# Patient Record
Sex: FEMALE | Race: WHITE | NOT HISPANIC OR LATINO | Employment: OTHER | URBAN - METROPOLITAN AREA
[De-identification: names, ages, dates, MRNs, and addresses within clinical notes are randomized per-mention and may not be internally consistent; named-entity substitution may affect disease eponyms.]

---

## 2017-01-16 DIAGNOSIS — E78.5 HYPERLIPIDEMIA LDL GOAL <160: ICD-10-CM

## 2017-01-16 DIAGNOSIS — R73.01 IMPAIRED FASTING GLUCOSE: ICD-10-CM

## 2017-01-16 DIAGNOSIS — L68.0 HIRSUTISM: ICD-10-CM

## 2017-01-16 DIAGNOSIS — E66.09 NON MORBID OBESITY DUE TO EXCESS CALORIES: ICD-10-CM

## 2017-01-16 DIAGNOSIS — F33.1 MAJOR DEPRESSIVE DISORDER, RECURRENT EPISODE, MODERATE (H): Primary | ICD-10-CM

## 2017-01-16 DIAGNOSIS — Z11.59 NEED FOR HEPATITIS C SCREENING TEST: ICD-10-CM

## 2017-01-16 NOTE — TELEPHONE ENCOUNTER
buPROPion (WELLBUTRIN XL) 150 MG 24 hr tablet  Last Written Prescription Date: 10/19/16  Last Fill Quantity: 90; # refills: 0  Last Office Visit with [facility], [facility] or [facility] prescribing provider:  5/13/16        Last PHQ-9 score on record=   PHQ-9 SCORE 10/19/2016   Total Score -   Total Score MyChart -   Total Score 6       AST       23   1/17/2012  ALT       21   1/17/2012

## 2017-01-16 NOTE — TELEPHONE ENCOUNTER
spironolactone (ALDACTONE) 100 MG tablet  Last Written Prescription Date: 12/24/15  Last Fill Quantity: 90, # refills: 3  Last Office Visit with [facility], [facility] or [facility] prescribing provider: 5/13/16       POTASSIUM   Date Value Ref Range Status   11/06/2015 4.8 3.4 - 5.3 mmol/L Final     CREATININE   Date Value Ref Range Status   11/06/2015 0.84 0.52 - 1.04 mg/dL Final     BP Readings from Last 3 Encounters:   05/13/16 128/85   04/15/16 118/80   02/22/16 110/63

## 2017-01-19 RX ORDER — SPIRONOLACTONE 100 MG/1
100 TABLET, FILM COATED ORAL EVERY MORNING
Qty: 30 TABLET | Refills: 0 | Status: SHIPPED | OUTPATIENT
Start: 2017-01-19 | End: 2017-05-23

## 2017-01-19 RX ORDER — BUPROPION HYDROCHLORIDE 150 MG/1
150 TABLET ORAL EVERY MORNING
Qty: 90 TABLET | Refills: 0 | Status: SHIPPED | OUTPATIENT
Start: 2017-01-19 | End: 2017-05-03

## 2017-01-19 NOTE — TELEPHONE ENCOUNTER
[facility] pharmacy called, please fill or deny ASAP.    Thank you,    Marnie Mcarthur

## 2017-01-19 NOTE — TELEPHONE ENCOUNTER
Routing refill request to provider for review/approval because:  Labs not current:  hugo James. PHQ 9 >4. Please sign if ok. Lab teed up.  Mamta Maurice, RN  Triage Nurse

## 2017-02-06 ENCOUNTER — TELEPHONE (OUTPATIENT)
Dept: PEDIATRICS | Facility: CLINIC | Age: 67
End: 2017-02-06

## 2017-02-06 NOTE — TELEPHONE ENCOUNTER
2/6/2017    Call Regarding Preventive Health Screening Mammogram    Attempt 1    Message on voicemail     Comments:         Outreach   Lorraine Solis

## 2017-03-06 DIAGNOSIS — F33.1 MAJOR DEPRESSIVE DISORDER, RECURRENT EPISODE, MODERATE (H): ICD-10-CM

## 2017-03-06 RX ORDER — CITALOPRAM HYDROBROMIDE 20 MG/1
20 TABLET ORAL DAILY
Qty: 90 TABLET | Refills: 0 | Status: SHIPPED | OUTPATIENT
Start: 2017-03-06 | End: 2017-05-23

## 2017-03-06 NOTE — TELEPHONE ENCOUNTER
Routing refill request to provider for review/approval because:  PHQ 9 >4, please sign if ok.   Mamta Maurice, RN  Triage Nurse

## 2017-03-06 NOTE — TELEPHONE ENCOUNTER
CITALOPRAM 20MG     Last Written Prescription Date: 8/12/2016  Last Fill Quantity: 90, # refills: 1  Last Office Visit with [facility] primary care provider:  5/13/2016        Last PHQ-9 score on record=   PHQ-9 SCORE 10/19/2016   Total Score MyChart -   Total Score 6

## 2017-03-10 ENCOUNTER — TELEPHONE (OUTPATIENT)
Dept: PEDIATRICS | Facility: CLINIC | Age: 67
End: 2017-03-10

## 2017-03-10 NOTE — TELEPHONE ENCOUNTER
Type of outreach:  Sent [employer] message.  Health Maintenance Due   Topic Date Due     ADVANCE DIRECTIVE PLANNING Q5 YRS (NO INBASKET)  08/29/1970     HEPATITIS C SCREENING  08/29/1970     MAMMO Q2 YR NO INBASKET MESSAGE  05/20/2015     DEXA Q5 YR  05/19/2016     DEPRESSION ACTION PLAN Q1 YR (NO INBASKET)  07/29/2016     BMP Q1 YR (NO INBASKET)  11/06/2016     LIPID MONITORING Q1 YEAR( NO INBASKET)  11/06/2016     Lida Barajas CMA ([facility])

## 2017-03-24 ENCOUNTER — TELEPHONE (OUTPATIENT)
Dept: PEDIATRICS | Facility: CLINIC | Age: 67
End: 2017-03-24

## 2017-03-24 ENCOUNTER — RADIANT APPOINTMENT (OUTPATIENT)
Dept: BONE DENSITY | Facility: CLINIC | Age: 67
End: 2017-03-24
Attending: INTERNAL MEDICINE
Payer: COMMERCIAL

## 2017-03-24 DIAGNOSIS — E78.5 HYPERLIPIDEMIA LDL GOAL <160: ICD-10-CM

## 2017-03-24 DIAGNOSIS — M85.80 OSTEOPENIA: ICD-10-CM

## 2017-03-24 DIAGNOSIS — R73.01 IMPAIRED FASTING GLUCOSE: ICD-10-CM

## 2017-03-24 DIAGNOSIS — Z11.59 NEED FOR HEPATITIS C SCREENING TEST: ICD-10-CM

## 2017-03-24 DIAGNOSIS — L68.0 HIRSUTISM: ICD-10-CM

## 2017-03-24 PROCEDURE — 77080 DXA BONE DENSITY AXIAL: CPT | Mod: 59 | Performed by: FAMILY MEDICINE

## 2017-03-24 PROCEDURE — 77081 DXA BONE DENSITY APPENDICULR: CPT | Performed by: FAMILY MEDICINE

## 2017-03-24 PROCEDURE — 80048 BASIC METABOLIC PNL TOTAL CA: CPT | Performed by: INTERNAL MEDICINE

## 2017-03-24 PROCEDURE — 86803 HEPATITIS C AB TEST: CPT | Performed by: INTERNAL MEDICINE

## 2017-03-24 PROCEDURE — 36415 COLL VENOUS BLD VENIPUNCTURE: CPT | Performed by: INTERNAL MEDICINE

## 2017-03-24 PROCEDURE — 80061 LIPID PANEL: CPT | Performed by: INTERNAL MEDICINE

## 2017-03-24 NOTE — TELEPHONE ENCOUNTER
Type of outreach:  Phone, left message for patient to call back.   Health Maintenance Due   Topic Date Due     ADVANCE DIRECTIVE PLANNING Q5 YRS (NO INBASKET)  08/29/1970     HEPATITIS C SCREENING  08/29/1970     MAMMO Q2 YR NO INBASKET MESSAGE  05/20/2015     DEXA Q5 YR  05/19/2016     DEPRESSION ACTION PLAN Q1 YR (NO INBASKET)  07/29/2016     BMP Q1 YR (NO INBASKET)  11/06/2016     LIPID MONITORING Q1 YEAR( NO INBASKET)  11/06/2016     PHQ-9 Q6 MONTHS (NO INBASKET)  04/19/2017     Lida Barajas CMA ([facility])

## 2017-03-24 NOTE — TELEPHONE ENCOUNTER
Reason for call: Form   Our goal is to have forms completed within 72 hours, however some forms may require a visit or additional information.     Who is the form from? Insurance comp  Where did the form come from? Patient or family brought in     What clinic location was the form placed at? [place]  Where was the form placed? 's Box  What number is listed as a contact on the form? [phone]    Phone call message - patient request for a letter, form or note:     Date needed: at your convenience  Please fax to [phone]  Has the patient signed a consent form for release of information? Not Applicable    Additional comments: Pt needs biometric screening form filled out with data from her previous physical for insurance company.    Type of letter, form or note: work--biometric    Phone Number Pt can be reached at: Cell number on file:    Telephone Information:   Mobile [phone]     Best Time: N/A  Can we leave a detailed message on this number? Not Applicable     Thank you,    Micki Aleman

## 2017-03-25 LAB
ANION GAP SERPL CALCULATED.3IONS-SCNC: 11 MMOL/L (ref 3–14)
BUN SERPL-MCNC: 18 MG/DL (ref 7–30)
CALCIUM SERPL-MCNC: 9.2 MG/DL (ref 8.5–10.1)
CHLORIDE SERPL-SCNC: 104 MMOL/L (ref 94–109)
CHOLEST SERPL-MCNC: 229 MG/DL
CO2 SERPL-SCNC: 25 MMOL/L (ref 20–32)
CREAT SERPL-MCNC: 0.88 MG/DL (ref 0.52–1.04)
GFR SERPL CREATININE-BSD FRML MDRD: 64 ML/MIN/1.7M2
GLUCOSE SERPL-MCNC: 77 MG/DL (ref 70–99)
HDLC SERPL-MCNC: 44 MG/DL
LDLC SERPL CALC-MCNC: 143 MG/DL
NONHDLC SERPL-MCNC: 185 MG/DL
POTASSIUM SERPL-SCNC: 3.8 MMOL/L (ref 3.4–5.3)
SODIUM SERPL-SCNC: 140 MMOL/L (ref 133–144)
TRIGL SERPL-MCNC: 208 MG/DL

## 2017-03-27 LAB — HCV AB SERPL QL IA: NORMAL

## 2017-03-27 NOTE — TELEPHONE ENCOUNTER
Form completed and faxed. Called and informed patient.     Lida Barajas CMA ([facility])

## 2017-03-29 ENCOUNTER — RADIANT APPOINTMENT (OUTPATIENT)
Dept: MAMMOGRAPHY | Facility: CLINIC | Age: 67
End: 2017-03-29
Attending: INTERNAL MEDICINE
Payer: COMMERCIAL

## 2017-03-29 DIAGNOSIS — Z00.00 ENCOUNTER FOR ROUTINE ADULT HEALTH EXAMINATION WITHOUT ABNORMAL FINDINGS: ICD-10-CM

## 2017-03-29 PROCEDURE — G0202 SCR MAMMO BI INCL CAD: HCPCS | Mod: TC

## 2017-05-03 DIAGNOSIS — F33.1 MAJOR DEPRESSIVE DISORDER, RECURRENT EPISODE, MODERATE (H): ICD-10-CM

## 2017-05-03 RX ORDER — BUPROPION HYDROCHLORIDE 150 MG/1
150 TABLET ORAL EVERY MORNING
Qty: 30 TABLET | Refills: 0 | Status: SHIPPED | OUTPATIENT
Start: 2017-05-03 | End: 2017-05-05

## 2017-05-03 NOTE — TELEPHONE ENCOUNTER
Medication is being filled for 1 time refill only due to:  Patient needs to be seen because due for annual physical and depression medication f/u.     PHQ-9 score:    PHQ-9 SCORE 5/3/2017   Total Score MyChart -   Total Score 3       Prescription approved per [facility] Refill Protocol.    Tayler Mchugh, RN, BSN, PHN

## 2017-05-03 NOTE — TELEPHONE ENCOUNTER
buPROPion (WELLBUTRIN XL) 150 MG 24 hr tablet  Last Written Prescription Date: 1/19/17  Last Fill Quantity: 90; # refills: 0  Last Office Visit with [facility], [facility] or [facility] prescribing provider:  5/13/16        Last PHQ-9 score on record=   PHQ-9 SCORE 10/19/2016   Total Score MyChart -   Total Score 6       Lab Results   Component Value Date    AST 23 01/17/2012     Lab Results   Component Value Date    ALT 21 01/17/2012

## 2017-05-04 ASSESSMENT — PATIENT HEALTH QUESTIONNAIRE - PHQ9: SUM OF ALL RESPONSES TO PHQ QUESTIONS 1-9: 3

## 2017-05-05 RX ORDER — BUPROPION HYDROCHLORIDE 150 MG/1
150 TABLET ORAL EVERY MORNING
Qty: 90 TABLET | Refills: 0 | Status: SHIPPED | OUTPATIENT
Start: 2017-05-05 | End: 2017-05-23

## 2017-05-05 NOTE — TELEPHONE ENCOUNTER
PA being requested due to quantity limit.   [facility] must fill [facility] day [facility]. Please advise.     Lida Barajas CMA ([facility])

## 2017-05-08 DIAGNOSIS — R73.01 IMPAIRED FASTING GLUCOSE: ICD-10-CM

## 2017-05-08 NOTE — TELEPHONE ENCOUNTER
METFORMIN 500MG         Last Written Prescription Date: 5/13/2016  Last Fill Quantity: 360, # refills: 3  Last Office Visit with [facility], [facility] or  Health prescribing provider:  5/13/2016   Next 5 appointments (look out 90 days)     May 23, 2017  4:30 PM CDT   PHYSICAL with Nallely Moore MD   [facility] ([facility])    [address] [place] [address]  [address] [place] [id number] [phone]                   BP Readings from Last 3 Encounters:   05/13/16 128/85   04/15/16 118/80   02/22/16 110/63     No results found for: MICROL  No results found for: UMALCR  Creatinine   Date Value Ref Range Status   03/24/2017 0.88 0.52 - 1.04 mg/dL Final   ]  GFR Estimate   Date Value Ref Range Status   03/24/2017 64 >60 mL/min/1.7m2 Final     Comment:     Non  GFR Calc   11/06/2015 69 >60 mL/min/1.7m2 Final     Comment:     Non  GFR Calc   09/12/2014 70 >60 mL/min/1.7m2 Final     Comment:     Non  GFR Calc     GFR Estimate If Black   Date Value Ref Range Status   03/24/2017 78 >60 mL/min/1.7m2 Final     Comment:      GFR Calc   11/06/2015 83 >60 mL/min/1.7m2 Final     Comment:      GFR Calc   09/12/2014 85 >60 mL/min/1.7m2 Final     Comment:      GFR Calc     Lab Results   Component Value Date    CHOL 229 03/24/2017     Lab Results   Component Value Date    HDL 44 03/24/2017     Lab Results   Component Value Date     03/24/2017     Lab Results   Component Value Date    TRIG 208 03/24/2017     Lab Results   Component Value Date    CHOLHDLRATIO 5.6 11/06/2015     Lab Results   Component Value Date    AST 23 01/17/2012     Lab Results   Component Value Date    ALT 21 01/17/2012     No results found for: A1C  Potassium   Date Value Ref Range Status   03/24/2017 3.8 3.4 - 5.3 mmol/L Final

## 2017-05-10 RX ORDER — METFORMIN HCL 500 MG
2000 TABLET, EXTENDED RELEASE 24 HR ORAL
Qty: 360 TABLET | Refills: 1 | Status: SHIPPED | OUTPATIENT
Start: 2017-05-10 | End: 2017-05-23

## 2017-05-10 NOTE — TELEPHONE ENCOUNTER
Prescription approved per [facility] Refill Protocol.  Upcoming appointment this month.   Mamta Maurice, RN  Triage Nurse

## 2017-05-23 ENCOUNTER — OFFICE VISIT (OUTPATIENT)
Dept: PEDIATRICS | Facility: CLINIC | Age: 67
End: 2017-05-23
Payer: COMMERCIAL

## 2017-05-23 VITALS
OXYGEN SATURATION: 95 % | HEIGHT: 66 IN | HEART RATE: 75 BPM | TEMPERATURE: 98.2 F | WEIGHT: 201.2 LBS | BODY MASS INDEX: 32.33 KG/M2 | DIASTOLIC BLOOD PRESSURE: 78 MMHG | SYSTOLIC BLOOD PRESSURE: 120 MMHG

## 2017-05-23 DIAGNOSIS — G47.33 OSA (OBSTRUCTIVE SLEEP APNEA): ICD-10-CM

## 2017-05-23 DIAGNOSIS — R73.01 IMPAIRED FASTING GLUCOSE: ICD-10-CM

## 2017-05-23 DIAGNOSIS — F33.1 MAJOR DEPRESSIVE DISORDER, RECURRENT EPISODE, MODERATE (H): ICD-10-CM

## 2017-05-23 DIAGNOSIS — Z00.00 ROUTINE GENERAL MEDICAL EXAMINATION AT A HEALTH CARE FACILITY: Primary | ICD-10-CM

## 2017-05-23 DIAGNOSIS — D12.6 TUBULAR ADENOMA OF COLON: ICD-10-CM

## 2017-05-23 DIAGNOSIS — L68.0 HIRSUTISM: ICD-10-CM

## 2017-05-23 PROCEDURE — 99396 PREV VISIT EST AGE 40-64: CPT | Performed by: INTERNAL MEDICINE

## 2017-05-23 RX ORDER — SPIRONOLACTONE 100 MG/1
100 TABLET, FILM COATED ORAL EVERY MORNING
Qty: 90 TABLET | Refills: 3 | Status: SHIPPED | OUTPATIENT
Start: 2017-05-23 | End: 2017-05-23

## 2017-05-23 RX ORDER — BUPROPION HYDROCHLORIDE 150 MG/1
150 TABLET ORAL EVERY MORNING
Qty: 90 TABLET | Refills: 3 | Status: SHIPPED | OUTPATIENT
Start: 2017-05-23 | End: 2018-03-23

## 2017-05-23 RX ORDER — CITALOPRAM HYDROBROMIDE 20 MG/1
20 TABLET ORAL DAILY
Qty: 90 TABLET | Refills: 3 | Status: SHIPPED | OUTPATIENT
Start: 2017-05-23 | End: 2018-03-20

## 2017-05-23 NOTE — PROGRESS NOTES
SUBJECTIVE:     CC: Lucie Whitehead is an 66 year old woman who presents for preventive health visit.     Physical   Annual:     Getting at least 3 servings of Calcium per day::  Yes    Bi-annual eye exam::  Yes    Dental care twice a year::  Yes    Sleep apnea or symptoms of sleep apnea::  Sleep apnea    Diet::  Regular (no restrictions)    Taking medications regularly::  Yes    Medication side effects::  None    Additional concerns today::  YES  active at work.  Walks a lot.          Today's PHQ-2 Score:   PHQ-2 ( 1999 [employer]) 5/23/2017   Q1: Little interest or pleasure in doing things Not at all   Q2: Feeling down, depressed or hopeless Not at all   PHQ-2 Score 0       Abuse: Current or Past(Physical, Sexual or Emotional)- No  Do you feel safe in your environment - Yes    Social History   Substance Use Topics     Smoking status: Former Smoker     Quit date: 1/1/1975     Smokeless tobacco: Never Used     Alcohol use 0.0 oz/week     0 Standard drinks or equivalent per week      Comment: 2-3 glass wine /week     The patient does not drink >3 drinks per day nor >7 drinks per week.    Recent Labs   Lab Test  03/24/17   1334  11/06/15   0951  07/30/14   0744   CHOL  229*  237*  146   HDL  44*  42*  52   LDL  143*  145*  65   TRIG  208*  250*  146   CHOLHDLRATIO   --   5.6*  2.8   NHDL  185*   --    --        Reviewed orders with patient.  Reviewed health maintenance and updated orders accordingly - Yes    Mammo Decision Support:  Patient over age 50, mutual decision to screen reflected in health maintenance.    Pertinent mammograms are reviewed under the imaging tab.  History of abnormal Pap smear: NO - age 30-65 PAP every 5 years with negative HPV co-testing recommended    Reviewed and updated as needed this visit by clinical staff  Tobacco  Allergies  Meds  Problems  Med Hx  Surg Hx  Fam Hx  Soc Hx          Reviewed and updated as needed this visit by Provider  Allergies  Meds  Problems       "      ROS:  C: NEGATIVE for fever, chills, change in weight  I: NEGATIVE for worrisome rashes, moles or lesions  E: NEGATIVE for vision changes or irritation  ENT: NEGATIVE for ear, mouth and throat problems  R: NEGATIVE for significant cough or SOB  B: NEGATIVE for masses, tenderness or discharge  CV: NEGATIVE for chest pain, palpitations or peripheral edema  GI: NEGATIVE for nausea, abdominal pain, heartburn, or change in bowel habits  : NEGATIVE for unusual urinary or vaginal symptoms. No vaginal bleeding.  M: NEGATIVE for significant arthralgias or myalgia  N: NEGATIVE for weakness, dizziness or paresthesias  P: NEGATIVE for changes in mood or affect     Problem list, Medication list, Allergies, and Medical/Social/Surgical histories reviewed in [facility] and updated as appropriate.  Labs reviewed in EPIC  OBJECTIVE:     /78  Pulse 75  Temp 98.2  F (36.8  C) (Oral)  Ht 5' 5.75\" (1.67 m)  Wt 201 lb 3.2 oz (91.3 kg)  LMP 11/12/2007  SpO2 95%  BMI 32.72 kg/m2  EXAM:  GENERAL: healthy, alert and no distress  EYES: Eyes grossly normal to inspection, PERRL and conjunctivae and sclerae normal  HENT: ear canals and TM's normal, nose and mouth without ulcers or lesions  NECK: no adenopathy, no asymmetry, masses, or scars and thyroid normal to palpation  RESP: lungs clear to auscultation - no rales, rhonchi or wheezes  CV: regular rate and rhythm, normal S1 S2, no S3 or S4, no murmur, click or rub, no peripheral edema and peripheral pulses strong  ABDOMEN: soft, nontender, no hepatosplenomegaly, no masses and bowel sounds normal  MS: no gross musculoskeletal defects noted, no edema  SKIN: no suspicious lesions or rashes  NEURO: Normal strength and tone, mentation intact and speech normal  PSYCH: mentation appears normal, affect normal/bright    ASSESSMENT/PLAN:     1. Routine general medical examination at a health care facility  Routine health education discussed: calcium, diet, exercise, weight, safety.     2. " "Major depressive disorder, recurrent episode, moderate (H)  Discussed, controlled  - buPROPion (WELLBUTRIN XL) 150 MG 24 hr tablet; Take 1 tablet (150 mg) by mouth every morning  Dispense: 90 tablet; Refill: 3  - citalopram (CELEXA) 20 MG tablet; Take 1 tablet (20 mg) by mouth daily  Dispense: 90 tablet; Refill: 3    3. Hirsutism  Controlled, post-menopausal.  Will try off medication and notify if desires to resume    4. Tubular adenoma of colon  recheck next yr    5. PEÑA (obstructive sleep apnea)  Continue cpap    6. Impaired fasting glucose  .inipm       COUNSELING:  Reviewed preventive health counseling, as reflected in patient instructions    BP Screening:   Last 3 BP Readings:    BP Readings from Last 3 Encounters:   05/23/17 120/78   05/13/16 128/85   04/15/16 118/80       The following was recommended to the patient:  Re-screen BP within a year and recommended lifestyle modifications     reports that she quit smoking about 42 years ago. She has never used smokeless tobacco.    Estimated body mass index is 32.72 kg/(m^2) as calculated from the following:    Height as of this encounter: 5' 5.75\" (1.67 m).    Weight as of this encounter: 201 lb 3.2 oz (91.3 kg).   Weight management plan: Discussed healthy diet and exercise guidelines and patient will follow up in 12 months in clinic to re-evaluate.    Counseling Resources:  ATP IV Guidelines  Pooled Cohorts Equation Calculator  Breast Cancer Risk Calculator  FRAX Risk Assessment  ICSI Preventive Guidelines  Dietary Guidelines for Americans, 2010  USDA's MyPlate  ASA Prophylaxis  Lung CA Screening    Nallely Moore MD  [facility]  Answers for HPI/ROS submitted by the patient on 5/23/2017   Q1: Little interest or pleasure in doing things: 0=Not at all  Q2: Feeling down, depressed or hopeless: 0=Not at all  PHQ-2 Score: 0    The 10-year ASCVD risk score (Kekerodrigo COWART Jr, et al., 2013) is: 5.3%    Values used to calculate the score:      Age: 66 years      " Sex: Female      Is Non- : No      Diabetic: No      Tobacco smoker: No      Systolic Blood Pressure: 120 mmHg      Is BP treated: No      HDL Cholesterol: 44 mg/dL      Total Cholesterol: 229 mg/dL

## 2017-05-23 NOTE — MR AVS SNAPSHOT
After Visit Summary   5/23/2017    Lucie Whitehead    MRN: [id number]           Patient Information     Date Of Birth          1950        Visit Information        Provider Department      5/23/2017 4:30 PM Nallely Moore MD [facility]        Today's Diagnoses     Routine general medical examination at a health care facility    -  1    Major depressive disorder, recurrent episode, moderate (H)        Hirsutism        Tubular adenoma of colon        PEÑA (obstructive sleep apnea)        Impaired fasting glucose          Care Instructions      Preventive Health Recommendations  Female Ages 50 - 64    Yearly exam: See your health care provider every year in order to  o Review health changes.   o Discuss preventive care.    o Review your medicines if your doctor has prescribed any.      If you get Pap tests with HPV test, you only need to test every 5 years, unless you have an abnormal result.  You are due in 2021 but paps are not needed after age 65.      Have a mammogram every 1 to 2 years.    Have a colonoscopy in 2018     Have a cholesterol test every year    Have a diabetes test (fasting glucose) every three years. If you are at risk for diabetes, you should have this test more often.     Have a bone density scan (DEXA) in 2022    Shots: Get a flu shot each year. Get a tetanus shot every 10 years.  You are due 2026.  You can consider the shingles vaccine, check with your insurance.      Nutrition:     Eat at least 5 servings of fruits and vegetables each day.    Eat whole-grain bread, whole-wheat pasta and brown rice instead of white grains and rice.    Talk to your provider about Calcium and Vitamin D.     Lifestyle    Exercise at least 150 minutes a week (30 minutes a day, 5 days a week). This will help you control your weight and prevent disease.    Limit alcohol to one drink per day.    No smoking.     Wear sunscreen to prevent skin cancer.     See your dentist every six  "months for an exam and cleaning.    See your eye doctor every 1 to 2 years.    Try stopping the spironolactone - let me know if your hair growth gets worse.    Try stopping the metformin - let me know if your weight goes up.        Follow-ups after your visit        Who to contact     If you have questions or need follow up information about today's clinic visit or your schedule please contact [facility] directly at [phone].  Normal or non-critical lab and imaging results will be communicated to you by [employer], letter or phone within 4 business days after the clinic has received the results. If you do not hear from us within 7 days, please contact the clinic through [employer] or phone. If you have a critical or abnormal lab result, we will notify you by phone as soon as possible.  Submit refill requests through [employer] or call your pharmacy and they will forward the refill request to us. Please allow 3 business days for your refill to be completed.          Additional Information About Your Visit        [employer] Information     [employer] gives you secure access to your electronic health record. If you see a primary care provider, you can also send messages to your care team and make appointments. If you have questions, please call your primary care clinic.  If you do not have a primary care provider, please call [phone] and they will assist you.        Care EveryWhere ID     This is your Care EveryWhere ID. This could be used by other organizations to access your [facility] medical records  [id number]        Your Vitals Were     Pulse Temperature Height Last Period Pulse Oximetry BMI (Body Mass Index)    75 98.2  F (36.8  C) (Oral) 5' 5.75\" (1.67 m) 11/12/2007 95% 32.72 kg/m2       Blood Pressure from Last 3 Encounters:   05/23/17 120/78   05/13/16 128/85   04/15/16 118/80    Weight from Last 3 Encounters:   05/23/17 201 lb 3.2 oz (91.3 kg)   05/13/16 203 lb 14.4 oz (92.5 kg)   04/15/16 204 lb (92.5 " kg)              Today, you had the following     No orders found for display         Today's Medication Changes          These changes are accurate as of: 5/23/17  5:26 PM.  If you have any questions, ask your nurse or doctor.               These medicines have changed or have updated prescriptions.        Dose/Directions    buPROPion 150 MG 24 hr tablet   Commonly known as:  WELLBUTRIN XL   This may have changed:  additional instructions   Used for:  Major depressive disorder, recurrent episode, moderate (H)   Changed by:  Nallely Moore MD        Dose:  150 mg   Take 1 tablet (150 mg) by mouth every morning   Quantity:  90 tablet   Refills:  3         Stop taking these medicines if you haven't already. Please contact your care team if you have questions.     spironolactone 100 MG tablet   Commonly known as:  ALDACTONE   Stopped by:  Nallely Moore MD                Where to get your medicines      These medications were [facility] to [id number] - [facility] [address]  [address] [facility] [address]     Phone:  [phone]     buPROPion 150 MG 24 hr tablet    citalopram 20 MG tablet                Primary Care Provider Office Phone # Fax #    Nallely Moore [phone]       [address] [place] [address]        Thank you!     Thank you for choosing [facility]  for your care. Our goal is always to provide you with excellent care. Hearing back from our patients is one way we can continue to improve our services. Please take a few minutes to complete the written survey that you may receive in the mail after your visit with us. Thank you!             Your Updated Medication List - Protect others around you: Learn how to safely use, store and throw away your medicines at www.disposemymeds.org.          This list is accurate as of: 5/23/17  5:26 PM.  Always use your most recent med list.                   Brand Name Dispense  Instructions for use    ALEVE PO      Take 220 mg by mouth daily Takes up to 4 tablets depending on day       buPROPion 150 MG 24 hr tablet    WELLBUTRIN XL    90 tablet    Take 1 tablet (150 mg) by mouth every morning       CALCIUM PO      Take 950 mg by mouth 2 times daily.       carboxymethylcellulose 0.5 % Soln ophthalmic solution    REFRESH PLUS     Place 1 drop into both eyes 4 times daily Reported on 5/23/2017       citalopram 20 MG tablet    celeXA    90 tablet    Take 1 tablet (20 mg) by mouth daily       OCUVITE ADULT 50+ PO      Take  by mouth daily.       ranitidine 75 MG tablet   Generic drug:  ranitidine      Take 75 mg by mouth 2 times daily as needed.       TYLENOL PO      Take 1,300 mg by mouth 2 times daily       VITAMIN D3 PO      Take 1,000 Units by mouth daily       ZYRTEC 10 MG tablet   Generic drug:  cetirizine      1 TABLET EACH MORNING

## 2017-05-23 NOTE — NURSING NOTE
"Chief Complaint   Patient presents with     Physical       Initial /78  Pulse 75  Temp 98.2  F (36.8  C) (Oral)  Ht 5' 5.75\" (1.67 m)  Wt 201 lb 3.2 oz (91.3 kg)  LMP 11/12/2007  SpO2 95%  BMI 32.72 kg/m2 Estimated body mass index is 32.72 kg/(m^2) as calculated from the following:    Height as of this encounter: 5' 5.75\" (1.67 m).    Weight as of this encounter: 201 lb 3.2 oz (91.3 kg).  Medication Reconciliation: complete   Rafal Gross CMA        "

## 2017-05-23 NOTE — PATIENT INSTRUCTIONS
Preventive Health Recommendations  Female Ages 50 - 64    Yearly exam: See your health care provider every year in order to  o Review health changes.   o Discuss preventive care.    o Review your medicines if your doctor has prescribed any.      If you get Pap tests with HPV test, you only need to test every 5 years, unless you have an abnormal result.  You are due in 2021 but paps are not needed after age 65.      Have a mammogram every 1 to 2 years.    Have a colonoscopy in 2018     Have a cholesterol test every year    Have a diabetes test (fasting glucose) every three years. If you are at risk for diabetes, you should have this test more often.     Have a bone density scan (DEXA) in 2022    Shots: Get a flu shot each year. Get a tetanus shot every 10 years.  You are due 2026.  You can consider the shingles vaccine, check with your insurance.      Nutrition:     Eat at least 5 servings of fruits and vegetables each day.    Eat whole-grain bread, whole-wheat pasta and brown rice instead of white grains and rice.    Talk to your provider about Calcium and Vitamin D.     Lifestyle    Exercise at least 150 minutes a week (30 minutes a day, 5 days a week). This will help you control your weight and prevent disease.    Limit alcohol to one drink per day.    No smoking.     Wear sunscreen to prevent skin cancer.     See your dentist every six months for an exam and cleaning.    See your eye doctor every 1 to 2 years.    Try stopping the spironolactone - let me know if your hair growth gets worse.    Try stopping the metformin - let me know if your weight goes up.

## 2018-01-10 ENCOUNTER — TELEPHONE (OUTPATIENT)
Dept: PEDIATRICS | Facility: CLINIC | Age: 68
End: 2018-01-10

## 2018-01-10 ENCOUNTER — OFFICE VISIT (OUTPATIENT)
Dept: PEDIATRICS | Facility: CLINIC | Age: 68
End: 2018-01-10
Payer: COMMERCIAL

## 2018-01-10 VITALS
WEIGHT: 222.6 LBS | BODY MASS INDEX: 35.77 KG/M2 | HEIGHT: 66 IN | DIASTOLIC BLOOD PRESSURE: 80 MMHG | TEMPERATURE: 99 F | SYSTOLIC BLOOD PRESSURE: 118 MMHG | OXYGEN SATURATION: 97 % | HEART RATE: 83 BPM

## 2018-01-10 DIAGNOSIS — Z98.890 HISTORY OF SPINAL SURGERY: ICD-10-CM

## 2018-01-10 DIAGNOSIS — R20.2 PARESTHESIA: Primary | ICD-10-CM

## 2018-01-10 DIAGNOSIS — M48.061 SPINAL STENOSIS OF LUMBAR REGION WITHOUT NEUROGENIC CLAUDICATION: ICD-10-CM

## 2018-01-10 DIAGNOSIS — M54.16 LUMBAR RADICULOPATHY: ICD-10-CM

## 2018-01-10 PROCEDURE — 99213 OFFICE O/P EST LOW 20 MIN: CPT | Mod: GE | Performed by: INTERNAL MEDICINE

## 2018-01-10 RX ORDER — PREDNISONE 20 MG/1
40 TABLET ORAL DAILY
Qty: 10 TABLET | Refills: 0 | Status: SHIPPED | OUTPATIENT
Start: 2018-01-10 | End: 2018-01-15

## 2018-01-10 NOTE — TELEPHONE ENCOUNTER
Patient calling that she has had several surgeries on her spine but can't get in with spine surgeon until tomorrow afternoon. She states she feels she will need to go to the ER if she can't be seen today. Leg numbness, pain, and difficulty walking. She is wanting prednisone. Appointment scheduled.   Luanne Rojas RN

## 2018-01-10 NOTE — PROGRESS NOTES
SUBJECTIVE:   Lucie Whitehead is a 67 year old female who presents to clinic today for the following health issues:    Musculoskeletal problem/pain      Duration: a few months     Description  Location: lower right side of back radiating into leg and knee     Intensity:  severe    Accompanying signs and symptoms: radiation of pain to knee, numbness, hot/cold sensations, knee pain     History  Previous similar problem: YES- pt had back surgery in the back   Previous evaluation:  none    Precipitating or alleviating factors:  Trauma or overuse: no   Aggravating factors include: standing    Therapies tried and outcome: only monetarily stretching/lifiting  out leg    Has a history of multiple back surgeries for lumbar stenosis, including a prior surgery that was emergent due to bladder incontinence and paralysis in the lower extremities.   States she always has some baseline numbness in the right upper lateral thigh but in the past few months she has been developing numbness that radiates from the buttock down the back and side of the right leg.   It develops after periods of standing for a few minutes and will get worse and worse with onset of achy pain and weakness.   She comes in today because that has been having the sensation more frequently and more severely.     If she flexes at the hip and props her flexed knee on the edge of a table or if she sits down, that relieves the numbness/ache. Also mentions that leaning over onto a countertop helps to relieve the pain.    Has an appointment tomorrow with a neuro surgeon.   When she has had this issue in the past it has been managed with steroids which helped.     No fevers/chills. No falls/trauma. No unintentional weight loss. No saddle anesthesia. No unintentional bowel/bladder incontinence.    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 Has not been driving more than usual for her job    Always has a bit of lumbar back pain    Takes aleve;  acetaminophen    Problem list and histories reviewed & adjusted, as indicated.  Additional history: as documented    Patient Active Problem List   Diagnosis     Hirsutism     Leiomyoma of uterus     Ovarian cyst     Hyperlipidemia LDL goal <160     Lumbar spinal stenosis     PEÑA (obstructive sleep apnea)     Impaired fasting glucose     Macular degeneration     Tubular adenoma of colon     Major depressive disorder, recurrent episode (H)     Non morbid obesity due to excess calories     Osteopenia     Past Surgical History:   Procedure Laterality Date     ARTHROSCOPY SHOULDER DISTAL CLAVICLE REPAIR Right 2014    Procedure: ARTHROSCOPY SHOULDER DISTAL CLAVICLE RESECTION;  Surgeon: John Paul Rodriguez MD;  Location: [facility] OR     C NONSPECIFIC PROCEDURE      Bicep tendon repair    Abstracted 02     C/SECTION, LOW TRANSVERSE  x 3    , Low Transverse x 4     DECOMPRESSION, FUSION CERVICAL ANTERIOR TWO LEVELS, COMBINED  2012    Procedure:COMBINED DECOMPRESSION, FUSION CERVICAL ANTERIOR TWO LEVELS; DECOMPRESSION, FUSION CERVICAL ANTERIOR C4-6; Surgeon:DAVIDE ALCALA; Location:[facility] OR     DECOMPRESSION, FUSION LUMBAR POSTERIOR ONE LEVEL, COMBINED  3/7/2013    Procedure: COMBINED DECOMPRESSION, FUSION LUMBAR POSTERIOR ONE LEVEL;  Posterior Fusion L2-3, Hardware Removal L3-5;  Surgeon: Davide Alcala MD;  Location: [facility] OR     FUSION LUMBAR ANTERIOR ONE LEVEL  2013    Procedure: FUSION LUMBAR ANTERIOR ONE LEVEL;  Anterior Fusion L2-3;  Surgeon: Davide Alcala MD;  Location: [facility] OR     LAMINECTOMY, FUSION LUMBAR TWO LEVELS, COMBINED  3/11    AP fusion L3-5       Social History   Substance Use Topics     Smoking status: Former Smoker     Quit date: 1975     Smokeless tobacco: Never Used     Alcohol use 0.0 oz/week     0 Standard drinks or equivalent per week      Comment: 2-3 glass wine /week     Family History   Problem Relation Age of Onset     Prostate Cancer Father      Cancer -  colorectal Mother      born 1978 HTN and DMII     DIABETES Mother      type II     Coronary Artery Disease Mother      CHF     DIABETES Maternal Grandmother      type II     Hypertension Maternal Grandmother      Cancer - colorectal Maternal Grandmother      DIABETES Maternal Uncle      type II     Hypertension Maternal Uncle      Respiratory Paternal Grandmother      asthma     DIABETES Son      dx age 21type I         Current Outpatient Prescriptions   Medication Sig Dispense Refill     predniSONE (DELTASONE) 20 MG tablet Take 2 tablets (40 mg) by mouth daily for 5 days 10 tablet 0     buPROPion (WELLBUTRIN XL) 150 MG 24 hr tablet Take 1 tablet (150 mg) by mouth every morning 90 tablet 3     citalopram (CELEXA) 20 MG tablet Take 1 tablet (20 mg) by mouth daily 90 tablet 3     Cholecalciferol (VITAMIN D3 PO) Take 1,000 Units by mouth daily       carboxymethylcellulose (REFRESH PLUS) 0.5 % SOLN Place 1 drop into both eyes 4 times daily Reported on 5/23/2017       ranitidine (ZANTAC 75) 75 MG tablet Take 75 mg by mouth 2 times daily as needed.       CALCIUM PO Take 950 mg by mouth 2 times daily.       Multiple Vitamins-Minerals (OCUVITE ADULT 50+ PO) Take  by mouth daily.       ZYRTEC 10 MG OR TABS 1 TABLET EACH MORNING       Naproxen Sodium (ALEVE PO) Take 220 mg by mouth daily Takes up to 4 tablets depending on day       Acetaminophen (TYLENOL PO) Take 1,300 mg by mouth 2 times daily        Allergies   Allergen Reactions     Animal Dander      Cats      Dogs      Nkda [No Known Drug Allergies]      Seasonal Allergies      Trees and mold     BP Readings from Last 3 Encounters:   01/10/18 118/80   05/23/17 120/78   05/13/16 128/85    Wt Readings from Last 3 Encounters:   01/10/18 222 lb 9.6 oz (101 kg)   05/23/17 201 lb 3.2 oz (91.3 kg)   05/13/16 203 lb 14.4 oz (92.5 kg)         Reviewed and updated as needed this visit by clinical staff  Tobacco  Allergies  Meds  Problems  Med Hx  Surg Hx  Fam Hx  Soc Hx  "       Reviewed and updated as needed this visit by Provider  Allergies  Meds  Problems         ROS:  Constitutional, HEENT, cardiovascular, pulmonary, gi and gu systems are negative, except as otherwise noted.    OBJECTIVE:   /80 (BP Location: Right arm, Patient Position: Right side, Cuff Size: Adult Large)  Pulse 83  Temp 99  F (37.2  C) (Tympanic)  Ht 5' 5.75\" (1.67 m)  Wt 222 lb 9.6 oz (101 kg)  LMP 11/12/2007  SpO2 97%  BMI 36.2 kg/m2  Body mass index is 36.2 kg/(m^2).  GENERAL: healthy, alert and no distress  EYES: Eyes grossly normal to inspection, PERRL and conjunctivae and sclerae normal  RESP: lungs clear to auscultation - no rales, rhonchi or wheezes  CV: regular rate and rhythm, normal S1 S2, no S3 or S4, no murmur, click or rub, no peripheral edema and peripheral pulses strong  MS: Pain to palpation over the sciatic notch on the right side.   Pain to palpation of the lumbar spine over scar (not new). Has pain in the right buttock with straight leg raises on the left and right.  SKIN: no suspicious lesions or rashes. Has midline lumbar scar.  NEURO: Normal strength and tone of bilateral lower extremities including hip flexors, knee flexion/extension, dorsiflexion/plantar flexion. Mentation intact and speech normal. Has diminished sensation on the lateral thigh on the right leg (not new). Diminished sensation on the lateral shin on right leg.  PSYCH: mentation appears normal, affect normal/bright     Diagnostic Test Results: none     ASSESSMENT/PLAN:   (R20.2) Paresthesia  (primary encounter diagnosis)  (M54.16) Lumbar radiculopathy  (Z98.890) History of spinal surgery  (M48.061) Spinal stenosis of lumbar region without neurogenic claudication  Comment: Her symptoms in the setting of prior spinal surgery and her h/o spinal stenosis is quite concerning for worsening of the spinal stenosis. No signs of cauda equina or signs that emergent surgery is warranted but the escalation of her " symptoms is definitely concerning. Considered other etiologies such as fracture, mass but the intermittent nature suggests a reversible neurologic process. I do not think emergent imaging is warranted but because she has an appointment with the neurosurgeons  Tomorrow, will defer to them for imaging concerns.    Plan:   -Will start steroid burst-  predniSONE (DELTASONE) 40 MG x 5 days  -Recommend follow-up with neurosurgery  -Reviewed symptoms/signs that should prompt emergency evaluation    Follow-up: if worsening/not improving    Brittney Moulton MD  [facility]    I have discussed the patient with the resident and agree with the jointly developed plan as documented above    May Michel MD  Internal Medicine - Pediatrics

## 2018-01-10 NOTE — PATIENT INSTRUCTIONS
1. This sounds like a pinched nerve that needs evaluation but not in an emergent fashion. Please follow-up with the spinal surgeons tomorrow as you planned and in the meantime, start the steroid burst.     Reasons to be seen emergently would be numbness/tingling when you wipe after going to the bathroom, sudden bowel/bladder incontinence, new weakness that is not resolving, numbness that doesn't resolve, fevers.     It was a pleasure to see you today!    Dr. Brittney Moulton

## 2018-01-10 NOTE — MR AVS SNAPSHOT
After Visit Summary   1/10/2018    Lucie Whitehead    MRN: [id number]           Patient Information     Date Of Birth          1950        Visit Information        Provider Department      1/10/2018 1:00 PM Brittney Moulton MD [facility]        Today's Diagnoses     Paresthesia    -  1    Lumbar radiculopathy        History of spinal surgery        Spinal stenosis of lumbar region without neurogenic claudication          Care Instructions    1. This sounds like a pinched nerve that needs evaluation but not in an emergent fashion. Please follow-up with the spinal surgeons tomorrow as you planned and in the meantime, start the steroid burst.     Reasons to be seen emergently would be numbness/tingling when you wipe after going to the bathroom, sudden bowel/bladder incontinence, new weakness that is not resolving, numbness that doesn't resolve, fevers.     It was a pleasure to see you today!    Dr. Brittney Moulton            Follow-ups after your visit        Who to contact     If you have questions or need follow up information about today's clinic visit or your schedule please contact [facility] directly at [phone].  Normal or non-critical lab and imaging results will be communicated to you by [employer], letter or phone within 4 business days after the clinic has received the results. If you do not hear from us within 7 days, please contact the clinic through [employer] or phone. If you have a critical or abnormal lab result, we will notify you by phone as soon as possible.  Submit refill requests through [employer] or call your pharmacy and they will forward the refill request to us. Please allow 3 business days for your refill to be completed.          Additional Information About Your Visit        [employer] Information     [employer] gives you secure access to your electronic health record. If you see a primary care provider, you can also send messages to your care team and  "make appointments. If you have questions, please call your primary care clinic.  If you do not have a primary care provider, please call [phone] and they will assist you.        Care EveryWhere ID     This is your Care EveryWhere ID. This could be used by other organizations to access your [place] medical records  [id number]        Your Vitals Were     Pulse Temperature Height Last Period Pulse Oximetry BMI (Body Mass Index)    83 99  F (37.2  C) (Tympanic) 5' 5.75\" (1.67 m) 11/12/2007 97% 36.2 kg/m2       Blood Pressure from Last 3 Encounters:   01/10/18 118/80   05/23/17 120/78   05/13/16 128/85    Weight from Last 3 Encounters:   01/10/18 222 lb 9.6 oz (101 kg)   05/23/17 201 lb 3.2 oz (91.3 kg)   05/13/16 203 lb 14.4 oz (92.5 kg)              Today, you had the following     No orders found for display         Today's Medication Changes          These changes are accurate as of: 1/10/18  1:42 PM.  If you have any questions, ask your nurse or doctor.               Start taking these medicines.        Dose/Directions    predniSONE 20 MG tablet   Commonly known as:  DELTASONE   Used for:  Paresthesia, Lumbar radiculopathy, History of spinal surgery   Started by:  Brittney Moulton MD        Dose:  40 mg   Take 2 tablets (40 mg) by mouth daily for 5 days   Quantity:  10 tablet   Refills:  0            Where to get your medicines      These medications were sent to [facility]/pharmacy #[id number] - [facility] [address]  [address] [address] [facility] [address]     Phone:  [phone]     predniSONE 20 MG tablet                Primary Care Provider Office Phone # Fax #    Nallely Moore [phone] [address] [place] [address]        Equal Access to Services     DESTINY QUEZADA : Bertha Luciano, wasteffanie luqlaine, qaybta kaalmaelsy gutiérrez, nathan bucio. So [facility] [phone].    ATENCIÓN: Si arabella freeman " disposición servicios gratuitos de asistencia lingüística. Luz wei [phone].    We comply with applicable federal civil rights laws and [place] laws. We do not discriminate on the basis of race, color, national origin, age, disability, sex, sexual orientation, or gender identity.            Thank you!     Thank you for choosing [facility]  for your care. Our goal is always to provide you with excellent care. Hearing back from our patients is one way we can continue to improve our services. Please take a few minutes to complete the written survey that you may receive in the mail after your visit with us. Thank you!             Your Updated Medication List - Protect others around you: Learn how to safely use, store and throw away your medicines at www.disposemymeds.org.          This list is accurate as of: 1/10/18  1:42 PM.  Always use your most recent med list.                   Brand Name Dispense Instructions for use Diagnosis    ALEVE PO      Take 220 mg by mouth daily Takes up to 4 tablets depending on day        buPROPion 150 MG 24 hr tablet    WELLBUTRIN XL    90 tablet    Take 1 tablet (150 mg) by mouth every morning    Major depressive disorder, recurrent episode, moderate (H)       CALCIUM PO      Take 950 mg by mouth 2 times daily.        carboxymethylcellulose 0.5 % Soln ophthalmic solution    REFRESH PLUS     Place 1 drop into both eyes 4 times daily Reported on 5/23/2017        citalopram 20 MG tablet    celeXA    90 tablet    Take 1 tablet (20 mg) by mouth daily    Major depressive disorder, recurrent episode, moderate (H)       OCUVITE ADULT 50+ PO      Take  by mouth daily.        predniSONE 20 MG tablet    DELTASONE    10 tablet    Take 2 tablets (40 mg) by mouth daily for 5 days    Paresthesia, Lumbar radiculopathy, History of spinal surgery       ranitidine 75 MG tablet   Generic drug:  ranitidine      Take 75 mg by mouth 2 times daily as needed.        TYLENOL PO      Take  1,300 mg by mouth 2 times daily        VITAMIN D3 PO      Take 1,000 Units by mouth daily        ZYRTEC 10 MG tablet   Generic drug:  cetirizine      1 TABLET EACH MORNING

## 2018-01-10 NOTE — NURSING NOTE
"Chief Complaint   Patient presents with     Musculoskeletal Problem       Initial /80 (BP Location: Right arm, Patient Position: Right side, Cuff Size: Adult Large)  Pulse 83  Temp 99  F (37.2  C) (Tympanic)  Ht 5' 5.75\" (1.67 m)  Wt 222 lb 9.6 oz (101 kg)  LMP 11/12/2007  SpO2 97%  BMI 36.2 kg/m2 Estimated body mass index is 36.2 kg/(m^2) as calculated from the following:    Height as of this encounter: 5' 5.75\" (1.67 m).    Weight as of this encounter: 222 lb 9.6 oz (101 kg).  Medication Reconciliation: complete  "

## 2018-02-15 ENCOUNTER — TRANSFERRED RECORDS (OUTPATIENT)
Dept: HEALTH INFORMATION MANAGEMENT | Facility: CLINIC | Age: 68
End: 2018-02-15

## 2018-03-20 DIAGNOSIS — F33.1 MAJOR DEPRESSIVE DISORDER, RECURRENT EPISODE, MODERATE (H): ICD-10-CM

## 2018-03-20 NOTE — TELEPHONE ENCOUNTER
"Requested Prescriptions   Pending Prescriptions Disp Refills     citalopram (CELEXA) 20 MG tablet [Pharmacy Med Name: CITALOPRAM HBR 20 MG TABLET]    Last Written Prescription Date:  5/23/2017  Last Fill Quantity: 90,  # refills: 3   Last office visit: 1/10/2018 with prescribing provider:  Brittney Moulton MD      Future Office Visit:   Next 5 appointments (look out 90 days)     Mar 23, 2018  9:30 AM CDT   [facility]    [address] [place] [address]  [address]  [facility] [id number] [phone]                  90 tablet 3     Sig: TAKE 1 TABLET (20 MG) BY MOUTH DAILY    SSRIs Protocol Failed    3/20/2018  8:14 AM       Failed - PHQ-9 score less than 5 in past 6 months    Please review last PHQ-9 score.     PHQ-9 SCORE 8/12/2016 10/19/2016 5/3/2017   Total Score - - -   Total Score MyChart 11 (Moderate depression) - -   Total Score 11 6 3     No flowsheet data found.             Passed - Patient is age 18 or older       Passed - No active pregnancy on record       Passed - No positive pregnancy test in last 12 months       Passed - Recent (6 mo) or future (30 days) visit within the authorizing provider's specialty    Patient had office visit in the last 6 months or has a visit in the next 30 days with authorizing provider or within the authorizing provider's specialty.  See \"Patient Info\" tab in inbasket, or \"Choose Columns\" in Meds & Orders section of the refill encounter.              "

## 2018-03-21 RX ORDER — CITALOPRAM HYDROBROMIDE 20 MG/1
TABLET ORAL
Qty: 90 TABLET | Refills: 0 | Status: SHIPPED | OUTPATIENT
Start: 2018-03-21 | End: 2018-03-23

## 2018-03-21 NOTE — TELEPHONE ENCOUNTER
Will refill for one months sean.  Needs labs and office visit complete before future refills.    Aaron Benedict RN

## 2018-03-23 ENCOUNTER — OFFICE VISIT (OUTPATIENT)
Dept: PEDIATRICS | Facility: CLINIC | Age: 68
End: 2018-03-23
Payer: COMMERCIAL

## 2018-03-23 ENCOUNTER — THERAPY VISIT (OUTPATIENT)
Dept: PHYSICAL THERAPY | Facility: CLINIC | Age: 68
End: 2018-03-23
Payer: COMMERCIAL

## 2018-03-23 VITALS
SYSTOLIC BLOOD PRESSURE: 134 MMHG | WEIGHT: 223.2 LBS | DIASTOLIC BLOOD PRESSURE: 88 MMHG | HEART RATE: 85 BPM | HEIGHT: 66 IN | OXYGEN SATURATION: 96 % | TEMPERATURE: 97.4 F | BODY MASS INDEX: 35.87 KG/M2

## 2018-03-23 DIAGNOSIS — R53.83 FATIGUE, UNSPECIFIED TYPE: Primary | ICD-10-CM

## 2018-03-23 DIAGNOSIS — R73.01 IMPAIRED FASTING GLUCOSE: ICD-10-CM

## 2018-03-23 DIAGNOSIS — E78.5 HYPERLIPIDEMIA LDL GOAL <160: ICD-10-CM

## 2018-03-23 DIAGNOSIS — D12.6 TUBULAR ADENOMA OF COLON: ICD-10-CM

## 2018-03-23 DIAGNOSIS — Z23 ENCOUNTER FOR IMMUNIZATION: ICD-10-CM

## 2018-03-23 DIAGNOSIS — Z12.11 SCREEN FOR COLON CANCER: ICD-10-CM

## 2018-03-23 DIAGNOSIS — M54.50 LUMBAGO: Primary | ICD-10-CM

## 2018-03-23 DIAGNOSIS — F33.1 MAJOR DEPRESSIVE DISORDER, RECURRENT EPISODE, MODERATE (H): ICD-10-CM

## 2018-03-23 DIAGNOSIS — M85.80 OSTEOPENIA, UNSPECIFIED LOCATION: ICD-10-CM

## 2018-03-23 PROBLEM — Z71.89 ADVANCE CARE PLANNING: Chronic | Status: ACTIVE | Noted: 2018-03-23

## 2018-03-23 LAB
BASOPHILS # BLD AUTO: 0.1 10E9/L (ref 0–0.2)
BASOPHILS NFR BLD AUTO: 1 %
DEPRECATED CALCIDIOL+CALCIFEROL SERPL-MC: 32 UG/L (ref 20–75)
DIFFERENTIAL METHOD BLD: NORMAL
EOSINOPHIL # BLD AUTO: 0.3 10E9/L (ref 0–0.7)
EOSINOPHIL NFR BLD AUTO: 6.8 %
ERYTHROCYTE [DISTWIDTH] IN BLOOD BY AUTOMATED COUNT: 12.9 % (ref 10–15)
HCT VFR BLD AUTO: 41.6 % (ref 35–47)
HGB BLD-MCNC: 13.3 G/DL (ref 11.7–15.7)
LYMPHOCYTES # BLD AUTO: 1.3 10E9/L (ref 0.8–5.3)
LYMPHOCYTES NFR BLD AUTO: 26.9 %
MCH RBC QN AUTO: 31.8 PG (ref 26.5–33)
MCHC RBC AUTO-ENTMCNC: 32 G/DL (ref 31.5–36.5)
MCV RBC AUTO: 100 FL (ref 78–100)
MONOCYTES # BLD AUTO: 0.3 10E9/L (ref 0–1.3)
MONOCYTES NFR BLD AUTO: 6.8 %
NEUTROPHILS # BLD AUTO: 2.9 10E9/L (ref 1.6–8.3)
NEUTROPHILS NFR BLD AUTO: 58.5 %
NT-PROBNP SERPL-MCNC: 44 PG/ML (ref 0–125)
PLATELET # BLD AUTO: 290 10E9/L (ref 150–450)
RBC # BLD AUTO: 4.18 10E12/L (ref 3.8–5.2)
WBC # BLD AUTO: 5 10E9/L (ref 4–11)

## 2018-03-23 PROCEDURE — 97110 THERAPEUTIC EXERCISES: CPT | Mod: GP | Performed by: PHYSICAL THERAPIST

## 2018-03-23 PROCEDURE — 85025 COMPLETE CBC W/AUTO DIFF WBC: CPT | Performed by: INTERNAL MEDICINE

## 2018-03-23 PROCEDURE — 80061 LIPID PANEL: CPT | Performed by: INTERNAL MEDICINE

## 2018-03-23 PROCEDURE — 36415 COLL VENOUS BLD VENIPUNCTURE: CPT | Performed by: INTERNAL MEDICINE

## 2018-03-23 PROCEDURE — 82306 VITAMIN D 25 HYDROXY: CPT | Performed by: INTERNAL MEDICINE

## 2018-03-23 PROCEDURE — 99214 OFFICE O/P EST MOD 30 MIN: CPT | Performed by: INTERNAL MEDICINE

## 2018-03-23 PROCEDURE — 83880 ASSAY OF NATRIURETIC PEPTIDE: CPT | Performed by: INTERNAL MEDICINE

## 2018-03-23 PROCEDURE — 97161 PT EVAL LOW COMPLEX 20 MIN: CPT | Mod: GP | Performed by: PHYSICAL THERAPIST

## 2018-03-23 PROCEDURE — 83721 ASSAY OF BLOOD LIPOPROTEIN: CPT | Performed by: INTERNAL MEDICINE

## 2018-03-23 PROCEDURE — 80053 COMPREHEN METABOLIC PANEL: CPT | Performed by: INTERNAL MEDICINE

## 2018-03-23 PROCEDURE — 84443 ASSAY THYROID STIM HORMONE: CPT | Performed by: INTERNAL MEDICINE

## 2018-03-23 RX ORDER — CYCLOSPORINE 0.5 MG/ML
EMULSION OPHTHALMIC
Refills: 0 | COMMUNITY
Start: 2018-01-25 | End: 2019-10-01

## 2018-03-23 RX ORDER — CITALOPRAM HYDROBROMIDE 20 MG/1
TABLET ORAL
Qty: 90 TABLET | Refills: 3 | Status: SHIPPED | OUTPATIENT
Start: 2018-03-23 | End: 2019-03-26

## 2018-03-23 RX ORDER — BUPROPION HYDROCHLORIDE 150 MG/1
150 TABLET ORAL EVERY MORNING
Qty: 90 TABLET | Refills: 3 | Status: SHIPPED | OUTPATIENT
Start: 2018-03-23 | End: 2019-03-26

## 2018-03-23 NOTE — MR AVS SNAPSHOT
After Visit Summary   3/23/2018    Lucie Whitehead    MRN: [id number]           Patient Information     Date Of Birth          1950        Visit Information        Provider Department      3/23/2018 9:30 AM Nallely Moore MD [facility]        Today's Diagnoses     Fatigue, unspecified type    -  1    Tubular adenoma of colon        Screen for colon cancer        Class 2 obesity due to excess calories with serious comorbidity and body mass index (BMI) of 36.0 to 36.9 in adult        Hyperlipidemia LDL goal <160        Impaired fasting glucose        Major depressive disorder, recurrent episode, moderate (H)        Osteopenia, unspecified location          Care Instructions    I will let you know about labs and next steps.      They will call you about the colonoscopy.          Follow-ups after your visit        Additional Services     GASTROENTEROLOGY ADULT REF PROCEDURE ONLY       Last Lab Result: Creatinine (mg/dL)       Date                     Value                 03/24/2017               0.88             ----------  Body mass index is 36.3 kg/(m^2).      Patient will be contacted to schedule procedure.     Please be aware that coverage of these services is subject to the terms and limitations of your health insurance plan.  Call member services at your health plan with any benefit or coverage questions.  Any procedures must be performed at a [facility] facility OR coordinated by your clinic's referral office.    Please bring the following with you to your appointment:    (1) Any X-Rays, CTs or MRIs which have been performed.  Contact the facility where they were done to arrange for  prior to your scheduled appointment.    (2) List of current medications   (3) This referral request   (4) Any documents/labs given to you for this referral                  Follow-up notes from your care team     Return in about 1 year (around 3/23/2019) for Physical Exam.      Your next 10  "appointments already scheduled     Mar 23, 2018  2:20 PM CDT   (Arrive by 2:05 PM)   [facility] Spine with Maninder Valentino, PT   [facility] ([facility] [place] [facility])    [address]  [facility] [address] [phone]              Who to contact     If you have questions or need follow up information about today's clinic visit or your schedule please contact [facility] directly at [phone].  Normal or non-critical lab and imaging results will be communicated to you by [employer], letter or phone within 4 business days after the clinic has received the results. If you do not hear from us within 7 days, please contact the clinic through [employer] or phone. If you have a critical or abnormal lab result, we will notify you by phone as soon as possible.  Submit refill requests through [employer] or call your pharmacy and they will forward the refill request to us. Please allow 3 business days for your refill to be completed.          Additional Information About Your Visit        [employer] Information     [employer] gives you secure access to your electronic health record. If you see a primary care provider, you can also send messages to your care team and make appointments. If you have questions, please call your primary care clinic.  If you do not have a primary care provider, please call [phone] and they will assist you.        Care EveryWhere ID     This is your Care EveryWhere ID. This could be used by other organizations to access your [place] medical records  [id number]        Your Vitals Were     Pulse Temperature Height Last Period Pulse Oximetry BMI (Body Mass Index)    85 97.4  F (36.3  C) (Tympanic) 5' 5.75\" (1.67 m) 11/12/2007 96% 36.3 kg/m2       Blood Pressure from Last 3 Encounters:   03/23/18 134/88   01/10/18 118/80   05/23/17 120/78    Weight from Last 3 Encounters:   03/23/18 223 lb 3.2 oz (101.2 kg)   01/10/18 222 lb 9.6 oz (101 kg) "   05/23/17 201 lb 3.2 oz (91.3 kg)              We Performed the Following     BNP-N terminal pro     CBC with platelets differential     Comprehensive metabolic panel     GASTROENTEROLOGY ADULT REF PROCEDURE ONLY     Lipid panel reflex to direct LDL Fasting     TSH with free T4 reflex     Vitamin D Deficiency          Today's Medication Changes          These changes are accurate as of 3/23/18 10:04 AM.  If you have any questions, ask your nurse or doctor.               These medicines have changed or have updated prescriptions.        Dose/Directions    citalopram 20 MG tablet   Commonly known as:  celeXA   This may have changed:  See the new instructions.   Used for:  Major depressive disorder, recurrent episode, moderate (H)   Changed by:  Nallely Moore MD        TAKE 1 TABLET (20 MG) BY MOUTH DAILY   Quantity:  90 tablet   Refills:  3            Where to get your medicines      These medications were sent to [facility]/pharmacy #[id number] - [place], [place] - [address] [facility]  [address] [address] [facility] [address]     Phone:  [phone]     buPROPion 150 MG 24 hr tablet    citalopram 20 MG tablet                Primary Care Provider Office Phone # Fax #    Nallely Moore [phone] [address] [place] [address]        Equal Access to Services     [facility] AH: Hadii comfort driver hadeduardoo Sokymberly, waaxda luqadaha, qaybta kaalmada ademichelleyada, nathan bucio. So [facility] [phone].    ATENCIÓN: Si habla español, tiene a durham disposición servicios gratuitos de asistencia lingüística. [facility] [phone].    We comply with applicable federal civil rights laws and [place] laws. We do not discriminate on the basis of race, color, national origin, age, disability, sex, sexual orientation, or gender identity.            Thank you!     [facility] your [facility]. Our goal is always to provide you with excellent care. Hearing back  from our patients is one way we can continue to improve our services. Please take a few minutes to complete the written survey that you may receive in the mail after your visit with us. Thank you!             Your Updated Medication List - Protect others around you: Learn how to safely use, store and throw away your medicines at www.disposemymeds.org.          This list is accurate as of 3/23/18 10:04 AM.  Always use your most recent med list.                   Brand Name Dispense Instructions for use Diagnosis    ALEVE PO      Take 220 mg by mouth daily Takes up to 4 tablets depending on day        buPROPion 150 MG 24 hr tablet    WELLBUTRIN XL    90 tablet    Take 1 tablet (150 mg) by mouth every morning    Major depressive disorder, recurrent episode, moderate (H)       CALCIUM PO      Take 950 mg by mouth 2 times daily.        citalopram 20 MG tablet    celeXA    90 tablet    TAKE 1 TABLET (20 MG) BY MOUTH DAILY    Major depressive disorder, recurrent episode, moderate (H)       OCUVITE ADULT 50+ PO      Take  by mouth daily.        ranitidine 75 MG tablet   Generic drug:  ranitidine      Take 75 mg by mouth 2 times daily as needed.        RESTASIS 0.05 % ophthalmic emulsion   Generic drug:  cycloSPORINE      INSTILL 1 DROP INTO AFFECTED EYE(S) BY OPHTHALMIC ROUTE EVERY 12 HOURS        TYLENOL PO      Take 1,300 mg by mouth 2 times daily        VITAMIN D3 PO      Take 1,000 Units by mouth daily        ZYRTEC 10 MG tablet   Generic drug:  cetirizine      1 TABLET EACH MORNING

## 2018-03-23 NOTE — LETTER
[facility]  [address]  [facility] [address]  [phone]    2018    Re: Lucie Whitehead   :   1950  MRN:  [id number]   REFERRING PHYSICIAN:   Stas Baker    [facility]  Date of Initial Evaluation:    3/23/18  Visits:  Rxs Used: 1  Reason for Referral:  Lumbago    EVALUATION SUMMARY  Subjective:  Patient is a 67 year old female presenting with rehab back hpi. The history is provided by the patient. No  was used.   Lucie Whitehead is a 67 year old female with a lumbar condition.  Condition occurred with:  Insidious onset.  Condition occurred: for unknown reasons.  This is a recurrent and chronic condition  Pt reports having bilateral LBP with bilateral LE pain into thighs that has been present for greater than 2 years.  Previous L3-L5 fusion in .  .    Patient reports pain:  Lumbar spine right, lumbar spine left and central lumbar spine.  Radiates to:  Gluteals right, gluteals left, thigh left and thigh right.  Pain is described as aching and is intermittent and reported as 3/10.  Associated symptoms:  Loss of motion/stiffness and loss of strength. Pain is worse during the day.  Symptoms are exacerbated by bending, sitting, walking and standing and relieved by nothing.  Since onset symptoms are unchanged.  Special tests:  MRI (lumbar instability).  Previous treatment includes surgery.  There was moderate improvement following previous treatment.    Pertinent medical history includes:  Overweight, osteoarthritis, depression and sleep disorder/apnea.  Medical allergies: no.  Other surgeries include:  Orthopedic surgery (fusion and shoulder).  Current medications:  Anti-depressants, anti-inflammatory and pain medication.  Current occupation is   .  Patient is working in normal job without restrictions.  Primary job tasks include:   Prolonged sitting and prolonged standing.  Barriers include:  None as reported by the patient.  Red flags:  None as reported by the patient.  Objective:  Standing Alignment:    Lumbar:  Lordosis decr  Flexibility/Screens:   Positive screens:  Lumbar  Lumbar/SI Evaluation  ROM:    AROM Lumbar:   Flexion:          75% with ERP  Ext:                    25% with ERP   Side Bend:        Left:  75% with ERP    Right:  75% with ERP  Rotation:           Left:     Right:   Side Glide:        Left:     Right:   Lumbar Myotomes:  normal  Lumbar DTR's:  normal  Lumbar Dermtomes:  normal  Neural Tension/Mobility:    Left side:  SLR positive.   Right side:   SLR positive.  Lumbar Palpation:  Normal  Re: Lucie Whitehead   :   1950    Assessment/Plan:    Patient is a 67 year old female with lumbar complaints.    Patient has the following significant findings with corresponding treatment plan.                Diagnosis 1:  LBP  Pain -  self management, education, directional preference exercise and home program  Decreased ROM/flexibility - manual therapy and therapeutic exercise  Decreased strength - therapeutic exercise and therapeutic activities  Impaired muscle performance - neuro re-education  Decreased function - therapeutic activities    Therapy Evaluation Codes:   1) History comprised of:   Personal factors that impact the plan of care:      None.    Comorbidity factors that impact the plan of care are:      Depression, Osteoarthritis, Overweight and Sleep disorder/apnea.     Medications impacting care: Anti-depressant and Pain.  2) Examination of Body Systems comprised of:   Body structures and functions that impact the plan of care:      Lumbar spine.   Activity limitations that impact the plan of care are:      Bending, Dressing, Lifting, Sitting, Standing, Walking and Working.  3) Clinical presentation characteristics are:   Stable/Uncomplicated.  4) Decision-Making    Low complexity using standardized patient  assessment instrument and/or measureable assessment of functional outcome.  Cumulative Therapy Evaluation is: Low complexity.    Previous and current functional limitations:  (See Goal Flow Sheet for this information)    Short term and Long term goals: (See Goal Flow Sheet for this information)     Communication ability:  Patient appears to be able to clearly communicate and understand verbal and written communication and follow directions correctly.  Treatment Explanation - The following has been discussed with the patient:   RX ordered/plan of care  Anticipated outcomes  Possible risks and side effects  This patient would benefit from PT intervention to resume normal activities.   Rehab potential is fair.  Frequency:  2 X week, once daily  Duration:  for 6 weeks per MD order  Discharge Plan:  Achieve all LTG.  Independent in home treatment program.  Reach maximal therapeutic benefit.    Thank you for your referral.    INQUIRIES  Therapist:    Maninder Valentino, PT   [facility]  [address] [place] [address]  Phone: [phone]  Fax: [phone]

## 2018-03-23 NOTE — PROGRESS NOTES
"  SUBJECTIVE:   Lucie Whitehead is a 67 year old female who presents to clinic today for the following health issues:      \"I just don't feel well\".  Pt states she has been feeling really weak and fatigue x  3 months. Eyes swollen hands go numb. Would like her sugar levels checked.  Gained a lot of weight.  Hard to breathe - finds self wheezing even if just sitting down sometimes.  Legs make stairs hard.  Body aches.  Puffy lara around eyes but also hands and ankles.  Has been flying more than normal.  Arthritis hurts more than normal - has had swelling and redness in big toe and MCP joints.  Has had more neck and back pain - just saw Dr. Tamez and states this feels lie different issues. Mild sore throat lats week, but now gone.  Was mild.  Has had more headaches than normal.  Dull ache but never excruciating pain.  Cataracts and macular degeneration and dry eyes just saw eye doctor.  Not sure if is different than typical.  Has chronic post-nasal gtt, not changed.  Is coughing, not productive.  Cough triggered by unknown - not sleep/lying down or exercise.  Is wearing CPAP nightly.  Is waking on own in the morning, not a lot of energy even first thing in the morning.  No hypersomnolence.  Has food cravings and has no off switch.  Has intermittent diarrhea/constipation depending on what eats - more diarrhea.  Often feels very hot.    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    Takes 2 aleve in morning and aspirin or tylenol in afternoon and more tylenol or ibuprofen at night before bed.      PHQ-9 SCORE 10/19/2016 5/3/2017 3/23/2018   Total Score - - -   Total Score MyChart - - -   Total Score 6 3 8      Problem list and histories reviewed & adjusted, as " "indicated.  Additional history: as documented    Labs reviewed in EPIC    Reviewed and updated as needed this visit by clinical staff  Tobacco  Allergies  Meds  Problems  Med Hx  Surg Hx  Fam Hx  Soc Hx        Reviewed and updated as needed this visit by Provider  Allergies  Meds  Problems         ROS:  Constitutional, HEENT, cardiovascular, pulmonary, GI, , musculoskeletal, neuro, skin, endocrine and psych systems are negative, except as otherwise noted.    OBJECTIVE:     /88 (BP Location: Right arm, Patient Position: Chair, Cuff Size: Adult Regular)  Pulse 85  Temp 97.4  F (36.3  C) (Tympanic)  Ht 5' 5.75\" (1.67 m)  Wt 223 lb 3.2 oz (101.2 kg)  LMP 11/12/2007  SpO2 96%  BMI 36.3 kg/m2  Body mass index is 36.3 kg/(m^2).  GENERAL: healthy, alert and no distress  EYES: Eyes grossly normal to inspection, PERRL and conjunctivae and sclerae normal  HENT: ear canals and TM's normal, nose and mouth without ulcers or lesions  NECK: no adenopathy, no asymmetry, masses, or scars and thyroid normal to palpation  RESP: lungs clear to auscultation - no rales, rhonchi or wheezes  CV: regular rate and rhythm, normal S1 S2, no S3 or S4, no murmur, click or rub, no peripheral edema and peripheral pulses strong  ABDOMEN: soft, nontender, no hepatosplenomegaly, no masses and bowel sounds normal  MS: no gross musculoskeletal defects noted, no edema  SKIN: no suspicious lesions or rashes  NEURO: Normal strength and tone, mentation intact and speech normal  PSYCH: mentation appears normal and affect flat        ASSESSMENT/PLAN:         1. Fatigue, unspecified type  Will screen for metabolic cause with labs per orders.  Discussed sleep evaluation as next step  - TSH with free T4 reflex  - Comprehensive metabolic panel  - CBC with platelets differential  - BNP-N terminal pro  - LDL cholesterol direct    2. Tubular adenoma of colon  Screening colonoscopy discussed    3. Screen for colon cancer    - GASTROENTEROLOGY " ADULT REF PROCEDURE ONLY    4. Class 2 obesity due to excess calories with serious comorbidity and body mass index (BMI) of 36.0 to 36.9 in adult  Discussed healthy diet and exercise    5. Hyperlipidemia LDL goal <160  recheck  - Lipid panel reflex to direct LDL Fasting    6. Impaired fasting glucose  Labs per orders  - TSH with free T4 reflex  - Comprehensive metabolic panel    7. Major depressive disorder, recurrent episode, moderate (H)  Could be contributing to symptoms or worsening might be due to underlying illness.  Continue medications for now, consider adjusting if no other cause found  - TSH with free T4 reflex  - citalopram (CELEXA) 20 MG tablet; TAKE 1 TABLET (20 MG) BY MOUTH DAILY  Dispense: 90 tablet; Refill: 3  - buPROPion (WELLBUTRIN XL) 150 MG 24 hr tablet; Take 1 tablet (150 mg) by mouth every morning  Dispense: 90 tablet; Refill: 3    8. Osteopenia, unspecified location    - Vitamin D Deficiency        See Patient Instructions    Nallely Moore MD  [facility]

## 2018-03-23 NOTE — MR AVS SNAPSHOT
After Visit Summary   3/23/2018    Lucie Whitehead    MRN: [id number]           Patient Information     Date Of Birth          1950        Visit Information        Provider Department      3/23/2018 2:20 PM Maninder Valentino PT [facility]        Today's Diagnoses     Lumbago    -  1       Follow-ups after your visit        Who to contact     If you have questions or need follow up information about today's clinic visit or your schedule please contact [facility] directly at [phone].  Normal or non-critical lab and imaging results will be communicated to you by [employer], letter or phone within 4 business days after the clinic has received the results. If you do not hear from us within 7 days, please contact the clinic through [employer] or phone. If you have a critical or abnormal lab result, we will notify you by phone as soon as possible.  Submit refill requests through [employer] or call your pharmacy and they will forward the refill request to us. Please allow 3 business days for your refill to be completed.          Additional Information About Your Visit        MyChart Information     [employer] gives you secure access to your electronic health record. If you see a primary care provider, you can also send messages to your care team and make appointments. If you have questions, please call your primary care clinic.  If you do not have a primary care provider, please call [phone] and they will assist you.        Care EveryWhere ID     This is your Care EveryWhere ID. This could be used by other organizations to access your [place] medical records  [id number]        Your Vitals Were     Last Period                   11/12/2007            Blood Pressure from Last 3 Encounters:   03/23/18 134/88   01/10/18 118/80   05/23/17 120/78    Weight from Last 3 Encounters:   03/23/18 101.2 kg  (223 lb 3.2 oz)   01/10/18 101 kg (222 lb 9.6 oz)   05/23/17 91.3 kg (201 lb 3.2 oz)              We Performed the Following     HC PT EVAL, LOW COMPLEXITY     NICOL INITIAL EVAL REPORT     THERAPEUTIC EXERCISES          Today's Medication Changes          These changes are accurate as of 3/23/18  3:08 PM.  If you have any questions, ask your nurse or doctor.               These medicines have changed or have updated prescriptions.        Dose/Directions    citalopram 20 MG tablet   Commonly known as:  celeXA   This may have changed:  See the new instructions.   Used for:  Major depressive disorder, recurrent episode, moderate (H)   Changed by:  Nallely Moore MD        TAKE 1 TABLET (20 MG) BY MOUTH DAILY   Quantity:  90 tablet   Refills:  3            Where to get your medicines      These medications were sent to [facility]/pharmacy #[id number] - [place], [place] - [address]  [address] [facility] [address]     Phone:  [phone]     buPROPion 150 MG 24 hr tablet    citalopram 20 MG tablet                Primary Care Provider Office Phone # Fax #    Nallely Moore [phone] [address] [place] [address]        Equal Access to Services     [facility] AH: Hadii comfort bowerso Sokymberly, waaxda luqadaha, qaybta kaalmada adekristida, nathan bucio. So [facility] [phone].    ATENCIÓN: Si habla español, tiene a durham disposición servicios gratuitos de asistencia lingüística. Llame al [phone].    We comply with applicable federal civil rights laws and [place] laws. We do not discriminate on the basis of race, color, national origin, age, disability, sex, sexual orientation, or gender identity.            Thank you!     Thank you for choosing [facility]  for your care. Our goal is always to provide you with excellent care. Hearing back from our patients is one way we can continue to improve our  services. Please take a few minutes to complete the written survey that you may receive in the mail after your visit with us. Thank you!             Your Updated Medication List - Protect others around you: Learn how to safely use, store and throw away your medicines at www.disposemymeds.org.          This list is accurate as of 3/23/18  3:08 PM.  Always use your most recent med list.                   Brand Name Dispense Instructions for use Diagnosis    ALEVE PO      Take 220 mg by mouth daily Takes up to 4 tablets depending on day        buPROPion 150 MG 24 hr tablet    WELLBUTRIN XL    90 tablet    Take 1 tablet (150 mg) by mouth every morning    Major depressive disorder, recurrent episode, moderate (H)       CALCIUM PO      Take 950 mg by mouth 2 times daily.        citalopram 20 MG tablet    celeXA    90 tablet    TAKE 1 TABLET (20 MG) BY MOUTH DAILY    Major depressive disorder, recurrent episode, moderate (H)       OCUVITE ADULT 50+ PO      Take  by mouth daily.        ranitidine 75 MG tablet   Generic drug:  ranitidine      Take 75 mg by mouth 2 times daily as needed.        RESTASIS 0.05 % ophthalmic emulsion   Generic drug:  cycloSPORINE      INSTILL 1 DROP INTO AFFECTED EYE(S) BY OPHTHALMIC ROUTE EVERY 12 HOURS        TYLENOL PO      Take 1,300 mg by mouth 2 times daily        VITAMIN D3 PO      Take 1,000 Units by mouth daily        ZYRTEC 10 MG tablet   Generic drug:  cetirizine      1 TABLET EACH MORNING

## 2018-03-23 NOTE — PROGRESS NOTES
Subjective:  Patient is a 67 year old female presenting with rehab back hpi. The history is provided by the patient. No  was used.   Lucie Whitehead is a 67 year old female with a lumbar condition.  Condition occurred with:  Insidious onset.  Condition occurred: for unknown reasons.  This is a recurrent and chronic condition  Pt reports having bilateral LBP with bilateral LE pain into thighs that has been present for greater than 2 years.  Previous L3-L5 fusion in 2013.  .    Patient reports pain:  Lumbar spine right, lumbar spine left and central lumbar spine.  Radiates to:  Gluteals right, gluteals left, thigh left and thigh right.  Pain is described as aching and is intermittent and reported as 3/10.  Associated symptoms:  Loss of motion/stiffness and loss of strength. Pain is worse during the day.  Symptoms are exacerbated by bending, sitting, walking and standing and relieved by nothing.  Since onset symptoms are unchanged.  Special tests:  MRI (lumbar instability).  Previous treatment includes surgery.  There was moderate improvement following previous treatment.    Pertinent medical history includes:  Overweight, osteoarthritis, depression and sleep disorder/apnea.  Medical allergies: no.  Other surgeries include:  Orthopedic surgery (fusion and shoulder).  Current medications:  Anti-depressants, anti-inflammatory and pain medication.  Current occupation is   .  Patient is working in normal job without restrictions.  Primary job tasks include:  Prolonged sitting and prolonged standing.    Barriers include:  None as reported by the patient.    Red flags:  None as reported by the patient.                        Objective:  Standing Alignment:        Lumbar:  Lordosis decr                Flexibility/Screens:   Positive screens:  Lumbar                   Lumbar/SI Evaluation  ROM:    AROM Lumbar:   Flexion:          75% with ERP  Ext:                    25% with ERP   Side  Bend:        Left:  75% with ERP    Right:  75% with ERP  Rotation:           Left:     Right:   Side Glide:        Left:     Right:           Lumbar Myotomes:  normal            Lumbar DTR's:  normal        Lumbar Dermtomes:  normal                Neural Tension/Mobility:    Left side:  SLR positive.   Right side:   SLR positive.  Lumbar Palpation:  normal                                                         General     ROS    Assessment/Plan:    Patient is a 67 year old female with lumbar complaints.    Patient has the following significant findings with corresponding treatment plan.                Diagnosis 1:  LBP  Pain -  self management, education, directional preference exercise and home program  Decreased ROM/flexibility - manual therapy and therapeutic exercise  Decreased strength - therapeutic exercise and therapeutic activities  Impaired muscle performance - neuro re-education  Decreased function - therapeutic activities    Therapy Evaluation Codes:   1) History comprised of:   Personal factors that impact the plan of care:      None.    Comorbidity factors that impact the plan of care are:      Depression, Osteoarthritis, Overweight and Sleep disorder/apnea.     Medications impacting care: Anti-depressant and Pain.  2) Examination of Body Systems comprised of:   Body structures and functions that impact the plan of care:      Lumbar spine.   Activity limitations that impact the plan of care are:      Bending, Dressing, Lifting, Sitting, Standing, Walking and Working.  3) Clinical presentation characteristics are:   Stable/Uncomplicated.  4) Decision-Making    Low complexity using standardized patient assessment instrument and/or measureable assessment of functional outcome.  Cumulative Therapy Evaluation is: Low complexity.    Previous and current functional limitations:  (See Goal Flow Sheet for this information)    Short term and Long term goals: (See Goal Flow Sheet for this information)      Communication ability:  Patient appears to be able to clearly communicate and understand verbal and written communication and follow directions correctly.  Treatment Explanation - The following has been discussed with the patient:   RX ordered/plan of care  Anticipated outcomes  Possible risks and side effects  This patient would benefit from PT intervention to resume normal activities.   Rehab potential is fair.    Frequency:  2 X week, once daily  Duration:  for 6 weeks per MD order  Discharge Plan:  Achieve all LTG.  Independent in home treatment program.  Reach maximal therapeutic benefit.    Please refer to the daily flowsheet for treatment today, total treatment time and time spent performing 1:1 timed codes.

## 2018-03-23 NOTE — LETTER
My Depression Action Plan  Name: Lucie Whitehead   Date of Birth 1950  Date: 3/23/2018    My doctor: Nallely Moore   My clinic: [address]  [address]  [facility] [address] [phone]          GREEN    ZONE   Good Control    What it looks like:     Things are going generally well. You have normal up s and down s. You may even feel depressed from time to time, but bad moods usually last less than a day.   What you need to do:  1. Continue to care for yourself (see self care plan)  2. Check your depression survival kit and update it as needed  3. Follow your physician s recommendations including any medication.  4. Do not stop taking medication unless you consult with your physician first.           YELLOW         ZONE Getting Worse    What it looks like:     Depression is starting to interfere with your life.     It may be hard to get out of bed; you may be starting to isolate yourself from others.    Symptoms of depression are starting to last most all day and this has happened for several days.     You may have suicidal thoughts but they are not constant.   What you need to do:     1. Call your care team, your response to treatment will improve if you keep your care team informed of your progress. Yellow periods are signs an adjustment may need to be made.     2. Continue your self-care, even if you have to fake it!    3. Talk to someone in your support network    4. Open up your depression survival kit           RED    ZONE Medical Alert - Get Help    What it looks like:     Depression is seriously interfering with your life.     You may experience these or other symptoms: You can t get out of bed most days, can t work or engage in other necessary activities, you have trouble taking care of basic hygiene, or basic responsibilities, thoughts of suicide or death that will not go away, self-injurious behavior.     What you need to do:  1. Call your  care team and request a same-day appointment. If they are not available (weekends or after hours) call your local crisis line, emergency room or 911.            Depression Self Care Plan / Survival Kit    Self-Care for Depression  Here s the deal. Your body and mind are really not as separate as most people think.  What you do and think affects how you feel and how you feel influences what you do and think. This means if you do things that people who feel good do, it will help you feel better.  Sometimes this is all it takes.  There is also a place for medication and therapy depending on how severe your depression is, so be sure to consult with your medical provider and/ or Behavioral Health Consultant if your symptoms are worsening or not improving.     In order to better manage my stress, I will:    Exercise  Get some form of exercise, every day. This will help reduce pain and release endorphins, the  feel good  chemicals in your brain. This is almost as good as taking antidepressants!  This is not the same as joining a gym and then never going! (they count on that by the way ) It can be as simple as just going for a walk or doing some gardening, anything that will get you moving.      Hygiene   Maintain good hygiene (Get out of bed in the morning, Make your bed, Brush your teeth, Take a shower, and Get dressed like you were going to work, even if you are unemployed).  If your clothes don't fit try to get ones that do.    Diet  I will strive to eat foods that are good for me, drink plenty of water, and avoid excessive sugar, caffeine, alcohol, and other mood-altering substances.  Some foods that are helpful in depression are: complex carbohydrates, B vitamins, flaxseed, fish or fish oil, fresh fruits and vegetables.    Psychotherapy  I agree to participate in Individual Therapy (if recommended).    Medication  If prescribed medications, I agree to take them.  Missing doses can result in serious side effects.  I  understand that drinking alcohol, or other illicit drug use, may cause potential side effects.  I will not stop my medication abruptly without first discussing it with my provider.    Staying Connected With Others  I will stay in touch with my friends, family members, and my primary care provider/team.    Use your imagination  Be creative.  We all have a creative side; it doesn t matter if it s oil painting, sand castles, or mud pies! This will also kick up the endorphins.    Witness Beauty  (AKA stop and smell the roses) Take a look outside, even in mid-winter. Notice colors, textures. Watch the squirrels and birds.     Service to others  Be of service to others.  There is always someone else in need.  By helping others we can  get out of ourselves  and remember the really important things.  This also provides opportunities for practicing all the other parts of the program.    Humor  Laugh and be silly!  Adjust your TV habits for less news and crime-drama and more comedy.    Control your stress  Try breathing deep, massage therapy, biofeedback, and meditation. Find time to relax each day.     My support system    Clinic Contact:  Phone number:    Contact 1:  Phone number:    Contact 2:  Phone number:    [facility]/:  Phone number:    Therapist:  Phone number:    Local crisis center:    Phone number:    Other community support:  Phone number:

## 2018-03-24 LAB
ALBUMIN SERPL-MCNC: 4.2 G/DL (ref 3.4–5)
ALP SERPL-CCNC: 83 U/L (ref 40–150)
ALT SERPL W P-5'-P-CCNC: 22 U/L (ref 0–50)
ANION GAP SERPL CALCULATED.3IONS-SCNC: 5 MMOL/L (ref 3–14)
AST SERPL W P-5'-P-CCNC: 17 U/L (ref 0–45)
BILIRUB SERPL-MCNC: 0.3 MG/DL (ref 0.2–1.3)
BUN SERPL-MCNC: 18 MG/DL (ref 7–30)
CALCIUM SERPL-MCNC: 10.1 MG/DL (ref 8.5–10.1)
CHLORIDE SERPL-SCNC: 105 MMOL/L (ref 94–109)
CHOLEST SERPL-MCNC: 282 MG/DL
CO2 SERPL-SCNC: 29 MMOL/L (ref 20–32)
CREAT SERPL-MCNC: 0.93 MG/DL (ref 0.52–1.04)
GFR SERPL CREATININE-BSD FRML MDRD: 60 ML/MIN/1.7M2
GLUCOSE SERPL-MCNC: 82 MG/DL (ref 70–99)
HDLC SERPL-MCNC: 40 MG/DL
LDLC SERPL CALC-MCNC: ABNORMAL MG/DL
LDLC SERPL DIRECT ASSAY-MCNC: 155 MG/DL
NONHDLC SERPL-MCNC: 242 MG/DL
POTASSIUM SERPL-SCNC: 4.9 MMOL/L (ref 3.4–5.3)
PROT SERPL-MCNC: 7.4 G/DL (ref 6.8–8.8)
SODIUM SERPL-SCNC: 139 MMOL/L (ref 133–144)
TRIGL SERPL-MCNC: 447 MG/DL
TSH SERPL DL<=0.005 MIU/L-ACNC: 2.17 MU/L (ref 0.4–4)

## 2018-03-24 ASSESSMENT — PATIENT HEALTH QUESTIONNAIRE - PHQ9: SUM OF ALL RESPONSES TO PHQ QUESTIONS 1-9: 8

## 2018-03-31 ENCOUNTER — MYC MEDICAL ADVICE (OUTPATIENT)
Dept: PEDIATRICS | Facility: CLINIC | Age: 68
End: 2018-03-31

## 2018-06-19 ENCOUNTER — OFFICE VISIT (OUTPATIENT)
Dept: PEDIATRICS | Facility: CLINIC | Age: 68
End: 2018-06-19
Payer: COMMERCIAL

## 2018-06-19 ENCOUNTER — RADIANT APPOINTMENT (OUTPATIENT)
Dept: GENERAL RADIOLOGY | Facility: CLINIC | Age: 68
End: 2018-06-19
Attending: INTERNAL MEDICINE
Payer: COMMERCIAL

## 2018-06-19 VITALS
DIASTOLIC BLOOD PRESSURE: 68 MMHG | BODY MASS INDEX: 36.11 KG/M2 | SYSTOLIC BLOOD PRESSURE: 114 MMHG | WEIGHT: 224.7 LBS | OXYGEN SATURATION: 96 % | TEMPERATURE: 98.2 F | HEIGHT: 66 IN | HEART RATE: 84 BPM

## 2018-06-19 DIAGNOSIS — G47.33 OSA (OBSTRUCTIVE SLEEP APNEA): ICD-10-CM

## 2018-06-19 DIAGNOSIS — Z01.818 PREOP GENERAL PHYSICAL EXAM: ICD-10-CM

## 2018-06-19 DIAGNOSIS — M54.16 LUMBAR RADICULOPATHY: ICD-10-CM

## 2018-06-19 DIAGNOSIS — Z01.818 PREOP GENERAL PHYSICAL EXAM: Primary | ICD-10-CM

## 2018-06-19 LAB
ALBUMIN UR-MCNC: NEGATIVE MG/DL
APPEARANCE UR: CLEAR
BILIRUB UR QL STRIP: NEGATIVE
COLOR UR AUTO: YELLOW
ERYTHROCYTE [DISTWIDTH] IN BLOOD BY AUTOMATED COUNT: 13.1 % (ref 10–15)
GLUCOSE UR STRIP-MCNC: NEGATIVE MG/DL
HCT VFR BLD AUTO: 41.8 % (ref 35–47)
HGB BLD-MCNC: 13.9 G/DL (ref 11.7–15.7)
HGB UR QL STRIP: NEGATIVE
KETONES UR STRIP-MCNC: ABNORMAL MG/DL
LEUKOCYTE ESTERASE UR QL STRIP: ABNORMAL
MCH RBC QN AUTO: 32.5 PG (ref 26.5–33)
MCHC RBC AUTO-ENTMCNC: 33.3 G/DL (ref 31.5–36.5)
MCV RBC AUTO: 98 FL (ref 78–100)
NITRATE UR QL: NEGATIVE
NON-SQ EPI CELLS #/AREA URNS LPF: ABNORMAL /LPF
PH UR STRIP: 7 PH (ref 5–7)
PLATELET # BLD AUTO: 267 10E9/L (ref 150–450)
RBC # BLD AUTO: 4.28 10E12/L (ref 3.8–5.2)
RBC #/AREA URNS AUTO: ABNORMAL /HPF
SOURCE: ABNORMAL
SP GR UR STRIP: 1.02 (ref 1–1.03)
UROBILINOGEN UR STRIP-ACNC: 0.2 EU/DL (ref 0.2–1)
WBC # BLD AUTO: 6.8 10E9/L (ref 4–11)
WBC #/AREA URNS AUTO: ABNORMAL /HPF

## 2018-06-19 PROCEDURE — 93000 ELECTROCARDIOGRAM COMPLETE: CPT | Performed by: INTERNAL MEDICINE

## 2018-06-19 PROCEDURE — 85027 COMPLETE CBC AUTOMATED: CPT | Performed by: INTERNAL MEDICINE

## 2018-06-19 PROCEDURE — 71046 X-RAY EXAM CHEST 2 VIEWS: CPT

## 2018-06-19 PROCEDURE — 81001 URINALYSIS AUTO W/SCOPE: CPT | Performed by: INTERNAL MEDICINE

## 2018-06-19 PROCEDURE — 99215 OFFICE O/P EST HI 40 MIN: CPT | Performed by: INTERNAL MEDICINE

## 2018-06-19 PROCEDURE — 36415 COLL VENOUS BLD VENIPUNCTURE: CPT | Performed by: INTERNAL MEDICINE

## 2018-06-19 PROCEDURE — 84132 ASSAY OF SERUM POTASSIUM: CPT | Performed by: INTERNAL MEDICINE

## 2018-06-19 NOTE — MR AVS SNAPSHOT
After Visit Summary   6/19/2018    Lucie Whitehead    MRN: [id number]           Patient Information     Date Of Birth          1950        Visit Information        Provider Department      6/19/2018 5:50 PM Nallely Moore MD [facility]        Today's Diagnoses     Preop general physical exam    -  1    Lumbar radiculopathy        PEÑA (obstructive sleep apnea)          Care Instructions      Before Your Surgery      Call your surgeon if there is any change in your health. This includes signs of a cold or flu (such as a sore throat, runny nose, cough, rash or fever).    Do not smoke, drink alcohol or take over the counter medicine (unless your surgeon or primary care doctor tells you to) for the 24 hours before and after surgery.    If you take prescribed drugs: Follow your doctor s orders about which medicines to take and which to stop until after surgery.    Eating and drinking prior to surgery: follow the instructions from your surgeon    Take a shower or bath the night before surgery. Use the soap your surgeon gave you to gently clean your skin. If you do not have soap from your surgeon, use your regular soap. Do not shave or scrub the surgery site.  Wear clean pajamas and have clean sheets on your bed.     Do not take aspirin, fish oil, gingko, ginseng or vitamin E for 1wk before surgery.  Do not take ibuprofen, advil, motrin, naprosyn, aleve for 5d before surgery.      Take your other medications as usual - small sips of water with celexa and wellbutrin that morning    Bring your CPAP with you.      Do you colonoscopy once you are healed.          Follow-ups after your visit        Follow-up notes from your care team     Return in about 3 months (around 9/19/2018) for Physical Exam with fasting labs.      Your next 10 appointments already scheduled     Jun 19, 2018  6:45 PM CDT   XR CHEST 2 VIEWS with EAXR1   [facility] ([facility])    [address]  "[facility]  [address]  [place] [address] [phone]           Please bring a list of your current medicines to your exam. (Include vitamins, minerals and over-thecounter medicines.) Leave your valuables at home.  Tell your doctor if there is a chance you may be pregnant.  You do not need to do anything special for this exam.            Jul 03, 2018   Procedure with Davide Tamez MD   [facility] (--)    [address] [facility] [address] [phone]              Who to contact     If you have questions or need follow up information about today's clinic visit or your schedule please contact [facility] directly at [phone].  Normal or non-critical lab and imaging results will be communicated to you by MyChart, letter or phone within 4 business days after the clinic has received the results. If you do not hear from us within 7 days, please contact the clinic through MyChart or phone. If you have a critical or abnormal lab result, we will notify you by phone as soon as possible.  Submit refill requests through Sendori or call your pharmacy and they will forward the refill request to us. Please allow 3 business days for your refill to be completed.          Additional Information About Your Visit        Sendori Information     Sendori gives you secure access to your electronic health record. If you see a primary care provider, you can also send messages to your care team and make appointments. If you have questions, please call your primary care clinic.  If you do not have a primary care provider, please call [phone] and they will assist you.        Care EveryWhere ID     This is your Care EveryWhere ID. This could be used by other organizations to access your [place] medical records  [id number]        Your Vitals Were     Pulse Temperature Height Last Period Pulse Oximetry BMI (Body Mass Index)    84 98.2  F (36.8  C) (Tympanic) 5' 5.75\" (1.67 m) " 11/12/2007 96% 36.54 kg/m2       Blood Pressure from Last 3 Encounters:   06/19/18 114/68   03/23/18 134/88   01/10/18 118/80    Weight from Last 3 Encounters:   06/19/18 224 lb 11.2 oz (101.9 kg)   03/23/18 223 lb 3.2 oz (101.2 kg)   01/10/18 222 lb 9.6 oz (101 kg)              We Performed the Following     *UA reflex to Microscopic     CBC with platelets     EKG 12-lead complete w/read - Clinics (performed today)     [facility]        Primary Care Provider Office Phone # Fax #    Nallely Moore [phone] [address] [place] [address]        Equal Access to Services     [facility] : Bertha Luciano, waaxda luqadaha, qaybta kaalmada marla, nathan starr . So [facility] [phone].    ATENCIÓN: Si habla español, tiene a durham disposición servicios gratuitos de asistencia lingüística. [facility] [phone].    We comply with applicable federal civil rights laws and [place] laws. We do not discriminate on the basis of race, color, national origin, age, disability, sex, sexual orientation, or gender identity.            Thank you!     Thank you for choosing [facility]  for your care. Our goal is always to provide you with excellent care. Hearing back from our patients is one way we can continue to improve our services. Please take a few minutes to complete the written survey that you may receive in the mail after your visit with us. Thank you!             Your Updated Medication List - Protect others around you: Learn how to safely use, store and throw away your medicines at www.disposemymeds.org.          This list is accurate as of 6/19/18  6:44 PM.  Always use your most recent med list.                   Brand Name Dispense Instructions for use Diagnosis    ALEVE PO      Take 220 mg by mouth daily Takes up to 4 tablets depending on day        buPROPion 150 MG 24 hr tablet    WELLBUTRIN XL    90 tablet    Take 1 tablet (150  mg) by mouth every morning    Major depressive disorder, recurrent episode, moderate (H)       CALCIUM PO      Take 950 mg by mouth daily        citalopram 20 MG tablet    celeXA    90 tablet    TAKE 1 TABLET (20 MG) BY MOUTH DAILY    Major depressive disorder, recurrent episode, moderate (H)       NONFORMULARY      Sustained night time lubricant eye ointment night before bed        OCUVITE ADULT 50+ PO      Take  by mouth daily.        OMEGA 3 PO      Take by mouth daily        ranitidine 75 MG tablet   Generic drug:  ranitidine      Take 75 mg by mouth 2 times daily as needed.        RESTASIS 0.05 % ophthalmic emulsion   Generic drug:  cycloSPORINE      INSTILL 1 DROP INTO AFFECTED EYE(S) BY OPHTHALMIC ROUTE EVERY 12 HOURS        TYLENOL PO      Take 1,300 mg by mouth 2 times daily        VITAMIN D2 PO      Take by mouth once a week        VITAMIN D3 PO      Take 1,000 Units by mouth daily        ZYRTEC 10 MG tablet   Generic drug:  cetirizine      1 TABLET EACH MORNING

## 2018-06-19 NOTE — PATIENT INSTRUCTIONS
Before Your Surgery      Call your surgeon if there is any change in your health. This includes signs of a cold or flu (such as a sore throat, runny nose, cough, rash or fever).    Do not smoke, drink alcohol or take over the counter medicine (unless your surgeon or primary care doctor tells you to) for the 24 hours before and after surgery.    If you take prescribed drugs: Follow your doctor s orders about which medicines to take and which to stop until after surgery.    Eating and drinking prior to surgery: follow the instructions from your surgeon    Take a shower or bath the night before surgery. Use the soap your surgeon gave you to gently clean your skin. If you do not have soap from your surgeon, use your regular soap. Do not shave or scrub the surgery site.  Wear clean pajamas and have clean sheets on your bed.     Do not take aspirin, fish oil, gingko, ginseng or vitamin E for 1wk before surgery.  Do not take ibuprofen, advil, motrin, naprosyn, aleve for 5d before surgery.      Take your other medications as usual - small sips of water with celexa and wellbutrin that morning    Bring your CPAP with you.      Do your colonoscopy once you are healed.

## 2018-06-19 NOTE — PROGRESS NOTES
[facility]  [address]  [address]  [place] [address]  [phone]  Dept: [phone]    PRE-OP EVALUATION:  Today's date: 2018    Lucie Whitehead (: 1950) presents for pre-operative evaluation assessment as requested by Dr. read.  She requires evaluation and anesthesia risk assessment prior to undergoing surgery/procedure for treatment of lumbar fusion .    Fax number for surgical facility: [facility]  Primary Physician: Nallely Moore  Type of Anesthesia Anticipated: General    Patient has a Health Care Directive or Living Will:  NO    Preop Questions 2018   Who is doing your surgery? dr chen   What are you having done? lumbar fusion   Date of Surgery/Procedure: 2018   Facility or Hospital where procedure/surgery will be performed: [facility]   1.  Do you have a history of Heart attack, stroke, stent, coronary bypass surgery, or other heart surgery? No   2.  Do you ever have any pain or discomfort in your chest? No   3.  Do you have a history of  Heart Failure? No   4.   Are you troubled by shortness of breath when:  walking on a level surface, or up a slight hill, or at night? No   5.  Do you currently have a cold, bronchitis or other respiratory infection? No   6.  Do you have a cough, shortness of breath, or wheezing? No   7.  Do you sometimes get pains in the calves of your legs when you walk? No   8. Do you or anyone in your family have previous history of blood clots? No   9.  Do you or does anyone in your family have a serious bleeding problem such as prolonged bleeding following surgeries or cuts? No   10. Have you ever had problems with anemia or been told to take iron pills? No   11. Have you had any abnormal blood loss such as black, tarry or bloody stools, or abnormal vaginal bleeding? No   12. Have you ever had a blood transfusion? YES -    13. Have you or any of your relatives ever had problems with anesthesia? No   14.  Do you have sleep apnea, excessive snoring or daytime drowsiness? YES -    15. Do you have any prosthetic heart valves? No   16. Do you have prosthetic joints? No   17. Is there any chance that you may be pregnant? No         HPI:     HPI related to upcoming procedure: Back pain unresponsive to conservative measures thus undergoing surgery      See problem list for active medical problems.  Problems all longstanding and stable, except as noted/documented.  See ROS for pertinent symptoms related to these conditions.                                                                                                                                                          .    MEDICAL HISTORY:     Patient Active Problem List    Diagnosis Date Noted     Advance care planning 03/23/2018     Priority: Medium     Osteopenia 05/13/2016     Priority: Medium     Non morbid obesity due to excess calories 11/08/2015     Priority: Medium     Major depressive disorder, recurrent episode (H) 09/12/2014     Priority: Medium     prozac caused problems with mentation.  zoloft caused sleepiness.       Tubular adenoma of colon 07/11/2013     Priority: Medium     On colonoscopy 2013       Macular degeneration 07/08/2013     Priority: Medium     Dry, OU, follows with retinal surgery       PEÑA (obstructive sleep apnea) 03/05/2013     Priority: Medium     On CPAP and doing well       Impaired fasting glucose 03/05/2013     Priority: Medium     Lumbar spinal stenosis 04/14/2010     Priority: Medium     S/p lumbar surgery 3/13 and 4/13.       Hyperlipidemia LDL goal <160 02/10/2010     Priority: Medium     [place] 10-year CHD Risk Score: 1% (12 Total Points)   Values used to calculate score:     Age: 61 years -- Points: 8     Total Cholesterol: 232 mg/dL -- Points: 4     HDL Cholesterol: 55 mg/dL -- Points: 0     Systolic BP (untreated): 110 mmHg -- Points: 0    The patient is not a smoker. -- Points: 0    The patient has not been diagnosed  "with diabetes. -- Points: 0    The patient does not have a family history of CHD. -- Points:0          Ovarian cyst 2006     Priority: Medium     Problem list name updated by automated process. Provider to review       Hirsutism 2004     Priority: Medium     Leiomyoma of uterus 2004     Priority: Medium     Pelvic US 2006: Contour is irreg w/myomata: 1)anterior mid submucosal 4.1x3.6cm, 2)anterior fundal subserosal 2.4x1.9cm, 3)lt mid intramural 2.4x1.9cm, 4)posterior fundal intramural 2.6x2.4cm, others smaller.  Problem list name updated by automated process. Provider to review        Past Medical History:   Diagnosis Date     Complication of anesthesia     extremely dry eyes     Diabetes (H)      Gastro-oesophageal reflux disease      Hirsutism     Abstracted 02     History of blood transfusion      Leiomyoma of uterus, unspecified     Abstracted 02     Obesity 2010    Estimated Body mass index is 28.97 kg/(m^2) as calculated from the following:   Height as of 7/10/13: 5' 7\"(1.702 m).   Weight as of 7/10/13: 185 lb(83.915 kg).       Other and unspecified hyperlipidemia     Abstracted 02     Other chronic pain     low back and legs and feet     Sleep apnea     CPAP     Past Surgical History:   Procedure Laterality Date     ARTHROSCOPY SHOULDER DISTAL CLAVICLE REPAIR Right 2014    Procedure: ARTHROSCOPY SHOULDER DISTAL CLAVICLE RESECTION;  Surgeon: John Paul Rodriguez MD;  Location: [facility] OR     C NONSPECIFIC PROCEDURE      Bicep tendon repair    Abstracted 02     C/SECTION, LOW TRANSVERSE  x 3    , Low Transverse x 4     DECOMPRESSION, FUSION CERVICAL ANTERIOR TWO LEVELS, COMBINED  2012    Procedure:COMBINED DECOMPRESSION, FUSION CERVICAL ANTERIOR TWO LEVELS; DECOMPRESSION, FUSION CERVICAL ANTERIOR C4-6; Surgeon:SHALONDA ALCALA; Location:[facility] OR     DECOMPRESSION, FUSION LUMBAR POSTERIOR ONE LEVEL, COMBINED  3/7/2013    Procedure: COMBINED " DECOMPRESSION, FUSION LUMBAR POSTERIOR ONE LEVEL;  Posterior Fusion L2-3, Hardware Removal L3-5;  Surgeon: Davide Tamez MD;  Location: [facility] OR     FUSION LUMBAR ANTERIOR ONE LEVEL  4/11/2013    Procedure: FUSION LUMBAR ANTERIOR ONE LEVEL;  Anterior Fusion L2-3;  Surgeon: Davide Tamez MD;  Location: [facility] OR     LAMINECTOMY, FUSION LUMBAR TWO LEVELS, COMBINED  3/11    AP fusion L3-5     Current Outpatient Prescriptions   Medication Sig Dispense Refill     Acetaminophen (TYLENOL PO) Take 1,300 mg by mouth 2 times daily        buPROPion (WELLBUTRIN XL) 150 MG 24 hr tablet Take 1 tablet (150 mg) by mouth every morning 90 tablet 3     CALCIUM PO Take 950 mg by mouth daily        Cholecalciferol (VITAMIN D3 PO) Take 1,000 Units by mouth daily       citalopram (CELEXA) 20 MG tablet TAKE 1 TABLET (20 MG) BY MOUTH DAILY 90 tablet 3     Ergocalciferol (VITAMIN D2 PO) Take by mouth once a week       Multiple Vitamins-Minerals (OCUVITE ADULT 50+ PO) Take  by mouth daily.       Naproxen Sodium (ALEVE PO) Take 220 mg by mouth daily Takes up to 4 tablets depending on day       NONFORMULARY Sustained night time lubricant eye ointment night before bed       Omega-3 Fatty Acids (OMEGA 3 PO) Take by mouth daily       ranitidine (ZANTAC 75) 75 MG tablet Take 75 mg by mouth 2 times daily as needed.       RESTASIS 0.05 % ophthalmic emulsion INSTILL 1 DROP INTO AFFECTED EYE(S) BY OPHTHALMIC ROUTE EVERY 12 HOURS  0     ZYRTEC 10 MG OR TABS 1 TABLET EACH MORNING       mupirocin (BACTROBAN) 2 % ointment Spray into both nostrils 2 times daily Apply small amount to each nostril 2 times per day for 7 days prior to surgery (6/26 to 7/3/18)       OTC products: None, except as noted above    Allergies   Allergen Reactions     Animal Dander      Cats      Dogs      Nkda [No Known Drug Allergies]      Seasonal Allergies      Trees and mold      Latex Allergy: NO    Social History   Substance Use Topics     Smoking status: Former Smoker  "    Quit date: 1/1/1975     Smokeless tobacco: Never Used     Alcohol use 0.0 oz/week     0 Standard drinks or equivalent per week      Comment: 2-3 glass wine /week     History   Drug Use No       REVIEW OF SYSTEMS:   CONSTITUTIONAL: NEGATIVE for fever, chills, change in weight  INTEGUMENTARY/SKIN: NEGATIVE for worrisome rashes, moles or lesions  EYES: NEGATIVE for vision changes or irritation  ENT/MOUTH: NEGATIVE for ear, mouth and throat problems  RESP: NEGATIVE for significant cough or SOB  BREAST: NEGATIVE for masses, tenderness or discharge  CV: NEGATIVE for chest pain, palpitations or peripheral edema  GI: NEGATIVE for nausea, abdominal pain, heartburn, or change in bowel habits  : NEGATIVE for frequency, dysuria, or hematuria  MUSCULOSKELETAL: NEGATIVE for significant arthralgias or myalgia  NEURO: NEGATIVE for weakness, dizziness or paresthesias  ENDOCRINE: NEGATIVE for temperature intolerance, skin/hair changes  HEME: NEGATIVE for bleeding problems  PSYCHIATRIC: NEGATIVE for changes in mood or affect    EXAM:   /68 (BP Location: Right arm, Patient Position: Chair, Cuff Size: Adult Regular)  Pulse 84  Temp 98.2  F (36.8  C) (Tympanic)  Ht 1.67 m (5' 5.75\")  Wt 101.9 kg (224 lb 11.2 oz)  LMP 11/12/2007  SpO2 96%  BMI 36.54 kg/m2    GENERAL APPEARANCE: healthy, alert and no distress     EYES: EOMI, PERRL     HENT: ear canals and TM's normal and nose and mouth without ulcers or lesions     NECK: no adenopathy, no asymmetry, masses, or scars and decreased ROM     RESP: lungs clear to auscultation - no rales, rhonchi or wheezes     CV: regular rates and rhythm, normal S1 S2, no S3 or S4 and no murmur, click or rub     ABDOMEN:  soft, nontender, no HSM or masses and bowel sounds normal     MS: extremities normal- no gross deformities noted, no evidence of inflammation in joints, FROM in all extremities.     SKIN: no suspicious lesions or rashes     NEURO: Normal strength and tone, sensory exam " grossly normal, mentation intact and speech normal     PSYCH: mentation appears normal. and affect mildly flat     LYMPHATICS: No cervical adenopathy    DIAGNOSTICS:     EKG: appears normal, NSR, normal axis, normal intervals, no acute ST/T changes c/w ischemia, no LVH by voltage criteria, nonspecific ST-T changes  Labs Drawn and in Process:   Unresulted Labs Ordered in the Past 30 Days of this Admission     No orders found from 4/20/2018 to 6/20/2018.        CXR Findings: Normal- no infiltrates, effusions, pneumothoraces, cardiomegaly or masses     Recent Labs   Lab Test  03/23/18   1010  03/24/17   1334   04/13/13   0535   04/08/13   0729   03/23/11   1100   HGB  13.3   --    --   9.6*   < >  12.3   < >  14.4   PLT  290   --    --    --    --   369   < >   --    INR   --    --    --    --    --   0.90   --   1.00   NA  139  140   < >   --    --   144   < >  143   POTASSIUM  4.9  3.8   < >   --    --   4.7   < >  4.4   CR  0.93  0.88   < >   --    --   0.75   < >  0.85    < > = values in this interval not displayed.        IMPRESSION:   Reason for surgery/procedure: lumbar radiculopathy  Diagnosis/reason for consult: evaluation for safety prior to surgery    The proposed surgical procedure is considered INTERMEDIATE risk.    REVISED CARDIAC RISK INDEX  The patient has the following serious cardiovascular risks for perioperative complications such as (MI, PE, VFib and 3  AV Block):  No serious cardiac risks  INTERPRETATION: 1 risks: Class II (low risk - 0.9% complication rate)    The patient has the following additional risks for perioperative complications:  No identified additional risks      ICD-10-CM    1. Preop general physical exam Z01.818 EKG 12-lead complete w/read - Clinics (performed today)     CBC with platelets     *UA reflex to Microscopic     Potassium     XR Chest 2 Views     Urine Microscopic   2. Lumbar radiculopathy M54.16 CBC with platelets     *UA reflex to Microscopic     Potassium     XR Chest  2 Views   3. PEÑA (obstructive sleep apnea) G47.33        RECOMMENDATIONS:       Obstructive Sleep Apnea (or suspected sleep apnea)  Patient is to bring their home CPAP with them on the day of surgery    Cervical limited ROM and neck pain - be cautious with intubation and positioning    Chronic eye dryness and history of corneal ulcer - eye moisturizing ointment needs to be applied pre-op (pt can bring own).      --Patient is to take all scheduled medications on the day of surgery EXCEPT for modifications listed below.    APPROVAL GIVEN to proceed with proposed procedure, without further diagnostic evaluation       Signed Electronically by: Nallely Moore MD    Copy of this evaluation report is provided to requesting physician.    [place] Preop Guidelines    Revised Cardiac Risk Index

## 2018-06-20 ENCOUNTER — HOSPITAL ENCOUNTER (OUTPATIENT)
Dept: LAB | Facility: CLINIC | Age: 68
Discharge: HOME | End: 2018-06-20
Attending: ORTHOPAEDIC SURGERY | Admitting: INTERNAL MEDICINE
Payer: COMMERCIAL

## 2018-06-20 DIAGNOSIS — Z01.812 PRE-OPERATIVE LABORATORY EXAMINATION: ICD-10-CM

## 2018-06-20 LAB
MRSA DNA SPEC QL NAA+PROBE: NEGATIVE
POTASSIUM SERPL-SCNC: 4.4 MMOL/L (ref 3.4–5.3)
SPECIMEN SOURCE: NORMAL

## 2018-06-20 PROCEDURE — 40000829 ZZHCL STATISTIC STAPH AUREUS SUSCEPT SCREEN PCR: Performed by: INTERNAL MEDICINE

## 2018-06-20 PROCEDURE — 87640 STAPH A DNA AMP PROBE: CPT | Performed by: INTERNAL MEDICINE

## 2018-06-20 PROCEDURE — 87641 MR-STAPH DNA AMP PROBE: CPT | Performed by: INTERNAL MEDICINE

## 2018-06-20 PROCEDURE — 40000830 ZZHCL STATISTIC STAPH AUREUS METH RESIST SCREEN PCR: Performed by: INTERNAL MEDICINE

## 2018-06-21 RX ORDER — MUPIROCIN 20 MG/G
OINTMENT TOPICAL 2 TIMES DAILY
COMMUNITY
End: 2019-03-26

## 2018-06-27 NOTE — H&P (VIEW-ONLY)
[facility]  [address]  [address]  [place] [address]  [phone]  Dept: [phone]    PRE-OP EVALUATION:  Today's date: 2018    Lucie Whitehead (: 1950) presents for pre-operative evaluation assessment as requested by Dr. read.  She requires evaluation and anesthesia risk assessment prior to undergoing surgery/procedure for treatment of lumbar fusion .    Fax number for surgical facility: [facility], [place]  Primary Physician: Nallely Moore  Type of Anesthesia Anticipated: General    Patient has a Health Care Directive or Living Will:  NO    Preop Questions 2018   Who is doing your surgery? dr chen   What are you having done? lumbar fusion   Date of Surgery/Procedure: 2018   Facility or Hospital where procedure/surgery will be performed: [facility]   1.  Do you have a history of Heart attack, stroke, stent, coronary bypass surgery, or other heart surgery? No   2.  Do you ever have any pain or discomfort in your chest? No   3.  Do you have a history of  Heart Failure? No   4.   Are you troubled by shortness of breath when:  walking on a level surface, or up a slight hill, or at night? No   5.  Do you currently have a cold, bronchitis or other respiratory infection? No   6.  Do you have a cough, shortness of breath, or wheezing? No   7.  Do you sometimes get pains in the calves of your legs when you walk? No   8. Do you or anyone in your family have previous history of blood clots? No   9.  Do you or does anyone in your family have a serious bleeding problem such as prolonged bleeding following surgeries or cuts? No   10. Have you ever had problems with anemia or been told to take iron pills? No   11. Have you had any abnormal blood loss such as black, tarry or bloody stools, or abnormal vaginal bleeding? No   12. Have you ever had a blood transfusion? YES -    13. Have you or any of your relatives ever had problems with anesthesia? No   14.  Do you have sleep apnea, excessive snoring or daytime drowsiness? YES -    15. Do you have any prosthetic heart valves? No   16. Do you have prosthetic joints? No   17. Is there any chance that you may be pregnant? No         HPI:     HPI related to upcoming procedure: Back pain unresponsive to conservative measures thus undergoing surgery      See problem list for active medical problems.  Problems all longstanding and stable, except as noted/documented.  See ROS for pertinent symptoms related to these conditions.                                                                                                                                                          .    MEDICAL HISTORY:     Patient Active Problem List    Diagnosis Date Noted     Advance care planning 03/23/2018     Priority: Medium     Osteopenia 05/13/2016     Priority: Medium     Non morbid obesity due to excess calories 11/08/2015     Priority: Medium     Major depressive disorder, recurrent episode (H) 09/12/2014     Priority: Medium     prozac caused problems with mentation.  zoloft caused sleepiness.       Tubular adenoma of colon 07/11/2013     Priority: Medium     On colonoscopy 2013       Macular degeneration 07/08/2013     Priority: Medium     Dry, OU, follows with retinal surgery       PEÑA (obstructive sleep apnea) 03/05/2013     Priority: Medium     On CPAP and doing well       Impaired fasting glucose 03/05/2013     Priority: Medium     Lumbar spinal stenosis 04/14/2010     Priority: Medium     S/p lumbar surgery 3/13 and 4/13.       Hyperlipidemia LDL goal <160 02/10/2010     Priority: Medium     [place] 10-year CHD Risk Score: 1% (12 Total Points)   Values used to calculate score:     Age: 61 years -- Points: 8     Total Cholesterol: 232 mg/dL -- Points: 4     HDL Cholesterol: 55 mg/dL -- Points: 0     Systolic BP (untreated): 110 mmHg -- Points: 0    The patient is not a smoker. -- Points: 0    The patient has not been diagnosed  "with diabetes. -- Points: 0    The patient does not have a family history of CHD. -- Points:0          Ovarian cyst 2006     Priority: Medium     Problem list name updated by automated process. Provider to review       Hirsutism 2004     Priority: Medium     Leiomyoma of uterus 2004     Priority: Medium     Pelvic US 2006: Contour is irreg w/myomata: 1)anterior mid submucosal 4.1x3.6cm, 2)anterior fundal subserosal 2.4x1.9cm, 3)lt mid intramural 2.4x1.9cm, 4)posterior fundal intramural 2.6x2.4cm, others smaller.  Problem list name updated by automated process. Provider to review        Past Medical History:   Diagnosis Date     Complication of anesthesia     extremely dry eyes     Diabetes (H)      Gastro-oesophageal reflux disease      Hirsutism     Abstracted 02     History of blood transfusion      Leiomyoma of uterus, unspecified     Abstracted 02     Obesity 2010    Estimated Body mass index is 28.97 kg/(m^2) as calculated from the following:   Height as of 7/10/13: 5' 7\"(1.702 m).   Weight as of 7/10/13: 185 lb(83.915 kg).       Other and unspecified hyperlipidemia     Abstracted 02     Other chronic pain     low back and legs and feet     Sleep apnea     CPAP     Past Surgical History:   Procedure Laterality Date     ARTHROSCOPY SHOULDER DISTAL CLAVICLE REPAIR Right 2014    Procedure: ARTHROSCOPY SHOULDER DISTAL CLAVICLE RESECTION;  Surgeon: John Paul Rodriguez MD;  Location: [facility] OR     C NONSPECIFIC PROCEDURE      Bicep tendon repair    Abstracted 02     C/SECTION, LOW TRANSVERSE  x 3    , Low Transverse x 4     DECOMPRESSION, FUSION CERVICAL ANTERIOR TWO LEVELS, COMBINED  2012    Procedure:COMBINED DECOMPRESSION, FUSION CERVICAL ANTERIOR TWO LEVELS; DECOMPRESSION, FUSION CERVICAL ANTERIOR C4-6; Surgeon:SHALONDA ALCALA; Location:[facility] OR     DECOMPRESSION, FUSION LUMBAR POSTERIOR ONE LEVEL, COMBINED  3/7/2013    Procedure: COMBINED " DECOMPRESSION, FUSION LUMBAR POSTERIOR ONE LEVEL;  Posterior Fusion L2-3, Hardware Removal L3-5;  Surgeon: Davide Tamez MD;  Location: [facility] OR     FUSION LUMBAR ANTERIOR ONE LEVEL  4/11/2013    Procedure: FUSION LUMBAR ANTERIOR ONE LEVEL;  Anterior Fusion L2-3;  Surgeon: Davide Tamez MD;  Location: [facility] OR     LAMINECTOMY, FUSION LUMBAR TWO LEVELS, COMBINED  3/11    AP fusion L3-5     Current Outpatient Prescriptions   Medication Sig Dispense Refill     Acetaminophen (TYLENOL PO) Take 1,300 mg by mouth 2 times daily        buPROPion (WELLBUTRIN XL) 150 MG 24 hr tablet Take 1 tablet (150 mg) by mouth every morning 90 tablet 3     CALCIUM PO Take 950 mg by mouth daily        Cholecalciferol (VITAMIN D3 PO) Take 1,000 Units by mouth daily       citalopram (CELEXA) 20 MG tablet TAKE 1 TABLET (20 MG) BY MOUTH DAILY 90 tablet 3     Ergocalciferol (VITAMIN D2 PO) Take by mouth once a week       Multiple Vitamins-Minerals (OCUVITE ADULT 50+ PO) Take  by mouth daily.       Naproxen Sodium (ALEVE PO) Take 220 mg by mouth daily Takes up to 4 tablets depending on day       NONFORMULARY Sustained night time lubricant eye ointment night before bed       Omega-3 Fatty Acids (OMEGA 3 PO) Take by mouth daily       ranitidine (ZANTAC 75) 75 MG tablet Take 75 mg by mouth 2 times daily as needed.       RESTASIS 0.05 % ophthalmic emulsion INSTILL 1 DROP INTO AFFECTED EYE(S) BY OPHTHALMIC ROUTE EVERY 12 HOURS  0     ZYRTEC 10 MG OR TABS 1 TABLET EACH MORNING       mupirocin (BACTROBAN) 2 % ointment Spray into both nostrils 2 times daily Apply small amount to each nostril 2 times per day for 7 days prior to surgery (6/26 to 7/3/18)       OTC products: None, except as noted above    Allergies   Allergen Reactions     Animal Dander      Cats      Dogs      Nkda [No Known Drug Allergies]      Seasonal Allergies      Trees and mold      Latex Allergy: NO    Social History   Substance Use Topics     Smoking status: Former Smoker  "    Quit date: 1/1/1975     Smokeless tobacco: Never Used     Alcohol use 0.0 oz/week     0 Standard drinks or equivalent per week      Comment: 2-3 glass wine /week     History   Drug Use No       REVIEW OF SYSTEMS:   CONSTITUTIONAL: NEGATIVE for fever, chills, change in weight  INTEGUMENTARY/SKIN: NEGATIVE for worrisome rashes, moles or lesions  EYES: NEGATIVE for vision changes or irritation  ENT/MOUTH: NEGATIVE for ear, mouth and throat problems  RESP: NEGATIVE for significant cough or SOB  BREAST: NEGATIVE for masses, tenderness or discharge  CV: NEGATIVE for chest pain, palpitations or peripheral edema  GI: NEGATIVE for nausea, abdominal pain, heartburn, or change in bowel habits  : NEGATIVE for frequency, dysuria, or hematuria  MUSCULOSKELETAL: NEGATIVE for significant arthralgias or myalgia  NEURO: NEGATIVE for weakness, dizziness or paresthesias  ENDOCRINE: NEGATIVE for temperature intolerance, skin/hair changes  HEME: NEGATIVE for bleeding problems  PSYCHIATRIC: NEGATIVE for changes in mood or affect    EXAM:   /68 (BP Location: Right arm, Patient Position: Chair, Cuff Size: Adult Regular)  Pulse 84  Temp 98.2  F (36.8  C) (Tympanic)  Ht 1.67 m (5' 5.75\")  Wt 101.9 kg (224 lb 11.2 oz)  LMP 11/12/2007  SpO2 96%  BMI 36.54 kg/m2    GENERAL APPEARANCE: healthy, alert and no distress     EYES: EOMI, PERRL     HENT: ear canals and TM's normal and nose and mouth without ulcers or lesions     NECK: no adenopathy, no asymmetry, masses, or scars and decreased ROM     RESP: lungs clear to auscultation - no rales, rhonchi or wheezes     CV: regular rates and rhythm, normal S1 S2, no S3 or S4 and no murmur, click or rub     ABDOMEN:  soft, nontender, no HSM or masses and bowel sounds normal     MS: extremities normal- no gross deformities noted, no evidence of inflammation in joints, FROM in all extremities.     SKIN: no suspicious lesions or rashes     NEURO: Normal strength and tone, sensory exam " grossly normal, mentation intact and speech normal     PSYCH: mentation appears normal. and affect mildly flat     LYMPHATICS: No cervical adenopathy    DIAGNOSTICS:     EKG: appears normal, NSR, normal axis, normal intervals, no acute ST/T changes c/w ischemia, no LVH by voltage criteria, nonspecific ST-T changes  Labs Drawn and in Process:   Unresulted Labs Ordered in the Past 30 Days of this Admission     No orders found from 4/20/2018 to 6/20/2018.        CXR Findings: Normal- no infiltrates, effusions, pneumothoraces, cardiomegaly or masses     Recent Labs   Lab Test  03/23/18   1010  03/24/17   1334   04/13/13   0535   04/08/13   0729   03/23/11   1100   HGB  13.3   --    --   9.6*   < >  12.3   < >  14.4   PLT  290   --    --    --    --   369   < >   --    INR   --    --    --    --    --   0.90   --   1.00   NA  139  140   < >   --    --   144   < >  143   POTASSIUM  4.9  3.8   < >   --    --   4.7   < >  4.4   CR  0.93  0.88   < >   --    --   0.75   < >  0.85    < > = values in this interval not displayed.        IMPRESSION:   Reason for surgery/procedure: lumbar radiculopathy  Diagnosis/reason for consult: evaluation for safety prior to surgery    The proposed surgical procedure is considered INTERMEDIATE risk.    REVISED CARDIAC RISK INDEX  The patient has the following serious cardiovascular risks for perioperative complications such as (MI, PE, VFib and 3  AV Block):  No serious cardiac risks  INTERPRETATION: 1 risks: Class II (low risk - 0.9% complication rate)    The patient has the following additional risks for perioperative complications:  No identified additional risks      ICD-10-CM    1. Preop general physical exam Z01.818 EKG 12-lead complete w/read - Clinics (performed today)     CBC with platelets     *UA reflex to Microscopic     Potassium     XR Chest 2 Views     Urine Microscopic   2. Lumbar radiculopathy M54.16 CBC with platelets     *UA reflex to Microscopic     Potassium     XR Chest  2 Views   3. PEÑA (obstructive sleep apnea) G47.33        RECOMMENDATIONS:       Obstructive Sleep Apnea (or suspected sleep apnea)  Patient is to bring their home CPAP with them on the day of surgery    Cervical limited ROM and neck pain - be cautious with intubation and positioning    Chronic eye dryness and history of corneal ulcer - eye moisturizing ointment needs to be applied pre-op (pt can bring own).      --Patient is to take all scheduled medications on the day of surgery EXCEPT for modifications listed below.    APPROVAL GIVEN to proceed with proposed procedure, without further diagnostic evaluation       Signed Electronically by: Nallely Moore MD    Copy of this evaluation report is provided to requesting physician.    [place] Preop Guidelines    Revised Cardiac Risk Index

## 2018-07-02 ENCOUNTER — HOSPITAL ENCOUNTER (OUTPATIENT)
Dept: LAB | Facility: CLINIC | Age: 68
Discharge: HOME | End: 2018-07-02
Attending: ORTHOPAEDIC SURGERY | Admitting: ORTHOPAEDIC SURGERY
Payer: COMMERCIAL

## 2018-07-02 DIAGNOSIS — Z01.812 PRE-OPERATIVE LABORATORY EXAMINATION: ICD-10-CM

## 2018-07-02 LAB
ABO + RH BLD: NORMAL
ABO + RH BLD: NORMAL
BLD GP AB SCN SERPL QL: NORMAL
BLOOD BANK CMNT PATIENT-IMP: NORMAL
CREAT SERPL-MCNC: 1.02 MG/DL (ref 0.52–1.04)
GFR SERPL CREATININE-BSD FRML MDRD: 54 ML/MIN/1.7M2
SPECIMEN EXP DATE BLD: NORMAL

## 2018-07-02 PROCEDURE — 86850 RBC ANTIBODY SCREEN: CPT | Performed by: ORTHOPAEDIC SURGERY

## 2018-07-02 PROCEDURE — 86900 BLOOD TYPING SEROLOGIC ABO: CPT | Performed by: ORTHOPAEDIC SURGERY

## 2018-07-02 PROCEDURE — 36415 COLL VENOUS BLD VENIPUNCTURE: CPT | Performed by: ORTHOPAEDIC SURGERY

## 2018-07-02 PROCEDURE — 86901 BLOOD TYPING SEROLOGIC RH(D): CPT | Performed by: ORTHOPAEDIC SURGERY

## 2018-07-02 PROCEDURE — 82565 ASSAY OF CREATININE: CPT | Performed by: ORTHOPAEDIC SURGERY

## 2018-07-03 ENCOUNTER — ANESTHESIA EVENT (OUTPATIENT)
Dept: SURGERY | Facility: CLINIC | Age: 68
DRG: 454 | End: 2018-07-03
Payer: COMMERCIAL

## 2018-07-03 ENCOUNTER — ANESTHESIA (OUTPATIENT)
Dept: SURGERY | Facility: CLINIC | Age: 68
DRG: 454 | End: 2018-07-03
Payer: COMMERCIAL

## 2018-07-03 ENCOUNTER — APPOINTMENT (OUTPATIENT)
Dept: GENERAL RADIOLOGY | Facility: CLINIC | Age: 68
DRG: 454 | End: 2018-07-03
Attending: ORTHOPAEDIC SURGERY
Payer: COMMERCIAL

## 2018-07-03 ENCOUNTER — HOSPITAL ENCOUNTER (INPATIENT)
Facility: CLINIC | Age: 68
LOS: 2 days | Discharge: HOME | DRG: 454 | End: 2018-07-05
Attending: ORTHOPAEDIC SURGERY | Admitting: ORTHOPAEDIC SURGERY
Payer: COMMERCIAL

## 2018-07-03 DIAGNOSIS — G89.18 POST-OP PAIN: Primary | ICD-10-CM

## 2018-07-03 PROBLEM — M54.16 LUMBAR RADICULOPATHY: Status: ACTIVE | Noted: 2018-07-03

## 2018-07-03 LAB
GLUCOSE BLDC GLUCOMTR-MCNC: 108 MG/DL (ref 70–99)
GLUCOSE BLDC GLUCOMTR-MCNC: 111 MG/DL (ref 70–99)

## 2018-07-03 PROCEDURE — 25800025 ZZH RX 258: Performed by: ORTHOPAEDIC SURGERY

## 2018-07-03 PROCEDURE — 27210794 ZZH OR GENERAL SUPPLY STERILE: Performed by: ORTHOPAEDIC SURGERY

## 2018-07-03 PROCEDURE — 0SG00AJ FUSION OF LUMBAR VERTEBRAL JOINT WITH INTERBODY FUSION DEVICE, POSTERIOR APPROACH, ANTERIOR COLUMN, OPEN APPROACH: ICD-10-PCS | Performed by: ORTHOPAEDIC SURGERY

## 2018-07-03 PROCEDURE — 36000069 ZZH SURGERY LEVEL 5 EA 15 ADDTL MIN: Performed by: ORTHOPAEDIC SURGERY

## 2018-07-03 PROCEDURE — 00000146 ZZHCL STATISTIC GLUCOSE BY METER IP

## 2018-07-03 PROCEDURE — 25000125 ZZHC RX 250: Performed by: ANESTHESIOLOGY

## 2018-07-03 PROCEDURE — 37000008 ZZH ANESTHESIA TECHNICAL FEE, 1ST 30 MIN: Performed by: ORTHOPAEDIC SURGERY

## 2018-07-03 PROCEDURE — 25000125 ZZHC RX 250: Performed by: NURSE ANESTHETIST, CERTIFIED REGISTERED

## 2018-07-03 PROCEDURE — C1713 ANCHOR/SCREW BN/BN,TIS/BN: HCPCS | Performed by: ORTHOPAEDIC SURGERY

## 2018-07-03 PROCEDURE — 25000128 H RX IP 250 OP 636: Performed by: NURSE ANESTHETIST, CERTIFIED REGISTERED

## 2018-07-03 PROCEDURE — 25000128 H RX IP 250 OP 636: Performed by: PHYSICIAN ASSISTANT

## 2018-07-03 PROCEDURE — 25000566 ZZH SEVOFLURANE, EA 15 MIN: Performed by: ORTHOPAEDIC SURGERY

## 2018-07-03 PROCEDURE — 27210995 ZZH RX 272: Performed by: ORTHOPAEDIC SURGERY

## 2018-07-03 PROCEDURE — 36000071 ZZH SURGERY LEVEL 5 W FLUORO 1ST 30 MIN: Performed by: ORTHOPAEDIC SURGERY

## 2018-07-03 PROCEDURE — L8699 PROSTHETIC IMPLANT NOS: HCPCS | Performed by: ORTHOPAEDIC SURGERY

## 2018-07-03 PROCEDURE — 0ST20ZZ RESECTION OF LUMBAR VERTEBRAL DISC, OPEN APPROACH: ICD-10-PCS | Performed by: ORTHOPAEDIC SURGERY

## 2018-07-03 PROCEDURE — C1762 CONN TISS, HUMAN(INC FASCIA): HCPCS | Performed by: ORTHOPAEDIC SURGERY

## 2018-07-03 PROCEDURE — 25000128 H RX IP 250 OP 636: Performed by: ANESTHESIOLOGY

## 2018-07-03 PROCEDURE — A9270 NON-COVERED ITEM OR SERVICE: HCPCS | Mod: GY | Performed by: PHYSICIAN ASSISTANT

## 2018-07-03 PROCEDURE — 71000013 ZZH RECOVERY PHASE 1 LEVEL 1 EA ADDTL HR: Performed by: ORTHOPAEDIC SURGERY

## 2018-07-03 PROCEDURE — 25000125 ZZHC RX 250: Performed by: ORTHOPAEDIC SURGERY

## 2018-07-03 PROCEDURE — 25000132 ZZH RX MED GY IP 250 OP 250 PS 637: Performed by: PHYSICIAN ASSISTANT

## 2018-07-03 PROCEDURE — 4A11X4G MONITORING OF PERIPHERAL NERVOUS ELECTRICAL ACTIVITY, INTRAOPERATIVE, EXTERNAL APPROACH: ICD-10-PCS | Performed by: ORTHOPAEDIC SURGERY

## 2018-07-03 PROCEDURE — 95940 IONM IN OPERATNG ROOM 15 MIN: CPT | Performed by: ORTHOPAEDIC SURGERY

## 2018-07-03 PROCEDURE — 40000277 XR SURGERY CARM FLUORO LESS THAN 5 MIN W STILLS: Mod: TC

## 2018-07-03 PROCEDURE — 0SP004Z REMOVAL OF INTERNAL FIXATION DEVICE FROM LUMBAR VERTEBRAL JOINT, OPEN APPROACH: ICD-10-PCS | Performed by: ORTHOPAEDIC SURGERY

## 2018-07-03 PROCEDURE — 07DR3ZZ EXTRACTION OF ILIAC BONE MARROW, PERCUTANEOUS APPROACH: ICD-10-PCS | Performed by: ORTHOPAEDIC SURGERY

## 2018-07-03 PROCEDURE — 0SG0071 FUSION OF LUMBAR VERTEBRAL JOINT WITH AUTOLOGOUS TISSUE SUBSTITUTE, POSTERIOR APPROACH, POSTERIOR COLUMN, OPEN APPROACH: ICD-10-PCS | Performed by: ORTHOPAEDIC SURGERY

## 2018-07-03 PROCEDURE — 25000128 H RX IP 250 OP 636: Performed by: ORTHOPAEDIC SURGERY

## 2018-07-03 PROCEDURE — 12000007 ZZH R&B INTERMEDIATE

## 2018-07-03 PROCEDURE — 27210995 ZZH RX 272: Performed by: PHYSICIAN ASSISTANT

## 2018-07-03 PROCEDURE — 71000012 ZZH RECOVERY PHASE 1 LEVEL 1 FIRST HR: Performed by: ORTHOPAEDIC SURGERY

## 2018-07-03 PROCEDURE — 40000306 ZZH STATISTIC PRE PROC ASSESS II: Performed by: ORTHOPAEDIC SURGERY

## 2018-07-03 PROCEDURE — 37000009 ZZH ANESTHESIA TECHNICAL FEE, EACH ADDTL 15 MIN: Performed by: ORTHOPAEDIC SURGERY

## 2018-07-03 DEVICE — IMP SCR STRK XIA 3 POLYAXIAL 6.5X45MM TI 482316545: Type: IMPLANTABLE DEVICE | Site: SPINE LUMBAR | Status: FUNCTIONAL

## 2018-07-03 DEVICE — GRAFT BONE INFUSE BMP SM 7510200: Type: IMPLANTABLE DEVICE | Site: SPINE LUMBAR | Status: FUNCTIONAL

## 2018-07-03 DEVICE — GRAFT BONE CRUSH CANC 30ML 400080: Type: IMPLANTABLE DEVICE | Site: SPINE LUMBAR | Status: FUNCTIONAL

## 2018-07-03 DEVICE — IMPLANTABLE DEVICE: Type: IMPLANTABLE DEVICE | Site: SPINE LUMBAR | Status: FUNCTIONAL

## 2018-07-03 DEVICE — GRAFT BONE PUTTY DBX 05ML 038050: Type: IMPLANTABLE DEVICE | Site: SPINE LUMBAR | Status: FUNCTIONAL

## 2018-07-03 RX ORDER — LIDOCAINE HYDROCHLORIDE 10 MG/ML
INJECTION, SOLUTION INFILTRATION; PERINEURAL PRN
Status: DISCONTINUED | OUTPATIENT
Start: 2018-07-03 | End: 2018-07-03

## 2018-07-03 RX ORDER — DEXAMETHASONE SODIUM PHOSPHATE 4 MG/ML
4 INJECTION, SOLUTION INTRA-ARTICULAR; INTRALESIONAL; INTRAMUSCULAR; INTRAVENOUS; SOFT TISSUE EVERY 8 HOURS
Status: COMPLETED | OUTPATIENT
Start: 2018-07-03 | End: 2018-07-04

## 2018-07-03 RX ORDER — BUPROPION HYDROCHLORIDE 150 MG/1
150 TABLET ORAL EVERY MORNING
Status: DISCONTINUED | OUTPATIENT
Start: 2018-07-04 | End: 2018-07-05 | Stop reason: HOSPADM

## 2018-07-03 RX ORDER — LIDOCAINE 40 MG/G
CREAM TOPICAL
Status: DISCONTINUED | OUTPATIENT
Start: 2018-07-03 | End: 2018-07-05 | Stop reason: HOSPADM

## 2018-07-03 RX ORDER — EPHEDRINE SULFATE 50 MG/ML
INJECTION, SOLUTION INTRAMUSCULAR; INTRAVENOUS; SUBCUTANEOUS PRN
Status: DISCONTINUED | OUTPATIENT
Start: 2018-07-03 | End: 2018-07-03

## 2018-07-03 RX ORDER — PROCHLORPERAZINE MALEATE 5 MG
5 TABLET ORAL EVERY 6 HOURS PRN
Status: DISCONTINUED | OUTPATIENT
Start: 2018-07-03 | End: 2018-07-05 | Stop reason: HOSPADM

## 2018-07-03 RX ORDER — SODIUM CHLORIDE 450 MG/100ML
INJECTION, SOLUTION INTRAVENOUS CONTINUOUS
Status: DISCONTINUED | OUTPATIENT
Start: 2018-07-03 | End: 2018-07-04 | Stop reason: CLARIF

## 2018-07-03 RX ORDER — ACETAMINOPHEN 325 MG/1
975 TABLET ORAL EVERY 8 HOURS
Status: DISCONTINUED | OUTPATIENT
Start: 2018-07-03 | End: 2018-07-05 | Stop reason: HOSPADM

## 2018-07-03 RX ORDER — OXYCODONE HYDROCHLORIDE 5 MG/1
5 TABLET ORAL EVERY 6 HOURS PRN
Qty: 70 TABLET | Refills: 0 | Status: SHIPPED | OUTPATIENT
Start: 2018-07-03 | End: 2019-03-26

## 2018-07-03 RX ORDER — GABAPENTIN 100 MG/1
100 CAPSULE ORAL 3 TIMES DAILY
Status: DISCONTINUED | OUTPATIENT
Start: 2018-07-03 | End: 2018-07-05 | Stop reason: HOSPADM

## 2018-07-03 RX ORDER — OXYCODONE HYDROCHLORIDE 5 MG/1
5-10 TABLET ORAL EVERY 4 HOURS PRN
Status: DISCONTINUED | OUTPATIENT
Start: 2018-07-03 | End: 2018-07-05 | Stop reason: HOSPADM

## 2018-07-03 RX ORDER — ONDANSETRON 4 MG/1
4 TABLET, ORALLY DISINTEGRATING ORAL EVERY 6 HOURS PRN
Status: DISCONTINUED | OUTPATIENT
Start: 2018-07-03 | End: 2018-07-05 | Stop reason: HOSPADM

## 2018-07-03 RX ORDER — AMOXICILLIN 250 MG
1 CAPSULE ORAL 2 TIMES DAILY
Status: DISCONTINUED | OUTPATIENT
Start: 2018-07-03 | End: 2018-07-05 | Stop reason: HOSPADM

## 2018-07-03 RX ORDER — METOCLOPRAMIDE 5 MG/1
5 TABLET ORAL EVERY 6 HOURS PRN
Status: DISCONTINUED | OUTPATIENT
Start: 2018-07-03 | End: 2018-07-05 | Stop reason: HOSPADM

## 2018-07-03 RX ORDER — AMOXICILLIN 250 MG
2 CAPSULE ORAL 2 TIMES DAILY
Status: DISCONTINUED | OUTPATIENT
Start: 2018-07-03 | End: 2018-07-05 | Stop reason: HOSPADM

## 2018-07-03 RX ORDER — METOCLOPRAMIDE HYDROCHLORIDE 5 MG/ML
5 INJECTION INTRAMUSCULAR; INTRAVENOUS EVERY 6 HOURS PRN
Status: DISCONTINUED | OUTPATIENT
Start: 2018-07-03 | End: 2018-07-05 | Stop reason: HOSPADM

## 2018-07-03 RX ORDER — FENTANYL CITRATE 50 UG/ML
INJECTION, SOLUTION INTRAMUSCULAR; INTRAVENOUS PRN
Status: DISCONTINUED | OUTPATIENT
Start: 2018-07-03 | End: 2018-07-03

## 2018-07-03 RX ORDER — METHOCARBAMOL 500 MG/1
500 TABLET, FILM COATED ORAL 4 TIMES DAILY PRN
Status: DISCONTINUED | OUTPATIENT
Start: 2018-07-03 | End: 2018-07-05 | Stop reason: HOSPADM

## 2018-07-03 RX ORDER — ONDANSETRON 2 MG/ML
4 INJECTION INTRAMUSCULAR; INTRAVENOUS EVERY 30 MIN PRN
Status: DISCONTINUED | OUTPATIENT
Start: 2018-07-03 | End: 2018-07-03 | Stop reason: HOSPADM

## 2018-07-03 RX ORDER — TOBRAMYCIN 1.2 G/30ML
INJECTION, POWDER, LYOPHILIZED, FOR SOLUTION INTRAVENOUS PRN
Status: DISCONTINUED | OUTPATIENT
Start: 2018-07-03 | End: 2018-07-03 | Stop reason: HOSPADM

## 2018-07-03 RX ORDER — PROPOFOL 10 MG/ML
INJECTION, EMULSION INTRAVENOUS PRN
Status: DISCONTINUED | OUTPATIENT
Start: 2018-07-03 | End: 2018-07-03

## 2018-07-03 RX ORDER — ONDANSETRON 2 MG/ML
4 INJECTION INTRAMUSCULAR; INTRAVENOUS EVERY 6 HOURS PRN
Status: DISCONTINUED | OUTPATIENT
Start: 2018-07-03 | End: 2018-07-05 | Stop reason: HOSPADM

## 2018-07-03 RX ORDER — DIPHENHYDRAMINE HCL 12.5MG/5ML
12.5 LIQUID (ML) ORAL EVERY 6 HOURS PRN
Status: DISCONTINUED | OUTPATIENT
Start: 2018-07-03 | End: 2018-07-05 | Stop reason: HOSPADM

## 2018-07-03 RX ORDER — FENTANYL CITRATE 50 UG/ML
25-50 INJECTION, SOLUTION INTRAMUSCULAR; INTRAVENOUS
Status: DISCONTINUED | OUTPATIENT
Start: 2018-07-03 | End: 2018-07-03 | Stop reason: HOSPADM

## 2018-07-03 RX ORDER — CITALOPRAM HYDROBROMIDE 20 MG/1
20 TABLET ORAL DAILY
Status: DISCONTINUED | OUTPATIENT
Start: 2018-07-04 | End: 2018-07-05 | Stop reason: HOSPADM

## 2018-07-03 RX ORDER — CEFAZOLIN SODIUM 1 G/3ML
1 INJECTION, POWDER, FOR SOLUTION INTRAMUSCULAR; INTRAVENOUS SEE ADMIN INSTRUCTIONS
Status: DISCONTINUED | OUTPATIENT
Start: 2018-07-03 | End: 2018-07-03 | Stop reason: HOSPADM

## 2018-07-03 RX ORDER — SODIUM CHLORIDE, SODIUM LACTATE, POTASSIUM CHLORIDE, CALCIUM CHLORIDE 600; 310; 30; 20 MG/100ML; MG/100ML; MG/100ML; MG/100ML
INJECTION, SOLUTION INTRAVENOUS CONTINUOUS
Status: DISCONTINUED | OUTPATIENT
Start: 2018-07-03 | End: 2018-07-03 | Stop reason: HOSPADM

## 2018-07-03 RX ORDER — ACETAMINOPHEN 325 MG/1
650 TABLET ORAL EVERY 4 HOURS PRN
Status: DISCONTINUED | OUTPATIENT
Start: 2018-07-06 | End: 2018-07-05 | Stop reason: HOSPADM

## 2018-07-03 RX ORDER — CEFAZOLIN SODIUM 1 G/50ML
1 INJECTION, SOLUTION INTRAVENOUS EVERY 8 HOURS
Status: COMPLETED | OUTPATIENT
Start: 2018-07-03 | End: 2018-07-04

## 2018-07-03 RX ORDER — GLYCOPYRROLATE 0.2 MG/ML
INJECTION, SOLUTION INTRAMUSCULAR; INTRAVENOUS PRN
Status: DISCONTINUED | OUTPATIENT
Start: 2018-07-03 | End: 2018-07-03

## 2018-07-03 RX ORDER — METOPROLOL TARTRATE 1 MG/ML
2 INJECTION, SOLUTION INTRAVENOUS EVERY 12 HOURS
Status: COMPLETED | OUTPATIENT
Start: 2018-07-03 | End: 2018-07-03

## 2018-07-03 RX ORDER — ONDANSETRON 4 MG/1
4 TABLET, ORALLY DISINTEGRATING ORAL EVERY 30 MIN PRN
Status: DISCONTINUED | OUTPATIENT
Start: 2018-07-03 | End: 2018-07-03 | Stop reason: HOSPADM

## 2018-07-03 RX ORDER — HYDRALAZINE HYDROCHLORIDE 20 MG/ML
2.5-5 INJECTION INTRAMUSCULAR; INTRAVENOUS EVERY 10 MIN PRN
Status: DISCONTINUED | OUTPATIENT
Start: 2018-07-03 | End: 2018-07-03 | Stop reason: HOSPADM

## 2018-07-03 RX ORDER — NALOXONE HYDROCHLORIDE 0.4 MG/ML
.1-.4 INJECTION, SOLUTION INTRAMUSCULAR; INTRAVENOUS; SUBCUTANEOUS
Status: DISCONTINUED | OUTPATIENT
Start: 2018-07-03 | End: 2018-07-05 | Stop reason: HOSPADM

## 2018-07-03 RX ORDER — HYDROMORPHONE HYDROCHLORIDE 1 MG/ML
.3-.5 INJECTION, SOLUTION INTRAMUSCULAR; INTRAVENOUS; SUBCUTANEOUS EVERY 5 MIN PRN
Status: DISCONTINUED | OUTPATIENT
Start: 2018-07-03 | End: 2018-07-03 | Stop reason: HOSPADM

## 2018-07-03 RX ORDER — PROPOFOL 10 MG/ML
INJECTION, EMULSION INTRAVENOUS CONTINUOUS PRN
Status: DISCONTINUED | OUTPATIENT
Start: 2018-07-03 | End: 2018-07-03

## 2018-07-03 RX ORDER — NALOXONE HYDROCHLORIDE 0.4 MG/ML
.1-.4 INJECTION, SOLUTION INTRAMUSCULAR; INTRAVENOUS; SUBCUTANEOUS
Status: DISCONTINUED | OUTPATIENT
Start: 2018-07-03 | End: 2018-07-03

## 2018-07-03 RX ORDER — LABETALOL HYDROCHLORIDE 5 MG/ML
10 INJECTION, SOLUTION INTRAVENOUS
Status: DISCONTINUED | OUTPATIENT
Start: 2018-07-03 | End: 2018-07-03 | Stop reason: HOSPADM

## 2018-07-03 RX ORDER — DIMENHYDRINATE 50 MG/ML
25 INJECTION, SOLUTION INTRAMUSCULAR; INTRAVENOUS
Status: DISCONTINUED | OUTPATIENT
Start: 2018-07-03 | End: 2018-07-03 | Stop reason: HOSPADM

## 2018-07-03 RX ORDER — DIPHENHYDRAMINE HYDROCHLORIDE 50 MG/ML
12.5 INJECTION INTRAMUSCULAR; INTRAVENOUS EVERY 6 HOURS PRN
Status: DISCONTINUED | OUTPATIENT
Start: 2018-07-03 | End: 2018-07-05 | Stop reason: HOSPADM

## 2018-07-03 RX ORDER — ONDANSETRON 2 MG/ML
INJECTION INTRAMUSCULAR; INTRAVENOUS PRN
Status: DISCONTINUED | OUTPATIENT
Start: 2018-07-03 | End: 2018-07-03

## 2018-07-03 RX ORDER — LIDOCAINE 40 MG/G
CREAM TOPICAL
Status: DISCONTINUED | OUTPATIENT
Start: 2018-07-03 | End: 2018-07-03 | Stop reason: HOSPADM

## 2018-07-03 RX ORDER — ACETAMINOPHEN 650 MG
TABLET, EXTENDED RELEASE ORAL PRN
Status: DISCONTINUED | OUTPATIENT
Start: 2018-07-03 | End: 2018-07-03 | Stop reason: HOSPADM

## 2018-07-03 RX ORDER — CEFAZOLIN SODIUM 2 G/100ML
2 INJECTION, SOLUTION INTRAVENOUS
Status: COMPLETED | OUTPATIENT
Start: 2018-07-03 | End: 2018-07-03

## 2018-07-03 RX ORDER — DEXAMETHASONE SODIUM PHOSPHATE 4 MG/ML
INJECTION, SOLUTION INTRA-ARTICULAR; INTRALESIONAL; INTRAMUSCULAR; INTRAVENOUS; SOFT TISSUE PRN
Status: DISCONTINUED | OUTPATIENT
Start: 2018-07-03 | End: 2018-07-03

## 2018-07-03 RX ORDER — HYDROXYZINE HYDROCHLORIDE 25 MG/1
25-50 TABLET, FILM COATED ORAL EVERY 4 HOURS PRN
Qty: 60 TABLET | Refills: 1 | Status: SHIPPED | OUTPATIENT
Start: 2018-07-03 | End: 2019-03-26

## 2018-07-03 RX ADMIN — FENTANYL CITRATE 25 MCG: 50 INJECTION INTRAMUSCULAR; INTRAVENOUS at 11:12

## 2018-07-03 RX ADMIN — ACETAMINOPHEN 975 MG: 325 TABLET, FILM COATED ORAL at 14:01

## 2018-07-03 RX ADMIN — PROPOFOL 200 MG: 10 INJECTION, EMULSION INTRAVENOUS at 08:25

## 2018-07-03 RX ADMIN — HYDROMORPHONE HYDROCHLORIDE 0.5 MG: 1 INJECTION, SOLUTION INTRAMUSCULAR; INTRAVENOUS; SUBCUTANEOUS at 08:52

## 2018-07-03 RX ADMIN — Medication 5 MG: at 09:50

## 2018-07-03 RX ADMIN — FENTANYL CITRATE 25 MCG: 50 INJECTION INTRAMUSCULAR; INTRAVENOUS at 11:27

## 2018-07-03 RX ADMIN — DEXAMETHASONE SODIUM PHOSPHATE 4 MG: 4 INJECTION, SOLUTION INTRAMUSCULAR; INTRAVENOUS at 16:07

## 2018-07-03 RX ADMIN — Medication 5 MG: at 09:29

## 2018-07-03 RX ADMIN — Medication 5 MG: at 08:51

## 2018-07-03 RX ADMIN — FENTANYL CITRATE 50 MCG: 50 INJECTION INTRAMUSCULAR; INTRAVENOUS at 11:35

## 2018-07-03 RX ADMIN — CEFAZOLIN SODIUM 1 G: 1 INJECTION, SOLUTION INTRAVENOUS at 15:59

## 2018-07-03 RX ADMIN — SODIUM CHLORIDE: 4.5 INJECTION, SOLUTION INTRAVENOUS at 14:02

## 2018-07-03 RX ADMIN — MIDAZOLAM 2 MG: 1 INJECTION INTRAMUSCULAR; INTRAVENOUS at 08:23

## 2018-07-03 RX ADMIN — GABAPENTIN 100 MG: 100 CAPSULE ORAL at 14:01

## 2018-07-03 RX ADMIN — GLYCOPYRROLATE 0.2 MG: 0.2 INJECTION, SOLUTION INTRAMUSCULAR; INTRAVENOUS at 08:25

## 2018-07-03 RX ADMIN — HYDROMORPHONE HYDROCHLORIDE 0.5 MG: 1 INJECTION, SOLUTION INTRAMUSCULAR; INTRAVENOUS; SUBCUTANEOUS at 09:07

## 2018-07-03 RX ADMIN — METOPROLOL TARTRATE 2 MG: 5 INJECTION, SOLUTION INTRAVENOUS at 11:24

## 2018-07-03 RX ADMIN — Medication 5 MG: at 10:03

## 2018-07-03 RX ADMIN — FENTANYL CITRATE 100 MCG: 50 INJECTION, SOLUTION INTRAMUSCULAR; INTRAVENOUS at 08:25

## 2018-07-03 RX ADMIN — SODIUM CHLORIDE, POTASSIUM CHLORIDE, SODIUM LACTATE AND CALCIUM CHLORIDE: 600; 310; 30; 20 INJECTION, SOLUTION INTRAVENOUS at 07:49

## 2018-07-03 RX ADMIN — HYDROMORPHONE HYDROCHLORIDE 0.5 MG: 1 INJECTION, SOLUTION INTRAMUSCULAR; INTRAVENOUS; SUBCUTANEOUS at 11:44

## 2018-07-03 RX ADMIN — SODIUM CHLORIDE, POTASSIUM CHLORIDE, SODIUM LACTATE AND CALCIUM CHLORIDE: 600; 310; 30; 20 INJECTION, SOLUTION INTRAVENOUS at 10:19

## 2018-07-03 RX ADMIN — CEFAZOLIN SODIUM 2 G: 2 INJECTION, SOLUTION INTRAVENOUS at 08:46

## 2018-07-03 RX ADMIN — HYDROMORPHONE HYDROCHLORIDE: 10 INJECTION, SOLUTION INTRAMUSCULAR; INTRAVENOUS; SUBCUTANEOUS at 12:05

## 2018-07-03 RX ADMIN — SENNOSIDES AND DOCUSATE SODIUM 1 TABLET: 8.6; 5 TABLET ORAL at 20:23

## 2018-07-03 RX ADMIN — PROPOFOL 75 MCG/KG/MIN: 10 INJECTION, EMULSION INTRAVENOUS at 08:30

## 2018-07-03 RX ADMIN — DEXAMETHASONE SODIUM PHOSPHATE 8 MG: 4 INJECTION, SOLUTION INTRA-ARTICULAR; INTRALESIONAL; INTRAMUSCULAR; INTRAVENOUS; SOFT TISSUE at 08:25

## 2018-07-03 RX ADMIN — METHOCARBAMOL 500 MG: 500 TABLET ORAL at 14:01

## 2018-07-03 RX ADMIN — ONDANSETRON 4 MG: 2 INJECTION INTRAMUSCULAR; INTRAVENOUS at 08:25

## 2018-07-03 RX ADMIN — ROCURONIUM BROMIDE 30 MG: 10 INJECTION INTRAVENOUS at 08:25

## 2018-07-03 RX ADMIN — ACETAMINOPHEN 975 MG: 325 TABLET, FILM COATED ORAL at 21:53

## 2018-07-03 RX ADMIN — FENTANYL CITRATE 50 MCG: 50 INJECTION, SOLUTION INTRAMUSCULAR; INTRAVENOUS at 10:42

## 2018-07-03 RX ADMIN — LIDOCAINE HYDROCHLORIDE 50 MG: 10 INJECTION, SOLUTION INFILTRATION; PERINEURAL at 08:25

## 2018-07-03 RX ADMIN — GABAPENTIN 100 MG: 100 CAPSULE ORAL at 20:23

## 2018-07-03 ASSESSMENT — LIFESTYLE VARIABLES: TOBACCO_USE: 1

## 2018-07-03 ASSESSMENT — COPD QUESTIONNAIRES: COPD: 0

## 2018-07-03 ASSESSMENT — ENCOUNTER SYMPTOMS
DYSRHYTHMIAS: 0
STRIDOR: 0
SEIZURES: 0

## 2018-07-03 NOTE — ANESTHESIA POSTPROCEDURE EVALUATION
Patient: Lucie Whitehead    Procedure(s):  Hardware removal from L2-3, posterior fusion with transforaminal lumbar interbody fusion L1 to 2 - Wound Class: I-Clean   - Wound Class: I-Clean    Diagnosis:stenosis, retrolythesis, nerve root impingement  Diagnosis Additional Information: stenosis, retrolythesis, nerve root impingement    Anesthesia Type:  General    Note:  Anesthesia Post Evaluation    Patient location during evaluation: PACU  Patient participation: Able to fully participate in evaluation  Level of consciousness: awake  Pain management: adequate  Airway patency: patent  Cardiovascular status: acceptable  Respiratory status: acceptable  Hydration status: acceptable  PONV: controlled     Anesthetic complications: None          Last vitals:  Vitals:    07/03/18 1155 07/03/18 1200 07/03/18 1235   BP: 137/90 (!) 143/91 118/67   Resp: (!) 6 10 12   Temp:   98.7  F (37.1  C)   SpO2: 98% 98% 98%         Electronically Signed By: Oliverio Urrutia MD  July 3, 2018  12:41 PM

## 2018-07-03 NOTE — ANESTHESIA CARE TRANSFER NOTE
Patient: Lucie Whitehead    Procedure(s):  Hardware removal from L2-3, posterior fusion with transforaminal lumbar interbody fusion L1 to 2 - Wound Class: I-Clean   - Wound Class: I-Clean    Diagnosis: stenosis, retrolythesis, nerve root impingement  Diagnosis Additional Information: No value filed.    Anesthesia Type:   General     Note:  Airway :Face Mask  Patient transferred to:PACU  Comments: 4/4, moving all extremities, pt follows commands, suctioned orally, extubated without complications.Pt tx to PACU, VSS, pt comfortable. Report given to  PACU RN.Handoff Report: Identifed the Patient, Identified the Reponsible Provider, Reviewed the pertinent medical history, Discussed the surgical course, Reviewed Intra-OP anesthesia mangement and issues during anesthesia, Set expectations for post-procedure period and Allowed opportunity for questions and acknowledgement of understanding      Vitals: (Last set prior to Anesthesia Care Transfer)    CRNA VITALS  7/3/2018 1021 - 7/3/2018 1058      7/3/2018             NIBP: 142/89    Pulse: 81    NIBP Mean: 108    SpO2: 99 %    Resp Rate (observed): 8                Electronically Signed By: LENNY Cosme CRNA  July 3, 2018  10:58 AM

## 2018-07-03 NOTE — ANESTHESIA PREPROCEDURE EVALUATION
Anesthesia Evaluation     . Pt has had prior anesthetic. Type: General    No history of anesthetic complications          ROS/MED HX    ENT/Pulmonary: Comment: Dry eyes- on Restasis    (+)sleep apnea, tobacco use, Past use uses CPAP , . .   (-) asthma, COPD and recent URI   Neurologic:  - neg neurologic ROS    (-) seizures and CVA   Cardiovascular:     (+) Dyslipidemia, ----. : . . . :. .      (-) hypertension, CAD, arrhythmias and valvular problems/murmurs   METS/Exercise Tolerance:     Hematologic:     (+) History of Transfusion -      Musculoskeletal:   (+) arthritis, , , other musculoskeletal- DDD      GI/Hepatic:     (+) GERD Asymptomatic on medication,      (-) hepatitis and liver disease   Renal/Genitourinary:     (+) chronic renal disease, type: CRI, Pt does not require dialysis, Pt has no history of transplant,       Endo:     (+) Obesity, .   (-) Type I DM, Type II DM, thyroid disease and chronic steroid usage   Psychiatric:     (+) psychiatric history depression      Infectious Disease:  - neg infectious disease ROS       Malignancy:      - no malignancy   Other:    (+) H/O Chronic Pain,                   Physical Exam  Normal systems: cardiovascular, pulmonary and dental    Airway   Mallampati: II  TM distance: >3 FB  Neck ROM: full    Dental     Cardiovascular   Rhythm and rate: regular and normal  (-) no friction rub, no systolic click and no murmur    Pulmonary    breath sounds clear to auscultation(-) no rhonchi, no decreased breath sounds, no wheezes, no rales and no stridor                    Anesthesia Plan      History & Physical Review  History and physical reviewed and following examination; no interval change.    ASA Status:  2 .    NPO Status:  > 8 hours    Plan for General with Intravenous induction. Maintenance will be Balanced.    PONV prophylaxis:  Ondansetron (or other 5HT-3) and Dexamethasone or Solumedrol  She has eye ointment with her that she will put in her eyes pre-op.       Postoperative Care  Postoperative pain management:  IV analgesics.      Consents  Anesthetic plan, risks, benefits and alternatives discussed with:  Patient or representative, Patient and Spouse..                          .

## 2018-07-03 NOTE — PROGRESS NOTES
S: Patient was seen today at [facility] with an order from [employer]EL[employer] to fit the patient with a lumbosacral corset. Patient recently had a posterior fusion with transforaminal lumbar interbody fusion L1 to 2 performed.    O: Patient was in bed for the fitting appointment. Patient was alert/oriented during the appointment and in minimal distress.    A: Patient already has a lumbosacral corset that was received in 2013 and is still fitting appropriately. Patient wants to continue to wear the corset.    P: Patient will follow the physician's wearing schedule and will follow up with the [place] clinic as needed.     This note has been electronically signed by Devaughn JACKSON , ABC #[id number], License #[id number].

## 2018-07-03 NOTE — IP AVS SNAPSHOT
MRN:[id number]                      After Visit Summary   7/3/2018    Lucie Whitehead    MRN: [id number]           Thank you!     Thank you for choosing [facility] for your care. Our goal is always to provide you with excellent care. Hearing back from our patients is one way we can continue to improve our services. Please take a few minutes to complete the written survey that you may receive in the mail after you visit. If you would like to speak to someone directly about your visit please contact Patient Relations at [phone]. Thank you!          Patient Information     Date Of Birth          1950        Designated Caregiver       Most Recent Value    Caregiver    Will someone help with your care after discharge? yes    Name of designated caregiver Luis M Whitehead ()    Phone number of caregiver [phone]    Caregiver address [facility]      About your hospital stay     You were admitted on:  July 3, 2018 You last received care in the:  [facility]    You were discharged on:  July 5, 2018        Reason for your hospital stay       Lumbar fusion                  Who to Call     For medical emergencies, please call 911.  For non-urgent questions about your medical care, please call your primary care provider or clinic, [phone]  For questions related to your surgery, please call your surgery clinic        Attending Provider     Provider Specialty    Davide Tamez MD Orthopaedic Surgery       Primary Care Provider Office Phone # Fax #    Nallely Moore [phone]      After Care Instructions     Activity       Your activity upon discharge: activity as tolerated. no lifting >5lbs. No bending/twisting. Brace on when out of bed            Diet       Follow this diet upon discharge: advance to regular as tolerated            Wound care and dressings       Instructions to care for your wound at home: daily dressing  changes and keep wound clean and dry.                  Follow-up Appointments     Follow-up and recommended labs and tests        msbi 2 weeks                  Further instructions from your care team         Discharge Instructions:   1. Monitor Aquacel dressing. DO NOT CHANGE DRESSING. Leave this dressing on until follow-up appointment with Surgeon. Dressing is water resistant. You can shower with it on, pat dry when done.      No soaking such as in tub baths, pools or hot tubs    2. No heavy lifting, nothing more then 6-10lbs. No bending or twisting, no sitting for long periods of time. Follow physical therapy restrictions and exercises. Follow BLT-no bending, lifting or twisting.   3. Avoid sitting or laying in one position too long (more than 20-30 minutes at a time), walk as tolerated, log roll for turning   4. Wear brace when up   5. Take an over the counter stool softener as directed daily so that bowel movements are regular. Take a laxative as directed on bottle if no bowel movement in 2 days. Suggested softeners miralax or senna S   6. Stay hydrated, drink plenty of fluids, eat a balanced diet      Call your physician ([phone]) if:   1. Fever greater than 100 degrees with body chills or excessive sweating.  2. Increased redness, localized warmth, tenderness, drainage or swelling at incision site.  Opening or pulling apart of incision site.   3. Pain not controlled with oral pain medications, ice and rest.   4. No bowel movement in 3 days (may use Milk of Magnesia or other over the counter remedy).  5. Chest pain, shortness of breath, and/or calf pain with excessive swelling.  6. Generalized feeling of illness.  7. Any other questions or concerns related to your surgery/recovery.      Thank you for allowing [facility] to participate in your cares!!      Pending Results     No orders found from 7/1/2018 to 7/4/2018.            Statement of Approval     Ordered          07/05/18 0702  I have  "reviewed and agree with all the recommendations and orders detailed in this document.  EFFECTIVE NOW     Approved and electronically signed by:  Stas Baker PA-C             Admission Information     Date & Time Provider Department Dept. Phone    7/3/2018 Davide Tamez MD [facility] [phone]      Your Vitals Were     Blood Pressure Temperature Respirations Height Weight Last Period    107/61 (BP Location: Right arm) 96.8  F (36  C) (Oral) 16 1.67 m (5' 5.75\") 100.2 kg (221 lb) 11/12/2007    Pulse Oximetry BMI (Body Mass Index)                97% 35.94 kg/m2          [employer] Information     [employer] gives you secure access to your electronic health record. If you see a primary care provider, you can also send messages to your care team and make appointments. If you have questions, please call your primary care clinic.  If you do not have a primary care provider, please call [phone] and they will assist you.        Care EveryWhere ID     This is your Care EveryWhere ID. This could be used by other organizations to access your [facility] medical records  [id number]        Equal Access to Services     MARÍA QUEZADA : Hadii comfort Luciano, fredis cruz, qayumiko gutiérrez, nathan bucio. So [facility] [phone].    ATENCIÓN: Si habla español, tiene a durham disposición servicios gratuitos de asistencia lingüística. Luz al [phone].    We comply with applicable federal civil rights laws and [place] laws. We do not discriminate on the basis of race, color, national origin, age, disability, sex, sexual orientation, or gender identity.               Review of your medicines      START taking        Dose / Directions    hydrOXYzine 25 MG tablet   Commonly known as:  ATARAX   Used for:  Post-op pain   Notes to Patient:  Next dose available to be taken after 4:30 pm        Dose:  25-50 mg   Take 1-2 tablets (25-50 mg) by mouth every 4 hours as " needed   Quantity:  60 tablet   Refills:  1       oxyCODONE IR 5 MG tablet   Commonly known as:  ROXICODONE   Used for:  Post-op pain   Notes to Patient:  Next dose available to be taken after 4:30 pm        Dose:  5 mg   Take 1 tablet (5 mg) by mouth every 6 hours as needed for pain   Quantity:  70 tablet   Refills:  0         CONTINUE these medicines which have NOT CHANGED        Dose / Directions    buPROPion 150 MG 24 hr tablet   Commonly known as:  WELLBUTRIN XL   Used for:  Major depressive disorder, recurrent episode, moderate (H)   Notes to Patient:  Resume per home schedule        Dose:  150 mg   Take 1 tablet (150 mg) by mouth every morning   Quantity:  90 tablet   Refills:  3       CALCIUM PO   Notes to Patient:  Resume per home schedule        Dose:  950 mg   Take 950 mg by mouth daily   Refills:  0       citalopram 20 MG tablet   Commonly known as:  celeXA   Used for:  Major depressive disorder, recurrent episode, moderate (H)   Notes to Patient:  Resume per home schedule        TAKE 1 TABLET (20 MG) BY MOUTH DAILY   Quantity:  90 tablet   Refills:  3       mupirocin 2 % ointment   Commonly known as:  BACTROBAN        Spray into both nostrils 2 times daily Apply small amount to each nostril 2 times per day for 7 days prior to surgery (6/26 to 7/3/18)   Refills:  0       NONFORMULARY   Notes to Patient:  Resume per home schedule        Sustained night time lubricant eye ointment night before bed   Refills:  0       OCUVITE ADULT 50+ PO   Notes to Patient:  Resume per home schedule        Dose:  1 tablet   Take 1 tablet by mouth daily   Refills:  0       OMEGA 3 PO   Notes to Patient:  Resume per home schedule        Dose:  1 tablet   Take 1 tablet by mouth daily   Refills:  0       ranitidine 75 MG tablet   Generic drug:  ranitidine   Notes to Patient:  Resume per home schedule        Dose:  75 mg   Take 75 mg by mouth 2 times daily as needed.   Refills:  0       RESTASIS 0.05 % ophthalmic emulsion    Generic drug:  cycloSPORINE   Notes to Patient:  Resume per home schedule        INSTILL 1 DROP INTO AFFECTED EYE(S) BY OPHTHALMIC ROUTE EVERY 12 HOURS   Refills:  0       TYLENOL PO        Dose:  1300 mg   Take 1,300 mg by mouth 2 times daily   Refills:  0       VITAMIN D2 PO   Notes to Patient:  Resume per home schedule        Dose:  80151 Units   Take 50,000 Units by mouth once a week   Refills:  0       VITAMIN D3 PO   Notes to Patient:  Resume per home schedule        Dose:  1000 Units   Take 1,000 Units by mouth daily   Refills:  0       ZYRTEC 10 MG tablet   Generic drug:  cetirizine   Notes to Patient:  Resume per home schedule        1 TABLET EACH MORNING   Refills:  0         STOP taking     ALEVE PO                Where to get your medicines      Some of these will need a paper prescription and others can be bought over the counter. Ask your nurse if you have questions.     Bring a paper prescription for each of these medications     hydrOXYzine 25 MG tablet    oxyCODONE IR 5 MG tablet                Protect others around you: Learn how to safely use, store and throw away your medicines at www.disposemymeds.org.        Information about OPIOIDS     PRESCRIPTION OPIOIDS: WHAT YOU NEED TO KNOW   We gave you an opioid (narcotic) pain medicine. It is important to manage your pain, but opioids are not always the best choice. You should first try all the other options your care team gave you. Take this medicine for as short a time (and as few doses) as possible.     These medicines have risks:    DO NOT drive when on new or higher doses of pain medicine. These medicines can affect your alertness and reaction times, and you could be arrested for driving under the influence (DUI). If you need to use opioids long-term, talk to your care team about driving.    DO NOT operate heave machinery    DO NOT do any other dangerous activities while taking these medicines.     DO NOT drink any alcohol while taking these  medicines.      If the opioid prescribed includes acetaminophen, DO NOT take with any other medicines that contain acetaminophen. Read all labels carefully. Look for the word  acetaminophen  or  Tylenol.  Ask your pharmacist if you have questions or are unsure.    You can get addicted to pain medicines, especially if you have a history of addiction (chemical, alcohol or substance dependence). Talk to your care team about ways to reduce this risk.    Store your pills in a secure place, locked if possible. We will not replace any lost or stolen medicine. If you don t finish your medicine, please throw away (dispose) as directed by your pharmacist. The [place] Pollution Control Agency has more information about safe disposal: https://www.pca.[facility].mn.us/living-green/managing-unwanted-medications.     All opioids tend to cause constipation. Drink plenty of water and eat foods that have a lot of fiber, such as fruits, vegetables, prune juice, apple juice and high-fiber cereal. Take a laxative (Miralax, milk of magnesia, Colace, Senna) if you don t move your bowels at least every other day.              Medication List: This is a list of all your medications and when to take them. Check marks below indicate your daily home schedule. Keep this list as a reference.      Medications           Morning Afternoon Evening Bedtime As Needed    buPROPion 150 MG 24 hr tablet   Commonly known as:  WELLBUTRIN XL   Take 1 tablet (150 mg) by mouth every morning   Last time this was given:  150 mg on 7/5/2018  8:13 AM   Notes to Patient:  Resume per home schedule                                CALCIUM PO   Take 950 mg by mouth daily   Notes to Patient:  Resume per home schedule                                citalopram 20 MG tablet   Commonly known as:  celeXA   TAKE 1 TABLET (20 MG) BY MOUTH DAILY   Last time this was given:  20 mg on 7/5/2018  8:13 AM   Notes to Patient:  Resume per home schedule                                 hydrOXYzine 25 MG tablet   Commonly known as:  ATARAX   Take 1-2 tablets (25-50 mg) by mouth every 4 hours as needed   Last time this was given:  25 mg on 7/5/2018 12:31 PM   Notes to Patient:  Next dose available to be taken after 4:30 pm                            Next dose available to be taken after 4:30 pm       mupirocin 2 % ointment   Commonly known as:  BACTROBAN   Spray into both nostrils 2 times daily Apply small amount to each nostril 2 times per day for 7 days prior to surgery (6/26 to 7/3/18)                                NONFORMULARY   Sustained night time lubricant eye ointment night before bed   Notes to Patient:  Resume per home schedule                                OCUVITE ADULT 50+ PO   Take 1 tablet by mouth daily   Notes to Patient:  Resume per home schedule                                OMEGA 3 PO   Take 1 tablet by mouth daily   Notes to Patient:  Resume per home schedule                                oxyCODONE IR 5 MG tablet   Commonly known as:  ROXICODONE   Take 1 tablet (5 mg) by mouth every 6 hours as needed for pain   Last time this was given:  10 mg on 7/5/2018 12:31 PM   Notes to Patient:  Next dose available to be taken after 4:30 pm                            Next dose available to be taken after 4:30 pm       ranitidine 75 MG tablet   Take 75 mg by mouth 2 times daily as needed.   Generic drug:  ranitidine   Notes to Patient:  Resume per home schedule                                RESTASIS 0.05 % ophthalmic emulsion   INSTILL 1 DROP INTO AFFECTED EYE(S) BY OPHTHALMIC ROUTE EVERY 12 HOURS   Generic drug:  cycloSPORINE   Notes to Patient:  Resume per home schedule                                TYLENOL PO   Take 1,300 mg by mouth 2 times daily   Last time this was given:  975 mg on 7/5/2018  6:10 AM            Take at 8 am           Take at 8 pm               VITAMIN D2 PO   Take 50,000 Units by mouth once a week   Notes to Patient:  Resume per home schedule                                 VITAMIN D3 PO   Take 1,000 Units by mouth daily   Notes to Patient:  Resume per home schedule                                ZYRTEC 10 MG tablet   1 TABLET EACH MORNING   Generic drug:  cetirizine   Notes to Patient:  Resume per home schedule                                          More Information        Using an Incentive Spirometer    An incentive spirometer is a device that helps you do deep breathing exercises. These exercises expand your lungs, aid in circulation, and help prevent pneumonia. Deep breathing exercises also help you breathe better and improve the function of your lungs by:    Keeping your lungs clear    Strengthening your breathing muscles    Helping prevent respiratory complications or problems  The incentive spirometer gives you a way to take an active part in your recovery. A nurse or therapist will teach you breathing exercises. To do these exercises, you will breathe in through your mouth and not your nose. The incentive spirometer only works correctly if you breathe in through your mouth.  Steps to clear lungs  Step 1. Exhale normally. Then, inhale normally.    Relax and breathe out.  Step 2. Place your lips tightly around the mouthpiece.    Make sure the device is upright and not tilted.  Step 3. Inhale as much air as you can through the mouthpiece (don't breathe through your nose).    Inhale slowly and deeply.    Hold your breath long enough to keep the balls or disk raised for at least 3 to 5 seconds, or as instructed by your healthcare provider.    Some spirometers have an indicator to let you know that you are breathing in too fast. If the indicator goes off, breathe in more slowly.  Step 4. Repeat the exercise regularly.    Do this exercise every hour while you're awake, or as instructed by your healthcare provider.    If you were taught deep breathing and coughing exercises, do them regularly as instructed by your healthcare provider.   Follow-up care  Make a follow up  appointment, or as directed. Also, follow up with your healthcare provider as advised or if your symptoms don't improve or continue to get worse.  When to call your healthcare provider  Call your healthcare provider right away if you have any of the following:    Fever 100.4  (38 C) or higher, or as directed by your healthcare provider    Brownish, bloody, or smelly sputum (phlegm that you cough up)  Call 911  Call 911 if any of these occur:     Shortness of breath that doesn't get better after taking your medicine    Cool, moist, pale or blue skin    Trouble breathing or swallowing, wheezing    Fainting or loss of consciousness    Feeling of dizziness or weakness, or a sudden drop in blood pressure    Feeling very ill    Lightheadedness    Chest pain or rapid heart rate  Date Last Reviewed: 1/1/2017 2000-2017 [employer]. [address] [place] [address]. All rights reserved. This information is not intended as a substitute for professional medical care. Always follow your healthcare professional's instructions.                Treating Constipation    Constipation is a common and often uncomfortable problem. Constipation means you have bowel movements fewer than 3 times per week, or strain to pass hard, dry stool. It can last a short time. Or it can be a problem that never seems to go away. The good news is that it can often be treated and controlled.  Eat more fiber  One of the best ways to help treat constipation is to increase your fiber intake. You can do this either through diet or by using fiber supplements. Fiber (in whole grains, fruits, and vegetables) adds bulk and absorbs water to soften the stool. This helps the stool pass through the colon more easily. When you increase your fiber intake, do it slowly to avoid side effects such as bloating. Also increase the amount of water that you drink. Eating more of the following foods can add fiber to your diet.    High-fiber  cereals    Whole grains, bran, and brown rice    Vegetables such as carrots, broccoli, and greens    Fresh fruits (especially apples, pears, and dried fruits like raisins and apricots)    Nuts and legumes (especially beans such as lentils, kidney beans, and lima beans)  Get physically active  Exercise helps improve the working of your colon which helps ease constipation. Try to get some physical activity every day. If you haven t been active for a while, talk to your healthcare provider before starting again.  Laxatives  Your healthcare provider may suggest an over-the-counter product to help ease your constipation. He or she may suggest the use of bulk-forming agents or laxatives. The use of laxatives, if used as directed, is common and safe. Follow directions carefully when using them. See your healthcare provider for new-onset constipation, or long-term constipation, to rule out other causes such as medicines or thyroid disease.  Date Last Reviewed: 7/1/2016 2000-2017 [employer]. [address] [place] [address], [place] [address]. All rights reserved. This information is not intended as a substitute for professional medical care. Always follow your healthcare professional's instructions.

## 2018-07-03 NOTE — IP AVS SNAPSHOT
[facility]    [address]    [facility] [address]    Phone:  [phone]    Fax:  [phone]                                       After Visit Summary   7/3/2018    Lucie Whitehead    MRN: [id number]           After Visit Summary Signature Page     I have received my discharge instructions, and my questions have been answered. I have discussed any challenges I see with this plan with the nurse or doctor.    ..........................................................................................................................................  Patient/Patient Representative Signature      ..........................................................................................................................................  Patient Representative Print Name and Relationship to Patient    ..................................................               ................................................  Date                                            Time    ..........................................................................................................................................  Reviewed by Signature/Title    ...................................................              ..............................................  Date                                                            Time

## 2018-07-03 NOTE — PHARMACY-ADMISSION MEDICATION HISTORY
Pharmacy reviewed prior to admission med list from pre-admitting rn, SAW Quiñonez.  Added dose to vitamin D per [employer].        Prior to Admission medications    Medication Sig Last Dose Taking? Auth Provider   Acetaminophen (TYLENOL PO) Take 1,300 mg by mouth 2 times daily  6/26/2018 Yes Reported, Patient   buPROPion (WELLBUTRIN XL) 150 MG 24 hr tablet Take 1 tablet (150 mg) by mouth every morning 7/3/2018 at Unknown time Yes Nallely Moore MD   CALCIUM PO Take 950 mg by mouth daily  6/26/2018 Yes Reported, Patient   Cholecalciferol (VITAMIN D3 PO) Take 1,000 Units by mouth daily 6/26/2018 Yes Reported, Patient   citalopram (CELEXA) 20 MG tablet TAKE 1 TABLET (20 MG) BY MOUTH DAILY 7/3/2018 at Unknown time Yes Nallely Moore MD   Ergocalciferol (VITAMIN D2 PO) Take 50,000 Units by mouth once a week  6/26/2018 Yes Reported, Patient   hydrOXYzine (ATARAX) 25 MG tablet Take 1-2 tablets (25-50 mg) by mouth every 4 hours as needed  Yes Red Weller PA-C   Multiple Vitamins-Minerals (OCUVITE ADULT 50+ PO) Take 1 tablet by mouth daily  6/26/2018 Yes Reported, Patient   mupirocin (BACTROBAN) 2 % ointment Spray into both nostrils 2 times daily Apply small amount to each nostril 2 times per day for 7 days prior to surgery (6/26 to 7/3/18) 7/3/2018 at Unknown time Yes Reported, Patient   NONFORMULARY Sustained night time lubricant eye ointment night before bed 7/2/2018 at Unknown time Yes Reported, Patient   Omega-3 Fatty Acids (OMEGA 3 PO) Take 1 tablet by mouth daily  6/26/2018 Yes Reported, Patient   oxyCODONE IR (ROXICODONE) 5 MG tablet Take 1 tablet (5 mg) by mouth every 6 hours as needed for pain  Yes Red Weller PA-C   ranitidine (ZANTAC 75) 75 MG tablet Take 75 mg by mouth 2 times daily as needed. 7/2/2018 at Unknown time Yes Unknown, Entered By History   RESTASIS 0.05 % ophthalmic emulsion INSTILL 1 DROP INTO AFFECTED EYE(S) BY OPHTHALMIC ROUTE EVERY 12 HOURS 7/3/2018 at Unknown time Yes Reported,  Patient   ZYRTEC 10 MG OR TABS 1 TABLET EACH MORNING More than a month at Unknown time  Reported, Patient

## 2018-07-03 NOTE — PLAN OF CARE
Problem: Patient Care Overview  Goal: Plan of Care/Patient Progress Review  Outcome: Improving  solo PO clear liquids well so advanced to full liquid diet, adjusts self in bed with minimal assist, PO pain meds and PCA managing pain, dressing CDI, hemovac patent, rodriguez draining in good amts, plans dc to home with

## 2018-07-03 NOTE — OR NURSING
2 prescriptions sent up to floor with patient in the patient's chart, one for hydroxyzine and one for oxycodone

## 2018-07-03 NOTE — PROGRESS NOTES
Arrived to room 628 from PACU at 1220 via cart, transferred to bed via hover mat without difficulty, alert and oriented x 4, oriented to room and call system, dressing CDI, CMS intact, IV patent and infusing, hemovac and rodriguez patent, rates pain 8/10, reviewed welcome folder with patient and . SCD's on BLE. Na ice chips well. Frequent VS started. Reviewed PCA use with patient, PCA positioned low on IV pole at eye level of pt and PCA button in pt's right hand.

## 2018-07-03 NOTE — DISCHARGE INSTRUCTIONS
Discharge Instructions:   1. Monitor Aquacel dressing. DO NOT CHANGE DRESSING. Leave this dressing on until follow-up appointment with Surgeon. Dressing is water resistant. You can shower with it on, pat dry when done.      No soaking such as in tub baths, pools or hot tubs    2. No heavy lifting, nothing more then 6-10lbs. No bending or twisting, no sitting for long periods of time. Follow physical therapy restrictions and exercises. Follow BLT-no bending, lifting or twisting.   3. Avoid sitting or laying in one position too long (more than 20-30 minutes at a time), walk as tolerated, log roll for turning   4. Wear brace when up   5. Take an over the counter stool softener as directed daily so that bowel movements are regular. Take a laxative as directed on bottle if no bowel movement in 2 days. Suggested softeners miralax or senna S   6. Stay hydrated, drink plenty of fluids, eat a balanced diet      Call your physician ([phone]) if:   1. Fever greater than 100 degrees with body chills or excessive sweating.  2. Increased redness, localized warmth, tenderness, drainage or swelling at incision site.  Opening or pulling apart of incision site.   3. Pain not controlled with oral pain medications, ice and rest.   4. No bowel movement in 3 days (may use Milk of Magnesia or other over the counter remedy).  5. Chest pain, shortness of breath, and/or calf pain with excessive swelling.  6. Generalized feeling of illness.  7. Any other questions or concerns related to your surgery/recovery.      Thank you for allowing [facility] to participate in your cares!!

## 2018-07-03 NOTE — BRIEF OP NOTE
[facility] Brief Operative Note    Pre-operative diagnosis: stenosis, retrolythesis, nerve root impingement   Post-operative diagnosis * No post-op diagnosis entered *  Lumbar radiculopathy   Procedure: Procedure(s):  Hardware removal from L2-3, posterior fusion with transforaminal lumbar interbody fusion L1 to 2 - Wound Class: I-Clean   - Wound Class: I-Clean   Surgeon(s): Surgeon(s) and Role:     * Davide Tamez MD - Primary     * Red Weller PA-C - Assisting   Estimated blood loss: 100 mL    Specimens: * No specimens in log *   Findings: As expected      Indications:   I was asked by Dr. Tamez to assist with surgery. I positioned and prepped the patient. I retracted soft tissue for operative exposure. I suctioned fluids. I provided traction for dissection. I helped to ligate blood vessels. I helped Dr. Tamez identify and protect important structures. I sutured and bandaged the incision.   The procedure was medically necessary for an assistant because Dr. Cespedes needed the operative exposure and assistance that I provided. This allowed him to safely and efficiently operate. It was also important that I help ligate blood vessels to maintain hemostasis and reduce the bleeding risk. The assistance that I provided reduced operative time which meant less general anesthetic for the patient.   Red Weller PA-C

## 2018-07-03 NOTE — OR NURSING
4 screws, locking caps, 2 rods, and 1 cross link from previous lumbar fusion removed during procedure and disposed of per [place] Policy.    Leni Barkley RN

## 2018-07-04 ENCOUNTER — APPOINTMENT (OUTPATIENT)
Dept: PHYSICAL THERAPY | Facility: CLINIC | Age: 68
DRG: 454 | End: 2018-07-04
Attending: ORTHOPAEDIC SURGERY
Payer: COMMERCIAL

## 2018-07-04 ENCOUNTER — APPOINTMENT (OUTPATIENT)
Dept: GENERAL RADIOLOGY | Facility: CLINIC | Age: 68
DRG: 454 | End: 2018-07-04
Attending: PHYSICIAN ASSISTANT
Payer: COMMERCIAL

## 2018-07-04 LAB
GLUCOSE SERPL-MCNC: 133 MG/DL (ref 70–99)
HGB BLD-MCNC: 11.2 G/DL (ref 11.7–15.7)

## 2018-07-04 PROCEDURE — 40000193 ZZH STATISTIC PT WARD VISIT: Performed by: PHYSICAL THERAPIST

## 2018-07-04 PROCEDURE — 12000000 ZZH R&B MED SURG/OB

## 2018-07-04 PROCEDURE — 36415 COLL VENOUS BLD VENIPUNCTURE: CPT | Performed by: PHYSICIAN ASSISTANT

## 2018-07-04 PROCEDURE — 25000132 ZZH RX MED GY IP 250 OP 250 PS 637: Performed by: PHYSICIAN ASSISTANT

## 2018-07-04 PROCEDURE — 85018 HEMOGLOBIN: CPT | Performed by: PHYSICIAN ASSISTANT

## 2018-07-04 PROCEDURE — 25000128 H RX IP 250 OP 636: Performed by: PHYSICIAN ASSISTANT

## 2018-07-04 PROCEDURE — 97161 PT EVAL LOW COMPLEX 20 MIN: CPT | Mod: GP | Performed by: PHYSICAL THERAPIST

## 2018-07-04 PROCEDURE — A9270 NON-COVERED ITEM OR SERVICE: HCPCS | Mod: GY | Performed by: PHYSICIAN ASSISTANT

## 2018-07-04 PROCEDURE — 40000986 XR LUMBAR SPINE 2-3 VIEWS

## 2018-07-04 PROCEDURE — 97530 THERAPEUTIC ACTIVITIES: CPT | Mod: GP | Performed by: PHYSICAL THERAPIST

## 2018-07-04 PROCEDURE — 82947 ASSAY GLUCOSE BLOOD QUANT: CPT | Performed by: PHYSICIAN ASSISTANT

## 2018-07-04 PROCEDURE — 97116 GAIT TRAINING THERAPY: CPT | Mod: GP | Performed by: PHYSICAL THERAPIST

## 2018-07-04 RX ORDER — CYCLOSPORINE 0.5 MG/ML
1 EMULSION OPHTHALMIC 2 TIMES DAILY
Status: DISCONTINUED | OUTPATIENT
Start: 2018-07-04 | End: 2018-07-04 | Stop reason: CLARIF

## 2018-07-04 RX ADMIN — METHOCARBAMOL 500 MG: 500 TABLET ORAL at 16:41

## 2018-07-04 RX ADMIN — OXYCODONE HYDROCHLORIDE 10 MG: 5 TABLET ORAL at 19:29

## 2018-07-04 RX ADMIN — CEFAZOLIN SODIUM 1 G: 1 INJECTION, SOLUTION INTRAVENOUS at 00:00

## 2018-07-04 RX ADMIN — GABAPENTIN 100 MG: 100 CAPSULE ORAL at 08:50

## 2018-07-04 RX ADMIN — OXYCODONE HYDROCHLORIDE 5 MG: 5 TABLET ORAL at 15:09

## 2018-07-04 RX ADMIN — SENNOSIDES AND DOCUSATE SODIUM 1 TABLET: 8.6; 5 TABLET ORAL at 19:29

## 2018-07-04 RX ADMIN — METHOCARBAMOL 500 MG: 500 TABLET ORAL at 10:18

## 2018-07-04 RX ADMIN — SENNOSIDES AND DOCUSATE SODIUM 1 TABLET: 8.6; 5 TABLET ORAL at 08:53

## 2018-07-04 RX ADMIN — DEXAMETHASONE SODIUM PHOSPHATE 4 MG: 4 INJECTION, SOLUTION INTRAMUSCULAR; INTRAVENOUS at 08:49

## 2018-07-04 RX ADMIN — GABAPENTIN 100 MG: 100 CAPSULE ORAL at 14:27

## 2018-07-04 RX ADMIN — OXYCODONE HYDROCHLORIDE 5 MG: 5 TABLET ORAL at 14:27

## 2018-07-04 RX ADMIN — ACETAMINOPHEN 975 MG: 325 TABLET, FILM COATED ORAL at 05:46

## 2018-07-04 RX ADMIN — BUPROPION HYDROCHLORIDE 150 MG: 150 TABLET, FILM COATED, EXTENDED RELEASE ORAL at 08:50

## 2018-07-04 RX ADMIN — METHOCARBAMOL 500 MG: 500 TABLET ORAL at 22:43

## 2018-07-04 RX ADMIN — CITALOPRAM HYDROBROMIDE 20 MG: 20 TABLET ORAL at 08:50

## 2018-07-04 RX ADMIN — GABAPENTIN 100 MG: 100 CAPSULE ORAL at 19:29

## 2018-07-04 RX ADMIN — ACETAMINOPHEN 975 MG: 325 TABLET, FILM COATED ORAL at 14:27

## 2018-07-04 RX ADMIN — DEXAMETHASONE SODIUM PHOSPHATE 4 MG: 4 INJECTION, SOLUTION INTRAMUSCULAR; INTRAVENOUS at 00:00

## 2018-07-04 RX ADMIN — ACETAMINOPHEN 975 MG: 325 TABLET, FILM COATED ORAL at 21:25

## 2018-07-04 RX ADMIN — OXYCODONE HYDROCHLORIDE 5 MG: 5 TABLET ORAL at 10:18

## 2018-07-04 NOTE — OP NOTE
[facility]  1950  [id number]    OPERATIVE REPORT    DATE OF SURGERY: July 3, 2018    Procedures performed:   1. Bilateral decompression L1-L2 with left total facetectomy   2. Transforaminal lumbar interbody fusion L1-L2 from left-sided approach   3. Insertion of intervertebral biomechanical device L1-L2 (Spineology expandable titanium cage)   4. Posterolateral fusion L1-L2 (right facet technique)  5. Removal of instrumentation L2-3  6. RE-Instrumentation L1-L3  7. Aspiration of left iliac crest bone marrow   8. [place] of local autograft bone   9. Monitoring of SSEPs EMGs and screw stimulation without any complications noted throughout     Pre-Procedure Diagnosis:   Adjacent segment spinal stenosis/Spondylolisthesis L1-L2 (using previous nomenclature from other surgeries- patient has additional segmentation)  Severe back pain and bilateral lower extremity radiculopathy unresponsive to conservative management   Morbid obesity with preoperative BMI of 36    Post-Procedure Diagnosis:   Adjacent segment spinal stenosis/Spondylolisthesis L1-L2   Severe back pain and bilateral lower extremity radiculopathy unresponsive to conservative management   Morbid obesity with preoperative BMI of 36 made all portions of this procedure much more challenging than usual and added 50% to operative time (Modifier 22)    Surgeon(s):   Davide Tamez MD     Assistant:   Red Weller PAC     Findings:   As expected    Estimated Blood Loss:   100 mL     Specimens:   None     Drains:   Medium Hemovac     Complications:   None     Implants:   New 7.5 mm Nestor ES2 pedicle screws at L1   9 mm Spineology Titanium device expandable  DBX putty  Infuse    Indications for procedure:   This is an unfortunate 67-year old male who was referred for spinal stenosis at the L1-2 segment adjacent to previous L2-5 fusion. Se failed conservative management. Risks and benefits of been discussed and he did wish to proceed with  transforaminal lumbar interbody fusion L1-L2 with decompression and removal hardware L2-3 with re-instrumentation L1-L3.     Details of procedure:   After the induction of general endotracheal anesthesia patient was flipped to a prone position on the Amari table to 4 poster frame. Extra care was taken for positioning and padding secondary to patient's morbid obesity. Patient's lumbar spine was then prepped and draped in usual fashion and was in sterile technique and a timeout was called. Antibiotics were in. At this point an incision was made from L2 -S1 down to level posterior spinous process paraspinal musculature was dissected out to the tips of the transverse process of L1 and L2 and previous instrumentation L2-3. Rods were removed L2-3. New Styker Nusrat pedicle screws were placed utilizing standard anatomic landmarks of the L1 levels. High-speed bur was utilized to initiate pedicle holes. Small marking screws were placed and were seen to be centered within the pedicle. A thoracic probe was utilized to create the tract. Pedicle was tapped to 6.5 mm and 7.5 mm ×50 mm [place] Nusrat pedicle screws were placed. The stimulated adequately with neural monitoring. These were in excellent position.     At this point a distraction maneuver was done bilaterally through the L1 and 2 pedicles. The interspinous ligament was removed at L1-2. Bilateral medial facetectomies were done. Ligamentum flavum was elevated. Bilateral decompressions were done centrally in the lateral recesses. Right medial and complete left facetectomies were then accomplished. Decompression of the L1 and L2 nerve roots bilaterally was excellent. Decompression was adequate and the ball-tip probe easily passing along the exiting nerve roots.     A distraction maneuver was then done across the pedicle screws through the rods. At this point the L1 and L2 nerve roots were carefully protected on the left. An 11 Blade was utilized into the disc space and a  combination pituitary rongeur straight and curved curettes and intradiscal ruddy and intradiscal rasps were utilized to create a radical discectomy down to level of the anterior annulus. Disc space preparation was excellent. A Jamshidi needle was then placed into the right iliac crest and bone marrow was aspirated and mixed with DBX putty, local bone and packed heavily within the L1-2 disc space to achieve interbody fusion. A 9 mm expandable titanium Spineology cage  was then inserted under fluoroscopic imaging and expanded. This was seen to be in excellent position in all views. Cap screws were then locked into position L1- L3  by torquing.     High-speed bur was then utilized to decorticate the L1-2 facet joint on the right. This was packed with a combination of local bone and Progenix Plus to achieve posterolateral fusion. Wounds irrigated with antibiotic solution and a dilute Betadine wash. Hemostasis was excellent. Fascial layer was closed with #1 Vicryl suture in interrupted fashion. Subcutaneous tissues were closed with 2-0 Vicryl suture interrupted fashion. Skin was closed with skin staples. Sterile dressings were applied and the patient was transferred to the recovery room bed in satisfactory condition. Morbid obesity with preoperative BMI of 36 made all portions of this procedure much more challenging than usual and added 50% to operative time.    Red Rizzoh PAC provided assistance with preoperative positioning, prepping, and draping of the patient. The assistant provided vital operative assistance with retraction using instruments best providing the necessary exposure and visualization for the case, manipulation of tissues to achieve hemostasis, suction for visualization and assisted in wound closure. The assistant also helped place instrumentation under direct visualization of the surgeon. Postoperatively they assisted in the transfer of the patient off of the operative table and transition into the  post anesthesia care unit with transition of care being made to the anesthesiologist.    Davide Tamez MD   Spine Surgeon  [facility]

## 2018-07-04 NOTE — PLAN OF CARE
Problem: Patient Care Overview  Goal: Plan of Care/Patient Progress Review    PT:  Eval complete, treatment initiated.  Pt is POD #1 s/p Hardware removal from L2-3, posterior fusion with transforaminal lumbar interbody fusion L1-2.  Pt lives with spouse in a 2-level home, functions independently at baseline.  Hx of previous lumbar and cervical spine surgeries, familiar with spinal precautions and use of brace with activity.  Owns SEC and RTS for use at home if needed.    Discharge Planner PT   Patient plan for discharge: home with spouse  Current status: Ed pt on spinal precautions and LSO with out of bed activity, pt demo good understanding.  Amb hallways with SEC SBA, up/down stairs with good tolerance.  Only mild back pain reported.  Recommend pt amb hallways 4x/day with nsg staff.  Barriers to return to prior living situation: none anticipated  Recommendations for discharge: home, continue walking program  Rationale for recommendations: Progressing very well, anticipate she will meet mobility goals with 1-2 more PT visits.  Will continue to benefit from walking program while hospitalized.  Familiar with spinal precautions and modifications from previous surgeries.       Entered by: Luis M Ojeda 07/04/2018 12:40 PM

## 2018-07-04 NOTE — PROGRESS NOTES
I was able to don the Corset with out the extension. I did supply the extension  Panel. Patient was instructed on donning and doffing the brace. Fit of the brace appears adequate at this time.   ASHLEY Rothman.

## 2018-07-04 NOTE — PLAN OF CARE
Problem: Patient Care Overview  Goal: Plan of Care/Patient Progress Review  Outcome: Improving  solo PO well, up with SBA with gait belt, lumbar corset brace and walker, PO pain meds managing pain, voiding in good amts, plans to dc to home tomorrow  Hemovac remains in place

## 2018-07-04 NOTE — PLAN OF CARE
Problem: Patient Care Overview  Goal: Plan of Care/Patient Progress Review  A/O, VSS pain 3/10 using PCA for pain control. Repositions in bed as tolerated baseline numbness an all extremities. Dressing to back CDI ice applied as tolerated.  No flatus no nausea tolerating full liquid diet

## 2018-07-04 NOTE — PROGRESS NOTES
" 07/04/18 1109   Quick Adds   Type of Visit Initial PT Evaluation   Living Environment   Lives With spouse   Living Arrangements house   Home Accessibility stairs to enter home;stairs within home   Number of Stairs to Enter Home 2   Number of Stairs Within Home 12   Stair Railings at Home inside, present on right side   Living Environment Comment Pt reports spouse will be home/available for support prn at time of discharge.  Plans to stay on main level if needed for 1-2 days as she has with previous spinal surgeries.   Self-Care   Dominant Hand right   Usual Activity Tolerance good   Current Activity Tolerance moderate   Equipment Currently Used at Home none   Activity/Exercise/Self-Care Comment Indep PLOF, does own SEC and RTS.  Has walk in shower on upper level of home.   Functional Level Prior   Ambulation 0-->independent   Transferring 0-->independent   Toileting 0-->independent   Bathing 0-->independent   Dressing 0-->independent   Eating 0-->independent   Communication 0-->understands/communicates without difficulty   Swallowing 0-->swallows foods/liquids without difficulty   Cognition 0 - no cognition issues reported   Fall history within last six months yes   Number of times patient has fallen within last six months (pt reports \"a bunch\", no injuries reported)   Which of the above functional risks had a recent onset or change? ambulation;transferring;fall history   Prior Functional Level Comment Amb functiona distances prior to this surgery, but legs give out at times, hx of falls.   General Information   Onset of Illness/Injury or Date of Surgery - Date 07/03/18   Referring Physician Red Weller, PAGRISEL   Patient/Family Goals Statement d/c home with spouse in next 1-2 days   Pertinent History of Current Problem (include personal factors and/or comorbidities that impact the POC) Pt seen POD #1 s/p Hardware removal from L2-3, posterior fusion with transforaminal lumbar interbody fusion L1 to 2.  PMH " "including MDD, osteopenia, macular degeneration, and lumbar spinal stenosis with previous lumbar spine surgery in 2013.  Hx of previous cervical spine fusion as well.   Precautions/Limitations fall precautions;spinal precautions  (LSO when OOB)   General Observations pt awake and alert, has LSO with extension panel   Cognitive Status Examination   Orientation orientation to person, place and time   Pain Assessment   Patient Currently in Pain Yes, see Vital Sign flowsheet  (\"very minimal\" LBP, into R gluteal region slightly with walk)   Integumentary/Edema   Integumentary/Edema Comments incisional dressing appears dry and intact, drain x 1, no distal swelling in legs noted   Posture    Posture Not impaired   Posture Comments good postural control with cues, good awareness   Range of Motion (ROM)   ROM Comment WFL BLEs, mild LBP with end range PROM hip flexion, WFL BUEs.   Strength   Strength Comments WFL BLEs, symmetrical.  5/5 B ankle DF/PF, symmetrical quad strength, deferred full MMT due to spine surgery.   Bed Mobility   Bed Mobility Comments Log roll with SBA using bed railing   Transfer Skills   Transfer Comments Sit <> stand transfers at bedside with CGA, good postural awareness, able to stand statically without UE support   Gait   Gait Comments Amb in room with CGA/SBA, no UE support, good functional balance but slowed gait speed, looking down to floor.   Balance   Balance Comments Static stance EO/EC x 20 sec without LOB, good/fair gait stability, will likely benefit from using SEC in short term post op.   Sensory Examination   Sensory Perception Comments mild baseline lateral feet numbness, no change acutely, intact to gross/light touch   Coordination   Coordination no deficits were identified   Modality Interventions   Planned Modality Interventions Cryotherapy   Planned Modality Interventions Comments prn for back pain   General Therapy Interventions   Planned Therapy Interventions balance training;bed " "mobility training;gait training;neuromuscular re-education;ROM;transfer training;orthotic fitting/training;risk factor education;home program guidelines;progressive activity/exercise   Clinical Impression   Criteria for Skilled Therapeutic Intervention yes, treatment indicated   PT Diagnosis Impaired functional mobility s/p lumbar spine surgery   Influenced by the following impairments Pain, impaired balance, spinal precautions, LSO   Functional limitations due to impairments Impaired bed mobility, transfers, gait, stairs   Clinical Presentation Stable/Uncomplicated   Clinical Presentation Rationale stable status post op, good mobility, indep PLOF, age, PMH   Clinical Decision Making (Complexity) Low complexity   Therapy Frequency` daily  (does not appear to require BID currently)   Predicted Duration of Therapy Intervention (days/wks) 2-3 days   Anticipated Equipment Needs at Discharge (owns SEC)   Anticipated Discharge Disposition Home   Risk & Benefits of therapy have been explained Yes   Patient, Family & other staff in agreement with plan of care Yes   [facility] TM \"6 Clicks\"   2016, Trustees of [facility], under license to [employer].  All rights reserved.   6 Clicks Short Forms Basic Mobility Inpatient Short Form   [facility]  \"6 Clicks\" V.2 Basic Mobility Inpatient Short Form   1. Turning from your back to your side while in a flat bed without using bedrails? 3 - A Little   2. Moving from lying on your back to sitting on the side of a flat bed without using bedrails? 3 - A Little   3. Moving to and from a bed to a chair (including a wheelchair)? 3 - A Little   4. Standing up from a chair using your arms (e.g., wheelchair, or bedside chair)? 3 - A Little   5. To walk in hospital room? 3 - A Little   6. Climbing 3-5 steps with a railing? 3 - A Little   Basic Mobility Raw Score (Score out of 24.Lower scores equate to lower levels of function) 18   Total Evaluation Time "   Total Evaluation Time (Minutes) 15

## 2018-07-04 NOTE — PROGRESS NOTES
S- pain controlled. Improvements in LE pain    O-vss afebrile  5/5 LE motors  Incision covered  Drain 110/shift  hgb 11.2    A/p  Pod #1 tlif L1-2, instrumentation L1-3  -dc pca/rodriguez  -mobilize  -likely home tomorrow

## 2018-07-05 ENCOUNTER — APPOINTMENT (OUTPATIENT)
Dept: OCCUPATIONAL THERAPY | Facility: CLINIC | Age: 68
DRG: 454 | End: 2018-07-05
Attending: PHYSICIAN ASSISTANT
Payer: COMMERCIAL

## 2018-07-05 ENCOUNTER — APPOINTMENT (OUTPATIENT)
Dept: PHYSICAL THERAPY | Facility: CLINIC | Age: 68
DRG: 454 | End: 2018-07-05
Attending: ORTHOPAEDIC SURGERY
Payer: COMMERCIAL

## 2018-07-05 VITALS
TEMPERATURE: 96.8 F | DIASTOLIC BLOOD PRESSURE: 61 MMHG | BODY MASS INDEX: 35.52 KG/M2 | OXYGEN SATURATION: 97 % | RESPIRATION RATE: 16 BRPM | HEIGHT: 66 IN | SYSTOLIC BLOOD PRESSURE: 107 MMHG | WEIGHT: 221 LBS

## 2018-07-05 PROCEDURE — A9270 NON-COVERED ITEM OR SERVICE: HCPCS | Mod: GY | Performed by: PHYSICIAN ASSISTANT

## 2018-07-05 PROCEDURE — 40000133 ZZH STATISTIC OT WARD VISIT: Performed by: OCCUPATIONAL THERAPIST

## 2018-07-05 PROCEDURE — 97530 THERAPEUTIC ACTIVITIES: CPT | Mod: GO | Performed by: OCCUPATIONAL THERAPIST

## 2018-07-05 PROCEDURE — 97165 OT EVAL LOW COMPLEX 30 MIN: CPT | Mod: GO | Performed by: OCCUPATIONAL THERAPIST

## 2018-07-05 PROCEDURE — 97535 SELF CARE MNGMENT TRAINING: CPT | Mod: GO | Performed by: OCCUPATIONAL THERAPIST

## 2018-07-05 PROCEDURE — 97116 GAIT TRAINING THERAPY: CPT | Mod: GP | Performed by: PHYSICAL THERAPIST

## 2018-07-05 PROCEDURE — 25000132 ZZH RX MED GY IP 250 OP 250 PS 637: Performed by: PHYSICIAN ASSISTANT

## 2018-07-05 PROCEDURE — 40000193 ZZH STATISTIC PT WARD VISIT: Performed by: PHYSICAL THERAPIST

## 2018-07-05 PROCEDURE — 25000132 ZZH RX MED GY IP 250 OP 250 PS 637: Mod: GY | Performed by: PHYSICIAN ASSISTANT

## 2018-07-05 PROCEDURE — 97530 THERAPEUTIC ACTIVITIES: CPT | Mod: GP | Performed by: PHYSICAL THERAPIST

## 2018-07-05 RX ORDER — HYDROXYZINE HYDROCHLORIDE 25 MG/1
25 TABLET, FILM COATED ORAL EVERY 4 HOURS PRN
Status: DISCONTINUED | OUTPATIENT
Start: 2018-07-05 | End: 2018-07-05 | Stop reason: HOSPADM

## 2018-07-05 RX ORDER — HYDROXYZINE HYDROCHLORIDE 50 MG/1
50 TABLET, FILM COATED ORAL EVERY 4 HOURS PRN
Status: DISCONTINUED | OUTPATIENT
Start: 2018-07-05 | End: 2018-07-05 | Stop reason: HOSPADM

## 2018-07-05 RX ADMIN — ACETAMINOPHEN 975 MG: 325 TABLET, FILM COATED ORAL at 06:10

## 2018-07-05 RX ADMIN — GABAPENTIN 100 MG: 100 CAPSULE ORAL at 08:13

## 2018-07-05 RX ADMIN — OXYCODONE HYDROCHLORIDE 10 MG: 5 TABLET ORAL at 08:12

## 2018-07-05 RX ADMIN — METHOCARBAMOL 500 MG: 500 TABLET ORAL at 08:13

## 2018-07-05 RX ADMIN — OXYCODONE HYDROCHLORIDE 10 MG: 5 TABLET ORAL at 12:31

## 2018-07-05 RX ADMIN — SENNOSIDES AND DOCUSATE SODIUM 2 TABLET: 8.6; 5 TABLET ORAL at 08:13

## 2018-07-05 RX ADMIN — HYDROXYZINE HYDROCHLORIDE 25 MG: 25 TABLET ORAL at 12:31

## 2018-07-05 RX ADMIN — OXYCODONE HYDROCHLORIDE 10 MG: 5 TABLET ORAL at 04:46

## 2018-07-05 RX ADMIN — CITALOPRAM HYDROBROMIDE 20 MG: 20 TABLET ORAL at 08:13

## 2018-07-05 RX ADMIN — BUPROPION HYDROCHLORIDE 150 MG: 150 TABLET, FILM COATED, EXTENDED RELEASE ORAL at 08:13

## 2018-07-05 RX ADMIN — OXYCODONE HYDROCHLORIDE 10 MG: 5 TABLET ORAL at 00:10

## 2018-07-05 NOTE — DISCHARGE SUMMARY
Discharge Summary Note    Admitted: 7/3/2018    Discharged: 7/5/2018    Service: Ortho-Spine    Staff MD: Dr. Tamez    Admitting Diagnosis:   Lumbar radiculopathy    Operations Procedures: tlif L1-2, instrumentation L1-3    Complications: None    Discharge Diagnosis: Same    Brief History and Pertinent Objective Findings: Patient has had a long history of lumbar radiculopathy resistant to conservative management.  Because of this surgical intervention was discussed and agreed upon.    Hospital Course: Patient was admitted on 7/3/2018 to undergo an elective tlif L1-2, instrumentation L1-3.  Please refer to the operative report for procedure and details.  Post-op the patient did well and was eventually taking oral pain medications for pain management with good control.  They were performing their ADL's with the addition of PT/OT and were cleared for discharge to home.  The were voiding without difficulty and had no other specific medical issues and were discharged at that time.    Discharge Disposition: home    Discharge Instructions: Written instructions were given at time of discharge.    Discharge Medications:     Lucie Whitehead   Home Medication Instructions KAREN:[id number]    Printed on:07/05/18 0702   Medication Information                      Acetaminophen (TYLENOL PO)  Take 1,300 mg by mouth 2 times daily              buPROPion (WELLBUTRIN XL) 150 MG 24 hr tablet  Take 1 tablet (150 mg) by mouth every morning             CALCIUM PO  Take 950 mg by mouth daily              Cholecalciferol (VITAMIN D3 PO)  Take 1,000 Units by mouth daily             citalopram (CELEXA) 20 MG tablet  TAKE 1 TABLET (20 MG) BY MOUTH DAILY             Ergocalciferol (VITAMIN D2 PO)  Take 50,000 Units by mouth once a week              hydrOXYzine (ATARAX) 25 MG tablet  Take 1-2 tablets (25-50 mg) by mouth every 4 hours as needed             Multiple Vitamins-Minerals (OCUVITE ADULT 50+ PO)  Take 1 tablet by mouth daily               mupirocin (BACTROBAN) 2 % ointment  Spray into both nostrils 2 times daily Apply small amount to each nostril 2 times per day for 7 days prior to surgery (6/26 to 7/3/18)             NONFORMULARY  Sustained night time lubricant eye ointment night before bed             Omega-3 Fatty Acids (OMEGA 3 PO)  Take 1 tablet by mouth daily              oxyCODONE IR (ROXICODONE) 5 MG tablet  Take 1 tablet (5 mg) by mouth every 6 hours as needed for pain             ranitidine (ZANTAC 75) 75 MG tablet  Take 75 mg by mouth 2 times daily as needed.             RESTASIS 0.05 % ophthalmic emulsion  INSTILL 1 DROP INTO AFFECTED EYE(S) BY OPHTHALMIC ROUTE EVERY 12 HOURS             ZYRTEC 10 MG OR TABS  1 TABLET EACH MORNING                 Follow-up: As previously scheduled.    Stas Baker PA-C July 5, 2018

## 2018-07-05 NOTE — PROGRESS NOTES
07/05/18 0749   Quick Adds   Type of Visit Initial Occupational Therapy Evaluation   Living Environment   Lives With spouse   Living Arrangements house   Home Accessibility stairs to enter home;stairs within home   Number of Stairs to Enter Home 2   Number of Stairs Within Home 12   Transportation Available car;family or friend will provide   Living Environment Comment Pt reports living with spouse and has stairs to enter main bathroom; there is both a tub/shower and walk-in shower within the home (plans to use walk-in shower). Pt has raised toilet seat on both toilets within the home.    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 Pt reports having time off from job and that pt's job is not too physically straining. Spouse has first week off and pt reports that friends will be able to assist as needed after that.     Functional Level Prior   Ambulation 0-->independent   Transferring 0-->independent   Toileting 0-->independent   Bathing 0-->independent   Dressing 0-->independent   Eating 0-->independent   Communication 0-->understands/communicates without difficulty   Swallowing 0-->swallows foods/liquids without difficulty   Cognition 0 - no cognition issues reported   Fall history within last six months no   Which of the above functional risks had a recent onset or change? ambulation;transferring;toileting;bathing;dressing   Prior Functional Level Comment Pt reports I prior to admission.        Present no   General Information   Onset of Illness/Injury or Date of Surgery - Date 07/03/18   Referring Physician Red Weller PA-C   Patient/Family Goals Statement Home with assist    Additional Occupational Profile  Info/Pertinent History of Current Problem Pt has a history of lumbar radiculopathy resistance to conservative management and was admitted on 7/3/2018 to undergo an elective tlif L1-2, instrumentation L1-3. Pt reports having previous spinal opporations and reports having AE/DME needed to be successful within the home. Pt lives in a split level home; after on 1st level has access to everything that pt would need. Pt also reports having supportive spouse who is able to assist for the first week after d/c and has supportive friends who can assist as needed after. Pt reports confidence returning home. Pt also has time off of job to recover from surgery.    Precautions/Limitations fall precautions;spinal precautions   General Observations Pt alert and receptive to OT session.    Cognitive Status Examination   Orientation orientation to person, place and time   Level of Consciousness alert   Able to Follow Commands WNL/WFL   Personal Safety (Cognitive) WNL/WFL   Memory intact   Cognitive Comment No deficits identified during initial evaluation.    Visual Perception   Visual Perception Wears glasses   Pain Assessment   Patient Currently in Pain Yes, see Vital Sign flowsheet  (5-6 )   Posture   Posture Comments No deficits identified during initial evaluation.    Range of Motion (ROM)   ROM Comment Reduced ROM due to spinal precautions.    Strength   Strength Comments Reduced strength and endurance due to recent surgery.    Hand Strength   Hand Strength Comments No deficits identified during initial evaluation.    Coordination   Upper Extremity Coordination No deficits were identified   Coordination Comments No deficits identified during initial evaluation.    Mobility   Bed Mobility Comments Initiated; review daily note for more detail.    Transfer Skills   Transfer Comments Initiated; review daily note for more detail.    Transfer Skill: Bed to Chair/Chair to Bed   Level of [place]: Bed to Chair stand-by assist    Physical Assist/Nonphysical Assist: Bed to Chair supervision   Weight-Bearing Restrictions full weight-bearing   Transfer Skill: Sit to Stand   Level of [place]: Sit/Stand stand-by assist   Physical Assist/Nonphysical Assist: Sit/Stand supervision   Transfer Skill: Sit to Stand full weight-bearing   Toilet Transfer   Toilet Transfer Comments Initiated; review daily note for more detail.    Transfer Skill: Toilet Transfer   Level of [place]: Toilet stand-by assist   Physical Assist/Nonphysical Assist: Toilet supervision;verbal cues   Weight-Bearing Restrictions: Toilet full weight-bearing   Assistive Device grab bars  (vanity within the home )   Toilet Transfer Skill Comments Initiated; review daily note for more detail.    Tub/Shower Transfer   Tub/Shower Transfer Comments Initiated; review daily note for more detail.    Balance   Balance Comments No LOB observed during initial evaluation.    Upper Body Dressing   Level of [place]: Dress Upper Body stand-by assist   Physical Assist/Nonphysical Assist: Dress Upper Body supervision   Lower Body Dressing   Level of [place]: Dress Lower Body stand-by assist   Physical Assist/Nonphysical Assist: Dress Lower Body supervision   Assistive Device sock-aid   Toileting   Level of [place]: Toilet stand-by assist   Physical Assist/Nonphysical Assist: Toilet supervision   Grooming   Level of [place]: Grooming stand-by assist   Physical Assist/Nonphysical Assist: Grooming supervision;verbal cues   Eating/Self Feeding   Level of [place]: Eating independent   Instrumental Activities of Daily Living (IADL)   Previous Responsibilities meal prep;housekeeping;laundry;shopping;finances;driving;work;medication management   IADL Comments Reports I in all IADL activities    Activities of Daily Living Analysis   Impairments Contributing to Impaired Activities of Daily Living balance impaired;pain;ROM decreased;strength decreased   General Therapy  "Interventions   Planned Therapy Interventions ADL retraining;IADL retraining;progressive activity/exercise   Clinical Impression   Criteria for Skilled Therapeutic Interventions Met yes, treatment indicated   OT Diagnosis Reduced I/ADL activity    Influenced by the following impairments Impaired balance, pain, decreased ROM due to spinal precautions, reduced strength and endurance due to spinal surgery.    Assessment of Occupational Performance 5 or more Performance Deficits   Identified Performance Deficits Dressing, showering, functional mobility, driving, home mgmt, work   Clinical Decision Making (Complexity) Low complexity   Therapy Frequency daily   Predicted Duration of Therapy Intervention (days/wks) 1x evaluation and treatment    Anticipated Equipment Needs at Discharge (Pt has AE/DME available within the home to meet needs)   Anticipated Discharge Disposition Home with Assist   Risks and Benefits of Treatment have been explained. Yes   Patient, Family & other staff in agreement with plan of care Yes   Health system TM \"6 Clicks\"   2016, Trustees of [facility], under license to [employer].  All rights reserved.   6 Clicks Short Forms Daily Activity Inpatient Short Form   [facility]  \"6 Clicks\" Daily Activity Inpatient Short Form   1. Putting on and taking off regular lower body clothing? 3 - A Little   2. Bathing (including washing, rinsing, drying)? 3 - A Little   3. Toileting, which includes using toilet, bedpan or urinal? 3 - A Little   4. Putting on and taking off regular upper body clothing? 3 - A Little   5. Taking care of personal grooming such as brushing teeth? 4 - None   6. Eating meals? 4 - None   Daily Activity Raw Score (Score out of 24.Lower scores equate to lower levels of function) 20   Total Evaluation Time   Total Evaluation Time (Minutes) 10     "

## 2018-07-05 NOTE — PROGRESS NOTES
Reviewed discharge instructions and medications with patient and . Questions answered. Patient discharged to home with , discharge instructions, medications (Oxycodone, Vistaril), and belonging.

## 2018-07-05 NOTE — PLAN OF CARE
Problem: Patient Care Overview  Goal: Plan of Care/Patient Progress Review  Orders for OT received and evaluation and treatment initiated. Pt has a history of lumbar radiculopathy resistance to conservative management and was admitted on 7/3/2018 to undergo an elective tlif L1-2, instrumentation L1-3. Pt reports having previous spinal opporations and reports having AE/DME needed to be successful within the home. Pt lives in a split level home; after on 1st level has access to everything that pt would need. Pt also reports having supportive spouse who is able to assist for the first week after d/c and has supportive friends who can assist as needed after. Pt reports confidence returning home.     Discharge Planner OT   Patient plan for discharge: Home with assist   Current status:   Pt transferred EOB I using log roll technique. Pt was I donning spinal corset after sitting EOB. Pt demonstrated I completing sock donning/doffing; I donning LLE and using sock aid to mal RLE all while aligning with spinal precautions. Pt completed all transfers with SBA; sit<>stand, toilet transfer, walk in shower transfer, and walking throughout the room. Therapist provided education on energy conservation strategies, environmental modifications, AE/DME, dressing techniques, car transfers, and the energy conservation techniques through handouts. Pt refused completing further dressing techniques due to reported confidence and knowledge from previous spinal surgeries.  Barriers to return to prior living situation: Reduced I/ADL activity due to recent spinal surgery, spinal precautions, and reduced overall endurance.   Recommendations for discharge: Home with assist as needed for I/ADL activity.   Rationale for recommendations: Pt is transferring with SBA through all activity and reports having access to AE/DME within the home from previous spinal surgeries. Pt has 24 hour support from spouse during the first week after d/c and has  support from friends after through recovery. Pt reports confidence in returning home with this support.         Entered by: Isis Jose 07/05/2018 9:36 AM    Occupational Therapy Discharge Summary    Reason for therapy discharge:    All goals and outcomes met, no further needs identified.    Progress towards therapy goal(s). See goals on Care Plan in [facility] electronic health record for goal details.  Goals met    Therapy recommendation(s):    No further therapy is recommended.

## 2018-07-05 NOTE — PLAN OF CARE
Problem: Patient Care Overview  Goal: Plan of Care/Patient Progress Review    Discharge Planner PT   Patient plan for discharge: home today  Current status: Reviewed spinal precautions, mobility modifications, and LSO with OOB activity, voicing/demonstrating good understanding. Tolerating activity well, amb hallways with SEC independently, up/down stairs with good tolerance.  No radicular pain reported. Ed on progressive home walking program.  Barriers to return to prior living situation: none  Recommendations for discharge: home with continued walking program  Rationale for recommendations: Pt has met all mobility goals, progressed well.  No further skilled IP PT needs, will sign off. Safe for d/c home when medically ready.       Entered by: Luis M Ojeda 07/05/2018 9:09 AM       Physical Therapy Discharge Summary    Reason for therapy discharge:    All goals and outcomes met, no further needs identified.    Progress towards therapy goal(s). See goals on Care Plan in [facility] electronic health record for goal details.  Goals met.  Progressed well, see above.    Therapy recommendation(s):    Continue home exercise program.

## 2018-07-05 NOTE — PLAN OF CARE
Problem: Patient Care Overview  Goal: Plan of Care/Patient Progress Review  Outcome: Improving  Patient A & O. VSS Afebrile, Saline locked. Voiding well. Pain managed with Oxycodone and Robaxin. Tolerated regular diet. Hemovac 40 ml out and remains in place. Up with SBA assist, corset, gait belt and cane. Plan to dc tomorrow

## 2018-07-06 ENCOUNTER — TELEPHONE (OUTPATIENT)
Dept: PODIATRY | Facility: CLINIC | Age: 68
End: 2018-07-06

## 2018-07-06 ENCOUNTER — TELEPHONE (OUTPATIENT)
Dept: PEDIATRICS | Facility: CLINIC | Age: 68
End: 2018-07-06

## 2018-07-06 NOTE — TELEPHONE ENCOUNTER
Please contact patient for In-patient follow up.  [phone] (home) [phone] (work)    Visit date: 070518  Diagnosis listed: Lumbar Radiculopathy, Stenosis, Retrolythesis, Nerve Root Impingement  Number of visits in past 12 months: 0 ED / 1 IP

## 2018-07-06 NOTE — TELEPHONE ENCOUNTER
"ED / Discharge Outreach Protocol    Patient Contact    Attempt # 1    Was call answered?  Yes.  \"May I please speak with <patient name>\"  Is patient available?   Yes    ED/Discharge Protocol    \"Hi, my name is Lida Paris, a registered nurse, and I am calling on behalf of Dr. Moore's office at [place].  I am calling to follow up and see how things are going for you after your recent visit.\"    \"I see that you were in the (ER/UC/IP) on 7/3/18.    How are you doing now that you are home?\" \"very well\"    Is patient experiencing symptoms that may require a hospital visit?  No      Discharge Instructions    \"Let's review your discharge instructions.  What is/are the follow-up recommendations?  Pt. Response: Discharge instructions reviewed.    \"Were you instructed to make a follow-up appointment?\"  Pt. Response: Yes.  Has appointment been made?   Yes      \"When you see the provider, I would recommend that you bring your discharge instructions with you.    Medications    \"How many new medications are you on since your hospitalization/ED visit?\"    2 or more - Epic MTM referral needed  \"How many of your current medicines changed (dose, timing, name, etc.) while you were in the hospital/ED visit?\"   2 or more - Epic MTM referral needed  \"Do you have questions about your medications?\"   No  \"Were you newly diagnosed with heart failure, COPD, diabetes or did you have a heart attack?\"   No  For patients on insulin: \"Did you start on insulin in the hospital or did you have your insulin dose changed?\"   No    Medication reconciliation completed? Yes    Was MTM referral placed (*Make sure to put transitions as reason for referral)?   No    Call Summary    \"Do you have any questions or concerns about your condition or care plan at the moment?\"    No      Patient was in ER 0 in the past year (assess appropriateness of ER visits.)      \"If you have questions or things don't continue to improve, we encourage you contact us through " "the main clinic number,  [phone].  Even if the clinic is not open, triage nurses are available 24/7 to help you.     We would like you to know that our clinic has extended hours (provide information).  We also have urgent care (provide details on closest location and hours/contact info)\"      \"Thank you for your time and take care!\"          "

## 2018-09-15 ENCOUNTER — HEALTH MAINTENANCE LETTER (OUTPATIENT)
Age: 68
End: 2018-09-15

## 2018-09-28 ENCOUNTER — OFFICE VISIT (OUTPATIENT)
Dept: URGENT CARE | Facility: URGENT CARE | Age: 68
End: 2018-09-28
Payer: COMMERCIAL

## 2018-09-28 VITALS
WEIGHT: 225.1 LBS | TEMPERATURE: 98.9 F | BODY MASS INDEX: 36.61 KG/M2 | HEART RATE: 77 BPM | DIASTOLIC BLOOD PRESSURE: 92 MMHG | OXYGEN SATURATION: 97 % | SYSTOLIC BLOOD PRESSURE: 132 MMHG

## 2018-09-28 DIAGNOSIS — H65.192 OTHER ACUTE NONSUPPURATIVE OTITIS MEDIA OF LEFT EAR, RECURRENCE NOT SPECIFIED: Primary | ICD-10-CM

## 2018-09-28 DIAGNOSIS — H92.02 LEFT EAR PAIN: ICD-10-CM

## 2018-09-28 DIAGNOSIS — R07.0 THROAT PAIN: ICD-10-CM

## 2018-09-28 DIAGNOSIS — R09.81 CONGESTION OF PARANASAL SINUS: ICD-10-CM

## 2018-09-28 LAB
DEPRECATED S PYO AG THROAT QL EIA: NORMAL
SPECIMEN SOURCE: NORMAL

## 2018-09-28 PROCEDURE — 87081 CULTURE SCREEN ONLY: CPT | Performed by: FAMILY MEDICINE

## 2018-09-28 PROCEDURE — 99213 OFFICE O/P EST LOW 20 MIN: CPT | Performed by: FAMILY MEDICINE

## 2018-09-28 PROCEDURE — 87880 STREP A ASSAY W/OPTIC: CPT | Performed by: FAMILY MEDICINE

## 2018-09-28 RX ORDER — AMOXICILLIN 500 MG/1
500 CAPSULE ORAL 3 TIMES DAILY
Qty: 30 CAPSULE | Refills: 0 | Status: SHIPPED | OUTPATIENT
Start: 2018-09-28 | End: 2019-03-26

## 2018-09-28 RX ORDER — AMOXICILLIN 500 MG/1
500 CAPSULE ORAL 3 TIMES DAILY
Qty: 30 CAPSULE | Refills: 0 | Status: SHIPPED | OUTPATIENT
Start: 2018-09-28 | End: 2018-09-28

## 2018-09-28 NOTE — PROGRESS NOTES
"Chief Complaint   Patient presents with     Throat Pain     Pt states sore throat, left ear pain, x 2-3 days          SUBJECTIVE:   Lucie Whitehead is a 68 year old female presenting with a chief complaint of runny nose, stuffy nose, sore throat and left ear pain .  Onset of symptoms was 3 day(s) ago.  Course of illness is worsening.    Severity moderate  Current and Associated symptoms: runny nose, stuffy nose, ear pain left, sore throat and fatigue  Treatment measures tried include Tylenol/Ibuprofen.  Predisposing factors include None.    Past Medical History:   Diagnosis Date     Complication of anesthesia     extremely dry eyes     Diabetes (H)      Gastro-oesophageal reflux disease      Hirsutism     Abstracted 06/14/02     History of blood transfusion      Leiomyoma of uterus, unspecified     Abstracted 06/14/02     Obesity 4/14/2010    Estimated Body mass index is 28.97 kg/(m^2) as calculated from the following:   Height as of 7/10/13: 5' 7\"(1.702 m).   Weight as of 7/10/13: 185 lb(83.915 kg).       Other and unspecified hyperlipidemia     Abstracted 06/14/02     Other chronic pain     low back and legs and feet     Sleep apnea     CPAP     Current Outpatient Prescriptions   Medication Sig Dispense Refill     Acetaminophen (TYLENOL PO) Take 1,300 mg by mouth 2 times daily        amoxicillin (AMOXIL) 500 MG capsule Take 1 capsule (500 mg) by mouth 3 times daily 30 capsule 0     buPROPion (WELLBUTRIN XL) 150 MG 24 hr tablet Take 1 tablet (150 mg) by mouth every morning 90 tablet 3     CALCIUM PO Take 950 mg by mouth daily        Cholecalciferol (VITAMIN D3 PO) Take 1,000 Units by mouth daily       citalopram (CELEXA) 20 MG tablet TAKE 1 TABLET (20 MG) BY MOUTH DAILY 90 tablet 3     Ergocalciferol (VITAMIN D2 PO) Take 50,000 Units by mouth once a week        Multiple Vitamins-Minerals (OCUVITE ADULT 50+ PO) Take 1 tablet by mouth daily        Omega-3 Fatty Acids (OMEGA 3 PO) Take 1 tablet by mouth daily    "     ranitidine (ZANTAC 75) 75 MG tablet Take 75 mg by mouth 2 times daily as needed.       RESTASIS 0.05 % ophthalmic emulsion INSTILL 1 DROP INTO AFFECTED EYE(S) BY OPHTHALMIC ROUTE EVERY 12 HOURS  0     hydrOXYzine (ATARAX) 25 MG tablet Take 1-2 tablets (25-50 mg) by mouth every 4 hours as needed (Patient not taking: Reported on 9/28/2018) 60 tablet 1     mupirocin (BACTROBAN) 2 % ointment Spray into both nostrils 2 times daily Apply small amount to each nostril 2 times per day for 7 days prior to surgery (6/26 to 7/3/18)       NONFORMULARY Sustained night time lubricant eye ointment night before bed       oxyCODONE IR (ROXICODONE) 5 MG tablet Take 1 tablet (5 mg) by mouth every 6 hours as needed for pain (Patient not taking: Reported on 9/28/2018) 70 tablet 0     ZYRTEC 10 MG OR TABS 1 TABLET EACH MORNING       Social History   Substance Use Topics     Smoking status: Former Smoker     Quit date: 1/1/1975     Smokeless tobacco: Never Used     Alcohol use 0.0 oz/week     0 Standard drinks or equivalent per week      Comment: 2-3 glass wine /week       ROS:  10 point ROS of systems including Constitutional, Eyes, Respiratory, Cardiovascular, Gastroenterology, Genitourinary, Integumentary, Muscularskeletal, Psychiatric were all negative except for pertinent positives noted in my HPI     Family History   Problem Relation Age of Onset     Prostate Cancer Father      Cancer - colorectal Mother      born 1978 HTN and DMII     Diabetes Mother      type II     Coronary Artery Disease Mother      CHF     Diabetes Maternal Grandmother      type II     Hypertension Maternal Grandmother      Cancer - colorectal Maternal Grandmother      Diabetes Maternal Uncle      type II     Hypertension Maternal Uncle      Respiratory Paternal Grandmother      asthma     Diabetes Son      dx age 21type I         OBJECTIVE:  BP (!) 132/92  Pulse 77  Temp 98.9  F (37.2  C) (Oral)  Wt 225 lb 1.6 oz (102.1 kg)  LMP 11/12/2007  SpO2 97%   BMI 36.61 kg/m2  GENERAL APPEARANCE: healthy, alert and no distress  EYES: EOMI,  PERRL, conjunctiva clear  HENT:rt ear  ear canals and TM's normal. , left ear congested TM with redness , ear canal normal  Nose and mouth without ulcers, erythema or lesions  NECK: supple, nontender, no lymphadenopathy  RESP: lungs clear to auscultation - no rales, rhonchi or wheezes  CV: regular rates and rhythm, normal S1 S2, no murmur noted  ABDOMEN:  soft, nontender, no HSM or masses and bowel sounds normal  SKIN: no suspicious lesions or rashes  PSYCH: mentation appears normal    ASSESSMENT:  Lucie was seen today for throat pain.    Diagnoses and all orders for this visit:    Other acute nonsuppurative otitis media of left ear, recurrence not specified  -     Discontinue: amoxicillin (AMOXIL) 500 MG capsule; Take 1 capsule (500 mg) by mouth 3 times daily for 10 days  -     amoxicillin (AMOXIL) 500 MG capsule; Take 1 capsule (500 mg) by mouth 3 times daily    Congestion of paranasal sinus    Throat pain  -     Strep, Rapid Screen    Left ear pain    amox for infection   Advised to do tylenol for the pain     Results for orders placed or performed in visit on 09/28/18   Strep, Rapid Screen   Result Value Ref Range    Specimen Description Throat     Rapid Strep A Screen       NEGATIVE: No Group A streptococcal antigen detected by immunoassay, await culture report.         PLAN:  Tylenol, Ibuprofen, Saline gargles and Vaporizer  Start antibiotic for ear infection   Follow up if  symptoms fail to improve or worsens   Pt understood and agreed with plan     See orders in [facility]    Daya Blanchard MD

## 2018-09-28 NOTE — MR AVS SNAPSHOT
After Visit Summary   9/28/2018    Lucie Whitehead    MRN: [id number]           Patient Information     Date Of Birth          1950        Visit Information        Provider Department      9/28/2018 10:30 AM Daya Blanchard MD [facility]        Today's Diagnoses     Other acute nonsuppurative otitis media of left ear, recurrence not specified    -  1    Congestion of paranasal sinus        Throat pain        Left ear pain           Follow-ups after your visit        Who to contact     If you have questions or need follow up information about today's clinic visit or your schedule please contact [facility] directly at [phone].  Normal or non-critical lab and imaging results will be communicated to you by MyChart, letter or phone within 4 business days after the clinic has received the results. If you do not hear from us within 7 days, please contact the clinic through Predictryhart or phone. If you have a critical or abnormal lab result, we will notify you by phone as soon as possible.  Submit refill requests through Aquiris or call your pharmacy and they will forward the refill request to us. Please allow 3 business days for your refill to be completed.          Additional Information About Your Visit        MyChart Information     Aquiris gives you secure access to your electronic health record. If you see a primary care provider, you can also send messages to your care team and make appointments. If you have questions, please call your primary care clinic.  If you do not have a primary care provider, please call [phone] and they will assist you.        Care EveryWhere ID     This is your Care EveryWhere ID. This could be used by other organizations to access your [place] medical records  [id number]        Your Vitals Were     Pulse Temperature Last Period Pulse Oximetry BMI (Body Mass Index)       77 98.9  F (37.2  C) (Oral)  11/12/2007 97% 36.61 kg/m2        Blood Pressure from Last 3 Encounters:   09/28/18 (!) 132/92   07/05/18 107/61   06/19/18 114/68    Weight from Last 3 Encounters:   09/28/18 225 lb 1.6 oz (102.1 kg)   07/03/18 221 lb (100.2 kg)   06/19/18 224 lb 11.2 oz (101.9 kg)              We Performed the Following     Beta strep group A culture     Strep, Rapid Screen          Today's Medication Changes          These changes are accurate as of 9/28/18 11:36 AM.  If you have any questions, ask your nurse or doctor.               Start taking these medicines.        Dose/Directions    amoxicillin 500 MG capsule   Commonly known as:  AMOXIL   Used for:  Other acute nonsuppurative otitis media of left ear, recurrence not specified   Started by:  Daya Blanchard MD        Dose:  500 mg   Take 1 capsule (500 mg) by mouth 3 times daily   Quantity:  30 capsule   Refills:  0            Where to get your medicines      These medications were sent to [facility]/pharmacy #[id number] - [facility] [address] [address] [facility]  [address] [address] [facility] [address]     Phone:  [phone]     amoxicillin 500 MG capsule                Primary Care Provider Office Phone # Fax #    Nallely Moore [phone] [address] [place] [address]        Equal Access to Services     [facility] AH: Hadii comfort driver hadeduardoo Sokymberly, waaxda luqadaha, qaybta kaalmada marla, nathan bucio. So [facility] [phone].    ATENCIÓN: Si habla español, tiene a durham disposición servicios gratuitos de asistencia lingüística. Llame al [phone].    We comply with applicable federal civil rights laws and [place] laws. We do not discriminate on the basis of race, color, national origin, age, disability, sex, sexual orientation, or gender identity.            Thank you!     Thank you for choosing [facility]  for your care. Our goal is always to provide you with excellent care. Hearing  back from our patients is one way we can continue to improve our services. Please take a few minutes to complete the written survey that you may receive in the mail after your visit with us. Thank you!             Your Updated Medication List - Protect others around you: Learn how to safely use, store and throw away your medicines at www.disposemymeds.org.          This list is accurate as of 9/28/18 11:36 AM.  Always use your most recent med list.                   Brand Name Dispense Instructions for use Diagnosis    amoxicillin 500 MG capsule    AMOXIL    30 capsule    Take 1 capsule (500 mg) by mouth 3 times daily    Other acute nonsuppurative otitis media of left ear, recurrence not specified       buPROPion 150 MG 24 hr tablet    WELLBUTRIN XL    90 tablet    Take 1 tablet (150 mg) by mouth every morning    Major depressive disorder, recurrent episode, moderate (H)       CALCIUM PO      Take 950 mg by mouth daily        citalopram 20 MG tablet    celeXA    90 tablet    TAKE 1 TABLET (20 MG) BY MOUTH DAILY    Major depressive disorder, recurrent episode, moderate (H)       hydrOXYzine 25 MG tablet    ATARAX    60 tablet    Take 1-2 tablets (25-50 mg) by mouth every 4 hours as needed    Post-op pain       mupirocin 2 % ointment    BACTROBAN     Spray into both nostrils 2 times daily Apply small amount to each nostril 2 times per day for 7 days prior to surgery (6/26 to 7/3/18)        NONFORMULARY      Sustained night time lubricant eye ointment night before bed        OCUVITE ADULT 50+ PO      Take 1 tablet by mouth daily        OMEGA 3 PO      Take 1 tablet by mouth daily        oxyCODONE IR 5 MG tablet    ROXICODONE    70 tablet    Take 1 tablet (5 mg) by mouth every 6 hours as needed for pain    Post-op pain       ranitidine 75 MG tablet   Generic drug:  ranitidine      Take 75 mg by mouth 2 times daily as needed.        RESTASIS 0.05 % ophthalmic emulsion   Generic drug:  cycloSPORINE      INSTILL 1 DROP  INTO AFFECTED EYE(S) BY OPHTHALMIC ROUTE EVERY 12 HOURS        TYLENOL PO      Take 1,300 mg by mouth 2 times daily        VITAMIN D2 PO      Take 50,000 Units by mouth once a week        VITAMIN D3 PO      Take 1,000 Units by mouth daily        ZYRTEC 10 MG tablet   Generic drug:  cetirizine      1 TABLET EACH MORNING

## 2018-09-29 LAB
BACTERIA SPEC CULT: NORMAL
SPECIMEN SOURCE: NORMAL

## 2018-10-11 ENCOUNTER — OFFICE VISIT (OUTPATIENT)
Dept: ORTHOPEDICS | Facility: CLINIC | Age: 68
End: 2018-10-11
Payer: COMMERCIAL

## 2018-10-11 VITALS — DIASTOLIC BLOOD PRESSURE: 74 MMHG | BODY MASS INDEX: 36.33 KG/M2 | WEIGHT: 223.4 LBS | SYSTOLIC BLOOD PRESSURE: 124 MMHG

## 2018-10-11 DIAGNOSIS — M20.011 MALLET DEFORMITY OF RIGHT RING FINGER: Primary | ICD-10-CM

## 2018-10-11 PROCEDURE — 99203 OFFICE O/P NEW LOW 30 MIN: CPT | Performed by: ORTHOPAEDIC SURGERY

## 2018-10-11 NOTE — LETTER
"    10/11/2018         RE: Lucie Whitehead  [address] [address] [address]        Dear Colleague,    Thank you for referring your patient, Lucie Whitehead, to the [facility] ORTHOPEDIC SURGERY. Please see a copy of my visit note below.    HISTORY OF PRESENT ILLNESS:    Lucie Whitehead is a 68 year old female who is seen as self referral for right hand pain. Patient reports injury to finger on 9/10/18. Patient reports she was in a hurry getting out of the shower catching her finger on her towel. She notes she ripped her finger loose causing finger to \"hyper flex\".  Patient notes that she was seen in  in [place].  Patient is right hand dominant.     Present symptoms:  Patient notes no pain at rest, she notes tenderness of the dorsal aspect of the DIP. Patient notes pain with bumping of finger also. Patient notes mild swelling and bruising after injury that has since dissipated.   Treatments tried to this point: alumafoam splint, Tylenol. Patient unable to take NSAIDS due to recent spinal fusions.   Orthopedic PMH: none    Past Medical History:   Diagnosis Date     Complication of anesthesia     extremely dry eyes     Diabetes (H)      Gastro-oesophageal reflux disease      Hirsutism     Abstracted 06/14/02     History of blood transfusion      Leiomyoma of uterus, unspecified     Abstracted 06/14/02     Obesity 4/14/2010    Estimated Body mass index is 28.97 kg/(m^2) as calculated from the following:   Height as of 7/10/13: 5' 7\"(1.702 m).   Weight as of 7/10/13: 185 lb(83.915 kg).       Other and unspecified hyperlipidemia     Abstracted 06/14/02     Other chronic pain     low back and legs and feet     Sleep apnea     CPAP       Past Surgical History:   Procedure Laterality Date     ARTHROSCOPY SHOULDER DISTAL CLAVICLE REPAIR Right 9/17/2014    Procedure: ARTHROSCOPY SHOULDER DISTAL CLAVICLE RESECTION;  Surgeon: John Paul Rodriguez MD;  Location: [facility] OR     C NONSPECIFIC PROCEDURE      " Bicep tendon repair    Abstracted 02     C/SECTION, LOW TRANSVERSE  x 3    , Low Transverse x 4     DECOMPRESSION, FUSION CERVICAL ANTERIOR TWO LEVELS, COMBINED  2012    Procedure:COMBINED DECOMPRESSION, FUSION CERVICAL ANTERIOR TWO LEVELS; DECOMPRESSION, FUSION CERVICAL ANTERIOR C4-6; Surgeon:DAVIDE ALCALA; Location:[facility] OR     DECOMPRESSION, FUSION LUMBAR POSTERIOR ONE LEVEL, COMBINED  3/7/2013    Procedure: COMBINED DECOMPRESSION, FUSION LUMBAR POSTERIOR ONE LEVEL;  Posterior Fusion L2-3, Hardware Removal L3-5;  Surgeon: Davide Alcala MD;  Location: [facility] OR     DECOMPRESSION, FUSION LUMBAR POSTERIOR TWO LEVELS, COMBINED N/A 7/3/2018    Procedure: COMBINED DECOMPRESSION, FUSION LUMBAR POSTERIOR TWO LEVELS;  Bilateral decompression L1-L2 with left total facetectomy   Transforaminal lumbar interbody fusion L1-L2 from left-sided approach   Insertion of intervertebral biomechanical device L1-L2  Posterolateral fusion L1-L2; Removal of instrumentation L2-3  RE-Instrumentation L1-L3;  Surgeon: Davide Alcala MD;  Location: [facility] OR     EXPLORE SPINE, REMOVE HARDWARE, COMBINED N/A 7/3/2018    Procedure: COMBINED EXPLORE SPINE, REMOVE HARDWARE;;  Surgeon: Davide Alcala MD;  Location: [facility] OR     FUSION LUMBAR ANTERIOR ONE LEVEL  2013    Procedure: FUSION LUMBAR ANTERIOR ONE LEVEL;  Anterior Fusion L2-3;  Surgeon: Davide Alacla MD;  Location: [facility] OR     LAMINECTOMY, FUSION LUMBAR TWO LEVELS, COMBINED  3/11    AP fusion L3-5       Family History   Problem Relation Age of Onset     Prostate Cancer Father      Cancer - colorectal Mother      born  HTN and DMII     Diabetes Mother      type II     Coronary Artery Disease Mother      CHF     Diabetes Maternal Grandmother      type II     Hypertension Maternal Grandmother      Cancer - colorectal Maternal Grandmother      Diabetes Maternal Uncle      type II     Hypertension Maternal Uncle      Respiratory Paternal  Grandmother      asthma     Diabetes Son      dx age 21type I       Social History     Social History     Marital status:      Spouse name: N/A     Number of children: N/A     Years of education: N/A     Occupational History     Not on file.     Social History Main Topics     Smoking status: Former Smoker     Quit date: 1/1/1975     Smokeless tobacco: Never Used     Alcohol use 0.0 oz/week     0 Standard drinks or equivalent per week      Comment: 2-3 glass wine /week     Drug use: No     Sexual activity: Yes     Partners: Male     Other Topics Concern     Parent/Sibling W/ Cabg, Mi Or Angioplasty Before 65f 55m? No     Social History Narrative       Current Outpatient Prescriptions   Medication Sig Dispense Refill     Acetaminophen (TYLENOL PO) Take 1,300 mg by mouth 2 times daily        buPROPion (WELLBUTRIN XL) 150 MG 24 hr tablet Take 1 tablet (150 mg) by mouth every morning 90 tablet 3     CALCIUM PO Take 950 mg by mouth daily        Cholecalciferol (VITAMIN D3 PO) Take 1,000 Units by mouth daily       citalopram (CELEXA) 20 MG tablet TAKE 1 TABLET (20 MG) BY MOUTH DAILY 90 tablet 3     Ergocalciferol (VITAMIN D2 PO) Take 50,000 Units by mouth once a week        Multiple Vitamins-Minerals (OCUVITE ADULT 50+ PO) Take 1 tablet by mouth daily        NONFORMULARY Sustained night time lubricant eye ointment night before bed       Omega-3 Fatty Acids (OMEGA 3 PO) Take 1 tablet by mouth daily        ranitidine (ZANTAC 75) 75 MG tablet Take 75 mg by mouth 2 times daily as needed.       RESTASIS 0.05 % ophthalmic emulsion INSTILL 1 DROP INTO AFFECTED EYE(S) BY OPHTHALMIC ROUTE EVERY 12 HOURS  0     ZYRTEC 10 MG OR TABS 1 TABLET EACH MORNING       amoxicillin (AMOXIL) 500 MG capsule Take 1 capsule (500 mg) by mouth 3 times daily (Patient not taking: Reported on 10/11/2018) 30 capsule 0     hydrOXYzine (ATARAX) 25 MG tablet Take 1-2 tablets (25-50 mg) by mouth every 4 hours as needed (Patient not taking: Reported  on 9/28/2018) 60 tablet 1     mupirocin (BACTROBAN) 2 % ointment Spray into both nostrils 2 times daily Apply small amount to each nostril 2 times per day for 7 days prior to surgery (6/26 to 7/3/18)       oxyCODONE IR (ROXICODONE) 5 MG tablet Take 1 tablet (5 mg) by mouth every 6 hours as needed for pain (Patient not taking: Reported on 9/28/2018) 70 tablet 0       Allergies   Allergen Reactions     Animal Dander      Cats      Dogs      Nkda [No Known Drug Allergies]      Seasonal Allergies      Trees and mold       REVIEW OF SYSTEMS:  CONSTITUTIONAL:  NEGATIVE for fever, chills, change in weight  INTEGUMENTARY/SKIN:  NEGATIVE for worrisome rashes, moles or lesions  EYES:  NEGATIVE for vision changes or irritation  ENT/MOUTH:  NEGATIVE for ear, mouth and throat problems  RESP:  NEGATIVE for significant cough or SOB  BREAST:  NEGATIVE for masses, tenderness or discharge  CV:  NEGATIVE for chest pain, palpitations or peripheral edema  GI:  NEGATIVE for nausea, abdominal pain, heartburn, or change in bowel habits  :  Frequency    MUSCULOSKELETAL:  See HPI above  NEURO: numbness and tinglign ,  NEGATIVE for weakness, dizziness  ENDOCRINE:  NEGATIVE for temperature intolerance, skin/hair changes  HEME/ALLERGY/IMMUNE:  NEGATIVE for bleeding problems  PSYCHIATRIC:  Depression.     PHYSICAL EXAM:  /74 (BP Location: Right arm, Patient Position: Chair, Cuff Size: Adult Regular)  Wt 223 lb 6.4 oz (101.3 kg)  LMP 11/12/2007  BMI 36.33 kg/m2  Body mass index is 36.33 kg/(m^2).   GENERAL APPEARANCE: healthy, alert and no distress   SKIN: no suspicious lesions or rashes  NEURO: Normal strength and tone, mentation intact and speech normal  VASCULAR: Good pulses, and capillary refill   LYMPH: no lymphadenopathy   PSYCH:  mentation appears normal and affect normal/bright    MSK:  Examination of her right hand reveals a ring finger mallet finger.  It is  reducible.  CMS is intact to her fingertips.     ASSESSMENT / PLAN:  Right ring finger mallet finger.  She was placed in a stack splint for the next 6 weeks.      Imaging Interpretation:  XRays viewed via disk brought by patient from 9/30/18 from [facility].      Moises Junior MD  Department of Orthopedic Surgery          Again, thank you for allowing me to participate in the care of your patient.        Sincerely,        Bud Junior MD

## 2018-10-11 NOTE — PROGRESS NOTES
"HISTORY OF PRESENT ILLNESS:    Lucie Whitehead is a 68 year old female who is seen as self referral for right hand pain. Patient reports injury to finger on 9/10/18. Patient reports she was in a hurry getting out of the shower catching her finger on her towel. She notes she ripped her finger loose causing finger to \"hyper flex\".  Patient notes that she was seen in  in [place].  Patient is right hand dominant.     Present symptoms:  Patient notes no pain at rest, she notes tenderness of the dorsal aspect of the DIP. Patient notes pain with bumping of finger also. Patient notes mild swelling and bruising after injury that has since dissipated.   Treatments tried to this point: alumafoam splint, Tylenol. Patient unable to take NSAIDS due to recent spinal fusions.   Orthopedic PMH: none    Past Medical History:   Diagnosis Date     Complication of anesthesia     extremely dry eyes     Diabetes (H)      Gastro-oesophageal reflux disease      Hirsutism     Abstracted 02     History of blood transfusion      Leiomyoma of uterus, unspecified     Abstracted 02     Obesity 2010    Estimated Body mass index is 28.97 kg/(m^2) as calculated from the following:   Height as of 7/10/13: 5' 7\"(1.702 m).   Weight as of 7/10/13: 185 lb(83.915 kg).       Other and unspecified hyperlipidemia     Abstracted 02     Other chronic pain     low back and legs and feet     Sleep apnea     CPAP       Past Surgical History:   Procedure Laterality Date     ARTHROSCOPY SHOULDER DISTAL CLAVICLE REPAIR Right 2014    Procedure: ARTHROSCOPY SHOULDER DISTAL CLAVICLE RESECTION;  Surgeon: John Paul Rodriguez MD;  Location: [facility] OR     C NONSPECIFIC PROCEDURE      Bicep tendon repair    Abstracted 02     C/SECTION, LOW TRANSVERSE  x 3    , Low Transverse x 4     DECOMPRESSION, FUSION CERVICAL ANTERIOR TWO LEVELS, COMBINED  2012    Procedure:COMBINED DECOMPRESSION, FUSION CERVICAL ANTERIOR TWO LEVELS; " DECOMPRESSION, FUSION CERVICAL ANTERIOR C4-6; Surgeon:DAVIDE ALCALA; Location:[facility] OR     DECOMPRESSION, FUSION LUMBAR POSTERIOR ONE LEVEL, COMBINED  3/7/2013    Procedure: COMBINED DECOMPRESSION, FUSION LUMBAR POSTERIOR ONE LEVEL;  Posterior Fusion L2-3, Hardware Removal L3-5;  Surgeon: Davide Alcala MD;  Location: [facility] OR     DECOMPRESSION, FUSION LUMBAR POSTERIOR TWO LEVELS, COMBINED N/A 7/3/2018    Procedure: COMBINED DECOMPRESSION, FUSION LUMBAR POSTERIOR TWO LEVELS;  Bilateral decompression L1-L2 with left total facetectomy   Transforaminal lumbar interbody fusion L1-L2 from left-sided approach   Insertion of intervertebral biomechanical device L1-L2  Posterolateral fusion L1-L2; Removal of instrumentation L2-3  RE-Instrumentation L1-L3;  Surgeon: Davide Alcala MD;  Location: [facility] OR     EXPLORE SPINE, REMOVE HARDWARE, COMBINED N/A 7/3/2018    Procedure: COMBINED EXPLORE SPINE, REMOVE HARDWARE;;  Surgeon: Davide Alcala MD;  Location: [facility] OR     FUSION LUMBAR ANTERIOR ONE LEVEL  4/11/2013    Procedure: FUSION LUMBAR ANTERIOR ONE LEVEL;  Anterior Fusion L2-3;  Surgeon: Davide Alcala MD;  Location: [facility] OR     LAMINECTOMY, FUSION LUMBAR TWO LEVELS, COMBINED  3/11    AP fusion L3-5       Family History   Problem Relation Age of Onset     Prostate Cancer Father      Cancer - colorectal Mother      born 1978 HTN and DMII     Diabetes Mother      type II     Coronary Artery Disease Mother      CHF     Diabetes Maternal Grandmother      type II     Hypertension Maternal Grandmother      Cancer - colorectal Maternal Grandmother      Diabetes Maternal Uncle      type II     Hypertension Maternal Uncle      Respiratory Paternal Grandmother      asthma     Diabetes Son      dx age 21type I       Social History     Social History     Marital status:      Spouse name: N/A     Number of children: N/A     Years of education: N/A     Occupational History     Not on file.     Social History  Main Topics     Smoking status: Former Smoker     Quit date: 1/1/1975     Smokeless tobacco: Never Used     Alcohol use 0.0 oz/week     0 Standard drinks or equivalent per week      Comment: 2-3 glass wine /week     Drug use: No     Sexual activity: Yes     Partners: Male     Other Topics Concern     Parent/Sibling W/ Cabg, Mi Or Angioplasty Before 65f 55m? No     Social History Narrative       Current Outpatient Prescriptions   Medication Sig Dispense Refill     Acetaminophen (TYLENOL PO) Take 1,300 mg by mouth 2 times daily        buPROPion (WELLBUTRIN XL) 150 MG 24 hr tablet Take 1 tablet (150 mg) by mouth every morning 90 tablet 3     CALCIUM PO Take 950 mg by mouth daily        Cholecalciferol (VITAMIN D3 PO) Take 1,000 Units by mouth daily       citalopram (CELEXA) 20 MG tablet TAKE 1 TABLET (20 MG) BY MOUTH DAILY 90 tablet 3     Ergocalciferol (VITAMIN D2 PO) Take 50,000 Units by mouth once a week        Multiple Vitamins-Minerals (OCUVITE ADULT 50+ PO) Take 1 tablet by mouth daily        NONFORMULARY Sustained night time lubricant eye ointment night before bed       Omega-3 Fatty Acids (OMEGA 3 PO) Take 1 tablet by mouth daily        ranitidine (ZANTAC 75) 75 MG tablet Take 75 mg by mouth 2 times daily as needed.       RESTASIS 0.05 % ophthalmic emulsion INSTILL 1 DROP INTO AFFECTED EYE(S) BY OPHTHALMIC ROUTE EVERY 12 HOURS  0     ZYRTEC 10 MG OR TABS 1 TABLET EACH MORNING       amoxicillin (AMOXIL) 500 MG capsule Take 1 capsule (500 mg) by mouth 3 times daily (Patient not taking: Reported on 10/11/2018) 30 capsule 0     hydrOXYzine (ATARAX) 25 MG tablet Take 1-2 tablets (25-50 mg) by mouth every 4 hours as needed (Patient not taking: Reported on 9/28/2018) 60 tablet 1     mupirocin (BACTROBAN) 2 % ointment Spray into both nostrils 2 times daily Apply small amount to each nostril 2 times per day for 7 days prior to surgery (6/26 to 7/3/18)       oxyCODONE IR (ROXICODONE) 5 MG tablet Take 1 tablet (5 mg) by  mouth every 6 hours as needed for pain (Patient not taking: Reported on 9/28/2018) 70 tablet 0       Allergies   Allergen Reactions     Animal Dander      Cats      Dogs      Nkda [No Known Drug Allergies]      Seasonal Allergies      Trees and mold       REVIEW OF SYSTEMS:  CONSTITUTIONAL:  NEGATIVE for fever, chills, change in weight  INTEGUMENTARY/SKIN:  NEGATIVE for worrisome rashes, moles or lesions  EYES:  NEGATIVE for vision changes or irritation  ENT/MOUTH:  NEGATIVE for ear, mouth and throat problems  RESP:  NEGATIVE for significant cough or SOB  BREAST:  NEGATIVE for masses, tenderness or discharge  CV:  NEGATIVE for chest pain, palpitations or peripheral edema  GI:  NEGATIVE for nausea, abdominal pain, heartburn, or change in bowel habits  :  Frequency    MUSCULOSKELETAL:  See HPI above  NEURO: numbness and tinglign ,  NEGATIVE for weakness, dizziness  ENDOCRINE:  NEGATIVE for temperature intolerance, skin/hair changes  HEME/ALLERGY/IMMUNE:  NEGATIVE for bleeding problems  PSYCHIATRIC:  Depression.     PHYSICAL EXAM:  /74 (BP Location: Right arm, Patient Position: Chair, Cuff Size: Adult Regular)  Wt 223 lb 6.4 oz (101.3 kg)  LMP 11/12/2007  BMI 36.33 kg/m2  Body mass index is 36.33 kg/(m^2).   GENERAL APPEARANCE: healthy, alert and no distress   SKIN: no suspicious lesions or rashes  NEURO: Normal strength and tone, mentation intact and speech normal  VASCULAR: Good pulses, and capillary refill   LYMPH: no lymphadenopathy   PSYCH:  mentation appears normal and affect normal/bright    MSK:  Examination of her right hand reveals a ring finger mallet finger.  It is  reducible.  CMS is intact to her fingertips.     ASSESSMENT / PLAN: Right ring finger mallet finger.  She was placed in a stack splint for the next 6 weeks.      Imaging Interpretation:  XRays viewed via disk brought by patient from 9/30/18 from [facility].      Moises Junior MD  Department of Orthopedic Surgery

## 2018-10-11 NOTE — MR AVS SNAPSHOT
After Visit Summary   10/11/2018    Lucie Whitehead    MRN: [id number]           Patient Information     Date Of Birth          1950        Visit Information        Provider Department      10/11/2018 3:00 PM Bud Junior MD [facility] ORTHOPEDIC SURGERY        Today's Diagnoses     Mallet deformity of right ring finger    -  1      Care Instructions    Follow up as needed.           Follow-ups after your visit        Who to contact     If you have questions or need follow up information about today's clinic visit or your schedule please contact [facility] ORTHOPEDIC SURGERY directly at [phone].  Normal or non-critical lab and imaging results will be communicated to you by [employer], letter or phone within 4 business days after the clinic has received the results. If you do not hear from us within 7 days, please contact the clinic through [employer] or phone. If you have a critical or abnormal lab result, we will notify you by phone as soon as possible.  Submit refill requests through [employer] or call your pharmacy and they will forward the refill request to us. Please allow 3 business days for your refill to be completed.          Additional Information About Your Visit        MyChart Information     [employer] gives you secure access to your electronic health record. If you see a primary care provider, you can also send messages to your care team and make appointments. If you have questions, please call your primary care clinic.  If you do not have a primary care provider, please call [phone] and they will assist you.        Care EveryWhere ID     This is your Care EveryWhere ID. This could be used by other organizations to access your [place] medical records  [id number]        Your Vitals Were     Last Period BMI (Body Mass Index)                11/12/2007 36.33 kg/m2           Blood Pressure from Last 3 Encounters:   10/11/18 124/74   09/28/18 (!) 132/92   07/05/18  107/61    Weight from Last 3 Encounters:   10/11/18 223 lb 6.4 oz (101.3 kg)   09/28/18 225 lb 1.6 oz (102.1 kg)   07/03/18 221 lb (100.2 kg)              We Performed the Following     SPLINT FINGER STAXX SIZE 5        Primary Care Provider Office Phone # Fax #    Nallely Moore [phone] [address] [place] [address]        Equal Access to Services     [facility] : Hadii aad ku hadasho Soomaali, waaxda luqadaha, qaybta kaalmada adeegyada, waxay idiin hayaan adeeg pastoraraumm lafabio . So [facility] [phone].    ATENCIÓN: Si habla español, tiene a durham disposición servicios gratuitos de asistencia lingüística. [facility] [phone].    We comply with applicable federal civil rights laws and [place] laws. We do not discriminate on the basis of race, color, national origin, age, disability, sex, sexual orientation, or gender identity.            Thank you!     Thank you for choosing [facility] ORTHOPEDIC SURGERY  for your care. Our goal is always to provide you with excellent care. Hearing back from our patients is one way we can continue to improve our services. Please take a few minutes to complete the written survey that you may receive in the mail after your visit with us. Thank you!             Your Updated Medication List - Protect others around you: Learn how to safely use, store and throw away your medicines at www.disposemymeds.org.          This list is accurate as of 10/11/18 11:59 PM.  Always use your most recent med list.                   Brand Name Dispense Instructions for use Diagnosis    amoxicillin 500 MG capsule    AMOXIL    30 capsule    Take 1 capsule (500 mg) by mouth 3 times daily    Other acute nonsuppurative otitis media of left ear, recurrence not specified       buPROPion 150 MG 24 hr tablet    WELLBUTRIN XL    90 tablet    Take 1 tablet (150 mg) by mouth every morning    Major depressive disorder, recurrent episode, moderate (H)       CALCIUM PO       Take 950 mg by mouth daily        citalopram 20 MG tablet    celeXA    90 tablet    TAKE 1 TABLET (20 MG) BY MOUTH DAILY    Major depressive disorder, recurrent episode, moderate (H)       hydrOXYzine 25 MG tablet    ATARAX    60 tablet    Take 1-2 tablets (25-50 mg) by mouth every 4 hours as needed    Post-op pain       mupirocin 2 % ointment    BACTROBAN     Spray into both nostrils 2 times daily Apply small amount to each nostril 2 times per day for 7 days prior to surgery (6/26 to 7/3/18)        NONFORMULARY      Sustained night time lubricant eye ointment night before bed        OCUVITE ADULT 50+ PO      Take 1 tablet by mouth daily        OMEGA 3 PO      Take 1 tablet by mouth daily        oxyCODONE IR 5 MG tablet    ROXICODONE    70 tablet    Take 1 tablet (5 mg) by mouth every 6 hours as needed for pain    Post-op pain       ranitidine 75 MG tablet   Generic drug:  ranitidine      Take 75 mg by mouth 2 times daily as needed.        RESTASIS 0.05 % ophthalmic emulsion   Generic drug:  cycloSPORINE      INSTILL 1 DROP INTO AFFECTED EYE(S) BY OPHTHALMIC ROUTE EVERY 12 HOURS        TYLENOL PO      Take 1,300 mg by mouth 2 times daily        VITAMIN D2 PO      Take 50,000 Units by mouth once a week        VITAMIN D3 PO      Take 1,000 Units by mouth daily        ZYRTEC 10 MG tablet   Generic drug:  cetirizine      1 TABLET EACH MORNING

## 2018-11-01 ENCOUNTER — THERAPY VISIT (OUTPATIENT)
Dept: PHYSICAL THERAPY | Facility: CLINIC | Age: 68
End: 2018-11-01
Payer: COMMERCIAL

## 2018-11-01 DIAGNOSIS — M62.81 GENERALIZED MUSCLE WEAKNESS: ICD-10-CM

## 2018-11-01 DIAGNOSIS — Z98.1 S/P LUMBAR SPINAL FUSION: Primary | ICD-10-CM

## 2018-11-01 PROCEDURE — 97161 PT EVAL LOW COMPLEX 20 MIN: CPT | Mod: GP | Performed by: PHYSICAL THERAPIST

## 2018-11-01 PROCEDURE — 97110 THERAPEUTIC EXERCISES: CPT | Mod: GP | Performed by: PHYSICAL THERAPIST

## 2018-11-01 NOTE — MR AVS SNAPSHOT
After Visit Summary   11/1/2018    Lucie Whitehead    MRN: [id number]           Patient Information     Date Of Birth          1950        Visit Information        Provider Department      11/1/2018 7:30 AM Sharon Calix, PT [facility] Physical Therapy        Today's Diagnoses     S/P lumbar spinal fusion    -  1    Generalized muscle weakness           Follow-ups after your visit        Your next 10 appointments already scheduled     Nov 09, 2018  8:20 AM CST   [facility] Spine with Maninder Valentino PT   [facility] Physical Therapy ([facility])    [address]  [facility] [address] [phone]              Who to contact     If you have questions or need follow up information about today's clinic visit or your schedule please contact [facility] PHYSICAL THERAPY directly at [phone].  Normal or non-critical lab and imaging results will be communicated to you by MyChart, letter or phone within 4 business days after the clinic has received the results. If you do not hear from us within 7 days, please contact the clinic through [employer] or phone. If you have a critical or abnormal lab result, we will notify you by phone as soon as possible.  Submit refill requests through [employer] or call your pharmacy and they will forward the refill request to us. Please allow 3 business days for your refill to be completed.          Additional Information About Your Visit        MyChart Information     [employer] gives you secure access to your electronic health record. If you see a primary care provider, you can also send messages to your care team and make appointments. If you have questions, please call your primary care clinic.  If you do not have a primary care provider, please call [phone] and they will assist you.        Care EveryWhere ID     This is your Care EveryWhere ID.  This could be used by other organizations to access your [facility] [place] medical records  [id number]        Your Vitals Were     Last Period                   11/12/2007            Blood Pressure from Last 3 Encounters:   10/11/18 124/74   09/28/18 (!) 132/92   07/05/18 107/61    Weight from Last 3 Encounters:   10/11/18 101.3 kg (223 lb 6.4 oz)   09/28/18 102.1 kg (225 lb 1.6 oz)   07/03/18 100.2 kg (221 lb)              We Performed the Following     HC PT EVAL, LOW COMPLEXITY     NICOL INITIAL EVAL REPORT     THERAPEUTIC EXERCISES        Primary Care Provider Office Phone # Fax #    Nallely Moore [phone] [address] [place] [address]        Equal Access to Services     MARÍA QUEZADA : Hadii aad ku hadasho Soomaali, waaxda luqadaha, qaybta kaalmada adeegyada, nathan starr . So [facility] [phone].    ATENCIÓN: Si habla español, tiene a durham disposición servicios gratuitos de asistencia lingüística. Llame al [phone].    We comply with applicable federal civil rights laws and [place] laws. We do not discriminate on the basis of race, color, national origin, age, disability, sex, sexual orientation, or gender identity.            Thank you!     Thank you for choosing [facility] PHYSICAL THERAP[facility]  for your care. Our goal is always to provide you with excellent care. Hearing back from our patients is one way we can continue to improve our services. Please take a few minutes to complete the written survey that you may receive in the mail after your visit with us. Thank you!             Your Updated Medication List - Protect others around you: Learn how to safely use, store and throw away your medicines at www.disposemymeds.org.          This list is accurate as of 11/1/18 10:00 AM.  Always use your most recent med list.                   Brand Name Dispense Instructions for use Diagnosis    amoxicillin 500 MG capsule     AMOXIL    30 capsule    Take 1 capsule (500 mg) by mouth 3 times daily    Other acute nonsuppurative otitis media of left ear, recurrence not specified       buPROPion 150 MG 24 hr tablet    WELLBUTRIN XL    90 tablet    Take 1 tablet (150 mg) by mouth every morning    Major depressive disorder, recurrent episode, moderate (H)       CALCIUM PO      Take 950 mg by mouth daily        citalopram 20 MG tablet    celeXA    90 tablet    TAKE 1 TABLET (20 MG) BY MOUTH DAILY    Major depressive disorder, recurrent episode, moderate (H)       hydrOXYzine 25 MG tablet    ATARAX    60 tablet    Take 1-2 tablets (25-50 mg) by mouth every 4 hours as needed    Post-op pain       mupirocin 2 % ointment    BACTROBAN     Spray into both nostrils 2 times daily Apply small amount to each nostril 2 times per day for 7 days prior to surgery (6/26 to 7/3/18)        NONFORMULARY      Sustained night time lubricant eye ointment night before bed        OCUVITE ADULT 50+ PO      Take 1 tablet by mouth daily        OMEGA 3 PO      Take 1 tablet by mouth daily        oxyCODONE IR 5 MG tablet    ROXICODONE    70 tablet    Take 1 tablet (5 mg) by mouth every 6 hours as needed for pain    Post-op pain       ranitidine 75 MG tablet   Generic drug:  ranitidine      Take 75 mg by mouth 2 times daily as needed.        RESTASIS 0.05 % ophthalmic emulsion   Generic drug:  cycloSPORINE      INSTILL 1 DROP INTO AFFECTED EYE(S) BY OPHTHALMIC ROUTE EVERY 12 HOURS        TYLENOL PO      Take 1,300 mg by mouth 2 times daily        VITAMIN D2 PO      Take 50,000 Units by mouth once a week        VITAMIN D3 PO      Take 1,000 Units by mouth daily        ZYRTEC 10 MG tablet   Generic drug:  cetirizine      1 TABLET EACH MORNING

## 2018-11-01 NOTE — PROGRESS NOTES
[facility] Initial Evaluation  Subjective:  Patient is a 68 year old female presenting with rehab back hpi. The history is provided by the patient. No  was used.   Lucie Whitehead is a 68 year old female with a lumbar condition.  Condition occurred with:  Degenerative joint disease.  Condition occurred: for unknown reasons.  This is a chronic condition  Patient reports chronic low back problems with 4 previous spinal surgeries essentially fusing the entire lumbar spine.  The most recent 2 were a L4-5 anterior/posterior fusion in 2013 and a L1-2 fusion on 7-3-18. She wore a brace for 2 weeks. Chief complaints are of right greater than left back pain with right anterior thigh pain and leg weakness. Posterior leg pain and numbness were relieved with the last surgery. She reports limited walking endurance and difficulty with stair climbing due to leg weakness..    Patient reports pain:  Lumbar spine right and lumbar spine left.  Radiates to:  Thigh right.  Pain is described as aching and is intermittent and reported as 3/10.  Associated symptoms:  Loss of strength and loss of motion/stiffness. Pain is worse during the day.  Symptoms are exacerbated by bending, certain positions, sitting, walking, lifting and standing and relieved by ice, rest and NSAID's.  Since onset symptoms are gradually improving.    Previous treatment includes physical therapy and surgery.  There was moderate improvement following previous treatment.  General health as reported by patient is good.  Pertinent medical history includes:  Concussions/dizziness, depression, incontinence, migraines/headaches, numbness/tingling, osteoarthritis and overweight.  Medical allergies: no.  Other surgeries include:  Orthopedic surgery and other (cervical fusion, lumbar fusions x 4, shoulder, c-sections).  Current medications:  Anti-depressants and other (restasis and vitamins).  Current occupation is Business market  manager.  Patient is working in normal job without restrictions.  Primary job tasks include:  Driving and prolonged standing (computer work).        Red flags:  Significant weakness, severe headaches and pain at night/rest.                        Objective:  Standing Alignment:        Lumbar:  Lordosis decr            Gait:    Gait Type:  Antalgic   Assistive Devices:  None  Deviations:  Lumbar:  Trunk flexionHip:  Trendelenberg L and Trendelenberg RGeneral Deviations:  Lizett decr               Lumbar/SI Evaluation  ROM:  Arom wnl lumbar: hip flexor tightness bilaterally, -10 deg in Bj position bilaterally.  AROM Lumbar:   Flexion:          60%  Ext:                    20%   Side Bend:        Left:  50%    Right:  50%  Rotation:           Left:     Right:   Side Glide:        Left:     Right:         Strength: fair to poor lower abdominals, glut medius grade 4-/5, glut max grade 4-/5.  Lumbar Myotomes:  normal (except B hip flexor grade 4+/5 and bilateral quads grade 4+/5)            Lumbar DTR's:  not assessed      Cord Signs:  normal    Lumbar Dermtomes:  normal                Neural Tension/Mobility:  Lumbar:  Normal (SLR to 90 deg bilaterally and negative)        Lumbar Palpation:  Palpation (lumbar): tenderness along incision, but incision is well healed with good scar mobility.                                                         General     ROS    Assessment/Plan:    Patient is a 68 year old female with lumbar complaints.    Patient has the following significant findings with corresponding treatment plan.                Diagnosis 1:  S/p L1-2 fusion 7-3-18 with residual weakness    Pain -  hot/cold therapy and education  Decreased ROM/flexibility - therapeutic exercise  Decreased strength - therapeutic exercise and therapeutic activities  Impaired gait - gait training  Impaired muscle performance - neuro re-education  Decreased function - therapeutic activities  Impaired posture - neuro  re-education    Therapy Evaluation Codes:   1) History comprised of:   Personal factors that impact the plan of care:      Past/current experiences.    Comorbidity factors that impact the plan of care are:      Depression, Dizziness, Migraines/headaches, Numbness/tingling, Osteoarthritis, Overweight, Sleep disorder/apnea and Weakness.     Medications impacting care: Anti-depressant.  2) Examination of Body Systems comprised of:   Body structures and functions that impact the plan of care:      Lumbar spine.   Activity limitations that impact the plan of care are:      Bathing, Bending, Cooking, Driving, Dressing, Lifting, Sitting, Squatting/kneeling, Stairs, Standing, Walking and Sleeping.  3) Clinical presentation characteristics are:   Stable/Uncomplicated.  4) Decision-Making    Low complexity using standardized patient assessment instrument and/or measureable assessment of functional outcome.  Cumulative Therapy Evaluation is: Low complexity.    Previous and current functional limitations:  (See Goal Flow Sheet for this information)    Short term and Long term goals: (See Goal Flow Sheet for this information)     Communication ability:  Patient appears to be able to clearly communicate and understand verbal and written communication and follow directions correctly.  Treatment Explanation - The following has been discussed with the patient:   RX ordered/plan of care  Anticipated outcomes  Possible risks and side effects  This patient would benefit from PT intervention to resume normal activities.   Rehab potential is good.    Frequency:  1 X week, once daily  Duration:  for 8 weeks  Discharge Plan:  Achieve all LTG.  Independent in home treatment program.  Reach maximal therapeutic benefit.    Please refer to the daily flowsheet for treatment today, total treatment time and time spent performing 1:1 timed codes.

## 2018-11-01 NOTE — LETTER
[facility] PHYSICAL THERAPY  [address]  [facility] [address]  [phone]    2018    Re: Lucie Whitehead   :   1950  MRN:  [id number]   REFERRING PHYSICIAN:   Red Weller    [facility] PHYSICAL THERAPY  Date of Initial Evaluation:  18  Visits:  Rxs Used: 1  Reason for Referral: S/P lumbar spinal fusion; Generalized muscle weakness    EVALUATION SUMMARY    [facility] Initial Evaluation  Subjective:  Patient is a 68 year old female presenting with rehab back hpi. The history is provided by the patient. No  was used.   Lucie Whitehead is a 68 year old female with a lumbar condition.  Condition occurred with:  Degenerative joint disease.  Condition occurred: for unknown reasons.  This is a chronic condition  Patient reports chronic low back problems with 4 previous spinal surgeries essentially fusing the entire lumbar spine.  The most recent 2 were a L4-5 anterior/posterior fusion in  and a L1-2 fusion on 7-3-18. She wore a brace for 2 weeks. Chief complaints are of right greater than left back pain with right anterior thigh pain and leg weakness. Posterior leg pain and numbness were relieved with the last surgery. She reports limited walking endurance and difficulty with stair climbing due to leg weakness.  Patient reports pain:  Lumbar spine right and lumbar spine left.  Radiates to:  Thigh right.  Pain is described as aching and is intermittent and reported as 3/10.  Associated symptoms:  Loss of strength and loss of motion/stiffness. Pain is worse during the day.  Symptoms are exacerbated by bending, certain positions, sitting, walking, lifting and standing and relieved by ice, rest and NSAID's.  Since onset symptoms are gradually improving.  Previous treatment includes physical therapy and surgery.  There was moderate improvement following  previous treatment.  General health as reported by patient is good.  Pertinent medical history includes:  Concussions/dizziness, depression, incontinence, migraines/headaches, numbness/tingling, osteoarthritis and overweight.  Medical allergies: no.  Other surgeries include:  Orthopedic surgery and other (cervical fusion, lumbar fusions x 4, shoulder, c-sections).  Current medications:  Anti-depressants and other (restasis and vitamins).  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  Primary job tasks include:  Driving and prolonged standing (computer work).  Red flags:  Significant weakness, severe headaches and pain at night/rest.                Objective:  Standing Alignment:  Lumbar:  Lordosis decr  Gait:    Gait Type:  Antalgic   Assistive Devices:  None  Deviations:  Lumbar:  Trunk flexionHip:  Trendelenberg L and Trendelenberg RGeneral Deviations:  Lizett decr      Lumbar/SI Evaluation  ROM:  Arom wnl lumbar: hip flexor tightness bilaterally, -10 deg in Bj position bilaterally.      Re: Lucie Whitehead   :   1950        AROM Lumbar:   Flexion:          60%  Ext:                    20%   Side Bend:        Left:  50%    Right:  50%  Rotation:           Left:     Right:   Side Glide:        Left:     Right:   Strength: fair to poor lower abdominals, glut medius grade 4-/5, glut max grade 4-/5.  Lumbar Myotomes:  normal (except B hip flexor grade 4+/5 and bilateral quads grade 4+/5)  Lumbar DTR's:  not assessed  Cord Signs:  normal  Lumbar Dermtomes:  normal  Neural Tension/Mobility:  Lumbar:  Normal (SLR to 90 deg bilaterally and negative)    Lumbar Palpation:  Palpation (lumbar): tenderness along incision, but incision is well healed with good scar mobility.    Assessment/Plan:    Patient is a 68 year old female with lumbar complaints.    Patient has the following significant findings with corresponding treatment plan.                 Diagnosis 1:  S/p L1-2 fusion 7-3-18 with residual weakness    Pain -  hot/cold therapy and education  Decreased ROM/flexibility - therapeutic exercise  Decreased strength - therapeutic exercise and therapeutic activities  Impaired gait - gait training  Impaired muscle performance - neuro re-education  Decreased function - therapeutic activities  Impaired posture - neuro re-education    Therapy Evaluation Codes:   1) History comprised of:   Personal factors that impact the plan of care:      Past/current experiences.    Comorbidity factors that impact the plan of care are:      Depression, Dizziness, Migraines/headaches, Numbness/tingling, Osteoarthritis, Overweight, Sleep disorder/apnea and Weakness.     Medications impacting care: Anti-depressant.  2) Examination of Body Systems comprised of:   Body structures and functions that impact the plan of care:      Lumbar spine.   Activity limitations that impact the plan of care are:      Bathing, Bending, Cooking, Driving, Dressing, Lifting, Sitting, Squatting/kneeling, Stairs, Standing, Walking and Sleeping.  3) Clinical presentation characteristics are:   Stable/Uncomplicated.  4) Decision-Making    Low complexity using standardized patient assessment instrument and/or measureable assessment of functional outcome.  Cumulative Therapy Evaluation is: Low complexity.    Previous and current functional limitations:  (See Goal Flow Sheet for this information)    Short term and Long term goals: (See Goal Flow Sheet for this information)         Re: Lucie Whitehead   :   1950            Communication ability:  Patient appears to be able to clearly communicate and understand verbal and written communication and follow directions correctly.  Treatment Explanation - The following has been discussed with the patient:   RX ordered/plan of care  Anticipated outcomes  Possible risks and side effects  This patient would benefit from PT intervention to resume normal  activities.   Rehab potential is good.    Frequency:  1 X week, once daily  Duration:  for 8 weeks  Discharge Plan:  Achieve all LTG.  Independent in home treatment program.  Reach maximal therapeutic benefit.    Thank you for your referral.    INQUIRIES  Therapist: Sharon Calix PT  [facility]  [address] [place] [address]  Phone: [phone]  Fax: [phone]

## 2018-11-09 ENCOUNTER — THERAPY VISIT (OUTPATIENT)
Dept: PHYSICAL THERAPY | Facility: CLINIC | Age: 68
End: 2018-11-09
Payer: COMMERCIAL

## 2018-11-09 DIAGNOSIS — M62.81 GENERALIZED MUSCLE WEAKNESS: ICD-10-CM

## 2018-11-09 DIAGNOSIS — Z98.1 S/P LUMBAR SPINAL FUSION: ICD-10-CM

## 2018-11-09 PROCEDURE — 97112 NEUROMUSCULAR REEDUCATION: CPT | Mod: GP | Performed by: PHYSICAL THERAPIST

## 2018-11-09 PROCEDURE — 97110 THERAPEUTIC EXERCISES: CPT | Mod: GP | Performed by: PHYSICAL THERAPIST

## 2018-11-09 NOTE — MR AVS SNAPSHOT
After Visit Summary   11/9/2018    Lucie Whitehead    MRN: [id number]           Patient Information     Date Of Birth          1950        Visit Information        Provider Department      11/9/2018 8:20 AM Maninder Valentino, PT [facility]        Today's Diagnoses     S/P lumbar spinal fusion        Generalized muscle weakness           Follow-ups after your visit        Your next 10 appointments already scheduled     Nov 16, 2018  1:00 PM CST   NICOL Spine with Maninder Valentino PT   [facility] ([facility])    [address]  [facility] [id number] [phone]            Nov 30, 2018  8:20 AM CST   NICOL Spine with Maninder Valentino PT   [facility] ([facility])    [address]  [facility] [id number] [phone]              Who to contact     If you have questions or need follow up information about today's clinic visit or your schedule please contact [facility] directly at [phone].  Normal or non-critical lab and imaging results will be communicated to you by MyChart, letter or phone within 4 business days after the clinic has received the results. If you do not hear from us within 7 days, please contact the clinic through [employer] or phone. If you have a critical or abnormal lab result, we will notify you by phone as soon as possible.  Submit refill requests through [employer] or call your pharmacy and they will forward the refill request to us. Please allow 3 business days for your refill to be completed.          Additional Information About Your Visit        MyChart Information     [employer] gives you secure access to your electronic health record. If you see a primary care provider, you can also send messages to your care team and make  appointments. If you have questions, please call your primary care clinic.  If you do not have a primary care provider, please call [phone] and they will assist you.        Care EveryWhere ID     This is your Care EveryWhere ID. This could be used by other organizations to access your [facility] medical records  [id number]        Your Vitals Were     Last Period                   11/12/2007            Blood Pressure from Last 3 Encounters:   10/11/18 124/74   09/28/18 (!) 132/92   07/05/18 107/61    Weight from Last 3 Encounters:   10/11/18 101.3 kg (223 lb 6.4 oz)   09/28/18 102.1 kg (225 lb 1.6 oz)   07/03/18 100.2 kg (221 lb)              We Performed the Following     NEUROMUSCULAR RE-EDUCATION     THERAPEUTIC EXERCISES        Primary Care Provider Office Phone # Fax #    Nallely Moore [phone] [address] [place] [address]        Equal Access to Services     [facility]: Hadii aad ku hadasho Soomaali, waaxda luqadaha, qaybta kaalmada adeegyada, waxay gregoriain idania starr . So [facility] [phone].    ATENCIÓN: Si missy molina, tiene a durham disposición servicios gratuitos de asistencia lingüística. Llame al [phone].    We comply with applicable federal civil rights laws and [place] laws. We do not discriminate on the basis of race, color, national origin, age, disability, sex, sexual orientation, or gender identity.            Thank you!     Thank you for choosing [facility]  for your care. Our goal is always to provide you with excellent care. Hearing back from our patients is one way we can continue to improve our services. Please take a few minutes to complete the written survey that you may receive in the mail after your visit with us. Thank you!             Your Updated Medication List - Protect others around you: Learn how to safely use, store and throw away your medicines at  www.disposemymeds.org.          This list is accurate as of 11/9/18  8:58 AM.  Always use your most recent med list.                   Brand Name Dispense Instructions for use Diagnosis    amoxicillin 500 MG capsule    AMOXIL    30 capsule    Take 1 capsule (500 mg) by mouth 3 times daily    Other acute nonsuppurative otitis media of left ear, recurrence not specified       buPROPion 150 MG 24 hr tablet    WELLBUTRIN XL    90 tablet    Take 1 tablet (150 mg) by mouth every morning    Major depressive disorder, recurrent episode, moderate (H)       CALCIUM PO      Take 950 mg by mouth daily        citalopram 20 MG tablet    celeXA    90 tablet    TAKE 1 TABLET (20 MG) BY MOUTH DAILY    Major depressive disorder, recurrent episode, moderate (H)       hydrOXYzine 25 MG tablet    ATARAX    60 tablet    Take 1-2 tablets (25-50 mg) by mouth every 4 hours as needed    Post-op pain       mupirocin 2 % ointment    BACTROBAN     Spray into both nostrils 2 times daily Apply small amount to each nostril 2 times per day for 7 days prior to surgery (6/26 to 7/3/18)        NONFORMULARY      Sustained night time lubricant eye ointment night before bed        OCUVITE ADULT 50+ PO      Take 1 tablet by mouth daily        OMEGA 3 PO      Take 1 tablet by mouth daily        oxyCODONE IR 5 MG tablet    ROXICODONE    70 tablet    Take 1 tablet (5 mg) by mouth every 6 hours as needed for pain    Post-op pain       ranitidine 75 MG tablet   Generic drug:  ranitidine      Take 75 mg by mouth 2 times daily as needed.        RESTASIS 0.05 % ophthalmic emulsion   Generic drug:  cycloSPORINE      INSTILL 1 DROP INTO AFFECTED EYE(S) BY OPHTHALMIC ROUTE EVERY 12 HOURS        TYLENOL PO      Take 1,300 mg by mouth 2 times daily        VITAMIN D2 PO      Take 50,000 Units by mouth once a week        VITAMIN D3 PO      Take 1,000 Units by mouth daily        ZYRTEC 10 MG tablet   Generic drug:  cetirizine      1 TABLET EACH MORNING

## 2018-12-27 PROBLEM — M62.81 GENERALIZED MUSCLE WEAKNESS: Status: RESOLVED | Noted: 2018-11-01 | Resolved: 2018-12-27

## 2018-12-27 PROBLEM — Z98.1 S/P LUMBAR SPINAL FUSION: Status: RESOLVED | Noted: 2018-11-01 | Resolved: 2018-12-27

## 2018-12-27 NOTE — PROGRESS NOTES
Discharge Note    Progress reporting period is from initial eval to Nov 9, 2018.     Lucie failed to return for next follow up visit and current status is unknown.  Please see information below for last relevant information on current status.  Patient seen for Rxs Used: 2 visits.  SUBJECTIVE  Subjective changes noted by patient:  Subjective: Poor compliance with HEP.  C/c is R LE strength.  .  Current pain level is Current Pain level: 3/10.     Previous pain level was  Initial Pain level: 3/10.   Changes in function:  Yes (See Goal flowsheet attached for changes in current functional level)  Adverse reaction to treatment or activity: None    OBJECTIVE  Changes noted in objective findings: Objective: R LE strength 3/5 generally,       ASSESSMENT/PLAN  Diagnosis: s/p L1-2 fusion with weakness   DIAGP:  Diagnoses of S/P lumbar spinal fusion and Generalized muscle weakness were pertinent to this visit.  Updated problem list and treatment plan:   Decreased function - HEP  Decreased strength - HEP  STG/LTGs have been met or progress has been made towards goals:  Yes, please see goal flowsheet for most current information  Assessment of Progress: current status is unknown.  Last current status: Assessment of progress: Pt is progressing well, Pt is progressing as expected   Self Management Plans:  HEP  I have re-evaluated this patient and find that the nature, scope, duration and intensity of the therapy is appropriate for the medical condition of the patient.  Lucie continues to require the following intervention to meet STG and LTG's:  HEP.    Recommendations:  Discharge with current home program.  Patient to follow up with MD as needed.    Please refer to the daily flowsheet for treatment today, total treatment time and time spent performing 1:1 timed codes.

## 2019-03-01 ENCOUNTER — OFFICE VISIT (OUTPATIENT)
Dept: URGENT CARE | Facility: URGENT CARE | Age: 69
End: 2019-03-01
Payer: COMMERCIAL

## 2019-03-01 ENCOUNTER — ANCILLARY PROCEDURE (OUTPATIENT)
Dept: GENERAL RADIOLOGY | Facility: CLINIC | Age: 69
End: 2019-03-01
Attending: FAMILY MEDICINE
Payer: COMMERCIAL

## 2019-03-01 VITALS
OXYGEN SATURATION: 95 % | TEMPERATURE: 98.5 F | BODY MASS INDEX: 40.93 KG/M2 | SYSTOLIC BLOOD PRESSURE: 144 MMHG | DIASTOLIC BLOOD PRESSURE: 84 MMHG | HEIGHT: 63 IN | RESPIRATION RATE: 12 BRPM | HEART RATE: 91 BPM | WEIGHT: 231 LBS

## 2019-03-01 DIAGNOSIS — M17.11 OSTEOARTHRITIS OF RIGHT KNEE, UNSPECIFIED OSTEOARTHRITIS TYPE: ICD-10-CM

## 2019-03-01 DIAGNOSIS — M25.561 ACUTE PAIN OF RIGHT KNEE: Primary | ICD-10-CM

## 2019-03-01 PROCEDURE — 73562 X-RAY EXAM OF KNEE 3: CPT | Mod: RT

## 2019-03-01 PROCEDURE — 99214 OFFICE O/P EST MOD 30 MIN: CPT | Performed by: FAMILY MEDICINE

## 2019-03-01 RX ORDER — LORATADINE 10 MG/1
10 TABLET ORAL DAILY
COMMUNITY
End: 2021-05-25

## 2019-03-01 ASSESSMENT — PAIN SCALES - GENERAL: PAINLEVEL: EXTREME PAIN (8)

## 2019-03-01 ASSESSMENT — MIFFLIN-ST. JEOR: SCORE: 1556.94

## 2019-03-01 NOTE — PROGRESS NOTES
"SUBJECTIVE:  Chief Complaint   Patient presents with     Urgent Care     Knee Pain     pain in right knee, can't trust that it won't \"buckle out,\" some swelling x 1wk  Has had problems since last back surgery in July off and on     Lucie Whitehead is a 68 year old female who presents with a chief complaint of right knee pain and tenderness.  Symptoms began 1 week(s) ago, are moderate and gradual onset, she has been noticing ongoing weakness of the right knee since her back surgery  almost for  7 months  months context:  Injury:No.   Pain exacerbated by walking, weight-bearing, repetitive motion, flexion/extension and standing Relieved by rest.  She treated it initially with no therapy. This is the is not the first time this type of  Pain has occurred to this patient.     Past Medical History:   Diagnosis Date     Complication of anesthesia     extremely dry eyes     Diabetes (H)      Gastro-oesophageal reflux disease      Hirsutism     Abstracted 06/14/02     History of blood transfusion      Leiomyoma of uterus, unspecified     Abstracted 06/14/02     Obesity 4/14/2010    Estimated Body mass index is 28.97 kg/(m^2) as calculated from the following:   Height as of 7/10/13: 5' 7\"(1.702 m).   Weight as of 7/10/13: 185 lb(83.915 kg).       Other and unspecified hyperlipidemia     Abstracted 06/14/02     Other chronic pain     low back and legs and feet     Sleep apnea     CPAP     Current Outpatient Medications   Medication Sig Dispense Refill     Acetaminophen (TYLENOL PO) Take 1,300 mg by mouth 2 times daily        buPROPion (WELLBUTRIN XL) 150 MG 24 hr tablet Take 1 tablet (150 mg) by mouth every morning 90 tablet 3     CALCIUM PO Take 950 mg by mouth daily        Cholecalciferol (VITAMIN D3 PO) Take 1,000 Units by mouth daily       citalopram (CELEXA) 20 MG tablet TAKE 1 TABLET (20 MG) BY MOUTH DAILY 90 tablet 3     Ergocalciferol (VITAMIN D2 PO) Take 50,000 Units by mouth once a week        loratadine " (CLARITIN) 10 MG tablet Take 10 mg by mouth daily       Multiple Vitamins-Minerals (OCUVITE ADULT 50+ PO) Take 1 tablet by mouth daily        NONFORMULARY Sustained night time lubricant eye ointment night before bed       Omega-3 Fatty Acids (OMEGA 3 PO) Take 1 tablet by mouth daily        ranitidine (ZANTAC 75) 75 MG tablet Take 75 mg by mouth 2 times daily as needed.       RESTASIS 0.05 % ophthalmic emulsion INSTILL 1 DROP INTO AFFECTED EYE(S) BY OPHTHALMIC ROUTE EVERY 12 HOURS  0     ZYRTEC 10 MG OR TABS 1 TABLET EACH MORNING       amoxicillin (AMOXIL) 500 MG capsule Take 1 capsule (500 mg) by mouth 3 times daily (Patient not taking: Reported on 10/11/2018) 30 capsule 0     hydrOXYzine (ATARAX) 25 MG tablet Take 1-2 tablets (25-50 mg) by mouth every 4 hours as needed (Patient not taking: Reported on 3/1/2019) 60 tablet 1     mupirocin (BACTROBAN) 2 % ointment Spray into both nostrils 2 times daily Apply small amount to each nostril 2 times per day for 7 days prior to surgery ( to 7/3/18)       oxyCODONE IR (ROXICODONE) 5 MG tablet Take 1 tablet (5 mg) by mouth every 6 hours as needed for pain (Patient not taking: Reported on 2018) 70 tablet 0     Social History     Tobacco Use     Smoking status: Former Smoker     Last attempt to quit: 1975     Years since quittin.1     Smokeless tobacco: Never Used   Substance Use Topics     Alcohol use: Yes     Alcohol/week: 0.0 oz     Comment: 2-3 glass wine /week     Family History   Problem Relation Age of Onset     Prostate Cancer Father      Cancer - colorectal Mother         born  HTN and DMII     Diabetes Mother         type II     Coronary Artery Disease Mother         CHF     Diabetes Maternal Grandmother         type II     Hypertension Maternal Grandmother      Cancer - colorectal Maternal Grandmother      Diabetes Maternal Uncle         type II     Hypertension Maternal Uncle      Respiratory Paternal Grandmother         asthma     Diabetes Son  "        dx age 21type I       ROS:  Review of systems negative except as stated below    EXAM:   /84 (BP Location: Right arm, Patient Position: Sitting, Cuff Size: Adult Regular)   Pulse 91   Temp 98.5  F (36.9  C) (Oral)   Resp 12   Ht 1.6 m (5' 3\")   Wt 104.8 kg (231 lb)   LMP 11/12/2007 (LMP Unknown)   SpO2 95%   BMI 40.92 kg/m    M/S Exam:kneetenderness to palpationGENERAL APPEARANCE: healthy, alert and no distress  EXTREMITIES: peripheral pulses normal  SKIN: no suspicious lesions or rashes  NEURO: Normal strength and tone, sensory exam grossly normal, mentation intact and speech normal    X-RAY was done.    ASSESSMENT:     Encounter Diagnosis   Name Primary?     Acute pain of right knee Yes   Lucie was seen today for urgent care and knee pain.    Diagnoses and all orders for this visit:    Acute pain of right knee  -     XR Knee Right 3 Views  -     [facility] PT, HAND, AND CHIROPRACTIC REFERRAL; Future    Osteoarthritis of right knee, unspecified osteoarthritis type          PLAN:  Reviewed x-ray results with patient and discussed that symptoms could be related to arthritic changes  Advised to do physical therapy which should help with the pain  Discussed with patient to lose weight which should make the pain better  Patient was given handout on knee exercises and also instructions on how to reduce the pain  Patient understood and agreed with plan    Daya Blanchard MD     See orders in epic    "

## 2019-03-01 NOTE — PATIENT INSTRUCTIONS
Patient Education     Reducing Knee Pain and Swelling    Many treatments can help reduce pain and swelling in your knee. Your healthcare provider or physical therapist may suggest one or more of the following treatments:    Icing your knee. This helps reduce swelling. You may be asked to ice your knee once a day or more. Apply ice for about 15 to 20 minutes at a time, with at least 40 minutes between sessions. To make an ice pack, put ice cubes in a plastic bag that seals at the top. Wrap the bag in a clean, thin towel or cloth. Never put ice or an ice pack directly on the skin.    Keeping your leg raised above your heart. This helps excess fluid flow out of your knee joint to reduce swelling.    Compression. This means wrapping an elastic bandage or neoprene sleeve snugly around your knees. It keeps fluid from collecting in and around your knee joint.    Electrical stimulation. This is done by a physical therapist or . It can help reduce excess fluid in your knee joint.    Anti-inflammatory medicines. These may be prescribed by your healthcare provider. You may take pills or get shots (injections) in your knee.    Isometric (awais) exercises. These strengthen the muscles that support your knee joint. They also help reduce excess fluid in your knee.    Massage. This helps fluid drain away from your knee.  Date Last Reviewed: 5/1/2018 2000-2018 [employer]. [address] [place] [address], [place] [address]. All rights reserved. This information is not intended as a substitute for professional medical care. Always follow your healthcare professional's instructions.

## 2019-03-20 ENCOUNTER — TELEPHONE (OUTPATIENT)
Dept: PEDIATRICS | Facility: CLINIC | Age: 69
End: 2019-03-20

## 2019-03-20 NOTE — TELEPHONE ENCOUNTER
Reason for call:  Same Day Appointment   Requested Provider: Short    PCP:Short  Reason for visit: Extreme fatigue, headaches and depression.    Duration of symptoms: for a month but getting worse.     Have you been treated for this in the past? Yes    Additional comments: Would like to see Dr Moore if possible      Phone number to reach patient:  Cell number on file:    Telephone Information:   Mobile [phone]       Best Time:  any    Can we leave a detailed message on this number?  YES

## 2019-03-26 ENCOUNTER — OFFICE VISIT (OUTPATIENT)
Dept: PEDIATRICS | Facility: CLINIC | Age: 69
End: 2019-03-26
Payer: COMMERCIAL

## 2019-03-26 VITALS
DIASTOLIC BLOOD PRESSURE: 70 MMHG | HEART RATE: 85 BPM | OXYGEN SATURATION: 95 % | SYSTOLIC BLOOD PRESSURE: 140 MMHG | HEIGHT: 63 IN | WEIGHT: 225.7 LBS | BODY MASS INDEX: 39.99 KG/M2 | TEMPERATURE: 97.8 F

## 2019-03-26 DIAGNOSIS — F33.1 MAJOR DEPRESSIVE DISORDER, RECURRENT EPISODE, MODERATE (H): Primary | ICD-10-CM

## 2019-03-26 DIAGNOSIS — M70.62 TROCHANTERIC BURSITIS OF BOTH HIPS: ICD-10-CM

## 2019-03-26 DIAGNOSIS — Z12.31 VISIT FOR SCREENING MAMMOGRAM: ICD-10-CM

## 2019-03-26 DIAGNOSIS — G89.29 CHRONIC PAIN OF BOTH SHOULDERS: ICD-10-CM

## 2019-03-26 DIAGNOSIS — M70.61 TROCHANTERIC BURSITIS OF BOTH HIPS: ICD-10-CM

## 2019-03-26 DIAGNOSIS — R20.9 SKIN SENSATION DISTURBANCE: ICD-10-CM

## 2019-03-26 DIAGNOSIS — M25.511 CHRONIC PAIN OF BOTH SHOULDERS: ICD-10-CM

## 2019-03-26 DIAGNOSIS — F41.1 GENERALIZED ANXIETY DISORDER: ICD-10-CM

## 2019-03-26 DIAGNOSIS — E66.01 MORBID OBESITY (H): ICD-10-CM

## 2019-03-26 DIAGNOSIS — R73.01 IMPAIRED FASTING GLUCOSE: ICD-10-CM

## 2019-03-26 DIAGNOSIS — E78.5 HYPERLIPIDEMIA LDL GOAL <160: ICD-10-CM

## 2019-03-26 DIAGNOSIS — M25.512 CHRONIC PAIN OF BOTH SHOULDERS: ICD-10-CM

## 2019-03-26 PROCEDURE — 99214 OFFICE O/P EST MOD 30 MIN: CPT | Performed by: INTERNAL MEDICINE

## 2019-03-26 RX ORDER — TRAMADOL HYDROCHLORIDE 50 MG/1
TABLET ORAL
Refills: 0 | COMMUNITY
Start: 2019-03-20 | End: 2020-10-06

## 2019-03-26 RX ORDER — HYDROXYZINE HYDROCHLORIDE 25 MG/1
25-50 TABLET, FILM COATED ORAL EVERY 4 HOURS PRN
Qty: 60 TABLET | Refills: 1 | Status: SHIPPED | OUTPATIENT
Start: 2019-03-26 | End: 2019-04-23

## 2019-03-26 RX ORDER — DULOXETIN HYDROCHLORIDE 30 MG/1
30 CAPSULE, DELAYED RELEASE ORAL 2 TIMES DAILY
Qty: 60 CAPSULE | Refills: 1 | Status: SHIPPED | OUTPATIENT
Start: 2019-03-26 | End: 2019-05-17

## 2019-03-26 RX ORDER — BUPROPION HYDROCHLORIDE 150 MG/1
150 TABLET ORAL EVERY MORNING
Qty: 90 TABLET | Refills: 0 | Status: SHIPPED | OUTPATIENT
Start: 2019-03-26 | End: 2019-06-22

## 2019-03-26 ASSESSMENT — MIFFLIN-ST. JEOR: SCORE: 1532.9

## 2019-03-26 NOTE — PROGRESS NOTES
"  SUBJECTIVE:   Lucie Whitehead is a 68 year old female who presents to clinic today for the following health issues:      Depression Followup    Status since last visit: Worsened a lot of stress    See PHQ-9 for current symptoms.  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   Current substance abuse:  None  Anxiety or Panic symptoms:  Yes - not seeing a counselor    PHQ 5/3/2017 3/23/2018 3/27/2019   PHQ-9 Total Score 3 8 21   Q9: Thoughts of better off dead/self-harm past 2 weeks Not at all Not at all Several days       PHQ-9  English  PHQ-9   Any Language  Suicide Assessment Five-step Evaluation and Treatment (SAFE-T)    Amount of exercise or physical activity: None    Problems taking medications regularly: No    Medication side effects: none    Diet: regular (no restrictions)    Having more pain - saw spine surgeon last week and has follow-up scheduled    Shoulders and hips painful.  Hips laterally.  Hurt to touch.  Worse with walking as well.  Hands get tingly with certain positions.  Is having difficulty with writing but doesn't think hands weak.    Problem list and histories reviewed & adjusted, as indicated.  Additional history: as documented    Labs reviewed in EPIC    Reviewed and updated as needed this visit by clinical staff  Tobacco  Allergies  Meds  Problems  Med Hx  Surg Hx  Fam Hx  Soc Hx        Reviewed and updated as needed this visit by Provider  Allergies  Meds  Problems         ROS:  Constitutional, HEENT, cardiovascular, pulmonary, gi and gu systems are negative, except as otherwise noted.    OBJECTIVE:     /70 (BP Location: Right arm, Patient Position: Chair, Cuff Size: Adult Regular)   Pulse 85   Temp 97.8  F (36.6  C) (Tympanic)   Ht 1.6 m (5' 3\")   Wt 102.4 kg (225 lb 11.2 oz)   LMP 11/12/2007 (LMP Unknown)   SpO2 95%   BMI 39.98 kg/m    Body mass index is " 39.98 kg/m .  GENERAL: healthy, alert and no distress  Musc: erik hips with lateral tenderness, good ROM with negative str leg raise  PSYCH: mentation appears normal and affect flat    ASSESSMENT/PLAN:       BP Screening:   Last 3 BP Readings:    BP Readings from Last 3 Encounters:   03/26/19 140/70   03/01/19 144/84   10/11/18 124/74       The following was recommended to the patient:  Re-screen within 4 weeks and recommend lifestyle modifications    1. Major depressive disorder, recurrent episode, moderate (H)  Uncontrolled  Discussed options and will try change from serotonin specific reuptake inhibitor to SNRI with cymbalta to see if helps with mood and pain.  Continue bupropion for now unchanged.  Begin counseling, order placed  - DULoxetine (CYMBALTA) 30 MG capsule; Take 1 capsule (30 mg) by mouth 2 times daily Start with once daily x1wk then increase to bid  Dispense: 60 capsule; Refill: 1  - MENTAL HEALTH REFERRAL  - Adult; Outpatient Treatment; Individual/Couples/Family/Group Therapy/Health Psychology; [facility]: [facility] [phone]; We will contact you to schedule the appointment or please call with any questions  - buPROPion (WELLBUTRIN XL) 150 MG 24 hr tablet; Take 1 tablet (150 mg) by mouth every morning  Dispense: 90 tablet; Refill: 0  - **TSH with free T4 reflex FUTURE anytime; Future    2. Generalized anxiety disorder  Discussed stress and coping, refill hydroxyzine and change controller to cymbalta  - hydrOXYzine (ATARAX) 25 MG tablet; Take 1-2 tablets (25-50 mg) by mouth every 4 hours as needed for anxiety  Dispense: 60 tablet; Refill: 1    3. Chronic pain of both shoulders  refer  - [facility] PT, HAND, AND CHIROPRACTIC REFERRAL; Future  - ORTHO  REFERRAL    4. Morbid obesity (H)  Discussed increase activity and healthy diet  - [facility] PT, HAND, AND CHIROPRACTIC REFERRAL; Future    5. Trochanteric bursitis of both hips  refer  - [facility] PT, HAND, AND CHIROPRACTIC REFERRAL; Future  -  ORTHO  REFERRAL    6. Skin sensation disturbance  Carpal tunnel vs radiculopathy vs other, refer  - ORTHO  REFERRAL    7. Visit for screening mammogram    - MA Screening Digital Bilateral; Future    8. Hyperlipidemia LDL goal <160    - Lipid panel reflex to direct LDL Fasting; Future    9. Impaired fasting glucose    - **Basic metabolic panel FUTURE anytime; Future  - **TSH with free T4 reflex FUTURE anytime; Future    See Patient Instructions    Nallely Moore MD  [facility]

## 2019-03-27 ASSESSMENT — ANXIETY QUESTIONNAIRES
5. BEING SO RESTLESS THAT IT IS HARD TO SIT STILL: MORE THAN HALF THE DAYS
6. BECOMING EASILY ANNOYED OR IRRITABLE: NEARLY EVERY DAY
GAD7 TOTAL SCORE: 19
1. FEELING NERVOUS, ANXIOUS, OR ON EDGE: NEARLY EVERY DAY
3. WORRYING TOO MUCH ABOUT DIFFERENT THINGS: NEARLY EVERY DAY
2. NOT BEING ABLE TO STOP OR CONTROL WORRYING: NEARLY EVERY DAY
IF YOU CHECKED OFF ANY PROBLEMS ON THIS QUESTIONNAIRE, HOW DIFFICULT HAVE THESE PROBLEMS MADE IT FOR YOU TO DO YOUR WORK, TAKE CARE OF THINGS AT HOME, OR GET ALONG WITH OTHER PEOPLE: EXTREMELY DIFFICULT
7. FEELING AFRAID AS IF SOMETHING AWFUL MIGHT HAPPEN: MORE THAN HALF THE DAYS

## 2019-03-27 ASSESSMENT — PATIENT HEALTH QUESTIONNAIRE - PHQ9
SUM OF ALL RESPONSES TO PHQ QUESTIONS 1-9: 21
5. POOR APPETITE OR OVEREATING: NEARLY EVERY DAY

## 2019-03-27 NOTE — PATIENT INSTRUCTIONS
"You can have water, black coffee and black tea ONLY for 8hrs prior to the blood draw.     Have your CPAP rechecked.    They will call you to schedule counseling.    Start cymbalta and stop the citalopram.    My assistant will get your mammogram scheduled with one of your upcoming appointments.    Given everything you are going through, doing a mindfulness based stress reduction (MBSR) course or therapy or reading through resources to help with stress and coping - it has been studied and shown to be beneficial.  [email or web address]/events/mindfulness-programs is a link to MBSR classes offered by the  at various locations.  http://www.[employer]/programs-and-services/courses/ is similar classes offered by a private group.  \"The chemistry of Calm\"  Is a book that can be worked through as is \"Full Catastrophe Living: Using the [facility] of Your Body and Mind to Face Stress, Pain, and Illness\".   "

## 2019-03-28 ENCOUNTER — THERAPY VISIT (OUTPATIENT)
Dept: PHYSICAL THERAPY | Facility: CLINIC | Age: 69
End: 2019-03-28
Payer: COMMERCIAL

## 2019-03-28 DIAGNOSIS — M70.62 TROCHANTERIC BURSITIS OF BOTH HIPS: ICD-10-CM

## 2019-03-28 DIAGNOSIS — M25.511 CHRONIC PAIN OF BOTH SHOULDERS: ICD-10-CM

## 2019-03-28 DIAGNOSIS — E66.01 MORBID OBESITY (H): ICD-10-CM

## 2019-03-28 DIAGNOSIS — M25.512 PAIN OF BOTH SHOULDER JOINTS: ICD-10-CM

## 2019-03-28 DIAGNOSIS — G89.29 CHRONIC PAIN OF BOTH SHOULDERS: ICD-10-CM

## 2019-03-28 DIAGNOSIS — M25.512 CHRONIC PAIN OF BOTH SHOULDERS: ICD-10-CM

## 2019-03-28 DIAGNOSIS — M25.511 PAIN OF BOTH SHOULDER JOINTS: ICD-10-CM

## 2019-03-28 DIAGNOSIS — M25.551 HIP PAIN, RIGHT: ICD-10-CM

## 2019-03-28 DIAGNOSIS — M70.61 TROCHANTERIC BURSITIS OF BOTH HIPS: ICD-10-CM

## 2019-03-28 DIAGNOSIS — M25.552 HIP PAIN, LEFT: ICD-10-CM

## 2019-03-28 PROCEDURE — 97110 THERAPEUTIC EXERCISES: CPT | Mod: GP | Performed by: PHYSICAL THERAPIST

## 2019-03-28 PROCEDURE — 97112 NEUROMUSCULAR REEDUCATION: CPT | Mod: GP | Performed by: PHYSICAL THERAPIST

## 2019-03-28 PROCEDURE — 97161 PT EVAL LOW COMPLEX 20 MIN: CPT | Mod: GP | Performed by: PHYSICAL THERAPIST

## 2019-03-28 ASSESSMENT — ACTIVITIES OF DAILY LIVING (ADL)
TWISTING/PIVOTING_ON_INVOLVED_LEG: EXTREME DIFFICULTY
WALKING_APPROXIMATELY_10_MINUTES: MODERATE DIFFICULTY
HOS_ADL_HIGHEST_POTENTIAL_SCORE: 64
GOING_UP_1_FLIGHT_OF_STAIRS: MODERATE DIFFICULTY
HOS_ADL_COUNT: 16
WALKING_DOWN_STEEP_HILLS: UNABLE TO DO
STANDING_FOR_15_MINUTES: MODERATE DIFFICULTY
GOING_DOWN_1_FLIGHT_OF_STAIRS: EXTREME DIFFICULTY
GETTING_INTO_AND_OUT_OF_AN_AVERAGE_CAR: MODERATE DIFFICULTY
HOW_WOULD_YOU_RATE_YOUR_CURRENT_LEVEL_OF_FUNCTION_DURING_YOUR_USUAL_ACTIVITIES_OF_DAILY_LIVING_FROM_0_TO_100_WITH_100_BEING_YOUR_LEVEL_OF_FUNCTION_PRIOR_TO_YOUR_HIP_PROBLEM_AND_0_BEING_THE_INABILITY_TO_PERFORM_ANY_OF_YOUR_USUAL_DAILY_ACTIVITIES?: 30
SITTING_FOR_15_MINUTES: MODERATE DIFFICULTY
GETTING_INTO_AND_OUT_OF_A_BATHTUB: UNABLE TO DO
WALKING_UP_STEEP_HILLS: UNABLE TO DO
ROLLING_OVER_IN_BED: SLIGHT DIFFICULTY
WALKING_INITIALLY: MODERATE DIFFICULTY
HEAVY_WORK: UNABLE TO DO
HOS_ADL_ITEM_SCORE_TOTAL: 21
PUTTING_ON_SOCKS_AND_SHOES: MODERATE DIFFICULTY
HOS_ADL_SCORE(%): 32.81
DEEP_SQUATTING: UNABLE TO DO
WALKING_15_MINUTES_OR_GREATER: EXTREME DIFFICULTY
LIGHT_TO_MODERATE_WORK: MODERATE DIFFICULTY
STEPPING_UP_AND_DOWN_CURBS: SLIGHT DIFFICULTY

## 2019-03-28 ASSESSMENT — ANXIETY QUESTIONNAIRES: GAD7 TOTAL SCORE: 19

## 2019-03-28 NOTE — PROGRESS NOTES
[facility] Initial Evaluation  Subjective:  The history is provided by the patient. No  was used.   Lucie Whitehead is a 68 year old female with a bilateral hips condition.  Condition occurred with:  Insidious onset.  Condition occurred: for unknown reasons.  This is a chronic condition  Pt c/o B hip pain x years.  Denies injury.  Relates to chronic overuse/sustitution for LBP/surgeries.  MD visit date 3/26/19.  PMH: L4-5 fusion 2013, L1-2 7/2018.  R leg weakness greater than L (dropped foot after 1st)..    Patient reports pain:  Lateral and groin.  Radiates to:  Thigh and low back.  Pain is described as aching, stabbing, shooting and sharp and is constant and reported as 5/10.  Associated symptoms:  Loss of motion/stiffness and loss of strength. Pain is the same all the time.  Symptoms are exacerbated by ascending stairs, bending/squatting, descending stairs, lying on extremity, weight bearing, walking, kneeling, standing and transfers and relieved by rest and activity/movement.  Since onset symptoms are unchanged.        General health as reported by patient is fair.  Pertinent medical history includes:  Asthma, concussions/dizziness, depression, overweight and sleep disorder/apnea.  Medical allergies: see EPIC.  Other surgeries include:  Orthopedic surgery (Cx/Lx fusion).  Current medications:  Anti-depressants, meds to increase bone density and other (anti-anxiety meds).  Current occupation is Retail management.  Patient is working in normal job without restrictions.  Primary job tasks include:  Driving and prolonged standing.        Red flags:  Significant weakness, foot drop and pain at rest/night.      Lucie Whitehead is a 68 year old female with a bilateral shoulders condition.  Condition occurred with:  Unknown cause.  Condition occurred: for unknown reasons.  This is a chronic condition  Pt c/o B shoulder pain x years.  Denies injury.  MD order date 3/26/19.   PMH: R shoulder scope 9/2014..    Patient reports pain:  Anterior, lateral and medial.  Radiates to:  Upper arm and cervical.  Pain is described as aching, sharp, shooting and stabbing  and reported as 8/10.  Associated symptoms:  Loss of strength and loss of motion/stiffness. Pain is the same all the time.  Symptoms are exacerbated by carrying, certain positions, lifting, lying on extremity, using arm behind back and using arm overhead and relieved by rest.  Since onset symptoms are unchanged.        General health as reported by patient is fair.                                              Objective:    Gait:    Gait Type:  Antalgic                    Lumbar/SI Evaluation  ROM:      Strength: poor core stab recruitment-abdominal strength 3+/5 +                              Shoulder Evaluation:  ROM:  AROM:  : ERP B flex, abd, -10-15* B ER, ERP IR/ext.                                  Strength:  : B shoulder 4 to 4+/5, ER 4-/5.  Fair poor scap stab B.                        Special Tests:    Left shoulder positive for the following special tests:  Impingement  Right shoulder positive for the following special tests:Impingement  Palpation:    Left shoulder tenderness present at:  Acrimioclavicular; Biceps; Supraspinatus and Bicipital Groove  Right shoulder tenderness present at: Acrimioclavicular; Biceps; Supraspinatus and Bicipital Groove (R>L)  Mobility Tests:                Scapulohumeral rhythm right:  Hypermobile  Scapulohumeral rhythm right:  Hypermobile                        Hip Evaluation  Hip PROM:  : WFL ERP ER/IR. : WFL ERP ER/IR.                          Hip Strength:  : glute med/max 3+ to 4-/5. : glute med/max 3+ to 4-/5.                              Functional Testing:          Quad:      Bilateral leg squat:  Moderate loss of control     Proprioception:    [employer] balance test:   Left:    10-12sec  Right:  3-5sec  % of Uninvolved:                General     ROS    Assessment/Plan:    Patient is a 68  year old female with both sides shoulder and both sides hip complaints.    Patient has the following significant findings with corresponding treatment plan.                Diagnosis 1:  B shoulder and hip pain   Pain -  hot/cold therapy, directional preference exercise and home program  Decreased ROM/flexibility - manual therapy and therapeutic exercise  Decreased strength - therapeutic exercise and therapeutic activities  Decreased proprioception - neuro re-education and therapeutic activities  Impaired gait - gait training  Impaired muscle performance - neuro re-education  Decreased function - therapeutic activities  Impaired posture - neuro re-education    Therapy Evaluation Codes:     Cumulative Therapy Evaluation is: Low complexity.    Previous and current functional limitations:  (See Goal Flow Sheet for this information)    Short term and Long term goals: (See Goal Flow Sheet for this information)     Communication ability:  Patient appears to be able to clearly communicate and understand verbal and written communication and follow directions correctly.  Treatment Explanation - The following has been discussed with the patient:   RX ordered/plan of care  Anticipated outcomes  Possible risks and side effects  This patient would benefit from PT intervention to resume normal activities.   Rehab potential is good.    Frequency:  1 X week, once daily  Duration:  for 8 weeks  Discharge Plan:  Achieve all LTG.  Independent in home treatment program.  Reach maximal therapeutic benefit.    Please refer to the daily flowsheet for treatment today, total treatment time and time spent performing 1:1 timed codes.

## 2019-04-02 ENCOUNTER — THERAPY VISIT (OUTPATIENT)
Dept: PHYSICAL THERAPY | Facility: CLINIC | Age: 69
End: 2019-04-02
Payer: COMMERCIAL

## 2019-04-02 ENCOUNTER — OFFICE VISIT (OUTPATIENT)
Dept: ORTHOPEDICS | Facility: CLINIC | Age: 69
End: 2019-04-02
Payer: COMMERCIAL

## 2019-04-02 VITALS
OXYGEN SATURATION: 92 % | HEART RATE: 88 BPM | SYSTOLIC BLOOD PRESSURE: 150 MMHG | BODY MASS INDEX: 39.98 KG/M2 | WEIGHT: 225.7 LBS | DIASTOLIC BLOOD PRESSURE: 92 MMHG

## 2019-04-02 DIAGNOSIS — M25.512 PAIN OF BOTH SHOULDER JOINTS: ICD-10-CM

## 2019-04-02 DIAGNOSIS — M70.62 TROCHANTERIC BURSITIS OF BOTH HIPS: ICD-10-CM

## 2019-04-02 DIAGNOSIS — M75.41 IMPINGEMENT SYNDROME OF BOTH SHOULDERS: ICD-10-CM

## 2019-04-02 DIAGNOSIS — M25.561 CHRONIC PAIN OF BOTH KNEES: ICD-10-CM

## 2019-04-02 DIAGNOSIS — M25.551 HIP PAIN, RIGHT: ICD-10-CM

## 2019-04-02 DIAGNOSIS — G89.29 CHRONIC PAIN OF BOTH KNEES: ICD-10-CM

## 2019-04-02 DIAGNOSIS — M70.61 TROCHANTERIC BURSITIS OF BOTH HIPS: ICD-10-CM

## 2019-04-02 DIAGNOSIS — M25.562 CHRONIC PAIN OF BOTH KNEES: ICD-10-CM

## 2019-04-02 DIAGNOSIS — M25.552 HIP PAIN, LEFT: ICD-10-CM

## 2019-04-02 DIAGNOSIS — M75.42 IMPINGEMENT SYNDROME OF BOTH SHOULDERS: ICD-10-CM

## 2019-04-02 DIAGNOSIS — M25.511 PAIN OF BOTH SHOULDER JOINTS: ICD-10-CM

## 2019-04-02 PROCEDURE — 97112 NEUROMUSCULAR REEDUCATION: CPT | Mod: GP | Performed by: PHYSICAL THERAPIST

## 2019-04-02 PROCEDURE — 99204 OFFICE O/P NEW MOD 45 MIN: CPT | Performed by: ORTHOPAEDIC SURGERY

## 2019-04-02 PROCEDURE — 97110 THERAPEUTIC EXERCISES: CPT | Mod: GP | Performed by: PHYSICAL THERAPIST

## 2019-04-02 NOTE — PATIENT INSTRUCTIONS
Lucie to follow up with Primary Care provider regarding elevated blood pressure.                       Trochanteric Bursitis           What is trochanteric bursitis?   Trochanteric bursitis is irritation or inflammation of the trochanteric bursa. A bursa is a fluid-filled sac that acts as a cushion between tendons, bones, and skin. The trochanteric bursa is located on the upper, outer area of the thigh. There is a bump on the outer side of the upper part of the thigh bone (femur) called the greater trochanter. The trochanteric bursa is located over the greater trochanter.   How does it occur?   The trochanteric bursa may be inflamed by a group of muscles or tendons rubbing over the bursa and causing friction against the thigh bone. This injury can occur with running, walking, or bicycling, especially when the bicycle seat is too high.   What are the symptoms?   You have pain on the upper outer area of your thigh or in your hip. The pain is worse when you walk, bicycle, or go up or down stairs. You have pain when you move your thigh bone and feel tenderness in the area over the greater trochanter.   How is it diagnosed?   Your healthcare provider will ask about your symptoms and examine your hip and thigh.   How is it treated?   To treat this condition:   Put an ice pack, gel pack, or package of frozen vegetables, wrapped in a cloth on the area every 3 to 4 hours, for up to 20 minutes at a time.   Sleep with a pillow between your legs.  Take an anti-inflammatory medicine such as ibuprofen, or other medicine as directed by your provider. Nonsteroidal anti-inflammatory medicines (NSAIDs) may cause stomach bleeding and other problems. These risks increase with age. Read the label and take as directed. Unless recommended by your healthcare provider, do not take for more than 10 days.   Your provider may give you an injection of a corticosteroid medicine.   While you are recovering from your injury you will need to  change your sport or activity to one that does not make your condition worse. For example, you may need to swim instead of running or bicycling. If you are bicycling, you may need to lower your bicycle seat.   How long do the effects last?   The length of recovery depends on many factors such as your age, health, and if you have had a previous injury. Recovery time also depends on the severity of the injury. A bursa that is only mildly inflamed and has just started to hurt may improve within a few weeks. A bursa that is significantly inflamed and has been painful for a long time may take up to a few months to improve. You need to stop doing the activities that cause pain until your bursa has healed. If you continue doing activities that cause pain, your symptoms will return and it will take longer to recover.   When can I return to my normal activities?   Everyone recovers from an injury at a different rate. Return to your activities depends on how soon your leg recovers, not by how many days or weeks it has been since your injury has occurred. In general, the longer you have symptoms before you start treatment, the longer it will take to get better. The goal of rehabilitation is to return to your normal activities as soon as is safely possible. If you return too soon you may worsen your injury.   You may safely return to your normal activities when, starting from the top of the list and progressing to the end, each of the following is true:   You have full range of motion in the injured leg compared to the uninjured leg.   You have full strength of the injured leg compared to the uninjured leg.   You can walk straight ahead without pain or limping.   How can I prevent trochanteric bursitis?   Trochanteric bursitis is best prevented by warming up properly and stretching the muscles on the outer side of your upper thigh.     Published by [employer].  This content is reviewed periodically and is subject to change as  new health information becomes available. The information is intended to inform and educate and is not a replacement for medical evaluation, advice, diagnosis or treatment by a healthcare professional.   Written by Shaun Rivera MD, for [employer].   ? 2010 [employer] and/or its affiliates. All Rights Reserved.   Copyright   [employer] Reference Systems 2011                     Stretches lower back, side of hip, and neck  1. Sit on floor with left leg straight out in front   2. Bend right leg, cross right foot over, place outside left knee   3. Bend left elbow and rest it outside right knee   4. Place right hand behind hips on floor   5. Turn head over right shoulder, rotate upper body right   6. Hold 10 to 15 seconds   7. Repeat on other side   8. Breathe in slowly               Trochanteric Bursitis Rehabilitation Exercises   You can begin stretching the muscles that run along the outside of your hip using the first exercise. You can do the strengthening exercises when the sharp pain lessens.                    Strengthening exercises:  Start abductor strengthening and begin the exercises demonstrated today.  Leg strengthening:  Hold onto the sink with painful leg toward the sink.  Lift painful leg out to touch the wall with the heel.  Lift 10-15 times, twice a day.                                  Step-up: Stand with the foot of your injured leg on a support 3 to 5 inches high (like a small step or block of wood). Keep your other foot flat on the floor. Shift your weight onto the injured leg on the support. Straighten your injured leg as the other leg comes off the floor. Return to the starting position by bending your injured leg and slowly lowering your uninjured leg back to the floor. Do 3 sets of 10.   Clam exercise: Lie on your uninjured side with your hips and knees bent and feet together. Slowly raise your top leg toward the ceiling while keeping your heels touching each other. Hold for 2 seconds and  lower slowly. Do 3 sets of 10 repetitions.   Iliotibial band stretch: Side-bending: Cross one leg in front of the other leg and lean in the opposite direction from the front leg. Reach the arm on the side of the back leg over your head while you do this. Hold this position for 15 to 30 seconds. Return to the starting position. Repeat 3 times and then switch legs and repeat the exercise.   Published by [employer].  This content is reviewed periodically and is subject to change as new health information becomes available. The information is intended to inform and educate and is not a replacement for medical evaluation, advice, diagnosis or treatment by a healthcare professional.   Written by Mary Lopez, MS, PT, and Jaz Galaviz PT, [facility], \[facility]\"", for [employer].   ? 2010 [facility] and/or its affiliates. All Rights Reserved.   Copyright   Clinical Reference Systems 2011  Adult Health Advisor

## 2019-04-02 NOTE — LETTER
"    2019         RE: Lucie Whitehead  [address] [address] [facility] [id number]        Dear Colleague,    Thank you for referring your patient, Lucie Whitehead, to the [facility]. Please see a copy of my visit note below.    Lucie Whitehead is a 68 year old female who is seen in consultation at the request of Dr. Nallely Moore for bilateral shoulder pain, bilateral hip pain, bilateral knee pain, right leg weakness following spine surgery.    Past Medical History:   Diagnosis Date     Complication of anesthesia     extremely dry eyes     Diabetes (H)      Gastro-oesophageal reflux disease      Hirsutism     Abstracted 02     History of blood transfusion      Leiomyoma of uterus, unspecified     Abstracted 02     Obesity 2010    Estimated Body mass index is 28.97 kg/(m^2) as calculated from the following:   Height as of 7/10/13: 5' 7\"(1.702 m).   Weight as of 7/10/13: 185 lb(83.915 kg).       Other and unspecified hyperlipidemia     Abstracted 02     Other chronic pain     low back and legs and feet     Sleep apnea     CPAP       Past Surgical History:   Procedure Laterality Date     ARTHROSCOPY SHOULDER DISTAL CLAVICLE REPAIR Right 2014    Procedure: ARTHROSCOPY SHOULDER DISTAL CLAVICLE RESECTION;  Surgeon: John Paul Rodriguez MD;  Location: [facility] OR     C NONSPECIFIC PROCEDURE      Bicep tendon repair    Abstracted 02     C/SECTION, LOW TRANSVERSE  x 3    , Low Transverse x 4     DECOMPRESSION, FUSION CERVICAL ANTERIOR TWO LEVELS, COMBINED  2012    Procedure:COMBINED DECOMPRESSION, FUSION CERVICAL ANTERIOR TWO LEVELS; DECOMPRESSION, FUSION CERVICAL ANTERIOR C4-6; Surgeon:SHALONDA ALCALA; Location:[facility] OR     DECOMPRESSION, FUSION LUMBAR POSTERIOR ONE LEVEL, COMBINED  3/7/2013    Procedure: COMBINED DECOMPRESSION, FUSION LUMBAR POSTERIOR ONE LEVEL;  Posterior Fusion L2-3, Hardware Removal L3-5;  Surgeon: Shalonda Alcala MD;  " Location: [facility] OR     DECOMPRESSION, FUSION LUMBAR POSTERIOR TWO LEVELS, COMBINED N/A 7/3/2018    Procedure: COMBINED DECOMPRESSION, FUSION LUMBAR POSTERIOR TWO LEVELS;  Bilateral decompression L1-L2 with left total facetectomy   Transforaminal lumbar interbody fusion L1-L2 from left-sided approach   Insertion of intervertebral biomechanical device L1-L2  Posterolateral fusion L1-L2; Removal of instrumentation L2-3  RE-Instrumentation L1-L3;  Surgeon: Davide Tamez MD;  Location: [facility] OR     EXPLORE SPINE, REMOVE HARDWARE, COMBINED N/A 7/3/2018    Procedure: COMBINED EXPLORE SPINE, REMOVE HARDWARE;;  Surgeon: Davide Tamez MD;  Location: [facility] OR     FUSION LUMBAR ANTERIOR ONE LEVEL  4/11/2013    Procedure: FUSION LUMBAR ANTERIOR ONE LEVEL;  Anterior Fusion L2-3;  Surgeon: Davide Tamez MD;  Location: [facility] OR     LAMINECTOMY, FUSION LUMBAR TWO LEVELS, COMBINED  3/11    AP fusion L3-5       Family History   Problem Relation Age of Onset     Prostate Cancer Father      Cancer - colorectal Mother         born 1978 HTN and DMII     Diabetes Mother         type II     Coronary Artery Disease Mother         CHF     Diabetes Maternal Grandmother         type II     Hypertension Maternal Grandmother      Cancer - colorectal Maternal Grandmother      Diabetes Maternal Uncle         type II     Hypertension Maternal Uncle      Respiratory Paternal Grandmother         asthma     Diabetes Son         dx age 21type I       Social History     Socioeconomic History     Marital status:      Spouse name: Not on file     Number of children: Not on file     Years of education: Not on file     Highest education level: Not on file   Occupational History     Not on file   Social Needs     Financial resource strain: Not on file     Food insecurity:     Worry: Not on file     Inability: Not on file     Transportation needs:     Medical: Not on file     Non-medical: Not on file   Tobacco Use     Smoking status: Former  Smoker     Last attempt to quit: 1975     Years since quittin.2     Smokeless tobacco: Never Used   Substance and Sexual Activity     Alcohol use: Yes     Alcohol/week: 0.0 oz     Comment: 2-3 glass wine /week     Drug use: No     Sexual activity: Yes     Partners: Male   Lifestyle     Physical activity:     Days per week: Not on file     Minutes per session: Not on file     Stress: Not on file   Relationships     Social connections:     Talks on phone: Not on file     Gets together: Not on file     Attends [employer] service: Not on file     Active member of club or organization: Not on file     Attends meetings of clubs or organizations: Not on file     Relationship status: Not on file     Intimate partner violence:     Fear of current or ex partner: Not on file     Emotionally abused: Not on file     Physically abused: Not on file     Forced sexual activity: Not on file   Other Topics Concern     Parent/sibling w/ CABG, MI or angioplasty before 65F 55M? No   Social History Narrative     Not on file       Current Outpatient Medications   Medication Sig Dispense Refill     buPROPion (WELLBUTRIN XL) 150 MG 24 hr tablet Take 1 tablet (150 mg) by mouth every morning 90 tablet 0     DULoxetine (CYMBALTA) 30 MG capsule Take 1 capsule (30 mg) by mouth 2 times daily Start with once daily x1wk then increase to bid 60 capsule 1     hydrOXYzine (ATARAX) 25 MG tablet Take 1-2 tablets (25-50 mg) by mouth every 4 hours as needed for anxiety 60 tablet 1     traMADol (ULTRAM) 50 MG tablet TAKE 1 - 2 TABLET BY ORAL ROUTE EVERY 4 - 6 HOURS AS NEEDED  0     Acetaminophen (TYLENOL PO) Take 1,300 mg by mouth 2 times daily        CALCIUM PO Take 950 mg by mouth daily        Cholecalciferol (VITAMIN D3 PO) Take 1,000 Units by mouth daily       Ergocalciferol (VITAMIN D2 PO) Take 50,000 Units by mouth once a week        loratadine (CLARITIN) 10 MG tablet Take 10 mg by mouth daily       Multiple Vitamins-Minerals (OCUVITE ADULT  50+ PO) Take 1 tablet by mouth daily        NONFORMULARY Sustained night time lubricant eye ointment night before bed       Omega-3 Fatty Acids (OMEGA 3 PO) Take 1 tablet by mouth daily        ranitidine (ZANTAC 75) 75 MG tablet Take 75 mg by mouth 2 times daily as needed.       RESTASIS 0.05 % ophthalmic emulsion INSTILL 1 DROP INTO AFFECTED EYE(S) BY OPHTHALMIC ROUTE EVERY 12 HOURS  0     ZYRTEC 10 MG OR TABS 1 TABLET EACH MORNING         Allergies   Allergen Reactions     Animal Dander      Cats      Dogs      Nkda [No Known Drug Allergies]      Seasonal Allergies      Trees and mold       REVIEW OF SYSTEMS:  CONSTITUTIONAL:  NEGATIVE for fever, chills, change in weight, not feeling tired  SKIN:  NEGATIVE for worrisome rashes, no skin lumps, no skin ulcers and no non-healing wounds  EYES:  NEGATIVE for vision changes or irritation.  ENT/MOUTH:  NEGATIVE.  No hearing loss, no hoarseness, no difficulty swallowing.  RESP:  NEGATIVE. No cough or shortness of breath.  CV:  NEGATIVE for chest pain, palpitations or peripheral edema  GI:  NEGATIVE for nausea, abdominal pain, heartburn, or change in bowel habits  :  Negative. No dysuria, no hematuria  MUSCULOSKELETAL:  See HPI above  NEURO:  NEGATIVE . No headaches, no dizziness,  no numbness  ENDOCRINE:  NEGATIVE for temperature intolerance, skin/hair changes  HEME/ALLERGY/IMMUNE:  NEGATIVE for bleeding problems  PSYCHIATRIC:  NEGATIVE. no anxiety, no depression.     Exam:  Vitals: BP (!) 150/92   Pulse 88   Wt 102.4 kg (225 lb 11.2 oz)   LMP 11/12/2007 (LMP Unknown)   SpO2 92%   BMI 39.98 kg/m     BMI= Body mass index is 39.98 kg/m .  Constitutional:  healthy, alert and no distress  Neuro: Alert and Oriented x 3, Sensation grossly WNL.  Psych: Affect normal   Respiratory: Breathing not labored.  Cardiovascular: normal peripheral pulses  Lymph: no adenopathy  Skin: No rashes,worrisome lesions or skin problems      Again, thank you for allowing me to participate in  the care of your patient.        Sincerely,        John Pierce MD

## 2019-04-03 PROBLEM — M70.61 TROCHANTERIC BURSITIS OF BOTH HIPS: Status: ACTIVE | Noted: 2019-04-03

## 2019-04-03 PROBLEM — M75.42 IMPINGEMENT SYNDROME OF BOTH SHOULDERS: Status: ACTIVE | Noted: 2019-04-03

## 2019-04-03 PROBLEM — M75.41 IMPINGEMENT SYNDROME OF BOTH SHOULDERS: Status: ACTIVE | Noted: 2019-04-03

## 2019-04-03 PROBLEM — M70.62 TROCHANTERIC BURSITIS OF BOTH HIPS: Status: ACTIVE | Noted: 2019-04-03

## 2019-04-03 PROBLEM — G89.29 CHRONIC PAIN OF BOTH KNEES: Status: ACTIVE | Noted: 2019-04-03

## 2019-04-03 PROBLEM — M25.561 CHRONIC PAIN OF BOTH KNEES: Status: ACTIVE | Noted: 2019-04-03

## 2019-04-03 PROBLEM — M25.562 CHRONIC PAIN OF BOTH KNEES: Status: ACTIVE | Noted: 2019-04-03

## 2019-04-04 NOTE — PROGRESS NOTES
Visit Date:   04/02/2019      HISTORY OF PRESENT ILLNESS:  Ms. Whitehead 68-year-old female seen in consultation at the request of Dr. Nallely Moore for bilateral shoulder pain, bilateral hip pain, bilateral knee pain, right leg weakness following spine surgery.  She reports she has had past history of L4-5 spine fusion in 2013 and L1-L2 fusion in 07/2018.  She reports she has had anterior and posterior fusions and they have removed metal when they go back in on surgery.  Her most recent x-ray from 07/2018 showed posterior fixation from L1-L3 and anterior screws from L2-L5 but without a plate.  There were beads and drain posteriorly.  She reports chronic right leg weakness following her spine surgeries and has been sent to physical therapy and is going to start this.  She is having trouble with lateral hips for a number of years.  Shoulder pains more recently, and pain in the knees chronically.  She has pain with sitting, standing, walking.  It is better lying flat on her back.  She has sharp aching pain rated 6/10.      IMAGING:  X-ray of the knees on 03/01/2019 show mild degenerative changes with spurring adjacent to the patella.  Otherwise unremarkable.      PHYSICAL EXAMINATION:  Shows full motion of the shoulder.  She does have pain reaching overhead.  She has tenderness in the subacromial space.  No tenderness of the AC joints.  She does have tenderness in the neck, trapezius.  She has pain primarily into the neck area with resisted internal and external rotation.  No pain with abduction.  She has no significant weakness with empty can sign.  She does have some pain with abduction at 90 degrees with resisted further abduction.  She had pain with anterior apprehension position but not increasing with stress.  Negative posterior apprehension.  She did have some pain in both shoulders with Baker test.  Sensation and circulation were intact to the arms.  Hips show good range of motion without significant pain.   She does have tenderness over the greater trochanters on both sides.  She has significant pain across the low back, SI joints and lumbar spine.  She had mild tenderness in the sciatic notch on both sides.  She has good mobility of the knees from 0 to 120 degrees.  She has tenderness on the medial and lateral aspect of both knees but primarily complains of anterior knee pain.  She does have mild patellofemoral crepitation.  No ligamentous laxity of the knees.  She had anterior knee pain with medial Jordyn, negative lateral Jordyn on both sides.  Sensation and circulation are intact.      IMPRESSION:   1.  Bilateral shoulder impingement.  No evidence of rotator cuff tear.   2.  Bilateral trochanteric bursitis, no evidence of hip arthritis.   3.  Bilateral chondromalacia patella.  We discussed options and will have her start Aleve 4 tablets per day.  We will have her work on strengthening with therapy.  Return to clinic as needed.         ALEKSANDR GAMEZ MD             D: 2019   T: 2019   MT: BETH      Name:     MIR CHAVEZ   MRN:      [id number]        Account:      [id number]   :      1950           Visit Date:   2019      Document: [id number]

## 2019-04-04 NOTE — PROGRESS NOTES
"Lucie Whitehead is a 68 year old female who is seen in consultation at the request of Dr. Nallely Moore for bilateral shoulder pain, bilateral hip pain, bilateral knee pain, right leg weakness following spine surgery.    Past Medical History:   Diagnosis Date     Complication of anesthesia     extremely dry eyes     Diabetes (H)      Gastro-oesophageal reflux disease      Hirsutism     Abstracted 02     History of blood transfusion      Leiomyoma of uterus, unspecified     Abstracted 02     Obesity 2010    Estimated Body mass index is 28.97 kg/(m^2) as calculated from the following:   Height as of 7/10/13: 5' 7\"(1.702 m).   Weight as of 7/10/13: 185 lb(83.915 kg).       Other and unspecified hyperlipidemia     Abstracted 02     Other chronic pain     low back and legs and feet     Sleep apnea     CPAP       Past Surgical History:   Procedure Laterality Date     ARTHROSCOPY SHOULDER DISTAL CLAVICLE REPAIR Right 2014    Procedure: ARTHROSCOPY SHOULDER DISTAL CLAVICLE RESECTION;  Surgeon: John Paul Rodriguez MD;  Location: [facility] OR     C NONSPECIFIC PROCEDURE      Bicep tendon repair    Abstracted 02     C/SECTION, LOW TRANSVERSE  x 3    , Low Transverse x 4     DECOMPRESSION, FUSION CERVICAL ANTERIOR TWO LEVELS, COMBINED  2012    Procedure:COMBINED DECOMPRESSION, FUSION CERVICAL ANTERIOR TWO LEVELS; DECOMPRESSION, FUSION CERVICAL ANTERIOR C4-6; Surgeon:SHALONDA ALCALA; Location:[facility] OR     DECOMPRESSION, FUSION LUMBAR POSTERIOR ONE LEVEL, COMBINED  3/7/2013    Procedure: COMBINED DECOMPRESSION, FUSION LUMBAR POSTERIOR ONE LEVEL;  Posterior Fusion L2-3, Hardware Removal L3-5;  Surgeon: Shalonda Alcala MD;  Location: [facility] OR     DECOMPRESSION, FUSION LUMBAR POSTERIOR TWO LEVELS, COMBINED N/A 7/3/2018    Procedure: COMBINED DECOMPRESSION, FUSION LUMBAR POSTERIOR TWO LEVELS;  Bilateral decompression L1-L2 with left total facetectomy   Transforaminal lumbar interbody " fusion L1-L2 from left-sided approach   Insertion of intervertebral biomechanical device L1-L2  Posterolateral fusion L1-L2; Removal of instrumentation L2-3  RE-Instrumentation L1-L3;  Surgeon: Davide Tamez MD;  Location: [facility]     EXPLORE SPINE, REMOVE HARDWARE, COMBINED N/A 7/3/2018    Procedure: COMBINED EXPLORE SPINE, REMOVE HARDWARE;;  Surgeon: Davide Tamez MD;  Location: [facility]     FUSION LUMBAR ANTERIOR ONE LEVEL  2013    Procedure: FUSION LUMBAR ANTERIOR ONE LEVEL;  Anterior Fusion L2-3;  Surgeon: Davide Tamez MD;  Location: [facility]     LAMINECTOMY, FUSION LUMBAR TWO LEVELS, COMBINED  3/11    AP fusion L3-5       Family History   Problem Relation Age of Onset     Prostate Cancer Father      Cancer - colorectal Mother         born  HTN and DMII     Diabetes Mother         type II     Coronary Artery Disease Mother         CHF     Diabetes Maternal Grandmother         type II     Hypertension Maternal Grandmother      Cancer - colorectal Maternal Grandmother      Diabetes Maternal Uncle         type II     Hypertension Maternal Uncle      Respiratory Paternal Grandmother         asthma     Diabetes Son         dx age 21type I       Social History     Socioeconomic History     Marital status:      Spouse name: Not on file     Number of children: Not on file     Years of education: Not on file     Highest education level: Not on file   Occupational History     Not on file   Social Needs     Financial resource strain: Not on file     Food insecurity:     Worry: Not on file     Inability: Not on file     Transportation needs:     Medical: Not on file     Non-medical: Not on file   Tobacco Use     Smoking status: Former Smoker     Last attempt to quit: 1975     Years since quittin.2     Smokeless tobacco: Never Used   Substance and Sexual Activity     Alcohol use: Yes     Alcohol/week: 0.0 oz     Comment: 2-3 glass wine /week     Drug use: No     Sexual activity: Yes      Partners: Male   Lifestyle     Physical activity:     Days per week: Not on file     Minutes per session: Not on file     Stress: Not on file   Relationships     Social connections:     Talks on phone: Not on file     Gets together: Not on file     Attends Jewish service: Not on file     Active member of club or organization: Not on file     Attends meetings of clubs or organizations: Not on file     Relationship status: Not on file     Intimate partner violence:     Fear of current or ex partner: Not on file     Emotionally abused: Not on file     Physically abused: Not on file     Forced sexual activity: Not on file   Other Topics Concern     Parent/sibling w/ CABG, MI or angioplasty before 65F 55M? No   Social History Narrative     Not on file       Current Outpatient Medications   Medication Sig Dispense Refill     buPROPion (WELLBUTRIN XL) 150 MG 24 hr tablet Take 1 tablet (150 mg) by mouth every morning 90 tablet 0     DULoxetine (CYMBALTA) 30 MG capsule Take 1 capsule (30 mg) by mouth 2 times daily Start with once daily x1wk then increase to bid 60 capsule 1     hydrOXYzine (ATARAX) 25 MG tablet Take 1-2 tablets (25-50 mg) by mouth every 4 hours as needed for anxiety 60 tablet 1     traMADol (ULTRAM) 50 MG tablet TAKE 1 - 2 TABLET BY ORAL ROUTE EVERY 4 - 6 HOURS AS NEEDED  0     Acetaminophen (TYLENOL PO) Take 1,300 mg by mouth 2 times daily        CALCIUM PO Take 950 mg by mouth daily        Cholecalciferol (VITAMIN D3 PO) Take 1,000 Units by mouth daily       Ergocalciferol (VITAMIN D2 PO) Take 50,000 Units by mouth once a week        loratadine (CLARITIN) 10 MG tablet Take 10 mg by mouth daily       Multiple Vitamins-Minerals (OCUVITE ADULT 50+ PO) Take 1 tablet by mouth daily        NONFORMULARY Sustained night time lubricant eye ointment night before bed       Omega-3 Fatty Acids (OMEGA 3 PO) Take 1 tablet by mouth daily        ranitidine (ZANTAC 75) 75 MG tablet Take 75 mg by mouth 2 times daily  as needed.       RESTASIS 0.05 % ophthalmic emulsion INSTILL 1 DROP INTO AFFECTED EYE(S) BY OPHTHALMIC ROUTE EVERY 12 HOURS  0     ZYRTEC 10 MG OR TABS 1 TABLET EACH MORNING         Allergies   Allergen Reactions     Animal Dander      Cats      Dogs      Nkda [No Known Drug Allergies]      Seasonal Allergies      Trees and mold       REVIEW OF SYSTEMS:  CONSTITUTIONAL:  NEGATIVE for fever, chills, change in weight, not feeling tired  SKIN:  NEGATIVE for worrisome rashes, no skin lumps, no skin ulcers and no non-healing wounds  EYES:  NEGATIVE for vision changes or irritation.  ENT/MOUTH:  NEGATIVE.  No hearing loss, no hoarseness, no difficulty swallowing.  RESP:  NEGATIVE. No cough or shortness of breath.  CV:  NEGATIVE for chest pain, palpitations or peripheral edema  GI:  NEGATIVE for nausea, abdominal pain, heartburn, or change in bowel habits  :  Negative. No dysuria, no hematuria  MUSCULOSKELETAL:  See HPI above  NEURO:  NEGATIVE . No headaches, no dizziness,  no numbness  ENDOCRINE:  NEGATIVE for temperature intolerance, skin/hair changes  HEME/ALLERGY/IMMUNE:  NEGATIVE for bleeding problems  PSYCHIATRIC:  NEGATIVE. no anxiety, no depression.     Exam:  Vitals: BP (!) 150/92   Pulse 88   Wt 102.4 kg (225 lb 11.2 oz)   LMP 11/12/2007 (LMP Unknown)   SpO2 92%   BMI 39.98 kg/m    BMI= Body mass index is 39.98 kg/m .  Constitutional:  healthy, alert and no distress  Neuro: Alert and Oriented x 3, Sensation grossly WNL.  Psych: Affect normal   Respiratory: Breathing not labored.  Cardiovascular: normal peripheral pulses  Lymph: no adenopathy  Skin: No rashes,worrisome lesions or skin problems

## 2019-04-15 ENCOUNTER — OFFICE VISIT (OUTPATIENT)
Dept: PSYCHOLOGY | Facility: CLINIC | Age: 69
End: 2019-04-15
Attending: INTERNAL MEDICINE
Payer: COMMERCIAL

## 2019-04-15 DIAGNOSIS — F33.1 MAJOR DEPRESSIVE DISORDER, RECURRENT EPISODE, MODERATE (H): Primary | ICD-10-CM

## 2019-04-15 DIAGNOSIS — F33.1 MAJOR DEPRESSIVE DISORDER, RECURRENT EPISODE, MODERATE (H): ICD-10-CM

## 2019-04-15 DIAGNOSIS — F41.1 GENERALIZED ANXIETY DISORDER: ICD-10-CM

## 2019-04-15 PROCEDURE — 90791 PSYCH DIAGNOSTIC EVALUATION: CPT | Performed by: MARRIAGE & FAMILY THERAPIST

## 2019-04-15 ASSESSMENT — ANXIETY QUESTIONNAIRES
GAD7 TOTAL SCORE: 17
7. FEELING AFRAID AS IF SOMETHING AWFUL MIGHT HAPPEN: MORE THAN HALF THE DAYS
1. FEELING NERVOUS, ANXIOUS, OR ON EDGE: NEARLY EVERY DAY
3. WORRYING TOO MUCH ABOUT DIFFERENT THINGS: MORE THAN HALF THE DAYS
2. NOT BEING ABLE TO STOP OR CONTROL WORRYING: NEARLY EVERY DAY
6. BECOMING EASILY ANNOYED OR IRRITABLE: NEARLY EVERY DAY
IF YOU CHECKED OFF ANY PROBLEMS ON THIS QUESTIONNAIRE, HOW DIFFICULT HAVE THESE PROBLEMS MADE IT FOR YOU TO DO YOUR WORK, TAKE CARE OF THINGS AT HOME, OR GET ALONG WITH OTHER PEOPLE: VERY DIFFICULT
5. BEING SO RESTLESS THAT IT IS HARD TO SIT STILL: MORE THAN HALF THE DAYS

## 2019-04-15 ASSESSMENT — PATIENT HEALTH QUESTIONNAIRE - PHQ9
SUM OF ALL RESPONSES TO PHQ QUESTIONS 1-9: 14
5. POOR APPETITE OR OVEREATING: MORE THAN HALF THE DAYS

## 2019-04-15 NOTE — PROGRESS NOTES
Progress Note - Initial Session    Client Name:  Lucie Whitehead  Date: April 15, 2019          Service Type: Individual  Video Visit: No     Session Start Time: 12:00  Session End Time: 12:45     Session Length: 45 min    Session #: 1    Attendees: Client attended alone     DATA:  Diagnostic Assessment in progress.  Unable to complete documentation at the conclusion of the first session due to gathering extensive information regarding symptom presentation, history, and impact on functioning. Client has a history of depression and anxiety. No current risk issues reported.      Interactive Complexity: No  Crisis: No    Intervention:  Data gathering    ASSESSMENT:  Mental Status Assessment:  Appearance:   Appropriate   Eye Contact:   Good   Psychomotor Behavior: Normal   Attitude:   Cooperative   Orientation:   All  Speech   Rate / Production: Normal    Volume:  Normal   Mood:    Anxious   Affect:    Appropriate   Thought Content:  Clear   Thought Form:  Coherent  Logical   Insight:    Good       Safety Issues and Plan for Safety and Risk Management:  Client denies current fears or concerns for personal safety.  Client denies current or recent suicidal ideation or behaviors.  Client denies current or recent homicidal ideation or behaviors.  Client denies current or recent self injurious behavior or ideation.  Client denies other safety concerns.  A safety and risk management plan has not been developed at this time, however client was given the after-hours number / 911 should there be a change in any of these risk factors.  Client reports there are no firearms in the house.      Diagnostic Criteria:  A. Excessive anxiety and worry about a number of events or activities (such as work or school performance).   B. The person finds it difficult to control the worry.  C. Select 3 or more symptoms (required for diagnosis). Only one item is required in children.   - Restlessness or feeling keyed up or on  edge.    - Being easily fatigued.    - Difficulty concentrating or mind going blank.    - Irritability.    - Sleep disturbance (difficulty falling or staying asleep, or restless unsatisfying sleep).   D. The focus of the anxiety and worry is not confined to features of an Axis I disorder.  E. The anxiety, worry, or physical symptoms cause clinically significant distress or impairment in social, occupational, or other important areas of functioning.   F. The disturbance is not due to the direct physiological effects of a substance (e.g., a drug of abuse, a medication) or a general medical condition (e.g., hyperthyroidism) and does not occur exclusively during a Mood Disorder, a Psychotic Disorder, or a Pervasive Developmental Disorder.    - The aformentioned symptoms began many year(s) ago and occurs 7 days per week and is experienced as moderate.  CRITERIA (A-C) REPRESENT A MAJOR DEPRESSIVE EPISODE - SELECT THESE CRITERIA  A) Recurrent episode(s) - symptoms have been present during the same 2-week period and represent a change from previous functioning 5 or more symptoms (required for diagnosis)   - Depressed mood. Note: In children and adolescents, can be irritable mood.     - Diminished interest or pleasure in all, or almost all, activities.    - Decreased sleep.    - Fatigue or loss of energy.    - Feelings of worthlessness or inappropriate and excessive guilt.    - Diminished ability to think or concentrate, or indecisiveness.   B) The symptoms cause clinically significant distress or impairment in social, occupational, or other important areas of functioning  C) The episode is not attributable to the physiological effects of a substance or to another medical condition  D) The occurence of major depressive episode is not better explained by other thought / psychotic disorders  E) There has never been a manic episode or hypomanic episode      DSM5 Diagnoses: (Sustained by DSM5 Criteria Listed  Above)  Diagnoses: 296.32 (F33.1) Major Depressive Disorder, Recurrent Episode, Moderate _  300.02 (F41.1) Generalized Anxiety Disorder  Psychosocial & Contextual Factors: job stress  WHODAS 2.0 (12 item)    S1 Standing for long periods such as 30 minutes? Severe =       4   S2 Taking care of household responsibilities? Severe =       4   S3 Learning a new task, for example, learning how to get to a new place? Mild =           2   S4 How much of a problem do you have joining community activities (for example, festivals, Yarsanism or other activities) in the same way as anyone else can? Moderate =   3   S5 How much have you been emotionally affected by your health problems? Severe =       4     In the past 30 days, how much difficulty did you have in:   S6 Concentrating on doing something for ten minutes? Mild =           2   S7 Walking a long distance such as a kilometer (or equivalent)? Severe =       4   S8 Washing your whole body? None =         1   S9 Getting dressed? None =         1   S10 Dealing with people you do not know? Mild =           2   S11 Maintaining a friendship? Mild =           2   S12 Your day to day work? Severe =       4     H1 Overall, in the past 30 days, how many days were these difficulties present? Record number of days 30   H2 In the past 30 days, for how many days were you totally unable to carry out your usual activities or work because of any health condition? Record number of days  20   H3 In the past 30 days, not counting the days that you were totally unable, for how many days did you cut back or reduce your usual activities or work because of any health condition? Record number of days 5       Collateral Reports Completed:  Not Applicable      PLAN: (Homework, other):  Client will return to complete the diagnostic interview and discuss treatment options.    Jorge Redding MA, LMFT

## 2019-04-15 NOTE — TELEPHONE ENCOUNTER
"Requested Prescriptions   Pending Prescriptions Disp Refills     buPROPion (WELLBUTRIN XL) 150 MG 24 hr tablet [Pharmacy Med Name: BUPROPION HCL  MG TABLET] 90 tablet 2     Sig: TAKE 1 TABLET (150 MG) BY MOUTH EVERY MORNING  Last Written Prescription Date:  03/26/2019  Last Fill Quantity: 90 tablet,  # refills: 0    Last office visit: 3/26/2019 with prescribing provider:  Nallely Moore MD       Future Office Visit:   Next 5 appointments (look out 90 days)    Apr 23, 2019 12:50 PM CDT  PHYSICAL with Nallely Moore MD  [facility] [place] ([facility] [address]  [address]  [place] [address]  [phone]   Apr 24, 2019  2:30 PM CDT  Return Visit with Maninder Redding  [facility] [place] ([facility] [place]) [address] [place] [address]  [phone]                SSRIs Protocol Failed - 4/15/2019  1:19 AM        Failed - PHQ-9 score less than 5 in past 6 months     Please review last PHQ-9 score.   PHQ-9 SCORE 5/3/2017 3/23/2018 3/27/2019   PHQ-9 Total Score - - -   PHQ-9 Total Score MyChart - - -   PHQ-9 Total Score 3 8 21     VIGNESH-7 SCORE 3/27/2019   Total Score 19                 Passed - Medication is Bupropion     If the medication is Bupropion (Wellbutrin), and the patient is taking for smoking cessation; OK to refill.          Passed - Medication is active on med list        Passed - Patient is age 18 or older        Passed - No active pregnancy on record        Passed - No positive pregnancy test in last 12 months        Passed - Recent (6 mo) or future (30 days) visit within the authorizing provider's specialty     Patient had office visit in the last 6 months or has a visit in the next 30 days with authorizing provider or within the authorizing provider's specialty.  See \"Patient Info\" tab in inbasket, or \"Choose Columns\" in Meds & Orders section of the refill encounter.              "

## 2019-04-16 ASSESSMENT — ANXIETY QUESTIONNAIRES: GAD7 TOTAL SCORE: 17

## 2019-04-17 ENCOUNTER — ANCILLARY PROCEDURE (OUTPATIENT)
Dept: MAMMOGRAPHY | Facility: CLINIC | Age: 69
End: 2019-04-17
Attending: INTERNAL MEDICINE
Payer: COMMERCIAL

## 2019-04-17 DIAGNOSIS — E78.5 HYPERLIPIDEMIA LDL GOAL <160: ICD-10-CM

## 2019-04-17 DIAGNOSIS — Z12.31 VISIT FOR SCREENING MAMMOGRAM: ICD-10-CM

## 2019-04-17 DIAGNOSIS — F33.1 MAJOR DEPRESSIVE DISORDER, RECURRENT EPISODE, MODERATE (H): ICD-10-CM

## 2019-04-17 DIAGNOSIS — R73.01 IMPAIRED FASTING GLUCOSE: ICD-10-CM

## 2019-04-17 LAB
ANION GAP SERPL CALCULATED.3IONS-SCNC: 7 MMOL/L (ref 3–14)
BUN SERPL-MCNC: 16 MG/DL (ref 7–30)
CALCIUM SERPL-MCNC: 9.4 MG/DL (ref 8.5–10.1)
CHLORIDE SERPL-SCNC: 107 MMOL/L (ref 94–109)
CHOLEST SERPL-MCNC: 319 MG/DL
CO2 SERPL-SCNC: 25 MMOL/L (ref 20–32)
CREAT SERPL-MCNC: 0.96 MG/DL (ref 0.52–1.04)
GFR SERPL CREATININE-BSD FRML MDRD: 61 ML/MIN/{1.73_M2}
GLUCOSE SERPL-MCNC: 107 MG/DL (ref 70–99)
HDLC SERPL-MCNC: 40 MG/DL
LDLC SERPL CALC-MCNC: 208 MG/DL
NONHDLC SERPL-MCNC: 279 MG/DL
POTASSIUM SERPL-SCNC: 4.5 MMOL/L (ref 3.4–5.3)
SODIUM SERPL-SCNC: 139 MMOL/L (ref 133–144)
TRIGL SERPL-MCNC: 353 MG/DL
TSH SERPL DL<=0.005 MIU/L-ACNC: 1.79 MU/L (ref 0.4–4)

## 2019-04-17 PROCEDURE — 77067 SCR MAMMO BI INCL CAD: CPT | Mod: TC

## 2019-04-17 PROCEDURE — 84443 ASSAY THYROID STIM HORMONE: CPT | Performed by: INTERNAL MEDICINE

## 2019-04-17 PROCEDURE — 80048 BASIC METABOLIC PNL TOTAL CA: CPT | Performed by: INTERNAL MEDICINE

## 2019-04-17 PROCEDURE — 36415 COLL VENOUS BLD VENIPUNCTURE: CPT | Performed by: INTERNAL MEDICINE

## 2019-04-17 PROCEDURE — 80061 LIPID PANEL: CPT | Performed by: INTERNAL MEDICINE

## 2019-04-17 RX ORDER — BUPROPION HYDROCHLORIDE 150 MG/1
150 TABLET ORAL EVERY MORNING
Qty: 90 TABLET | Refills: 2
Start: 2019-04-17

## 2019-04-17 NOTE — TELEPHONE ENCOUNTER
Refill too soon.   Called patient and she stated she does not need this refill at this time and she will talk to her provider at upcoming appointment about this medication.     Lauren Mtz RN Flex

## 2019-04-23 ENCOUNTER — OFFICE VISIT (OUTPATIENT)
Dept: PEDIATRICS | Facility: CLINIC | Age: 69
End: 2019-04-23
Payer: COMMERCIAL

## 2019-04-23 VITALS
DIASTOLIC BLOOD PRESSURE: 84 MMHG | TEMPERATURE: 96.4 F | OXYGEN SATURATION: 96 % | HEART RATE: 94 BPM | BODY MASS INDEX: 39.67 KG/M2 | SYSTOLIC BLOOD PRESSURE: 134 MMHG | WEIGHT: 223.9 LBS | HEIGHT: 63 IN

## 2019-04-23 DIAGNOSIS — F33.2 SEVERE EPISODE OF RECURRENT MAJOR DEPRESSIVE DISORDER, WITHOUT PSYCHOTIC FEATURES (H): ICD-10-CM

## 2019-04-23 DIAGNOSIS — F41.1 GENERALIZED ANXIETY DISORDER: ICD-10-CM

## 2019-04-23 DIAGNOSIS — E78.5 HYPERLIPIDEMIA LDL GOAL <160: ICD-10-CM

## 2019-04-23 DIAGNOSIS — G47.33 OSA (OBSTRUCTIVE SLEEP APNEA): ICD-10-CM

## 2019-04-23 DIAGNOSIS — E66.01 MORBID OBESITY (H): ICD-10-CM

## 2019-04-23 DIAGNOSIS — Z12.11 COLON CANCER SCREENING: ICD-10-CM

## 2019-04-23 DIAGNOSIS — R73.01 IMPAIRED FASTING GLUCOSE: ICD-10-CM

## 2019-04-23 DIAGNOSIS — Z00.00 ENCOUNTER FOR MEDICARE ANNUAL WELLNESS EXAM: Primary | ICD-10-CM

## 2019-04-23 PROCEDURE — 99397 PER PM REEVAL EST PAT 65+ YR: CPT | Mod: 25 | Performed by: INTERNAL MEDICINE

## 2019-04-23 PROCEDURE — 90471 IMMUNIZATION ADMIN: CPT | Performed by: INTERNAL MEDICINE

## 2019-04-23 PROCEDURE — 90732 PPSV23 VACC 2 YRS+ SUBQ/IM: CPT | Performed by: INTERNAL MEDICINE

## 2019-04-23 RX ORDER — HYDROXYZINE HYDROCHLORIDE 25 MG/1
25-50 TABLET, FILM COATED ORAL EVERY 4 HOURS PRN
Qty: 180 TABLET | Refills: 1 | Status: SHIPPED | OUTPATIENT
Start: 2019-04-23 | End: 2019-06-06

## 2019-04-23 RX ORDER — SIMVASTATIN 40 MG
40 TABLET ORAL AT BEDTIME
Qty: 90 TABLET | Refills: 3 | Status: SHIPPED | OUTPATIENT
Start: 2019-04-23 | End: 2020-03-17

## 2019-04-23 ASSESSMENT — ENCOUNTER SYMPTOMS
EYE PAIN: 1
BREAST MASS: 0
ARTHRALGIAS: 1
PARESTHESIAS: 1
FREQUENCY: 1
HEARTBURN: 1
WEAKNESS: 1
SHORTNESS OF BREATH: 1
HEADACHES: 1
DIARRHEA: 1
DIZZINESS: 1
COUGH: 1
NERVOUS/ANXIOUS: 1
CONSTIPATION: 1

## 2019-04-23 ASSESSMENT — PATIENT HEALTH QUESTIONNAIRE - PHQ9
SUM OF ALL RESPONSES TO PHQ QUESTIONS 1-9: 23
SUM OF ALL RESPONSES TO PHQ QUESTIONS 1-9: 23
10. IF YOU CHECKED OFF ANY PROBLEMS, HOW DIFFICULT HAVE THESE PROBLEMS MADE IT FOR YOU TO DO YOUR WORK, TAKE CARE OF THINGS AT HOME, OR GET ALONG WITH OTHER PEOPLE: EXTREMELY DIFFICULT

## 2019-04-23 ASSESSMENT — ACTIVITIES OF DAILY LIVING (ADL): CURRENT_FUNCTION: NO ASSISTANCE NEEDED

## 2019-04-23 ASSESSMENT — MIFFLIN-ST. JEOR: SCORE: 1524.73

## 2019-04-23 NOTE — PATIENT INSTRUCTIONS
"Take the duloxetine second dose with the evening meal.    Increase hydroxyzine to 2 tabs about 30 mins before bed and can gradually increase to 4 tabs total.      Let me know if you want to try an injection (and what part is bothering you the most) and I can order it.  Continue with physical therapy.    Once you finish your current books, you can try Michelle Boykin's \"I thought it was just me (but it wasn't)\"      Patient Education   Personalized Prevention Plan  You are due for the preventive services outlined below.  Your care team is available to assist you in scheduling these services.  If you have already completed any of these items, please share that information with your care team to update in your medical record.    Talk to your pharmacist about the shingles vaccine    Health Maintenance Due   Topic Date Due     URINE DRUG SCREEN Q1 YR  08/29/1965     Zoster (Shingles) Vaccine (1 of 2) 08/29/2002     Annual Wellness Visit  05/23/2018     Colonoscopy - every 5 years  07/10/2018     Flu Vaccine (1) 09/01/2018       Preventing Falls in the Home  An adult or child can fall for many reasons. If you are an older adult, you may fall because your reaction time slows down. Your muscles and joints may get stiff, weak, or less flexible because of illness, medicines, or a physical condition. These things can also make a child more likely to fall or be injured in a fall.  Other health problems that make falls more likely include:    Arthritis    Dizziness or lightheadedness when you get out of bed (orthostatic hypotension)    History of a stroke    Dizziness    Anemia    Certain medicines taken for mental illness    Problems with balance or gait    History of falls with or without an injury    Changes in vision (vision impairment)    Changes in thinking skills and memory (cognitive impairment)  Injuries from a fall can include broken bones, dislocated joints, and cuts. When these injuries are serious enough, they can make " it impossible for you or a child who is injured in a fall to live on his or her own.  Prevention tips  To help prevent falls and fall-related injuries, follow the tips below.   Floors  Make floors safer by doing the following:     Put nonskid pads under area rugs.    Remove throw rugs.    Replace worn floor coverings.    Tack carpets firmly to each step on carpeted stairs. Put nonskid strips on the edges of uncarpeted stairs.    Keep floors and stairs free of clutter and cords.    Arrange furniture so there are clear pathways.    Clean up any spills right away.    Wear shoes that fit.  Bathrooms    Make bathrooms safer by doing the following:     Install grab bars in the tub or shower.    Apply nonskid strips or put a nonskid rubber mat in the tub or shower.    Sit on a bath chair to bathe.    Use bathmats with nonskid backing.  Lighting and the environment  Improve lighting in your home by doing the following:     Keep a flashlight in each room. Or put a lamp next to the bed within easy reach.    Put nightlights in the bedrooms, hallways, kitchen, and bathrooms.    Make sure all stairways have good lighting.    Take your time when going up and down stairs.    Put handrails on both sides of stairs and in walkways for more support. To prevent injury to your wrist or arm, don t use handrails to pull yourself up.    Install grab bars to pull yourself up.    Move or rearrange items that you use often. This will make them easier to find or reach.    Look at your home to find any safety hazards. Especially look at doorways, walkways, and the driveway. Remove or repair any safety problems that you find.  Date Last Reviewed: 8/1/2016 2000-2018 [employer]. [address], [place] [address]. All rights reserved. This information is not intended as a substitute for professional medical care. Always follow your healthcare professional's instructions.

## 2019-04-23 NOTE — PROGRESS NOTES
She is at risk for falling and has been provided with information to reduce the risk of falling at home.

## 2019-04-23 NOTE — PROGRESS NOTES
"SUBJECTIVE:   Lucie Whitehead is a 68 year old female who presents for Preventive Visit.  Are you in the first 12 months of your Medicare coverage?  No    Healthy Habits:     In general, how would you rate your overall health?  Fair    Frequency of exercise:  1 day/week    Duration of exercise:  Less than 15 minutes    Do you usually eat at least 4 servings of fruit and vegetables a day, include whole grains    & fiber and avoid regularly eating high fat or \"junk\" foods?  Yes    Medication side effects:  None    Ability to successfully perform activities of daily living:  No assistance needed    Home Safety:  Lack of grab bars in the bathroom    Hearing Impairment:  Difficulty following a conversation in a noisy restaurant or crowded room, need to ask people to speak up or repeat themselves, difficulty understanding soft or whispered speech and difficulty understanding speech on the telephone    In the past 6 months, have you been bothered by leaking of urine? Yes    In general, how would you rate your overall mental or emotional health?  Poor      PHQ-2 Total Score: 6    Additional concerns today:  No    Do you feel safe in your environment? Yes    Do you have a Health Care Directive? No: Advance care planning was reviewed with patient; patient declined at this time.      Fall risk  Fallen 2 or more times in the past year?: Yes  Any fall with injury in the past year?: No    Cognitive Screening   1) Repeat 3 items (Leader, Season, Table)    2) Clock draw: NORMAL  3) 3 item recall: Recalls 3 objects  Results: 3 items recalled: COGNITIVE IMPAIRMENT LESS LIKELY    Mini-CogTM Copyright MARCIA Zamora. Licensed by the author for use in [facility]; reprinted with permission ([email or web address]). All rights reserved.      Do you have sleep apnea, excessive snoring or daytime drowsiness?: yes    Reviewed and updated as needed this visit by clinical staff  Tobacco  Allergies  Meds  Problems  Med Hx  Surg Hx  Fam " Hx  Soc Hx          Reviewed and updated as needed this visit by Provider  Allergies  Meds  Problems  Med Hx  Surg Hx        Social History     Tobacco Use     Smoking status: Former Smoker     Last attempt to quit: 1975     Years since quittin.3     Smokeless tobacco: Never Used   Substance Use Topics     Alcohol use: Yes     Alcohol/week: 0.0 oz     Comment: 2-3 glass wine /week         Alcohol Use 2019   Prescreen: >3 drinks/day or >7 drinks/week? No   Prescreen: >3 drinks/day or >7 drinks/week? -           Current providers sharing in care for this patient include:   Patient Care Team:  Nallely Moore MD as PCP - General  Nallely Moore MD as Assigned PCP    The following health maintenance items are reviewed in Epic and correct as of today:  Health Maintenance   Topic Date Due     URINE DRUG SCREEN Q1 YR  1965     ZOSTER IMMUNIZATION (1 of 2) 2002     COLONOSCOPY Q5 YR  07/10/2018     INFLUENZA VACCINE (1) 2018     PHQ-9 Q6 MONTHS  10/23/2019     VIGNESH QUESTIONNAIRE 1 YEAR  04/15/2020     BMP Q1 YR  2020     LIPID MONITORING Q1 YEAR  2020     MEDICARE ANNUAL WELLNESS VISIT  2020     FALL RISK ASSESSMENT  2020     MAMMO Q2 YR  2021     DEXA Q5 YR  2022     ADVANCE DIRECTIVE PLANNING Q5 YRS  2023     DTAP/TDAP/TD IMMUNIZATION (3 - Td) 2026     DEPRESSION ACTION PLAN  Completed     HEPATITIS C SCREENING  Completed     IPV IMMUNIZATION  Aged Out     MENINGITIS IMMUNIZATION  Aged Out     Labs reviewed in EPIC      Review of Systems   HENT: Positive for congestion.    Eyes: Positive for pain.   Respiratory: Positive for cough and shortness of breath.    Gastrointestinal: Positive for constipation, diarrhea and heartburn.   Breasts:  Negative for tenderness, breast mass and discharge.   Genitourinary: Positive for frequency. Negative for pelvic pain, vaginal bleeding and vaginal discharge.   Musculoskeletal: Positive for arthralgias.  "  Neurological: Positive for dizziness, weakness, headaches and paresthesias.   Psychiatric/Behavioral: Positive for mood changes. The patient is nervous/anxious.      Depression - has more energy but having trouble getting things accomplished still.  Getting out of bed and showered is hard.  Started with counseling weekly right now.  First time last week.  Extra stressors.  Is on short term leave from work.  Was through May 5th but extended to 28th of May.  No medication side effects but if take pain medications before bed then isn't sleeping well.  Feels like brain function is starting to get better.  PHQ-9 SCORE 3/27/2019 4/15/2019 4/23/2019   PHQ-9 Total Score - - -   PHQ-9 Total Score MyChart - - 23 (Severe depression)   PHQ-9 Total Score 21 14 23      VIGNESH-7 SCORE 3/27/2019 4/15/2019   Total Score 19 17      Pain - saw ortho, nothing surgical at this time.  Is working with PT.  Hips and back have been most problematic     OBJECTIVE:   /84 (BP Location: Right arm, Patient Position: Chair, Cuff Size: Adult Regular)   Pulse 94   Temp 96.4  F (35.8  C) (Tympanic)   Ht 1.6 m (5' 3\")   Wt 101.6 kg (223 lb 14.4 oz)   LMP 11/12/2007 (LMP Unknown)   SpO2 96%   BMI 39.66 kg/m   Estimated body mass index is 39.66 kg/m  as calculated from the following:    Height as of this encounter: 1.6 m (5' 3\").    Weight as of this encounter: 101.6 kg (223 lb 14.4 oz).  Physical Exam  GENERAL: alert, mild distress and fatigued  EYES: Eyes grossly normal to inspection, PERRL and conjunctivae and sclerae normal  HENT: ear canals and TM's normal, nose and mouth without ulcers or lesions  NECK: no adenopathy, no asymmetry, masses, or scars and thyroid normal to palpation  RESP: lungs clear to auscultation - no rales, rhonchi or wheezes  CV: regular rate and rhythm, normal S1 S2, no S3 or S4, no murmur, click or rub, no peripheral edema and peripheral pulses strong  ABDOMEN: soft, nontender, no hepatosplenomegaly, no masses " and bowel sounds normal  MS: no gross musculoskeletal defects noted, no edema  SKIN: no suspicious lesions or rashes  NEURO: Normal strength and tone, mentation intact and speech normal  PSYCH: mentation appears normal and affect flat      ASSESSMENT / PLAN:   1. Encounter for Medicare annual wellness exam  Routine health education discussed: calcium, diet, exercise, weight, safety.     2. Generalized anxiety disorder  Uncontrolled.  Discussed stress and coping.  Continue therapy.  Opted not to adjust medication for now other than increase hydroxyzine at bedtime for insomnia.  - hydrOXYzine (ATARAX) 25 MG tablet; Take 1-2 tablets (25-50 mg) by mouth every 4 hours as needed for anxiety And take 2-4 tabs about 30-60 mins before bed  Dispense: 180 tablet; Refill: 1    3. Colon cancer screening    - GASTROENTEROLOGY ADULT REF PROCEDURE ONLY    4. PEÑA (obstructive sleep apnea)  Refer for reeval  - SLEEP EVALUATION & MANAGEMENT REFERRAL - ADULT -[facility]- Numerous Locations - [phone]; Future    5. Obesity (BMI 35.0-39.9) with comorbidity (H)  Diet and exercise discussed    6. Impaired fasting glucose  Discussed glucose elevation.  Discuss this is a pre-diabetic condition.  Recommended eating healthily, exercising and maintaining a healthy weight to prevent the development of diabetes.  Recommended blood sugar checks at least yearly to monitor this.  Recommended dietary consultation which the patient declined.     7. Severe episode of recurrent major depressive disorder, without psychotic features (H)  Continue medication - only on x3wks and just started therapy.  Follow-up in 1 mo    8. Hyperlipidemia LDL goal <160    - simvastatin (ZOCOR) 40 MG tablet; Take 1 tablet (40 mg) by mouth At Bedtime  Dispense: 90 tablet; Refill: 3    End of Life Planning:  Patient currently has an advanced directive: No.  I have verified the patient's ablity to prepare an advanced directive/make health care decisions.   "Literature was provided to assist patient in preparing an advanced directive.    COUNSELING:  Reviewed preventive health counseling, as reflected in patient instructions    BP Readings from Last 1 Encounters:   04/23/19 134/84     Estimated body mass index is 39.66 kg/m  as calculated from the following:    Height as of this encounter: 1.6 m (5' 3\").    Weight as of this encounter: 101.6 kg (223 lb 14.4 oz).    BP Screening:   Last 3 BP Readings:    BP Readings from Last 3 Encounters:   04/23/19 134/84   04/02/19 (!) 150/92   03/26/19 140/70       The following was recommended to the patient:  Re-screen BP within a year and recommended lifestyle modifications  Weight management plan: Discussed healthy diet and exercise guidelines     reports that she quit smoking about 44 years ago. She has never used smokeless tobacco.      Appropriate preventive services were discussed with this patient, including applicable screening as appropriate for cardiovascular disease, diabetes, osteopenia/osteoporosis, and glaucoma.  As appropriate for age/gender, discussed screening for colorectal cancer, prostate cancer, breast cancer, and cervical cancer. Checklist reviewing preventive services available has been given to the patient.    Reviewed patients plan of care and provided an AVS. The Basic Care Plan (routine screening as documented in Health Maintenance) for Lucie meets the Care Plan requirement. This Care Plan has been established and reviewed with the Patient.    Counseling Resources:  ATP IV Guidelines  Pooled Cohorts Equation Calculator  Breast Cancer Risk Calculator  FRAX Risk Assessment  ICSI Preventive Guidelines  Dietary Guidelines for Americans, 2010  USDA's MyPlate  ASA Prophylaxis  Lung CA Screening    Nallely Moore MD  [facility]    Identified Health Risks:  Answers for HPI/ROS submitted by the patient on 4/23/2019   Annual Exam:  If you checked off any problems, how difficult have these problems " made it for you to do your work, take care of things at home, or get along with other people?: Extremely difficult  PHQ9 TOTAL SCORE: 23

## 2019-04-24 ENCOUNTER — OFFICE VISIT (OUTPATIENT)
Dept: PSYCHOLOGY | Facility: CLINIC | Age: 69
End: 2019-04-24
Payer: COMMERCIAL

## 2019-04-24 DIAGNOSIS — F33.1 MAJOR DEPRESSIVE DISORDER, RECURRENT EPISODE, MODERATE (H): Primary | ICD-10-CM

## 2019-04-24 DIAGNOSIS — F41.1 GENERALIZED ANXIETY DISORDER: ICD-10-CM

## 2019-04-24 PROCEDURE — 90834 PSYTX W PT 45 MINUTES: CPT | Performed by: MARRIAGE & FAMILY THERAPIST

## 2019-04-24 ASSESSMENT — PATIENT HEALTH QUESTIONNAIRE - PHQ9: SUM OF ALL RESPONSES TO PHQ QUESTIONS 1-9: 23

## 2019-04-24 NOTE — PROGRESS NOTES
Progress Note    Client Name: Lucie Whitehead  Date: April 24, 2019          Service Type: Individual  Video Visit: No     Session Start Time: 2:30  Session End Time: 3:15     Session Length: 45 min    Session #: 2    Attendees: Client attended alone     Treatment Plan Last Reviewed: pending  PHQ-9 / VIGNESH-7: assessed regularly see flow sheets    DATA  Interactive Complexity: No  Crisis: No       Progress Since Last Session (Related to Symptoms / Goals / Homework):   Symptoms: No change .    Homework: Achieved / completed to satisfaction      Episode of Care Goals: Satisfactory progress - PREPARATION (Decided to change - considering how); Intervened by negotiating a change plan and determining options / strategies for behavior change, identifying triggers, exploring social supports, and working towards setting a date to begin behavior change     Current / Ongoing Stressors and Concerns:   - terminated from employment   - medical issues (see EPIC record)     Treatment Objective(s) Addressed in This Session:   Client interested in cbt interventions to reduce symptoms.     Intervention:   Completed diagnostic interview and discussed treatment options.         ASSESSMENT: Current Emotional / Mental Status (status of significant symptoms):   Risk status (Self / Other harm or suicidal ideation)   Client denies current fears or concerns for personal safety.   Client denies current or recent suicidal ideation or behaviors.   Client denies current or recent homicidal ideation or behaviors.   Client denies current or recent self injurious behavior or ideation.   Client denies other safety concerns.   Client Client reports there has been no change in risk factors since their last session.     Client Client reports there has been no change in protective factors since their last session.     A safety and risk management plan has not been developed at this time, however client was  given the after-hours number / 911 should there be a change in any of these risk factors.     Appearance:   Appropriate    Eye Contact:   Good    Psychomotor Behavior: Normal    Attitude:   Cooperative    Orientation:   All   Speech    Rate / Production: Normal     Volume:  Normal    Mood:    Anxious    Affect:    Appropriate    Thought Content:  Clear    Thought Form:  Coherent  Logical    Insight:    Good      Medication Review:   No changes to current psychiatric medication(s)     Medication Compliance:   Yes     Changes in Health Issues:   None reported     Chemical Use Review:   Substance Use: Chemical use reviewed, no active concerns identified      Tobacco Use: No current tobacco use.      Diagnosis:  296.32 (F33.1) Major Depressive Disorder, Recurrent Episode, Moderate _  300.02 (F41.1) Generalized Anxiety Disorder    Collateral Reports Completed:   Not Applicable    PLAN: (Client Tasks / Therapist Tasks / Other)  Client will return to finalize treatment plan and begin interventions to reduce symptoms. Client will focus on behavioral activation through self-care.    Maninder Redding MA, [facility]                                                       ______________________________________________________________________

## 2019-05-01 ENCOUNTER — FCC EXTENDED DOCUMENTATION (OUTPATIENT)
Dept: PSYCHOLOGY | Facility: CLINIC | Age: 69
End: 2019-05-01

## 2019-05-02 ENCOUNTER — OFFICE VISIT (OUTPATIENT)
Dept: PSYCHOLOGY | Facility: CLINIC | Age: 69
End: 2019-05-02
Payer: COMMERCIAL

## 2019-05-02 DIAGNOSIS — F33.1 MAJOR DEPRESSIVE DISORDER, RECURRENT EPISODE, MODERATE (H): Primary | ICD-10-CM

## 2019-05-02 DIAGNOSIS — F41.1 GENERALIZED ANXIETY DISORDER: ICD-10-CM

## 2019-05-02 PROCEDURE — 90834 PSYTX W PT 45 MINUTES: CPT | Performed by: MARRIAGE & FAMILY THERAPIST

## 2019-05-02 NOTE — PROGRESS NOTES
Progress Note    Client Name: Lucie Whitehead  Date: May 2, 2019           Service Type: Individual  Video Visit: No     Session Start Time: 12:30  Session End Time: 1:15     Session Length: 45 min    Session #: 3    Attendees: Client attended alone     Treatment Plan Last Reviewed: pending  PHQ-9 / VIGNESH-7: assessed regularly see flow sheets    DATA  Interactive Complexity: No  Crisis: No       Progress Since Last Session (Related to Symptoms / Goals / Homework):   Symptoms: No change .    Homework: Achieved / completed to satisfaction      Episode of Care Goals: Satisfactory progress - ACTION (Actively working towards change); Intervened by reinforcing change plan / affirming steps taken     Current / Ongoing Stressors and Concerns:   - terminated from employment   - medical issues (see EPIC record)     Treatment Objective(s) Addressed in This Session:   Client will learn and integrate CBT/DBT strategies for more effectively managing emotions and relationships.      Intervention:   Taught mindfulness skills. Client reports family is gathering this weekend for a family . Processed emotions in session, validated, supportive counseling.     ASSESSMENT: Current Emotional / Mental Status (status of significant symptoms):   Risk status (Self / Other harm or suicidal ideation)   Client denies current fears or concerns for personal safety.   Client denies current or recent suicidal ideation or behaviors.   Client denies current or recent homicidal ideation or behaviors.   Client denies current or recent self injurious behavior or ideation.   Client denies other safety concerns.   Client Client reports there has been no change in risk factors since their last session.     Client Client reports there has been no change in protective factors since their last session.     A safety and risk management plan has not been developed at this time, however client was given the  after-hours number / 911 should there be a change in any of these risk factors.     Appearance:   Appropriate    Eye Contact:   Good    Psychomotor Behavior: Normal    Attitude:   Cooperative    Orientation:   All   Speech    Rate / Production: Normal     Volume:  Normal    Mood:    Anxious    Affect:    Appropriate    Thought Content:  Clear    Thought Form:  Coherent  Logical    Insight:    Good      Medication Review:   No changes to current psychiatric medication(s)     Medication Compliance:   Yes     Changes in Health Issues:   None reported     Chemical Use Review:   Substance Use: Chemical use reviewed, no active concerns identified      Tobacco Use: No current tobacco use.      Diagnosis:  296.32 (F33.1) Major Depressive Disorder, Recurrent Episode, Moderate _  300.02 (F41.1) Generalized Anxiety Disorder    Collateral Reports Completed:   Not Applicable    PLAN: (Client Tasks / Therapist Tasks / Other)  Client will begin practicing mindfulness skills every day.      Maninder Redding MA, [facility]                                                       ______________________________________________________________________                                                   Treatment Plan    Client's Name: Lucie Whitehead  YOB: 1950    Date: May 2, 2019     DSM-V Diagnoses: 296.32 - Major Depressive Disorder, Recurrent, Moderate    300.02 - Generalized Anxiety Disorder  ; V71.09 - No Diagnosis  Psychosocial / Contextual Factors: recnet losses  WHODAS: 33    Referral / Collaboration:  Referral to another professional/service is not indicated at this time..    Anticipated number of session or this episode of care: 26      Measurable Treatment Goal(s) related to diagnosis / functional impairment(s)   I will know I've met my goal when I have better tools to control my emotions     Goal 1: Client will achieve a significant reduction in PHQ-9 scores.     Objective #A (Client Action)   Client will  identify cognitive distortions and negative self-talk contributing to depression.   Status: New - Date: May 2, 2019  Intervention(s)   Therapist will teach about identification and strategies to counter negative self-talk and cognitive distortions.    Objective #B (Client Action)   Client will learn and integrate CBT/DBT strategies for more effectively managing emotions and relationships.   Status: New - Date: May 2, 2019  Intervention(s)   Therapist will teach emotional regulation skills distress tolerance skills, interpersonal effectiveness skills and mindfulness skills.     Objective #C   Client will engage in positive self-care.  Status: New - Date: May 2, 2019  Intervention(s)   Therapist will teach self-care goals:  Maintain balance in schedule (time for self/others, relaxation/activities, leisure/tasks, home/out of the house), assert needs and set limits with others, challenge negative thoughts/use affirming and encouraging self-talk, engage with support people on regular basis, practice behavior activation behaviors (sleep hygiene, balanced eating, physical activity, medical needs, personal hygiene), acknowledge and accept your feelings.    Client has reviewed and agreed to the above plan.    Jorge Redding MA, LMFT May 2, 2019

## 2019-05-08 NOTE — PROGRESS NOTES
Discharge Note    Progress reporting period is from initial eval to Apr 2, 2019.     Lucie failed to return for next follow up visit and current status is unknown.  Please see information below for last relevant information on current status.  Patient seen for Rxs Used: 2 visits.  SUBJECTIVE  Subjective changes noted by patient:  Subjective: Feels HEP is helping but fatigues quickly.  Saw spine MD and had scan showing good healing in recent fusion area  .  Current pain level is Current Pain level: 4/10.     Previous pain level was  Initial Pain level: (5-7/10 pain).   Changes in function:  Yes (See Goal flowsheet attached for changes in current functional level)  Adverse reaction to treatment or activity: None    OBJECTIVE  Changes noted in objective findings:       ASSESSMENT/PLAN  Diagnosis: B hip and shoulder pain   DIAGP:  Diagnoses of Pain of both shoulder joints, Hip pain, right, and Hip pain, left were pertinent to this visit.  Updated problem list and treatment plan:   Pain - HEP  Decreased ROM/flexibility - HEP  Decreased function - HEP  Decreased strength - HEP  Impaired muscle performance - HEP  Impaired posture - HEP  STG/LTGs have been met or progress has been made towards goals:  Yes, please see goal flowsheet for most current information  Assessment of Progress: current status is unknown.  Last current status:     Self Management Plans:  HEP  I have re-evaluated this patient and find that the nature, scope, duration and intensity of the therapy is appropriate for the medical condition of the patient.  Lucie continues to require the following intervention to meet STG and LTG's:  HEP.    Recommendations:  Discharge with current home program.  Patient to follow up with MD as needed.    Please refer to the daily flowsheet for treatment today, total treatment time and time spent performing 1:1 timed codes.

## 2019-05-09 ENCOUNTER — OFFICE VISIT (OUTPATIENT)
Dept: PSYCHOLOGY | Facility: CLINIC | Age: 69
End: 2019-05-09
Payer: COMMERCIAL

## 2019-05-09 DIAGNOSIS — F33.1 MAJOR DEPRESSIVE DISORDER, RECURRENT EPISODE, MODERATE (H): Primary | ICD-10-CM

## 2019-05-09 DIAGNOSIS — F41.1 GENERALIZED ANXIETY DISORDER: ICD-10-CM

## 2019-05-09 PROCEDURE — 90834 PSYTX W PT 45 MINUTES: CPT | Performed by: MARRIAGE & FAMILY THERAPIST

## 2019-05-09 NOTE — PROGRESS NOTES
Progress Note    Client Name: Lucie Whitehead  Date: May 9, 2019           Service Type: Individual  Video Visit: No     Session Start Time: 1:30  Session End Time: 2:15     Session Length: 45 min    Session #: 4    Attendees: Client attended alone     Treatment Plan Last Reviewed: pending  PHQ-9 / VIGNESH-7: assessed regularly see flow sheets    DATA  Interactive Complexity: No  Crisis: No       Progress Since Last Session (Related to Symptoms / Goals / Homework):   Symptoms: No change .    Homework: Achieved / completed to satisfaction      Episode of Care Goals: Satisfactory progress - ACTION (Actively working towards change); Intervened by reinforcing change plan / affirming steps taken     Current / Ongoing Stressors and Concerns:   - terminated from employment   - medical issues (see EPIC record)     Treatment Objective(s) Addressed in This Session:   Client will learn and integrate CBT/DBT strategies for more effectively managing emotions and relationships.      Intervention:   Taught emotion regulation skills. Client reports that during a family gathering last weekend, she was unable to process information normally and seemed to couldn't make decisions normally. She says she is concerned that she is not able to think clearly enough to work full-time.Processed emotions in session, validated, supportive counseling.     ASSESSMENT: Current Emotional / Mental Status (status of significant symptoms):   Risk status (Self / Other harm or suicidal ideation)   Client denies current fears or concerns for personal safety.   Client denies current or recent suicidal ideation or behaviors.   Client denies current or recent homicidal ideation or behaviors.   Client denies current or recent self injurious behavior or ideation.   Client denies other safety concerns.   Client Client reports there has been no change in risk factors since their last session.     Client Client reports  there has been no change in protective factors since their last session.     A safety and risk management plan has not been developed at this time, however client was given the after-hours number / 911 should there be a change in any of these risk factors.     Appearance:   Appropriate    Eye Contact:   Good    Psychomotor Behavior: Normal    Attitude:   Cooperative    Orientation:   All   Speech    Rate / Production: Normal     Volume:  Normal    Mood:    Anxious  Depressed    Affect:    Appropriate    Thought Content:  Clear    Thought Form:  Coherent  Logical    Insight:    Good      Medication Review:   No changes to current psychiatric medication(s)     Medication Compliance:   Yes     Changes in Health Issues:   None reported     Chemical Use Review:   Substance Use: Chemical use reviewed, no active concerns identified      Tobacco Use: No current tobacco use.      Diagnosis:  296.32 (F33.1) Major Depressive Disorder, Recurrent Episode, Moderate _  300.02 (F41.1) Generalized Anxiety Disorder    Collateral Reports Completed:   Not Applicable    PLAN: (Client Tasks / Therapist Tasks / Other)  Client will practice mindfulness skills every day.      Maninder Redding MA, [facility]                                                       ______________________________________________________________________                                                   Treatment Plan    Client's Name: Lucie Whitehead  YOB: 1950    Date: May 2, 2019     DSM-V Diagnoses: 296.32 - Major Depressive Disorder, Recurrent, Moderate    300.02 - Generalized Anxiety Disorder  ; V71.09 - No Diagnosis  Psychosocial / Contextual Factors: recnet losses  WHODAS: 33    Referral / Collaboration:  Referral to another professional/service is not indicated at this time..    Anticipated number of session or this episode of care: 26      Measurable Treatment Goal(s) related to diagnosis / functional impairment(s)   I will know I've met my  goal when I have better tools to control my emotions     Goal 1: Client will achieve a significant reduction in PHQ-9 scores.     Objective #A (Client Action)   Client will identify cognitive distortions and negative self-talk contributing to depression.   Status: New - Date: May 2, 2019  Intervention(s)   Therapist will teach about identification and strategies to counter negative self-talk and cognitive distortions.    Objective #B (Client Action)   Client will learn and integrate CBT/DBT strategies for more effectively managing emotions and relationships.   Status: New - Date: May 2, 2019  Intervention(s)   Therapist will teach emotional regulation skills distress tolerance skills, interpersonal effectiveness skills and mindfulness skills.     Objective #C   Client will engage in positive self-care.  Status: New - Date: May 2, 2019  Intervention(s)   Therapist will teach self-care goals:  Maintain balance in schedule (time for self/others, relaxation/activities, leisure/tasks, home/out of the house), assert needs and set limits with others, challenge negative thoughts/use affirming and encouraging self-talk, engage with support people on regular basis, practice behavior activation behaviors (sleep hygiene, balanced eating, physical activity, medical needs, personal hygiene), acknowledge and accept your feelings.    Client has reviewed and agreed to the above plan.    Jorge Redding MA, LMFT May 2, 2019

## 2019-05-10 NOTE — PROGRESS NOTES
"                                                                                                                                                                      Adult Intake Structured Interview  Standard Diagnostic Assessment      CLIENT'S NAME: Lucie Whitehead  MRN:   [id number]  :   1950  ACCT. NUMBER: [id number]  DATE OF SERVICE: 19  VIDEO VISIT: No    Identifying Information:  Client is a 68 year old, ,  female. Client was referred for counseling by Dr. Moore at [facility]. Client is currently on medical leave from full time employment due to medical conditions (see EPIC record).. Client attended the session alone.       Client's Statement of Presenting Concern:  Client reports the reason for seeking therapy at this time as depression: \"withdrawal and inability to react properly to questions, and pain management - extreme exhaustion: job.\"  Client stated that her symptoms have resulted in the following functional impairments: management of the household and or completion of tasks, organization, relationship(s), self-care, social interactions and work / vocational responsibilities      History of Presenting Concern:  Client reports that these problem(s) began over the past few months. Client has attempted to resolve these concerns in the past through new depression medicine and other medical interventions for physical ailments.. Client reports that other professional(s) are involved in providing support / services.       Social History:  Client reported she [place] \"[place].\". They were the first born of 2 children. Parents were  until father  in 2018.. Client reported that her childhood was : \"good family - [facility]: hated it- always felt ugly and isolated.\" Client described her current relationships with family of origin as good.    Client reported a history of 1 marriage. Client has been  for 40+ years. Client reported " having 3 children, one of whom is . Client identified some stable and meaningful social connections. Client reported that she has not been involved with the legal system.  Client's highest education level was graduate school [employer]. Client did not identify any learning problems. There are no ethnic, cultural or Oriental orthodox factors that may be relevant for therapy. Client identified her preferred language to be English. Client reported she does not need the assistance of an  or other support involved in therapy. Modifications will not be used to assist communication in therapy. Client did not serve in the .     Client reports family history includes Cancer - colorectal in her maternal grandmother and mother; Coronary Artery Disease in her mother; Diabetes in her maternal grandmother, maternal uncle, mother, and son; Hypertension in her maternal grandmother and maternal uncle; Prostate Cancer in her father; Respiratory in her paternal grandmother.    Mental Health History:  Client reported no family history of mental health issues.  Client previously received the following mental health diagnosis: Depression.  Client has received the following mental health services in the past: counseling and medication(s) from physician / PCP.  Hospitalizations: None.  Client is currently receiving the following services: medication(s) from physician / PCP.      Chemical Health History:  Client reported no family history of chemical health issues. Client has not received chemical dependency treatment in the past. Client is not currently receiving any chemical dependency treatment. Client reports no problems as a result of their drinking / drug use.      Client Reports:  Client denies using alcohol.  Client denies using tobacco.  Client denies using marijuana.  Client reports using caffeine 7 times per week and drinks 3 at a time. Client started using caffeine at age did not report.  Client denies using street  drugs.  Client denies the non-medical use of prescription or over the counter drugs.    CAGE: C     Patient felt they ought to CUT down on your drinking (or drug use).   Based on the positive Cage-Aid score and clinical interview there  are not indications of drug or alcohol abuse.    Discussed the general effects of drugs and alcohol on health and well-being. Therapist gave client printed information about the effects of chemical use on her health and well being.      Significant Losses / Trauma / Abuse / Neglect Issues:  There are indications or report of significant loss, trauma, abuse or neglect issues related to: death of father last year and major medical problems (see EPIC record)..    Issues of possible neglect are not present.      Medical Issues:  Client has had a physical exam to rule out medical causes for current symptoms. Date of last physical exam was within the past year. Client was encouraged to follow up with PCP if symptoms were to develop. The client has a [place] Primary [facility] Provider, who is named Nallely Moore. The client reports not having a psychiatrist. Client reports the following current medical concerns: Multiple - see EPIC record. The client reports the presence of chronic or episodic pain in the form of joints, neck, back. The pain level is moderate and has a frequency of daily.. There are significant nutritional concerns. Client is obese.    Client reports current meds as:   Current Outpatient Medications   Medication Sig     Acetaminophen (TYLENOL PO) Take 1,300 mg by mouth 2 times daily      buPROPion (WELLBUTRIN XL) 150 MG 24 hr tablet Take 1 tablet (150 mg) by mouth every morning     CALCIUM PO Take 950 mg by mouth daily      Cholecalciferol (VITAMIN D3 PO) Take 1,000 Units by mouth daily     DULoxetine (CYMBALTA) 30 MG capsule Take 1 capsule (30 mg) by mouth 2 times daily Start with once daily x1wk then increase to bid     hydrOXYzine (ATARAX) 25 MG tablet Take 1-2 tablets  "(25-50 mg) by mouth every 4 hours as needed for anxiety And take 2-4 tabs about 30-60 mins before bed     loratadine (CLARITIN) 10 MG tablet Take 10 mg by mouth daily     Multiple Vitamins-Minerals (OCUVITE ADULT 50+ PO) Take 1 tablet by mouth daily      NONFORMULARY Sustained night time lubricant eye ointment night before bed     Omega-3 Fatty Acids (OMEGA 3 PO) Take 1 tablet by mouth daily      ranitidine (ZANTAC 75) 75 MG tablet Take 75 mg by mouth 2 times daily as needed.     RESTASIS 0.05 % ophthalmic emulsion INSTILL 1 DROP INTO AFFECTED EYE(S) BY OPHTHALMIC ROUTE EVERY 12 HOURS     simvastatin (ZOCOR) 40 MG tablet Take 1 tablet (40 mg) by mouth At Bedtime     traMADol (ULTRAM) 50 MG tablet TAKE 1 - 2 TABLET BY ORAL ROUTE EVERY 4 - 6 HOURS AS NEEDED     ZYRTEC 10 MG OR TABS 1 TABLET EACH MORNING     No current facility-administered medications for this visit.        Client Allergies:  Allergies   Allergen Reactions     Animal Dander      Cats      Dogs      Seasonal Allergies      Trees and mold         Medical History:  Past Medical History:   Diagnosis Date     Complication of anesthesia     extremely dry eyes     Diabetes (H)      Gastro-oesophageal reflux disease      Hirsutism     Abstracted 06/14/02     History of blood transfusion      Leiomyoma of uterus, unspecified     Abstracted 06/14/02     Obesity 4/14/2010    Estimated Body mass index is 28.97 kg/(m^2) as calculated from the following:   Height as of 7/10/13: 5' 7\"(1.702 m).   Weight as of 7/10/13: 185 lb(83.915 kg).       Other and unspecified hyperlipidemia     Abstracted 06/14/02     Other chronic pain     low back and legs and feet     Sleep apnea     CPAP         Medication Adherence:  Client reports taking prescribed medications as prescribed.    Client was provided recommendation to follow-up with prescribing physician.    Mental Status Assessment:  Appearance:   Appropriate   Eye Contact:   Good   Psychomotor Behavior: Normal "   Attitude:   Cooperative   Orientation:   All  Speech   Rate / Production: Normal    Volume:  Normal   Mood:    Depressed   Affect:    Appropriate   Thought Content:  Clear   Thought Form:  Coherent  Logical   Insight:    Good       Review of Symptoms:  Depression: Sleep Interest Guilt Energy Concentration Appetite Helpless Worthless Ruminations Irritability  Penny:  No symptoms  Psychosis: No symptoms  Anxiety: Worries Nervousness  Panic:  No symptoms  Post Traumatic Stress Disorder: No symptoms  Obsessive Compulsive Disorder: No symptoms  Eating Disorder: No symptoms  Oppositional Defiant Disorder: No symptoms  ADD / ADHD: No symptoms  Conduct Disorder: No symptoms      Safety Assessment:    History of Safety Concerns:   Client denied a history of suicidal ideation.    Client denied a history of suicide attempts.    Client denied a history of homicidal ideation.    Client denied a history of self-injurious ideation and behaviors.    Client denied a history of personal safety concerns.    Client denied a history of assaultive behaviors.        Current Safety Concerns:  Client denies current suicidal ideation.    Client denies current homicidal ideation and behaviors.  Client denies current self-injurious ideation and behaviors.    Client denies current concerns for personal safety.    Client reports the following protective factors: positive relationships positive family connections, dedication to family/friends and safe and stable environment    Client reports there are no firearms in the house.     Plan for Safety and Risk Management:  A safety and risk management plan has not been developed at this time, however client was given the after-hours number / 911 should there be a change in any of these risk factors.    Client's Strengths and Limitations:  Client identified the following strengths or resources that will help her succeed in counseling: commitment to health and well being, family support and  intelligence. Client identified the following supports: family and friends. Things that may interfere with the client's success in counseling include: work stress.      Diagnostic Criteria:  A. Excessive anxiety and worry about a number of events or activities (such as work or school performance).   B. The person finds it difficult to control the worry.  C. Select 3 or more symptoms (required for diagnosis). Only one item is required in children.   - Restlessness or feeling keyed up or on edge.    - Being easily fatigued.    - Difficulty concentrating or mind going blank.    - Irritability.    - Sleep disturbance (difficulty falling or staying asleep, or restless unsatisfying sleep).   D. The focus of the anxiety and worry is not confined to features of an Axis I disorder.  E. The anxiety, worry, or physical symptoms cause clinically significant distress or impairment in social, occupational, or other important areas of functioning.   F. The disturbance is not due to the direct physiological effects of a substance (e.g., a drug of abuse, a medication) or a general medical condition (e.g., hyperthyroidism) and does not occur exclusively during a Mood Disorder, a Psychotic Disorder, or a Pervasive Developmental Disorder.    - The aformentioned symptoms began many month(s) ago and occurs 7 days per week and is experienced as moderate.  CRITERIA (A-C) REPRESENT A MAJOR DEPRESSIVE EPISODE - SELECT THESE CRITERIA  A) Recurrent episode(s) - symptoms have been present during the same 2-week period and represent a change from previous functioning 5 or more symptoms (required for diagnosis)   - Depressed mood. Note: In children and adolescents, can be irritable mood.     - Diminished interest or pleasure in all, or almost all, activities.    - Significant weight gainincrease in appetite.    - Decreased sleep.    - Fatigue or loss of energy.    - Feelings of worthlessness or inappropriate and excessive guilt.    - Diminished  ability to think or concentrate, or indecisiveness.   B) The symptoms cause clinically significant distress or impairment in social, occupational, or other important areas of functioning  C) The episode is not attributable to the physiological effects of a substance or to another medical condition  D) The occurence of major depressive episode is not better explained by other thought / psychotic disorders  E) There has never been a manic episode or hypomanic episode      Functional Status:  Client's symptoms are causing reduced functional status in the following areas: Activities of Daily Living - pain and cognitive functioning  Occupational / Vocational - unable to perform absic tasks of her job and is on STD  Social / Relational - Difficulty focusing and desires isolation      DSM5 Diagnoses: (Sustained by DSM5 Criteria Listed Above)  Diagnoses: 296.32 (F33.1) Major Depressive Disorder, Recurrent Episode, Moderate _  300.02 (F41.1) Generalized Anxiety Disorder  Psychosocial & Contextual Factors: medical issues, unable to perform job duties  WHODAS 2.0 (12 item)            This questionnaire asks about difficulties due to health conditions. Health conditions  include  disease or illnesses, other health problems that may be short or long lasting,  injuries, mental health or emotional problems, and problems with alcohol or drugs.                     Think back over the past 30 days and answer these questions, thinking about how much  difficulty you had doing the following activities. For each question, please [place] only  one response.    S1 Standing for long periods such as 30 minutes? Severe =       4   S2 Taking care of household responsibilities? Severe =       4   S3 Learning a new task, for example, learning how to get to a new place? Severe =       4   S4 How much of a problem do you have joining community activities (for example, festivals, Buddhism or other activities) in the same way as anyone else can?  Severe =       4   S5 How much have you been emotionally affected by your health problems? Severe =       4     In the past 30 days, how much difficulty did you have in:   S6 Concentrating on doing something for ten minutes? Moderate =   3   S7 Walking a long distance such as a kilometer (or equivalent)? Severe =       4   S8 Washing your whole body? None =         1   S9 Getting dressed? None =         1   S10 Dealing with people you do not know? Moderate =   3   S11 Maintaining a friendship? Moderate =   3   S12 Your day to day work? Extreme / or cannot do = 5     H1 Overall, in the past 30 days, how many days were these difficulties present? Record number of days 30   H2 In the past 30 days, for how many days were you totally unable to carry out your usual activities or work because of any health condition? Record number of days  10   H3 In the past 30 days, not counting the days that you were totally unable, for how many days did you cut back or reduce your usual activities or work because of any health condition? Record number of days 20     Attendance Agreement:  Client has signed Attendance Agreement:No: will obtain in future session      Collaboration:  Collaboration with other professionals is not indicated at this time.      Preliminary Treatment Plan:  The client reports no currently identified Sikh, ethnic or cultural issues relevant to therapy.     services are not indicated.    Modifications to assist communication are not indicated.    The concerns identified by the client will be addressed in therapy.    Initial Treatment will focus on: Depressed Mood - ..    As a preliminary treatment goal, client will experience a reduction in depressed mood.    The focus of initial interventions will be to increase coping skills.    Referral to another professional/service is not indicated at this time..    A Release of Information is not needed at this time.    Report to child / adult protection  services was NA.    Client will have access to their [facility]' medical record.    Jorge Redding MA, LMFT  May 10, 2019

## 2019-05-16 DIAGNOSIS — F33.1 MAJOR DEPRESSIVE DISORDER, RECURRENT EPISODE, MODERATE (H): ICD-10-CM

## 2019-05-16 NOTE — TELEPHONE ENCOUNTER
Requested Prescriptions   Pending Prescriptions Disp Refills     DULoxetine (CYMBALTA) 30 MG capsule  Last Written Prescription Date:  3/26/19  Last Fill Quantity: 60,  # refills: 1    Last office visit: 4/23/2019 with prescribing provider:  Nallely Moore MD       Future Office Visit:   Next 5 appointments (look out 90 days)    May 23, 2019  1:30 PM CDT  Return Visit with Maninder Redding  [facility] ([facility]) [address] [place] [address] Dr Mannie DIALLO [address]  [phone]   May 30, 2019  1:30 PM CDT  Return Visit with Maninder Redding  [facility] ([facility]) [address] [place] [address] Dr Mannie DIALLO [address]  [phone]   Jun 06, 2019  1:30 PM CDT  Return Visit with Maninder Redding  [facility] ([facility]) [address] [place] [address] Dr Mannie DIALLO [address]  [phone]   Jun 13, 2019  1:30 PM CDT  Return Visit with Maninder Redding  [facility] ([facility]) [address] [place] [address] Dr Mannie DIALLO [address]  [phone]   Jun 20, 2019  1:30 PM CDT  Return Visit with Maninder Redding  [facility] ([facility]) [address] [place] [address] Dr Mannie DIALLO [address]  [phone]          60 capsule 1     Sig: Take 1 capsule (30 mg) by mouth 2 times daily Start with once daily x1wk then increase to bid       Serotonin-Norepinephrine Reuptake Inhibitors  Failed - 5/16/2019  1:53 PM        Failed - PHQ-9 score of less than 5 in past 6 months     Please review last PHQ-9 score.   PHQ-9 SCORE 3/27/2019 4/15/2019 4/23/2019   PHQ-9 Total Score - - -   PHQ-9 Total Score MyChart - - 23 (Severe depression)   PHQ-9 Total Score 21 14 23     VIGNESH-7 SCORE 3/27/2019 4/15/2019   Total Score 19 17                 Passed - Blood pressure under 140/90 in past 12 months     BP Readings from  "Last 3 Encounters:   04/23/19 134/84   04/02/19 (!) 150/92   03/26/19 140/70                 Passed - Medication is active on med list        Passed - Patient is age 18 or older        Passed - No active pregnancy on record        Passed - No positive pregnancy test in past 12 months        Passed - Recent (6 mo) or future (30 days) visit within the authorizing provider's specialty     Patient had office visit in the last 6 months or has a visit in the next 30 days with authorizing provider or within the authorizing provider's specialty.  See \"Patient Info\" tab in inbasket, or \"Choose Columns\" in Meds & Orders section of the refill encounter.              "

## 2019-05-17 RX ORDER — DULOXETIN HYDROCHLORIDE 30 MG/1
30 CAPSULE, DELAYED RELEASE ORAL 2 TIMES DAILY
Qty: 60 CAPSULE | Refills: 0 | Status: SHIPPED | OUTPATIENT
Start: 2019-05-17 | End: 2019-06-14

## 2019-05-17 NOTE — TELEPHONE ENCOUNTER
Routing refill request to provider for review/approval because:  PHQ-9 SCORE 3/27/2019 4/15/2019 4/23/2019   PHQ-9 Total Score - - -   PHQ-9 Total Score MyChart - - 23 (Severe depression)   PHQ-9 Total Score 21 14 23

## 2019-05-22 ENCOUNTER — TRANSFERRED RECORDS (OUTPATIENT)
Dept: HEALTH INFORMATION MANAGEMENT | Facility: CLINIC | Age: 69
End: 2019-05-22

## 2019-05-23 ENCOUNTER — OFFICE VISIT (OUTPATIENT)
Dept: PSYCHOLOGY | Facility: CLINIC | Age: 69
End: 2019-05-23
Payer: COMMERCIAL

## 2019-05-23 DIAGNOSIS — F41.1 GENERALIZED ANXIETY DISORDER: ICD-10-CM

## 2019-05-23 DIAGNOSIS — F33.1 MAJOR DEPRESSIVE DISORDER, RECURRENT EPISODE, MODERATE (H): Primary | ICD-10-CM

## 2019-05-23 PROCEDURE — 90834 PSYTX W PT 45 MINUTES: CPT | Performed by: MARRIAGE & FAMILY THERAPIST

## 2019-05-24 NOTE — PROGRESS NOTES
Progress Note    Client Name: Lucie Whitehead  Date: May 24, 2019           Service Type: Individual  Video Visit: No     Session Start Time: 1:30  Session End Time: 2:15     Session Length: 45 min    Session #: 5    Attendees: Client attended alone     Treatment Plan Last Reviewed: May 2, 2019   PHQ-9 / VIGNESH-7: assessed regularly see flow sheets    DATA  Interactive Complexity: No  Crisis: No       Progress Since Last Session (Related to Symptoms / Goals / Homework):   Symptoms: Worsening .    Homework: Achieved / completed to satisfaction      Episode of Care Goals: Satisfactory progress - ACTION (Actively working towards change); Intervened by reinforcing change plan / affirming steps taken     Current / Ongoing Stressors and Concerns:   - terminated from employment   - medical issues (see EPIC record)     Treatment Objective(s) Addressed in This Session:   Client will learn and integrate CBT/DBT strategies for more effectively managing emotions and relationships.      Intervention:   Taught distress tolerance skills. Client reports her depression and anxiety are worsening. We discussed higher levels of care and this writer gave her the names of nearby day treatment providers to explore. She reports worsening cognitive difficulties, losing words and thoughts frequently.  Processed emotions in session, validated, supportive counseling.     ASSESSMENT: Current Emotional / Mental Status (status of significant symptoms):   Risk status (Self / Other harm or suicidal ideation)   Client denies current fears or concerns for personal safety.   Client denies current or recent suicidal ideation or behaviors.   Client denies current or recent homicidal ideation or behaviors.   Client denies current or recent self injurious behavior or ideation.   Client denies other safety concerns.   Client Client reports there has been no change in risk factors since their last session.      Client Client reports there has been no change in protective factors since their last session.     A safety and risk management has not been developed at this time.  Client was given the Suicide Prevention National Hotline, Text [place] 671603, the [facility] Crisis Number, or encouraged to Call 911 should there be a change in these risk factors.       Appearance:   Appropriate    Eye Contact:   Good    Psychomotor Behavior: Normal    Attitude:   Cooperative    Orientation:   All   Speech    Rate / Production: Normal     Volume:  Normal    Mood:    Anxious  Depressed    Affect:    Appropriate    Thought Content:  Clear    Thought Form:   Coherent  Logical    Insight:    Good      Medication Review:   Changes to psychiatric medications, see updated Medication List in EPIC.      Medication Compliance:   Yes     Changes in Health Issues:   None reported     Chemical Use Review:   Substance Use: Chemical use reviewed, no active concerns identified      Tobacco Use: No current tobacco use.      Diagnosis:  296.32 (F33.1) Major Depressive Disorder, Recurrent Episode, Moderate _  300.02 (F41.1) Generalized Anxiety Disorder    Collateral Reports Completed:   Not Applicable    PLAN: (Client Tasks / Therapist Tasks / Other)  Client will conatctday treatment provider for assessment..      Maninder Redding MA, LMFT                                                       ______________________________________________________________________                                                   Treatment Plan    Client's Name: Lucie Whitehead  YOB: 1950    Date: May 2, 2019     DSM-V Diagnoses: 296.32 - Major Depressive Disorder, Recurrent, Moderate    300.02 - Generalized Anxiety Disorder  ; V71.09 - No Diagnosis  Psychosocial / Contextual Factors: recnet losses  WHODAS: 33    Referral / Collaboration:  Referral to another professional/service is not indicated at this time..    Anticipated number of session or this  episode of care: 26      Measurable Treatment Goal(s) related to diagnosis / functional impairment(s)   I will know I've met my goal when I have better tools to control my emotions     Goal 1: Client will achieve a significant reduction in PHQ-9 scores.     Objective #A (Client Action)   Client will identify cognitive distortions and negative self-talk contributing to depression.   Status: New - Date: May 2, 2019  Intervention(s)   Therapist will teach about identification and strategies to counter negative self-talk and cognitive distortions.    Objective #B (Client Action)   Client will learn and integrate CBT/DBT strategies for more effectively managing emotions and relationships.   Status: New - Date: May 2, 2019  Intervention(s)   Therapist will teach emotional regulation skills distress tolerance skills, interpersonal effectiveness skills and mindfulness skills.     Objective #C   Client will engage in positive self-care.  Status: New - Date: May 2, 2019  Intervention(s)   Therapist will teach self-care goals:  Maintain balance in schedule (time for self/others, relaxation/activities, leisure/tasks, home/out of the house), assert needs and set limits with others, challenge negative thoughts/use affirming and encouraging self-talk, engage with support people on regular basis, practice behavior activation behaviors (sleep hygiene, balanced eating, physical activity, medical needs, personal hygiene), acknowledge and accept your feelings.    Client has reviewed and agreed to the above plan.    Jorge Redding MA, LMFT May 2, 2019

## 2019-05-28 ENCOUNTER — TRANSFERRED RECORDS (OUTPATIENT)
Dept: HEALTH INFORMATION MANAGEMENT | Facility: CLINIC | Age: 69
End: 2019-05-28

## 2019-05-30 ENCOUNTER — OFFICE VISIT (OUTPATIENT)
Dept: PSYCHOLOGY | Facility: CLINIC | Age: 69
End: 2019-05-30
Payer: COMMERCIAL

## 2019-05-30 DIAGNOSIS — F41.1 GENERALIZED ANXIETY DISORDER: ICD-10-CM

## 2019-05-30 DIAGNOSIS — F33.1 MAJOR DEPRESSIVE DISORDER, RECURRENT EPISODE, MODERATE (H): Primary | ICD-10-CM

## 2019-05-30 PROCEDURE — 90834 PSYTX W PT 45 MINUTES: CPT | Performed by: MARRIAGE & FAMILY THERAPIST

## 2019-05-31 NOTE — PROGRESS NOTES
Progress Note    Client Name: Lucie Whitehead  Date: May 30, 2019           Service Type: Individual  Video Visit: No     Session Start Time: 1:30  Session End Time: 2:15     Session Length: 45 min    Session #: 6    Attendees: Client attended alone     Treatment Plan Last Reviewed: May 2, 2019   PHQ-9 / VIGNESH-7: assessed regularly see flow sheets    DATA  Interactive Complexity: No  Crisis: No       Progress Since Last Session (Related to Symptoms / Goals / Homework):   Symptoms: No change .    Homework: Achieved / completed to satisfaction      Episode of Care Goals: Satisfactory progress - ACTION (Actively working towards change); Intervened by reinforcing change plan / affirming steps taken     Current / Ongoing Stressors and Concerns:   - terminated from employment   - medical issues (see EPIC record)     Treatment Objective(s) Addressed in This Session:   Client will learn and integrate CBT/DBT strategies for more effectively managing emotions and relationships.      Intervention:   Taught distress tolerance skills. Client reports her depression and anxiety continue to severely effect functioning. After looking at day treatment options she will contact one for a  higher levels of care. She reports worsening cognitive difficulties, losing words and thoughts frequently.  Processed emotions in session, validated, supportive counseling.     ASSESSMENT: Current Emotional / Mental Status (status of significant symptoms):   Risk status (Self / Other harm or suicidal ideation)   Client denies current fears or concerns for personal safety.   Client denies current or recent suicidal ideation or behaviors.   Client denies current or recent homicidal ideation or behaviors.   Client denies current or recent self injurious behavior or ideation.   Client denies other safety concerns.   Client Client reports there has been no change in risk factors since their last session.      Client Client reports there has been no change in protective factors since their last session.     A safety and risk management has not been developed at this time.  Client was given the Suicide Prevention National Hotline, Text [place] 107297, the [facility] Crisis Number, or encouraged to Call 911 should there be a change in these risk factors.       Appearance:   Appropriate    Eye Contact:   Good    Psychomotor Behavior: Normal    Attitude:   Cooperative    Orientation:   All   Speech    Rate / Production: Normal     Volume:  Normal    Mood:    Anxious  Depressed    Affect:    Appropriate    Thought Content:  Clear    Thought Form:   Coherent  Logical    Insight:    Good      Medication Review:   No changes to current psychiatric medication(s)     Medication Compliance:   Yes     Changes in Health Issues:   None reported     Chemical Use Review:   Substance Use: Chemical use reviewed, no active concerns identified      Tobacco Use: No current tobacco use.      Diagnosis:  296.32 (F33.1) Major Depressive Disorder, Recurrent Episode, Moderate _  300.02 (F41.1) Generalized Anxiety Disorder    Collateral Reports Completed:   Not Applicable    PLAN: (Client Tasks / Therapist Tasks / Other)  Client will contact day treatment provider for assessment..      Maninder Redding MA, LMFT                                                       ______________________________________________________________________                                                   Treatment Plan    Client's Name: Lucie Whitehead  YOB: 1950    Date: May 2, 2019     DSM-V Diagnoses: 296.32 - Major Depressive Disorder, Recurrent, Moderate    300.02 - Generalized Anxiety Disorder  ; V71.09 - No Diagnosis  Psychosocial / Contextual Factors: recnet losses  WHODAS: 33    Referral / Collaboration:  Referral to another professional/service is not indicated at this time..    Anticipated number of session or this episode of care:  26      Measurable Treatment Goal(s) related to diagnosis / functional impairment(s)   I will know I've met my goal when I have better tools to control my emotions     Goal 1: Client will achieve a significant reduction in PHQ-9 scores.     Objective #A (Client Action)   Client will identify cognitive distortions and negative self-talk contributing to depression.   Status: New - Date: May 2, 2019  Intervention(s)   Therapist will teach about identification and strategies to counter negative self-talk and cognitive distortions.    Objective #B (Client Action)   Client will learn and integrate CBT/DBT strategies for more effectively managing emotions and relationships.   Status: New - Date: May 2, 2019  Intervention(s)   Therapist will teach emotional regulation skills distress tolerance skills, interpersonal effectiveness skills and mindfulness skills.     Objective #C   Client will engage in positive self-care.  Status: New - Date: May 2, 2019  Intervention(s)   Therapist will teach self-care goals:  Maintain balance in schedule (time for self/others, relaxation/activities, leisure/tasks, home/out of the house), assert needs and set limits with others, challenge negative thoughts/use affirming and encouraging self-talk, engage with support people on regular basis, practice behavior activation behaviors (sleep hygiene, balanced eating, physical activity, medical needs, personal hygiene), acknowledge and accept your feelings.    Client has reviewed and agreed to the above plan.    Jorge Redding MA, LMFT May 2, 2019

## 2019-06-03 ENCOUNTER — MYC REFILL (OUTPATIENT)
Dept: PEDIATRICS | Facility: CLINIC | Age: 69
End: 2019-06-03

## 2019-06-03 DIAGNOSIS — F41.1 GENERALIZED ANXIETY DISORDER: ICD-10-CM

## 2019-06-03 DIAGNOSIS — F33.1 MAJOR DEPRESSIVE DISORDER, RECURRENT EPISODE, MODERATE (H): ICD-10-CM

## 2019-06-04 ENCOUNTER — HOSPITAL ENCOUNTER (OUTPATIENT)
Facility: CLINIC | Age: 69
Discharge: HOME | End: 2019-06-04
Attending: INTERNAL MEDICINE | Admitting: INTERNAL MEDICINE
Payer: COMMERCIAL

## 2019-06-04 VITALS
BODY MASS INDEX: 35.52 KG/M2 | OXYGEN SATURATION: 92 % | HEIGHT: 66 IN | WEIGHT: 221 LBS | RESPIRATION RATE: 16 BRPM | HEART RATE: 82 BPM | SYSTOLIC BLOOD PRESSURE: 145 MMHG | DIASTOLIC BLOOD PRESSURE: 87 MMHG

## 2019-06-04 LAB — COLONOSCOPY: NORMAL

## 2019-06-04 PROCEDURE — 45378 DIAGNOSTIC COLONOSCOPY: CPT | Performed by: INTERNAL MEDICINE

## 2019-06-04 PROCEDURE — 25000128 H RX IP 250 OP 636: Performed by: INTERNAL MEDICINE

## 2019-06-04 PROCEDURE — G0500 MOD SEDAT ENDO SERVICE >5YRS: HCPCS | Performed by: INTERNAL MEDICINE

## 2019-06-04 PROCEDURE — G0105 COLORECTAL SCRN; HI RISK IND: HCPCS | Performed by: INTERNAL MEDICINE

## 2019-06-04 RX ORDER — NALOXONE HYDROCHLORIDE 0.4 MG/ML
.1-.4 INJECTION, SOLUTION INTRAMUSCULAR; INTRAVENOUS; SUBCUTANEOUS
Status: DISCONTINUED | OUTPATIENT
Start: 2019-06-04 | End: 2019-06-04 | Stop reason: HOSPADM

## 2019-06-04 RX ORDER — ONDANSETRON 2 MG/ML
4 INJECTION INTRAMUSCULAR; INTRAVENOUS
Status: DISCONTINUED | OUTPATIENT
Start: 2019-06-04 | End: 2019-06-04 | Stop reason: HOSPADM

## 2019-06-04 RX ORDER — ONDANSETRON 4 MG/1
4 TABLET, ORALLY DISINTEGRATING ORAL EVERY 6 HOURS PRN
Status: DISCONTINUED | OUTPATIENT
Start: 2019-06-04 | End: 2019-06-04 | Stop reason: HOSPADM

## 2019-06-04 RX ORDER — DULOXETIN HYDROCHLORIDE 30 MG/1
30 CAPSULE, DELAYED RELEASE ORAL 2 TIMES DAILY
Qty: 60 CAPSULE | Refills: 0 | OUTPATIENT
Start: 2019-06-04

## 2019-06-04 RX ORDER — ONDANSETRON 2 MG/ML
4 INJECTION INTRAMUSCULAR; INTRAVENOUS EVERY 6 HOURS PRN
Status: DISCONTINUED | OUTPATIENT
Start: 2019-06-04 | End: 2019-06-04 | Stop reason: HOSPADM

## 2019-06-04 RX ORDER — LIDOCAINE 40 MG/G
CREAM TOPICAL
Status: DISCONTINUED | OUTPATIENT
Start: 2019-06-04 | End: 2019-06-04 | Stop reason: HOSPADM

## 2019-06-04 RX ORDER — FENTANYL CITRATE 50 UG/ML
INJECTION, SOLUTION INTRAMUSCULAR; INTRAVENOUS PRN
Status: DISCONTINUED | OUTPATIENT
Start: 2019-06-04 | End: 2019-06-04 | Stop reason: HOSPADM

## 2019-06-04 RX ORDER — HYDROXYZINE HYDROCHLORIDE 25 MG/1
25-50 TABLET, FILM COATED ORAL EVERY 4 HOURS PRN
Qty: 180 TABLET | Refills: 1 | OUTPATIENT
Start: 2019-06-04

## 2019-06-04 RX ORDER — FLUMAZENIL 0.1 MG/ML
0.2 INJECTION, SOLUTION INTRAVENOUS
Status: DISCONTINUED | OUTPATIENT
Start: 2019-06-04 | End: 2019-06-04 | Stop reason: HOSPADM

## 2019-06-04 ASSESSMENT — MIFFLIN-ST. JEOR: SCORE: 1551.26

## 2019-06-04 NOTE — LETTER
May 17, 2019      Lucie Whitehead  [address] [address] [id number]        Thank you for choosing [facility]. You are scheduled for the following service:     Date:  Tuesday June 4, 2019             Procedure:  COLONOSCOPY  Doctor:        Dr Gonzales   Arrival Time:  1pm  *Check in at Emergency/Endoscopy desk*  Procedure Time:  130pm      Location:   [facility]        Endoscopy Department, First Floor (Enter through ER Doors) *        [address] [place], [place] [address]      [phone] or [phone] () to reschedule      MIRALAX -GATORADE  PREP  Colonoscopy is the most accurate test to detect colon polyps and colon cancer; and the only test where polyps can be removed. During this procedure, a doctor examines the lining of your large intestine and rectum through a flexible tube.   Transportation  Arrange for a ride for the day of your procedure with a responsible adult.  A taxi ride is not an option unless you are accompanied by a responsible adult. If you fail to arrange transportation with a responsible adult, your procedure will be cancelled and rescheduled.    Purchase the  following supplies at your local pharmacy:  - 2 (two) bisacodyl tablets: each tablet contains 5 mg.  (Dulcolax  laxative NOT Dulcolax  stool softener)   - 1 (one) 8.3 oz bottle of Polyethylene Glycol (PEG) 3350 Powder   (MiraLAX , Smooth LAX , ClearLAX  or equivalent)  - 64 oz Gatorade    Regular Gatorade, Gatorade G2 , Powerade , Powerade Zero  or Pedialyte  is acceptable. Red colored flavors are not allowed; all other colors (yellow, green, orange, purple and blue) are okay. It is also okay to buy two 2.12 oz packets of powdered Gatorade that can be mixed with water to a total volume of 64 oz of liquid.  - 1 (one) 10 oz bottle of Magnesium Citrate (Red colored flavors are not allowed)  It is also okay for you to use a 0.5 oz package of powdered magnesium  citrate (17 g) mixed with 10 oz of water.      PREPARATION FOR COLONOSCOPY    7 days before:    Discontinue fiber supplements and medications containing iron. This includes Metamucil  and Fibercon ; and multivitamins with iron.    3 days before:    Begin a low-fiber diet. A low-fiber diet helps making the cleanout more effective.     Examples of a low-fiber diet include (but are not limited to): white bread, white rice, pasta, crackers, fish, chicken, eggs, ground beef, creamy peanut butter, cooked/steamed/boiled vegetables, canned fruit, bananas, melons, milk, plain yogurt cheese, salad dressing and other condiments.     The following are not allowed on a low-fiber diet: seeds, nuts, popcorn, bran, whole wheat, corn, quinoa, raw fruits and vegetables, berries and dried fruit, beans and lentils.    For additional details on low-fiber diet, please refer to the table on the last page.    2 days before:    Continue the low-fiber diet.     Drink at least 8 glasses of water throughout the day.     Stop eating solid foods at 11:45 pm.    1 day before:    In the morning: begin a clear liquid diet (liquids you can see through).     Examples of a clear liquid diet include: water, clear broth or bouillon, Gatorade, Pedialyte or Powerade, carbonated and non-carbonated soft drinks (Sprite , 7-Up , ginger ale), strained fruit juices without pulp (apple, white grape, white cranberry), Jell-O  and popsicles.     The following are not allowed on a clear liquid diet: red liquids, alcoholic beverages, dairy products (milk, creamer, and yogurt), protein shakes, creamy broths, juice with pulp and chewing tobacco.    At noon: take 2 (two) bisacodyl tablets     At 4 (and no later than 6pm): start drinking the Miralax-Gatorade preparation (8.3 oz of Miralax mixed with 64 oz of Gatorade in a large pitcher). Drink 1(one) 8 oz glass every 15 minutes thereafter, until the mixture is gone.    COLON CLEANSING TIPS: drink adequate amounts of  fluids before and after your colon cleansing to prevent dehydration. Stay near a toilet because you will have diarrhea. Even if you are sitting on the toilet, continue to drink the cleansing solution every 15 minutes. If you feel nauseous or vomit, rinse your mouth with water, take a 15 to 30-minute-break and then continue drinking the solution. You will be uncomfortable until the stool has flushed from your colon (in about 2 to 4 hours). You may feel chilled.    Day of your procedure  You may take all of your morning medications including blood pressure medications, blood thinners (if you have not been instructed to stop these by our office), methadone, anti-seizure medications with sips of water 3 hours prior to your procedure or earlier. Do not take insulin or vitamins prior to your procedure. Continue the clear liquid diet.       4 hours prior: drink 10 oz of magnesium citrate. It may be easier to drink it with a straw.    STOP consuming all liquids after that.     Do not take anything by mouth during this time.     Allow extra time to travel to your procedure as you may need to stop and use a restroom along the way.    You are ready for the procedure, if you followed all instructions and your stool is no longer formed, but clear or yellow liquid. If you are unsure whether your colon is clean, please call our office at [phone] before you leave for your appointment.    Bring the following to your procedure:  - Insurance Card/Photo ID.   - List of current medications including over-the-counter medications and supplements.   - Your rescue inhaler if you currently use one to control asthma.      Canceling or rescheduling your appointment:   If you must cancel or reschedule your appointment, please call [phone] as soon as possible.      COLONOSCOPY PRE-PROCEDURE CHECKLIST    If you have diabetes, ask your regular doctor for diet and medication restrictions.  If you take an anticoagulant or anti-platelet  medication (such as Coumadin , Lovenox , Pradaxa , Xarelto , Eliquis , etc.), please call your primary doctor for advice on holding this medication.  If you take aspirin you may continue to do so.  If you are or may be pregnant, please discuss the risks and benefits of this procedure with your doctor.        What happens during a colonoscopy?    Plan to spend up to two hours, starting at registration time, at the endoscopy center the day of your procedure. The colonoscopy takes an average of 15 to 30 minutes. Recovery time is about 30 minutes.      Before the exam:    You will change into a gown.    Your medical history and medication list will be reviewed with you, unless that has been done over the phone prior to the procedure.     A nurse will insert an intravenous (IV) line into your hand or arm.    The doctor will meet with you and will give you a consent form to sign.  During the exam:     Medicine will be given through the IV line to help you relax.     Your heart rate and oxygen levels will be monitored. If your blood pressure is low, you may be given fluids through the IV line.     The doctor will insert a flexible hollow tube, called a colonoscope, into your rectum. The scope will be advanced slowly through the large intestine (colon).    You may have a feeling of fullness or pressure.     If an abnormal tissue or a polyp is found, the doctor may remove it through the endoscope for closer examination, or biopsy. Tissue removal is painless    After the exam:           Any tissue samples removed during the exam will be sent to a lab for evaluation. It may take 5-7 working days for you to be notified of the results.     A nurse will provide you with complete discharge instructions before you leave the endoscopy center. Be sure to ask the nurse for specific instructions if you take blood thinners such as Aspirin, Coumadin or Plavix.     The doctor will prepare a full report for you and for the physician who  referred you for the procedure.     Your doctor will talk with you about the initial results of your exam.      Medication given during the exam will prohibit you from driving for the rest of the day.     Following the exam, you may resume your normal diet. Your first meal should be light, no greasy foods. Avoid alcohol until the next day.     You may resume your regular activities the day after the procedure.         LOW-FIBER DIET    Foods RECOMMENDED Foods to AVOID   Breads, Cereal, Rice and Pasta:   White bread, rolls, biscuits, croissant and michell toast.   Waffles, [place] toast and pancakes.   White rice, noodles, pasta, macaroni and peeled cooked potatoes.   Plain crackers and saltines.   Cooked cereals: farina, cream of rice.   Cold cereals: Puffed Rice , Rice Krispies , Corn Flakes  and Special K    Breads, Cereal, Rice and Pasta:   Breads or rolls with nuts, seeds or fruit.   Whole wheat, pumpernickel, rye breads and cornbread.   Potatoes with skin, brown or wild rice, and kasha (buckwheat).     Vegetables:   Tender cooked and canned vegetables without seeds: carrots, asparagus tips, green or wax beans, pumpkin, spinach, lima beans. Vegetables:   Raw or steamed vegetables.   Vegetables with seeds.   Sauerkraut.   Winter squash, peas, broccoli, Brussel sprouts, cabbage, onions, cauliflower, baked beans, peas and corn.   Fruits:   Strained fruit juice.   Canned fruit, except pineapple.   Ripe bananas and melon. Fruits:   Prunes and prune juice.   Raw fruits.   Dried fruits: figs, dates and raisins.   Milk/Dairy:   Milk: plain or flavored.   Yogurt, custard and ice cream.   Cheese and cottage cheese Milk/Dairy:     Meat and other proteins:   ground, well-cooked tender beef, lamb, ham, veal, pork, fish, poultry and organ meats.   Eggs.   Peanut butter without nuts. Meat and other proteins:   Tough, fibrous meats with gristle.   Dry beans, peas and lentils.   Peanut butter with nuts.   Tofu.   Fats, Snack,  Sweets, Condiments and Beverages:   Margarine, butter, oils, mayonnaise, sour cream and salad dressing, plain gravy.   Sugar, hard candy, clear jelly, honey and syrup.   Spices, cooked herbs, bouillon, broth and soups made with allowed vegetable, ketchup and mustard.   Coffee, tea and carbonated drinks.   Plain cakes, cookies and pretzels.   Gelatin, plain puddings, custard, ice cream, sherbet and popsicles. Fats, Snack, Sweets, Condiments and Beverages:   Nuts, seeds and coconut.   Jam, marmalade and preserves.   Pickles, olives, relish and horseradish.   All desserts containing nuts, seeds, dried fruit and coconut; or made from whole grains or bran.   Candy made with nuts or seeds.   Popcorn.                     DIRECTIONS TO THE ENDOSCOPY DEPARTMENT     From the north ([facility])  Take [address] South, exit on [address] [address]. Get into the left hand gamaliel, turn left (east), go one-half mile to [address] and turn left. Go north to the first stoplight, take a right on [address] and follow it to the Emergency entrance.    From the south ([facility])  Take [address]N to the [address] split and exit on [address] [address]. On [address] [address], turn left (west) to [address]. Turn right (north) on [address]. Go north to the first stoplight, take a right on [address] and follow it to the Emergency entrance.    From the east via 35[address] ([facility])  Take [address] south to [address] [address] exit. Turn right on [address] [address]. Go west to [address]. Turn right (north) on [address]. Go to the first stoplight, take a right and follow on [address] to the Emergency entrance.    From the east via [address] ([facility])  Take [address] to [address]. Turn left (south) on [address] to [address]. Turn left (east) on [address] and follow it to the Emergency entrance.    From the west via [address] ([place], [place])  Take [address] east to [address]. Turn right (south) on [address] to [facility]. Turn left (east) on [facility] and follow it to the Emergency entrance.

## 2019-06-04 NOTE — DISCHARGE INSTRUCTIONS
Understanding Diverticulosis and Diverticulitis     Pouches or diverticula usually occur in the lower part of the colon called the sigmoid.      Diverticulitis occurs when the pouches become inflamed.     The colon (large intestine) is the last part of the digestive tract. It absorbs water from stool and changes it from a liquid to a solid. In certain cases, small pouches called diverticula can form in the colon wall. This condition is called diverticulosis. The pouches can become infected. If this happens, it becomes a more serious problem called diverticulitis. These problems can be painful. But they can be managed.   Managing Your Condition  Diet changes or taking medications are often tried first. These may be enough to bring relief. If the case is bad, surgery may be done. You and your doctor can discuss the plan that is best for you.  If You Have Diverticulosis  Diet changes are often enough to control symptoms. The main changes are adding fiber (roughage) and drinking more water. Fiber absorbs water as it travels through your colon. This helps your stool stay soft and move smoothly. Water helps this process. If needed, you may be told to take over-the-counter stool softeners. To help relieve pain, antispasmodic medications may be prescribed.  If You Have Diverticulitis  Treatment depends on how bad your symptoms are.  For mild symptoms: You may be put on a liquid diet for a short time. You may also be prescribed antibiotics. If these two steps relieve your symptoms, you may then be prescribed a high-fiber diet. If you still have symptoms, your doctor will discuss further treatment options with you.  For severe symptoms: You may need to be admitted to the hospital. There, you can be given IV antibiotics and fluids. Once symptoms are under control, the above treatments may be tried. If these don t control your condition, your doctor may discuss the option of having surgery with you.  [place] [facility]  [facility]  Help keep your colon healthy with a diet that includes plenty of high-fiber fruits, vegetables, and whole grains. Drink plenty of liquids like water and juice. Your doctor may also recommend avoiding seeds and nuts.          [id number] Mitzi Leon, [address] [place] [address], [place] [address]. All rights reserved. This information is not intended as a substitute for professional medical care. Always follow your healthcare professional's instructions.

## 2019-06-06 ENCOUNTER — OFFICE VISIT (OUTPATIENT)
Dept: PSYCHOLOGY | Facility: CLINIC | Age: 69
End: 2019-06-06
Payer: COMMERCIAL

## 2019-06-06 DIAGNOSIS — F33.1 MAJOR DEPRESSIVE DISORDER, RECURRENT EPISODE, MODERATE (H): Primary | ICD-10-CM

## 2019-06-06 DIAGNOSIS — F41.1 GENERALIZED ANXIETY DISORDER: ICD-10-CM

## 2019-06-06 PROCEDURE — 90834 PSYTX W PT 45 MINUTES: CPT | Performed by: MARRIAGE & FAMILY THERAPIST

## 2019-06-07 NOTE — PROGRESS NOTES
Progress Note    Client Name: Lucie Whitehead  Date: June 6, 2019            Service Type: Individual  Video Visit: No     Session Start Time: 1:30  Session End Time: 2:15     Session Length: 45 min    Session #: 7    Attendees: Client attended alone     Treatment Plan Last Reviewed: May 2, 2019   PHQ-9 / VIGNESH-7: assessed regularly see flow sheets    DATA  Interactive Complexity: No  Crisis: No       Progress Since Last Session (Related to Symptoms / Goals / Homework):   Symptoms: No change .    Homework: Achieved / completed to satisfaction      Episode of Care Goals: Satisfactory progress - ACTION (Actively working towards change); Intervened by reinforcing change plan / affirming steps taken     Current / Ongoing Stressors and Concerns:   - terminated from employment   - medical issues (see EPIC record)     Treatment Objective(s) Addressed in This Session:   Client will learn and integrate CBT/DBT strategies for more effectively managing emotions and relationships.      Intervention:   Taugh interpersonal affectiveness skills.Client reports her depression and anxiety continue to severely effect functioning. After looking at day treatment options she contacted one and has an appointment for an intake next week. She reports continued cognitive difficulties, losing words and thoughts frequently.  Processed emotions in session, validated, supportive counseling.     ASSESSMENT: Current Emotional / Mental Status (status of significant symptoms):   Risk status (Self / Other harm or suicidal ideation)   Client denies current fears or concerns for personal safety.   Client denies current or recent suicidal ideation or behaviors.   Client denies current or recent homicidal ideation or behaviors.   Client denies current or recent self injurious behavior or ideation.   Client denies other safety concerns.   Client Client reports there has been no change in risk factors since  their last session.     Client Client reports there has been no change in protective factors since their last session.     A safety and risk management has not been developed at this time.  Client was given the Suicide Prevention National Hotline, Text [place] 928557, the [facility] Crisis Number, or encouraged to Call 911 should there be a change in these risk factors.       Appearance:   Appropriate    Eye Contact:   Good    Psychomotor Behavior: Normal    Attitude:   Cooperative    Orientation:   All   Speech    Rate / Production: Normal     Volume:  Normal    Mood:    Anxious  Depressed    Affect:    Appropriate    Thought Content:  Clear    Thought Form:   Coherent  Logical    Insight:    Good      Medication Review:   No changes to current psychiatric medication(s)     Medication Compliance:   Yes     Changes in Health Issues:   None reported     Chemical Use Review:   Substance Use: Chemical use reviewed, no active concerns identified      Tobacco Use: No current tobacco use.      Diagnosis:  296.32 (F33.1) Major Depressive Disorder, Recurrent Episode, Moderate _  300.02 (F41.1) Generalized Anxiety Disorder    Collateral Reports Completed:   Not Applicable    PLAN: (Client Tasks / Therapist Tasks / Other)  Client will follow through with treatment provider assessment.      Maninder Redding MA, LMFT                                                       ______________________________________________________________________                                                   Treatment Plan    Client's Name: Lucie Whitehead  YOB: 1950    Date: May 2, 2019     DSM-V Diagnoses: 296.32 - Major Depressive Disorder, Recurrent, Moderate    300.02 - Generalized Anxiety Disorder  ; V71.09 - No Diagnosis  Psychosocial / Contextual Factors: recnet losses  WHODAS: 33    Referral / Collaboration:  Referral to another professional/service is not indicated at this time..    Anticipated number of session or this  episode of care: 26      Measurable Treatment Goal(s) related to diagnosis / functional impairment(s)   I will know I've met my goal when I have better tools to control my emotions     Goal 1: Client will achieve a significant reduction in PHQ-9 scores.     Objective #A (Client Action)   Client will identify cognitive distortions and negative self-talk contributing to depression.   Status: New - Date: May 2, 2019  Intervention(s)   Therapist will teach about identification and strategies to counter negative self-talk and cognitive distortions.    Objective #B (Client Action)   Client will learn and integrate CBT/DBT strategies for more effectively managing emotions and relationships.   Status: New - Date: May 2, 2019  Intervention(s)   Therapist will teach emotional regulation skills distress tolerance skills, interpersonal effectiveness skills and mindfulness skills.     Objective #C   Client will engage in positive self-care.  Status: New - Date: May 2, 2019  Intervention(s)   Therapist will teach self-care goals:  Maintain balance in schedule (time for self/others, relaxation/activities, leisure/tasks, home/out of the house), assert needs and set limits with others, challenge negative thoughts/use affirming and encouraging self-talk, engage with support people on regular basis, practice behavior activation behaviors (sleep hygiene, balanced eating, physical activity, medical needs, personal hygiene), acknowledge and accept your feelings.    Client has reviewed and agreed to the above plan.    Jorge Redding MA, LMFT May 2, 2019

## 2019-06-11 DIAGNOSIS — F33.1 MAJOR DEPRESSIVE DISORDER, RECURRENT EPISODE, MODERATE (H): ICD-10-CM

## 2019-06-11 NOTE — TELEPHONE ENCOUNTER
Requested Prescriptions   Pending Prescriptions Disp Refills     citalopram (CELEXA) 20 MG tablet [Pharmacy Med Name: CITALOPRAM HBR 20 MG TABLET]    Last Written Prescription Date:  3/23/2018  Last Fill Quantity: 90,  # refills: 3   Last office visit: 4/23/2019 with prescribing provider:  Nallely Moore     Future Office Visit:   Next 5 appointments (look out 90 days)    Jun 13, 2019  1:30 PM CDT  Return Visit with Maninder Redding  [facility] ([facility]) [address] [place] [address] [address] [place] [address]  [phone]   Jun 20, 2019  1:30 PM CDT  Return Visit with Maninder Redding  [facility] ([facility]) [address] [place] [address] Dr [place] [address]  [phone]   Jun 27, 2019  1:30 PM CDT  Return Visit with Maninder Redding  [facility] ([facility]) [address] [place] [address] Dr [place] [address]  [phone]   Jul 11, 2019  1:30 PM CDT  Return Visit with Maninder Redding  [facility] ([facility]) [address] [place] [address] Dr [place] [address]  [phone]   Jul 18, 2019  1:30 PM CDT  Return Visit with Maninder Redding  [facility] ([facility]) [address] [place] [address] Dr [place] [address]  [phone]          90 tablet 3     Sig: TAKE 1 TABLET (20 MG) BY MOUTH DAILY       SSRIs Protocol Failed - 6/11/2019  1:21 AM        Failed - PHQ-9 score less than 5 in past 6 months     Please review last PHQ-9 score.           Failed - Medication is active on med list        Passed - Patient is age 18 or older        Passed - No active pregnancy on record        Passed - No positive pregnancy test in last 12 months        Passed - Recent (6 mo) or future (30 days) visit within the authorizing provider's specialty     Patient had office visit in the last 6 months  "or has a visit in the next 30 days with authorizing provider or within the authorizing provider's specialty.  See \"Patient Info\" tab in inbasket, or \"Choose Columns\" in Meds & Orders section of the refill encounter.              "

## 2019-06-13 ENCOUNTER — OFFICE VISIT (OUTPATIENT)
Dept: PSYCHOLOGY | Facility: CLINIC | Age: 69
End: 2019-06-13
Payer: COMMERCIAL

## 2019-06-13 DIAGNOSIS — F41.1 GENERALIZED ANXIETY DISORDER: ICD-10-CM

## 2019-06-13 DIAGNOSIS — F33.1 MAJOR DEPRESSIVE DISORDER, RECURRENT EPISODE, MODERATE (H): Primary | ICD-10-CM

## 2019-06-13 PROCEDURE — 90834 PSYTX W PT 45 MINUTES: CPT | Performed by: MARRIAGE & FAMILY THERAPIST

## 2019-06-13 RX ORDER — CITALOPRAM HYDROBROMIDE 20 MG/1
TABLET ORAL
Qty: 90 TABLET | Refills: 3 | OUTPATIENT
Start: 2019-06-13

## 2019-06-14 ENCOUNTER — OFFICE VISIT (OUTPATIENT)
Dept: PEDIATRICS | Facility: CLINIC | Age: 69
End: 2019-06-14
Payer: COMMERCIAL

## 2019-06-14 VITALS
HEART RATE: 91 BPM | WEIGHT: 225.7 LBS | BODY MASS INDEX: 36.27 KG/M2 | SYSTOLIC BLOOD PRESSURE: 112 MMHG | DIASTOLIC BLOOD PRESSURE: 70 MMHG | TEMPERATURE: 97.8 F | OXYGEN SATURATION: 96 % | HEIGHT: 66 IN

## 2019-06-14 DIAGNOSIS — F41.1 GENERALIZED ANXIETY DISORDER: ICD-10-CM

## 2019-06-14 DIAGNOSIS — R73.01 IMPAIRED FASTING GLUCOSE: ICD-10-CM

## 2019-06-14 DIAGNOSIS — F33.1 MAJOR DEPRESSIVE DISORDER, RECURRENT EPISODE, MODERATE (H): Primary | ICD-10-CM

## 2019-06-14 PROCEDURE — 99214 OFFICE O/P EST MOD 30 MIN: CPT | Performed by: INTERNAL MEDICINE

## 2019-06-14 RX ORDER — HYDROXYZINE HYDROCHLORIDE 25 MG/1
25-50 TABLET, FILM COATED ORAL EVERY 4 HOURS PRN
Qty: 180 TABLET | Refills: 1 | Status: SHIPPED | OUTPATIENT
Start: 2019-06-14 | End: 2019-10-01 | Stop reason: SINTOL

## 2019-06-14 RX ORDER — DULOXETIN HYDROCHLORIDE 30 MG/1
30 CAPSULE, DELAYED RELEASE ORAL 2 TIMES DAILY
Qty: 60 CAPSULE | Refills: 1 | Status: SHIPPED | OUTPATIENT
Start: 2019-06-14 | End: 2019-09-01

## 2019-06-14 RX ORDER — METFORMIN HCL 500 MG
TABLET, EXTENDED RELEASE 24 HR ORAL
Qty: 360 TABLET | Refills: 1 | Status: SHIPPED | OUTPATIENT
Start: 2019-06-14 | End: 2019-12-03

## 2019-06-14 ASSESSMENT — PATIENT HEALTH QUESTIONNAIRE - PHQ9
SUM OF ALL RESPONSES TO PHQ QUESTIONS 1-9: 24
5. POOR APPETITE OR OVEREATING: NEARLY EVERY DAY

## 2019-06-14 ASSESSMENT — ANXIETY QUESTIONNAIRES
1. FEELING NERVOUS, ANXIOUS, OR ON EDGE: NEARLY EVERY DAY
GAD7 TOTAL SCORE: 19
2. NOT BEING ABLE TO STOP OR CONTROL WORRYING: NEARLY EVERY DAY
IF YOU CHECKED OFF ANY PROBLEMS ON THIS QUESTIONNAIRE, HOW DIFFICULT HAVE THESE PROBLEMS MADE IT FOR YOU TO DO YOUR WORK, TAKE CARE OF THINGS AT HOME, OR GET ALONG WITH OTHER PEOPLE: EXTREMELY DIFFICULT
5. BEING SO RESTLESS THAT IT IS HARD TO SIT STILL: MORE THAN HALF THE DAYS
6. BECOMING EASILY ANNOYED OR IRRITABLE: NEARLY EVERY DAY
7. FEELING AFRAID AS IF SOMETHING AWFUL MIGHT HAPPEN: MORE THAN HALF THE DAYS
3. WORRYING TOO MUCH ABOUT DIFFERENT THINGS: NEARLY EVERY DAY

## 2019-06-14 ASSESSMENT — MIFFLIN-ST. JEOR: SCORE: 1572.58

## 2019-06-14 NOTE — LETTER
June 14, 2019      Lucie Whitehead  [address] [facility] [address]        To Whom It May Concern:    Lucie Whitehead was seen in our clinic today. She is not able to return to work for the foreseeable future.  I have completed long-term disability forms.  Please let us know if you have any questions or concerns.       Sincerely,        Nallely Moore MD

## 2019-06-14 NOTE — PROGRESS NOTES
Progress Note    Client Name: Lucie Whitehead  Date: June 13, 2019            Service Type: Individual  Video Visit: No     Session Start Time: 1:30  Session End Time: 2:15     Session Length: 45 min    Session #: 8    Attendees: Client attended alone     Treatment Plan Last Reviewed: May 2, 2019   PHQ-9 / VIGNESH-7: assessed regularly see flow sheets    DATA  Interactive Complexity: No  Crisis: No       Progress Since Last Session (Related to Symptoms / Goals / Homework):   Symptoms: No change .    Homework: Achieved / completed to satisfaction      Episode of Care Goals: Satisfactory progress - ACTION (Actively working towards change); Intervened by reinforcing change plan / affirming steps taken     Current / Ongoing Stressors and Concerns:   - terminated from employment   - medical issues (see EPIC record)     Treatment Objective(s) Addressed in This Session:   Client will learn and integrate CBT/DBT strategies for more effectively managing emotions and relationships.      Intervention:   Taught mindfulness skills. Client reports her depression and anxiety continue to severely effect functioning. She says she attended young introductory assessemnt appointment with a day treatment provider and will have a final session next week. She says she is willing to do whatever is necessary to feel better.  Processed emotions in session, validated, supportive counseling.     ASSESSMENT: Current Emotional / Mental Status (status of significant symptoms):   Risk status (Self / Other harm or suicidal ideation)   Client denies current fears or concerns for personal safety.   Client denies current or recent suicidal ideation or behaviors.   Client denies current or recent homicidal ideation or behaviors.   Client denies current or recent self injurious behavior or ideation.   Client denies other safety concerns.   Client Client reports there has been no change in risk factors since their  last session.     Client Client reports there has been no change in protective factors since their last session.     A safety and risk management has not been developed at this time.  Client was given the Suicide Prevention National Hotline, Text [place] 942519, the [facility] Crisis Number, or encouraged to Call 911 should there be a change in these risk factors.       Appearance:   Appropriate    Eye Contact:   Good    Psychomotor Behavior: Normal    Attitude:   Cooperative    Orientation:   All   Speech    Rate / Production: Normal     Volume:  Normal    Mood:    Anxious  Depressed    Affect:    Appropriate    Thought Content:  Clear    Thought Form:   Coherent  Logical    Insight:    Good      Medication Review:   No changes to current psychiatric medication(s)     Medication Compliance:   Yes     Changes in Health Issues:   None reported     Chemical Use Review:   Substance Use: Chemical use reviewed, no active concerns identified      Tobacco Use: No current tobacco use.      Diagnosis:  296.32 (F33.1) Major Depressive Disorder, Recurrent Episode, Moderate _  300.02 (F41.1) Generalized Anxiety Disorder    Collateral Reports Completed:   Not Applicable    PLAN: (Client Tasks / Therapist Tasks / Other)  Client will follow through with treatment provider assessment.      Maninder Redding MA, LMFT                                                       ______________________________________________________________________                                                   Treatment Plan    Client's Name: Lucie Whitehead  YOB: 1950    Date: May 2, 2019     DSM-V Diagnoses: 296.32 - Major Depressive Disorder, Recurrent, Moderate    300.02 - Generalized Anxiety Disorder  ; V71.09 - No Diagnosis  Psychosocial / Contextual Factors: recnet losses  WHODAS: 33    Referral / Collaboration:  Referral to another professional/service is not indicated at this time..    Anticipated number of session or this episode of  care: 26      Measurable Treatment Goal(s) related to diagnosis / functional impairment(s)   I will know I've met my goal when I have better tools to control my emotions     Goal 1: Client will achieve a significant reduction in PHQ-9 scores.     Objective #A (Client Action)   Client will identify cognitive distortions and negative self-talk contributing to depression.   Status: New - Date: May 2, 2019  Intervention(s)   Therapist will teach about identification and strategies to counter negative self-talk and cognitive distortions.    Objective #B (Client Action)   Client will learn and integrate CBT/DBT strategies for more effectively managing emotions and relationships.   Status: New - Date: May 2, 2019  Intervention(s)   Therapist will teach emotional regulation skills distress tolerance skills, interpersonal effectiveness skills and mindfulness skills.     Objective #C   Client will engage in positive self-care.  Status: New - Date: May 2, 2019  Intervention(s)   Therapist will teach self-care goals:  Maintain balance in schedule (time for self/others, relaxation/activities, leisure/tasks, home/out of the house), assert needs and set limits with others, challenge negative thoughts/use affirming and encouraging self-talk, engage with support people on regular basis, practice behavior activation behaviors (sleep hygiene, balanced eating, physical activity, medical needs, personal hygiene), acknowledge and accept your feelings.    Client has reviewed and agreed to the above plan.    Jorge Redding MA, LMFT May 2, 2019

## 2019-06-14 NOTE — PROGRESS NOTES
Subjective     Lucie Whitehead is a 68 year old female who presents to clinic today for the following health issues:    States she has appointment with hand surgeon.  Has forms with her today for disability.  Short term and long term disability. Originally forms were filled out for  and needs until .    HPI   Depression and Anxiety Follow-Up    How are you doing with your depression since your last visit? No change    How are you doing with your anxiety since your last visit?  No change    Are you having other symptoms that might be associated with depression or anxiety? Yes:  sleeping issues, concentration issues    Have you had a significant life event? No - having difficulties with insurance    Do you have any concerns with your use of alcohol or other drugs? No     Seeing counselor.  Still feels unwell enough to go back to work.  Is on short term disability.  Has evaluation for intensive counseling program 4 hrs/d for 4d/wk.      cymbalta 30 twice daily since 19 and hydroxyzine about three times daily.  Failed celexa and wellbutrin most recently after being on since about  with the welbutrin added in  .  Started anti-depressants in  and was started on zoloft but had side effects with sleepiness.  Prozac was helpful for a long time but noticed that wasn't as sharp as had been.       Sleep is inconsistent - has to be very tired to be able to go to sleep.  Uses breathing pattern exercises and hydroxyzine.     Social History     Tobacco Use     Smoking status: Former Smoker     Last attempt to quit: 1975     Years since quittin.4     Smokeless tobacco: Never Used   Substance Use Topics     Alcohol use: Yes     Alcohol/week: 0.0 oz     Comment: 2-3 glass wine /week     Drug use: No     PHQ 4/15/2019 2019 2019   PHQ-9 Total Score 14 23 24   Q9: Thoughts of better off dead/self-harm past 2 weeks Not at all More than half the days More than half the days   F/U: Thoughts of  "suicide or self-harm - No -   F/U: Safety concerns - No -     VIGNESH-7 SCORE 3/27/2019 4/15/2019 6/14/2019   Total Score 19 17 19           Suicide Assessment Five-step Evaluation and Treatment (SAFE-T)    Amount of exercise or physical activity: None    Problems taking medications regularly: No    Medication side effects: tremors    Diet: regular (no restrictions)    Needs forms done for work.  Reviewed work ability with patient - Can lift only 10 lbs, close to body.,  Changes position every 30 mins or so due to pain in back and legs.  Hands having issues with carpal tunnel so keyboard/mousing an issue.  Cognition and dealing with stress and anxiety/depression primary issue.      Pain is better with anti-inflammatories.  Rare use ultram as worsening tremor.  Does have appointment with specialist about the carpal tunnel      Reviewed and updated as needed this visit by Provider  Allergies  Meds  Problems         Review of Systems         Objective    /70 (BP Location: Right arm, Cuff Size: Adult Large)   Pulse 91   Temp 97.8  F (36.6  C) (Tympanic)   Ht 1.664 m (5' 5.5\")   Wt 102.4 kg (225 lb 11.2 oz)   LMP 11/12/2007 (LMP Unknown)   SpO2 96%   BMI 36.99 kg/m    Body mass index is 36.99 kg/m .  Physical Exam   GENERAL: healthy, alert and no distress  PSYCH: mentation appears normal, affect flat and tearful            Assessment & Plan     1. Major depressive disorder, recurrent episode, moderate (H)  Discussed, not improving.  Discussed option of adjusting medication or adding medication.  Concerned about tremor and wellbutrin potentially worsening that and has been on in the past although with SSRi rather than SNRI.  Remeron and atypical antipsychotics might be helpful but would likely cause weight gain which is very distressing to patient.  Will refer to psychiatry for further assist with medications.   - DULoxetine (CYMBALTA) 30 MG capsule; Take 1 capsule (30 mg) by mouth 2 times daily  Dispense: 60 " "capsule; Refill: 1  - MENTAL HEALTH REFERRAL  - Adult; Psychiatry and Medication Management; Psychiatry; [facility]: [facility] Psychiatry Service [phone].  Medication management & future refills will be returned to [facility] PCP upon completion of evaluation; We mack...    2. Generalized anxiety disorder  Continue medication, counseling and refer to psychiatry.  Being evaluated for intensive counseling program  - hydrOXYzine (ATARAX) 25 MG tablet; Take 1-2 tablets (25-50 mg) by mouth every 4 hours as needed for anxiety And take 2-4 tabs about 30-60 mins before bed  Dispense: 180 tablet; Refill: 1  - MENTAL HEALTH REFERRAL  - Adult; Psychiatry and Medication Management; Psychiatry; [facility]: [facility] Psychiatry Service [phone].  Medication management & future refills will be returned to [facility] PCP upon completion of evaluation; We mack...    3. Impaired fasting glucose  Discussed glucose elevation.  Discuss this is a pre-diabetic condition.  Recommended eating healthily, exercising and maintaining a healthy weight to prevent the development of diabetes.  Recommended blood sugar checks at least yearly to monitor this.  Recommended dietary consultation which the patient declined. Given stress about weight gain, try to resume wellbutrin  - metFORMIN (GLUCOPHAGE-XR) 500 MG 24 hr tablet; Take 1 tab daily in am for 1 wk then 2 tab daily in am for 1 wk then 3 tab daily in am for 1 wk then 4 tab daily in am  Dispense: 360 tablet; Refill: 1    Disability forms completed as well as letter     BMI:   Estimated body mass index is 36.99 kg/m  as calculated from the following:    Height as of this encounter: 1.664 m (5' 5.5\").    Weight as of this encounter: 102.4 kg (225 lb 11.2 oz).   Weight management plan: Discussed healthy diet and exercise guidelines        See Patient Instructions    Return in about 1 month (around 7/12/2019), or if symptoms worsen or fail to improve - if you can't get into [facility] in " time.    Nallely Moore MD  [facility]

## 2019-06-14 NOTE — LETTER
June 14, 2019      Lucie Whitehead  [address] [address]        To Whom It May Concern:    Lucie Whitehaed was seen in our clinic today. She is unable to return to work at this time.  She is working with specialists and she may be able to return to work in the future but anticipate that this will be 3 months or more away.  Please let us know if you have any questions or concerns.       Sincerely,        Nallely Moore MD

## 2019-06-14 NOTE — NURSING NOTE
[employer] forms faxed to [phone] [employer] fax#.  Copy sent to [employer].  Work letter faxed to [phone].  Shreya Aguirre, CMA

## 2019-06-14 NOTE — PATIENT INSTRUCTIONS
Start back on metformin.      They will call you to schedule with a consultation with a psychiatric nurse practitioner.    The [facility] Crisis Response Unit (CRU) provides 24-hour phone and face-to-face crisis intervention and consultation. Call [phone].   Other crisis resources:   Crisis Connection (24-hour hotline for mental health): 613.710.8002   [facility] Hotline for Youth Crisis and Suicide: 9-129-BBTWIIH (1-887.509.6759)  Coping with Depression      Crisis Text Line  http://www.crisistextline.org     The Crisis Text Line serves anyone, in any type of crisis, providing access to free, 24/7 support and information via the medium people already use and trust:     Here's how it works:  Text 773-067 from anywhere in the [place], anytime, about any type of crisis.  A live, trained Crisis Counselor receives the text and responds quickly.  The volunteer Crisis Counselor will help you move from a 'hot moment to a cool moment

## 2019-06-15 ASSESSMENT — ANXIETY QUESTIONNAIRES: GAD7 TOTAL SCORE: 19

## 2019-06-18 ENCOUNTER — TELEPHONE (OUTPATIENT)
Dept: PEDIATRICS | Facility: CLINIC | Age: 69
End: 2019-06-18

## 2019-06-18 NOTE — TELEPHONE ENCOUNTER
Date is still unknown.  She has not been responding to treatment.   If they insist on a date, 2-3 months.

## 2019-06-19 ENCOUNTER — TRANSFERRED RECORDS (OUTPATIENT)
Dept: HEALTH INFORMATION MANAGEMENT | Facility: CLINIC | Age: 69
End: 2019-06-19

## 2019-06-19 NOTE — TELEPHONE ENCOUNTER
Luli calls back.  Advised.  3 months from now is around mid September- 9/16/19 is the date I gave her as an estimated date.  Jeanette Nicole RN  Message handled by Nurse Triage.

## 2019-06-19 NOTE — TELEPHONE ENCOUNTER
Call to Luli-sheryl message for her to call back.  Jeanette Nicole, RN  Message handled by Nurse Triage.

## 2019-06-20 ENCOUNTER — OFFICE VISIT (OUTPATIENT)
Dept: PSYCHOLOGY | Facility: CLINIC | Age: 69
End: 2019-06-20
Payer: COMMERCIAL

## 2019-06-20 DIAGNOSIS — F33.1 MAJOR DEPRESSIVE DISORDER, RECURRENT EPISODE, MODERATE (H): Primary | ICD-10-CM

## 2019-06-20 DIAGNOSIS — F41.1 GENERALIZED ANXIETY DISORDER: ICD-10-CM

## 2019-06-20 PROCEDURE — 90834 PSYTX W PT 45 MINUTES: CPT | Performed by: MARRIAGE & FAMILY THERAPIST

## 2019-06-20 NOTE — PROGRESS NOTES
Progress Note    Client Name: Lucie Whitehead  Date: June 20, 2019            Service Type: Individual  Video Visit: No     Session Start Time: 1:30  Session End Time: 2:15     Session Length: 45 min    Session #: 9    Attendees: Client attended alone     Treatment Plan Last Reviewed: May 2, 2019   PHQ-9 / VIGNESH-7: assessed regularly see flow sheets    DATA  Interactive Complexity: No  Crisis: No       Progress Since Last Session (Related to Symptoms / Goals / Homework):   Symptoms: No change .    Homework: Achieved / completed to satisfaction      Episode of Care Goals: Satisfactory progress - ACTION (Actively working towards change); Intervened by reinforcing change plan / affirming steps taken     Current / Ongoing Stressors and Concerns:   - terminated from employment   - medical issues (see EPIC record)     Treatment Objective(s) Addressed in This Session:   Client will learn and integrate CBT/DBT strategies for more effectively managing emotions and relationships.      Intervention:   Taught distress tolerance skills as alternative to compulsive self-soothing behaviors. Client reports her depression and anxiety continue to severely effect functioning. She says she attended a second assessment appointment with a day treatment provider and will have one more prior to accept and treat,ent planning. She say she has been more prone to compulsive self-soothing behaviors lately, such as overeating.  Processed emotions in session, validated, supportive counseling.     ASSESSMENT: Current Emotional / Mental Status (status of significant symptoms):   Risk status (Self / Other harm or suicidal ideation)   Client denies current fears or concerns for personal safety.   Client denies current or recent suicidal ideation or behaviors.   Client denies current or recent homicidal ideation or behaviors.   Client denies current or recent self injurious behavior or ideation.   Client  denies other safety concerns.   Client Client reports there has been no change in risk factors since their last session.     Client Client reports there has been no change in protective factors since their last session.     A safety and risk management has not been developed at this time.  Client was given the Suicide Prevention National Hotline, Text [place] 655306, the [facility] Crisis Number, or encouraged to Call 911 should there be a change in these risk factors.       Appearance:   Appropriate    Eye Contact:   Good    Psychomotor Behavior: Normal    Attitude:   Cooperative    Orientation:   All   Speech    Rate / Production: Normal     Volume:  Normal    Mood:    Anxious  Depressed    Affect:    Appropriate    Thought Content:  Clear    Thought Form:   Coherent  Logical    Insight:    Good      Medication Review:   No changes to current psychiatric medication(s)     Medication Compliance:   Yes     Changes in Health Issues:   None reported     Chemical Use Review:   Substance Use: Chemical use reviewed, no active concerns identified      Tobacco Use: No current tobacco use.      Diagnosis:  296.32 (F33.1) Major Depressive Disorder, Recurrent Episode, Moderate _  300.02 (F41.1) Generalized Anxiety Disorder    Collateral Reports Completed:   Not Applicable    PLAN: (Client Tasks / Therapist Tasks / Other)  Client will follow through with day treatment provider assessment and acceptance process.      Maninder Redding MA, LMFT                                                       ______________________________________________________________________                                                   Treatment Plan    Client's Name: Lucie Whitehead  YOB: 1950    Date: May 2, 2019     DSM-V Diagnoses: 296.32 - Major Depressive Disorder, Recurrent, Moderate    300.02 - Generalized Anxiety Disorder  ; V71.09 - No Diagnosis  Psychosocial / Contextual Factors: recnet losses  WHODAS: 33    Referral /  Collaboration:  Referral to another professional/service is not indicated at this time..    Anticipated number of session or this episode of care: 26      Measurable Treatment Goal(s) related to diagnosis / functional impairment(s)   I will know I've met my goal when I have better tools to control my emotions     Goal 1: Client will achieve a significant reduction in PHQ-9 scores.     Objective #A (Client Action)   Client will identify cognitive distortions and negative self-talk contributing to depression.   Status: New - Date: May 2, 2019  Intervention(s)   Therapist will teach about identification and strategies to counter negative self-talk and cognitive distortions.    Objective #B (Client Action)   Client will learn and integrate CBT/DBT strategies for more effectively managing emotions and relationships.   Status: New - Date: May 2, 2019  Intervention(s)   Therapist will teach emotional regulation skills distress tolerance skills, interpersonal effectiveness skills and mindfulness skills.     Objective #C   Client will engage in positive self-care.  Status: New - Date: May 2, 2019  Intervention(s)   Therapist will teach self-care goals:  Maintain balance in schedule (time for self/others, relaxation/activities, leisure/tasks, home/out of the house), assert needs and set limits with others, challenge negative thoughts/use affirming and encouraging self-talk, engage with support people on regular basis, practice behavior activation behaviors (sleep hygiene, balanced eating, physical activity, medical needs, personal hygiene), acknowledge and accept your feelings.    Client has reviewed and agreed to the above plan.    Jorge Redding MA, LMFT May 2, 2019

## 2019-06-22 DIAGNOSIS — F33.1 MAJOR DEPRESSIVE DISORDER, RECURRENT EPISODE, MODERATE (H): ICD-10-CM

## 2019-06-23 NOTE — TELEPHONE ENCOUNTER
Requested Prescriptions   Pending Prescriptions Disp Refills     buPROPion (WELLBUTRIN XL) 150 MG 24 hr tablet [Pharmacy Med Name: BUPROPION HCL  MG TABLET]  Last Written Prescription Date:  03/26/2019  Last Fill Quantity: 90 tablet,  # refills: 0   Last Office Visit: 6/14/2019   Future Office Visit:    Next 5 appointments (look out 90 days)    Jul 18, 2019  1:30 PM CDT  Return Visit with Maninder Redding  [facility] ([facility]) [address] [place] [address] Dr Mannie DIALLO [address]  [phone]   Jul 25, 2019  1:30 PM CDT  Return Visit with Maninder Redding  [facility] ([facility]) [address] [place] [address] Dr Mannie DIALLO [address]  [phone]   Aug 01, 2019  1:30 PM CDT  Return Visit with Maninder Redding  [facility] ([facility]) [address] [place] [address] Dr Mannie DIALLO [address]  [phone]   Aug 08, 2019  1:30 PM CDT  Return Visit with Maninder Redding  [facility] ([facility]) [address] [place] [address] Dr Mannie DIALLO [address]  [phone]   Aug 15, 2019  1:30 PM CDT  Return Visit with Maninder Redding  [facility] ([facility]) [address] [place] [address] Dr Mannie DIALLO [address]  [phone]       90 tablet 0     Sig: TAKE 1 TABLET (150 MG) BY MOUTH EVERY MORNING       SSRIs Protocol Failed - 6/22/2019  8:27 AM        Failed - PHQ-9 score less than 5 in past 6 months     Please review last PHQ-9 score.   PHQ-9 SCORE 4/15/2019 4/23/2019 6/14/2019   PHQ-9 Total Score - - -   PHQ-9 Total Score MyChart - 23 (Severe depression) -   PHQ-9 Total Score 14 23 24     VIGNESH-7 SCORE 3/27/2019 4/15/2019 6/14/2019   Total Score 19 17 19           Passed - Medication is Bupropion     If the medication is Bupropion (Wellbutrin), and the patient is taking for smoking cessation; OK  "to refill.          Passed - Medication is active on med list        Passed - Patient is age 18 or older        Passed - No active pregnancy on record        Passed - No positive pregnancy test in last 12 months        Passed - Recent (6 mo) or future (30 days) visit within the authorizing provider's specialty     Patient had office visit in the last 6 months or has a visit in the next 30 days with authorizing provider or within the authorizing provider's specialty.  See \"Patient Info\" tab in inbasket, or \"Choose Columns\" in Meds & Orders section of the refill encounter.              "

## 2019-06-24 RX ORDER — BUPROPION HYDROCHLORIDE 150 MG/1
150 TABLET ORAL EVERY MORNING
Qty: 90 TABLET | Refills: 0 | Status: SHIPPED | OUTPATIENT
Start: 2019-06-24 | End: 2019-09-17

## 2019-06-24 NOTE — TELEPHONE ENCOUNTER
Prescription approved per [facility] [facility].  Rodney Whalen, RN, BSN

## 2019-06-25 ENCOUNTER — OFFICE VISIT (OUTPATIENT)
Dept: PEDIATRICS | Facility: CLINIC | Age: 69
End: 2019-06-25
Payer: COMMERCIAL

## 2019-06-25 VITALS
HEART RATE: 88 BPM | WEIGHT: 225 LBS | RESPIRATION RATE: 16 BRPM | HEIGHT: 66 IN | SYSTOLIC BLOOD PRESSURE: 130 MMHG | BODY MASS INDEX: 36.16 KG/M2 | OXYGEN SATURATION: 98 % | TEMPERATURE: 98 F | DIASTOLIC BLOOD PRESSURE: 80 MMHG

## 2019-06-25 DIAGNOSIS — G56.01 CARPAL TUNNEL SYNDROME OF RIGHT WRIST: ICD-10-CM

## 2019-06-25 DIAGNOSIS — G56.02 CARPAL TUNNEL SYNDROME OF LEFT WRIST: ICD-10-CM

## 2019-06-25 DIAGNOSIS — Z01.818 PREOP GENERAL PHYSICAL EXAM: Primary | ICD-10-CM

## 2019-06-25 LAB — HGB BLD-MCNC: 13.1 G/DL (ref 11.7–15.7)

## 2019-06-25 PROCEDURE — 80048 BASIC METABOLIC PNL TOTAL CA: CPT | Performed by: PHYSICIAN ASSISTANT

## 2019-06-25 PROCEDURE — 93000 ELECTROCARDIOGRAM COMPLETE: CPT | Performed by: PHYSICIAN ASSISTANT

## 2019-06-25 PROCEDURE — 99214 OFFICE O/P EST MOD 30 MIN: CPT | Performed by: PHYSICIAN ASSISTANT

## 2019-06-25 PROCEDURE — 84460 ALANINE AMINO (ALT) (SGPT): CPT | Performed by: PHYSICIAN ASSISTANT

## 2019-06-25 PROCEDURE — 36415 COLL VENOUS BLD VENIPUNCTURE: CPT | Performed by: PHYSICIAN ASSISTANT

## 2019-06-25 PROCEDURE — 85018 HEMOGLOBIN: CPT | Performed by: PHYSICIAN ASSISTANT

## 2019-06-25 RX ORDER — PRAMIPEXOLE DIHYDROCHLORIDE 0.25 MG/1
0.5 TABLET ORAL AT BEDTIME
Refills: 3 | COMMUNITY
Start: 2019-06-19

## 2019-06-25 ASSESSMENT — ENCOUNTER SYMPTOMS
GASTROINTESTINAL NEGATIVE: 1
CARDIOVASCULAR NEGATIVE: 1
PSYCHIATRIC NEGATIVE: 1
RESPIRATORY NEGATIVE: 1
EYES NEGATIVE: 1
NEUROLOGICAL NEGATIVE: 1
CONSTITUTIONAL NEGATIVE: 1

## 2019-06-25 ASSESSMENT — MIFFLIN-ST. JEOR: SCORE: 1569.4

## 2019-06-25 NOTE — PATIENT INSTRUCTIONS
Before Your Surgery      Call your surgeon if there is any change in your health. This includes signs of a cold or flu (such as a sore throat, runny nose, cough, rash or fever).    Do not smoke, drink alcohol or take over the counter medicine (unless your surgeon or primary care doctor tells you to) for the 24 hours before and after surgery.    If you take prescribed drugs: Follow your doctor s orders about which medicines to take and which to stop until after surgery.    Eating and drinking prior to surgery: follow the instructions from your surgeon    Take a shower or bath the night before surgery. Use the soap your surgeon gave you to gently clean your skin. If you do not have soap from your surgeon, use your regular soap. Do not shave or scrub the surgery site.  Wear clean pajamas and have clean sheets on your bed.       --Patient is to take all scheduled medications on the day of surgery EXCEPT for modifications listed below.    Medication Use  Do not take fish oil (omega 3) until after surgery  Do not take Aleve, ibuprofen, ASA until after surgery

## 2019-06-25 NOTE — PROGRESS NOTES
[facility]  [address]  [address]  [place] [address] [phone]  Dept: [phone]    PRE-OP EVALUATION:  Today's date: 2019    Lucie Whitehead (: 1950) presents for pre-operative evaluation assessment as requested by Dr. Blanton.  She requires evaluation and anesthesia risk assessment prior to undergoing surgery/procedure for treatment of rt hand first then lt 2 weeks later .    Fax number for surgical facility: [phone]  Primary Physician: Nallely Moore  Type of Anesthesia Anticipated: Local    Patient has a Health Care Directive or Living Will:  NO    Preop Questions 2019   Who is doing your surgery? dr jung blanton   What are you having done? carpal tunel release   Date of Surgery/Procedure: 19   Facility or Hospital where procedure/surgery will be performed: [facility]   1.  Do you have a history of Heart attack, stroke, stent, coronary bypass surgery, or other heart surgery? No   2.  Do you ever have any pain or discomfort in your chest? No   3.  Do you have a history of  Heart Failure? No   4.   Are you troubled by shortness of breath when:  walking on a level surface, or up a slight hill, or at night? YES - due to deconditioning per patient   5.  Do you currently have a cold, bronchitis or other respiratory infection? No   6.  Do you have a cough, shortness of breath, or wheezing? No   7.  Do you sometimes get pains in the calves of your legs when you walk? No   8. Do you or anyone in your family have previous history of blood clots? UNKNOWN   9.  Do you or does anyone in your family have a serious bleeding problem such as prolonged bleeding following surgeries or cuts? No   10. Have you ever had problems with anemia or been told to take iron pills? No   11. Have you had any abnormal blood loss such as black, tarry or bloody stools, or abnormal vaginal bleeding? No   12. Have you ever had a blood transfusion? YES -    13.  Have you or any of your relatives ever had problems with anesthesia? No   14. Do you have sleep apnea, excessive snoring or daytime drowsiness? YES - treated with CPAP   15. Do you have any prosthetic heart valves? No   16. Do you have prosthetic joints? No   17. Is there any chance that you may be pregnant? No         HPI:     HPI related to upcoming procedure: bilateral carpal tunnel, symptomatic for about 2-3 years.  Symptoms getting worse.  She now has more pain and weakness.  EMG confirmed.     MEDICAL HISTORY:     Patient Active Problem List    Diagnosis Date Noted     Trochanteric bursitis of both hips 04/03/2019     Priority: Medium     Impingement syndrome of both shoulders 04/03/2019     Priority: Medium     Chronic pain of both knees 04/03/2019     Priority: Medium     Pain of both shoulder joints 03/28/2019     Priority: Medium     Hip pain, right 03/28/2019     Priority: Medium     Hip pain, left 03/28/2019     Priority: Medium     Obesity (BMI 35.0-39.9) with comorbidity (H) 03/26/2019     Priority: Medium     Lumbar radiculopathy 07/03/2018     Priority: Medium     Advance care planning 03/23/2018     Priority: Medium     Osteopenia 05/13/2016     Priority: Medium     Non morbid obesity due to excess calories 11/08/2015     Priority: Medium     Major depressive disorder, recurrent episode (H) 09/12/2014     Priority: Medium     prozac caused problems with mentation.  zoloft caused sleepiness.  Celexa was working well but then failed 2019.  Change to cymbalta       Tubular adenoma of colon 07/11/2013     Priority: Medium     On colonoscopy 2013       Macular degeneration 07/08/2013     Priority: Medium     Dry, OU, follows with retinal surgery       PEÑA (obstructive sleep apnea) 03/05/2013     Priority: Medium     On CPAP and doing well       Impaired fasting glucose 03/05/2013     Priority: Medium     Lumbar spinal stenosis 04/14/2010     Priority: Medium     S/p lumbar surgery 3/13 and 4/13.        "Hyperlipidemia LDL goal <160 02/10/2010     Priority: Medium     [place] 10-year CHD Risk Score: 1% (12 Total Points)   Values used to calculate score:     Age: 61 years -- Points: 8     Total Cholesterol: 232 mg/dL -- Points: 4     HDL Cholesterol: 55 mg/dL -- Points: 0     Systolic BP (untreated): 110 mmHg -- Points: 0    The patient is not a smoker. -- Points: 0    The patient has not been diagnosed with diabetes. -- Points: 0    The patient does not have a family history of CHD. -- Points:0          Ovarian cyst 11/06/2006     Priority: Medium     Problem list name updated by automated process. Provider to review       Hirsutism 01/25/2004     Priority: Medium     Leiomyoma of uterus 01/25/2004     Priority: Medium     Pelvic US 2006: Contour is irreg w/myomata: 1)anterior mid submucosal 4.1x3.6cm, 2)anterior fundal subserosal 2.4x1.9cm, 3)lt mid intramural 2.4x1.9cm, 4)posterior fundal intramural 2.6x2.4cm, others smaller.  Problem list name updated by automated process. Provider to review        Past Medical History:   Diagnosis Date     Complication of anesthesia     extremely dry eyes     Diabetes (H)      Gastro-oesophageal reflux disease      Hirsutism     Abstracted 06/14/02     History of blood transfusion      Leiomyoma of uterus, unspecified     Abstracted 06/14/02     Obesity 4/14/2010    Estimated Body mass index is 28.97 kg/(m^2) as calculated from the following:   Height as of 7/10/13: 5' 7\"(1.702 m).   Weight as of 7/10/13: 185 lb(83.915 kg).       Other and unspecified hyperlipidemia     Abstracted 06/14/02     Other chronic pain     low back and legs and feet     Sleep apnea     CPAP     Past Surgical History:   Procedure Laterality Date     ARTHROSCOPY SHOULDER DISTAL CLAVICLE REPAIR Right 9/17/2014    Procedure: ARTHROSCOPY SHOULDER DISTAL CLAVICLE RESECTION;  Surgeon: John Paul Rodriguez MD;  Location: [facility] OR     C NONSPECIFIC PROCEDURE      Bicep tendon repair    Abstracted 06/14/02     " C/SECTION, LOW TRANSVERSE  x 3    , Low Transverse x 4     COLONOSCOPY       COLONOSCOPY N/A 2019    Procedure: COLONOSCOPY;  Surgeon: Hernán Gonzales MD;  Location: [facility] GI     DECOMPRESSION, FUSION CERVICAL ANTERIOR TWO LEVELS, COMBINED  2012    Procedure:COMBINED DECOMPRESSION, FUSION CERVICAL ANTERIOR TWO LEVELS; DECOMPRESSION, FUSION CERVICAL ANTERIOR C4-6; Surgeon:SHALONDA ALCALA; Location:[facility] OR     DECOMPRESSION, FUSION LUMBAR POSTERIOR ONE LEVEL, COMBINED  3/7/2013    Procedure: COMBINED DECOMPRESSION, FUSION LUMBAR POSTERIOR ONE LEVEL;  Posterior Fusion L2-3, Hardware Removal L3-5;  Surgeon: Shalonda Alcala MD;  Location: [facility] OR     DECOMPRESSION, FUSION LUMBAR POSTERIOR TWO LEVELS, COMBINED N/A 7/3/2018    Procedure: COMBINED DECOMPRESSION, FUSION LUMBAR POSTERIOR TWO LEVELS;  Bilateral decompression L1-L2 with left total facetectomy   Transforaminal lumbar interbody fusion L1-L2 from left-sided approach   Insertion of intervertebral biomechanical device L1-L2  Posterolateral fusion L1-L2; Removal of instrumentation L2-3  RE-Instrumentation L1-L3;  Surgeon: Shalonda Alcala MD;  Location: [facility] OR     EXPLORE SPINE, REMOVE HARDWARE, COMBINED N/A 7/3/2018    Procedure: COMBINED EXPLORE SPINE, REMOVE HARDWARE;;  Surgeon: Shalonda Alcala MD;  Location: [facility] OR     FUSION LUMBAR ANTERIOR ONE LEVEL  2013    Procedure: FUSION LUMBAR ANTERIOR ONE LEVEL;  Anterior Fusion L2-3;  Surgeon: Shalonda Alcala MD;  Location: [facility] OR     LAMINECTOMY, FUSION LUMBAR TWO LEVELS, COMBINED  3/11    AP fusion L3-5     Current Outpatient Medications   Medication Sig Dispense Refill     Acetaminophen (TYLENOL PO) Take 1,300 mg by mouth 2 times daily        buPROPion (WELLBUTRIN XL) 150 MG 24 hr tablet TAKE 1 TABLET (150 MG) BY MOUTH EVERY MORNING 90 tablet 0     CALCIUM PO Take 950 mg by mouth daily        DULoxetine (CYMBALTA) 30 MG capsule Take 1 capsule (30 mg) by mouth 2 times daily  60 capsule 1     hydrOXYzine (ATARAX) 25 MG tablet Take 1-2 tablets (25-50 mg) by mouth every 4 hours as needed for anxiety And take 2-4 tabs about 30-60 mins before bed 180 tablet 1     loratadine (CLARITIN) 10 MG tablet Take 10 mg by mouth daily       metFORMIN (GLUCOPHAGE-XR) 500 MG 24 hr tablet Take 1 tab daily in am for 1 wk then 2 tab daily in am for 1 wk then 3 tab daily in am for 1 wk then 4 tab daily in am 360 tablet 1     Multiple Vitamins-Minerals (OCUVITE ADULT 50+ PO) Take 1 tablet by mouth daily        NONFORMULARY Sustained night time lubricant eye ointment night before bed       Omega-3 Fatty Acids (OMEGA 3 PO) Take 1 tablet by mouth daily        pramipexole (MIRAPEX) 0.25 MG tablet 1 tablet At Bedtime  3     ranitidine (ZANTAC 75) 75 MG tablet Take 75 mg by mouth 2 times daily as needed.       RESTASIS 0.05 % ophthalmic emulsion INSTILL 1 DROP INTO AFFECTED EYE(S) BY OPHTHALMIC ROUTE EVERY 12 HOURS  0     simvastatin (ZOCOR) 40 MG tablet Take 1 tablet (40 mg) by mouth At Bedtime 90 tablet 3     traMADol (ULTRAM) 50 MG tablet TAKE 1 - 2 TABLET BY ORAL ROUTE EVERY 4 - 6 HOURS AS NEEDED  0     ZYRTEC 10 MG OR TABS 1 TABLET EACH MORNING       OTC products: NSAIDS    Allergies   Allergen Reactions     Animal Dander      Cats      Dogs      Seasonal Allergies      Trees and mold      Latex Allergy: NO    Social History     Tobacco Use     Smoking status: Former Smoker     Last attempt to quit: 1975     Years since quittin.5     Smokeless tobacco: Never Used   Substance Use Topics     Alcohol use: Yes     Alcohol/week: 0.0 oz     Comment: 2-3 glass wine /week     History   Drug Use No       REVIEW OF SYSTEMS:   Review of Systems   Constitutional: Negative.    HENT: Negative.    Eyes: Negative.    Respiratory: Negative.    Cardiovascular: Negative.    Gastrointestinal: Negative.    Genitourinary: Negative.    Musculoskeletal:        As in HPI   Skin: Negative.    Neurological: Negative.   "  Psychiatric/Behavioral: Negative.          EXAM:   /80 (Cuff Size: Adult Large)   Pulse 88   Temp 98  F (36.7  C) (Tympanic)   Resp 16   Ht 1.664 m (5' 5.5\")   Wt 102.1 kg (225 lb)   LMP 11/12/2007 (LMP Unknown)   SpO2 98%   BMI 36.87 kg/m    Physical Exam   Constitutional: She is oriented to person, place, and time. She appears well-developed and well-nourished. No distress.   HENT:   Head: Normocephalic.   Right Ear: External ear normal.   Left Ear: External ear normal.   Nose: Nose normal.   Eyes: Conjunctivae and EOM are normal.   Neck: Normal range of motion.   Cardiovascular: Normal rate, regular rhythm and normal heart sounds.   Pulmonary/Chest: Effort normal and breath sounds normal.   Neurological: She is alert and oriented to person, place, and time.   Skin: Skin is warm and dry. She is not diaphoretic.   Psychiatric: She has a normal mood and affect. Judgment normal.         DIAGNOSTICS:     Labs Drawn and in Process:   Unresulted Labs Ordered in the Past 30 Days of this Admission     Date and Time Order Name Status Description    6/25/2019 1051 ALT In process     6/25/2019 1051 BASIC METABOLIC PANEL In process         Results for orders placed or performed in visit on 06/25/19 (from the past 24 hour(s))   Hemoglobin   Result Value Ref Range    Hemoglobin 13.1 11.7 - 15.7 g/dL              EKG Interpretation:      Interpreted by Rena Sharif    Symptoms at time of EKG: None   Rhythm: Normal sinus   Rate: Normal  Comparison to prior: Unchanged - nonspecific T wave abnormality is unchanged from previous EKG    Clinical Impression: normal EKG    IMPRESSION:   Reason for surgery/procedure: bilateral carpal tunnel syndrome    The proposed surgical procedure is considered INTERMEDIATE risk.    REVISED CARDIAC RISK INDEX  The patient has the following serious cardiovascular risks for perioperative complications such as (MI, PE, VFib and 3  AV Block):  No serious cardiac " risks  INTERPRETATION: 0 risks: Class I (very low risk - 0.4% complication rate)    The patient has the following additional risks for perioperative complications:  No identified additional risks      ICD-10-CM    1. Preop general physical exam Z01.818 Hemoglobin     Basic metabolic panel     ALT     EKG 12-lead complete w/read - Clinics   2. Carpal tunnel syndrome of right wrist G56.01    3. Carpal tunnel syndrome of left wrist G56.02        RECOMMENDATIONS:     --Patient is to take all scheduled medications on the day of surgery EXCEPT for modifications listed below.    Medication Use  Do not take fish oil (omega 3) until after surgery  Do not take Aleve, ibuprofen, ASA until after surgery    APPROVAL GIVEN to proceed with proposed procedure, without further diagnostic evaluation       Signed Electronically by: Rena Sharif PA-C    Copy of this evaluation report is provided to requesting physician.    [facility] Preop Guidelines    Revised Cardiac Risk Index

## 2019-06-26 LAB
ALT SERPL W P-5'-P-CCNC: 31 U/L (ref 0–50)
ANION GAP SERPL CALCULATED.3IONS-SCNC: 6 MMOL/L (ref 3–14)
BUN SERPL-MCNC: 15 MG/DL (ref 7–30)
CALCIUM SERPL-MCNC: 9.5 MG/DL (ref 8.5–10.1)
CHLORIDE SERPL-SCNC: 106 MMOL/L (ref 94–109)
CO2 SERPL-SCNC: 28 MMOL/L (ref 20–32)
CREAT SERPL-MCNC: 1 MG/DL (ref 0.52–1.04)
GFR SERPL CREATININE-BSD FRML MDRD: 59 ML/MIN/{1.73_M2}
GLUCOSE SERPL-MCNC: 89 MG/DL (ref 70–99)
POTASSIUM SERPL-SCNC: 5 MMOL/L (ref 3.4–5.3)
SODIUM SERPL-SCNC: 140 MMOL/L (ref 133–144)

## 2019-06-27 NOTE — RESULT ENCOUNTER NOTE
Lucie    Your lab tests are complete and I have reviewed the results.     - Your lab results look great; everything is normal.    If you have any questions or concerns, please feel free to call or send a Jamgle message.    Sincerely,  Todd Sharif PA-C

## 2019-07-18 ENCOUNTER — OFFICE VISIT (OUTPATIENT)
Dept: PSYCHOLOGY | Facility: CLINIC | Age: 69
End: 2019-07-18
Payer: COMMERCIAL

## 2019-07-18 DIAGNOSIS — F41.1 GENERALIZED ANXIETY DISORDER: ICD-10-CM

## 2019-07-18 DIAGNOSIS — F33.1 MAJOR DEPRESSIVE DISORDER, RECURRENT EPISODE, MODERATE (H): Primary | ICD-10-CM

## 2019-07-18 PROCEDURE — 90834 PSYTX W PT 45 MINUTES: CPT | Performed by: MARRIAGE & FAMILY THERAPIST

## 2019-07-18 NOTE — PROGRESS NOTES
Progress Note    Client Name: Lucie Whiteheda  Date: July 18, 2019             Service Type: Individual  Video Visit: No     Session Start Time: 12:30  Session End Time: 1:15     Session Length: 45 min    Session #: 10    Attendees: Client attended alone     Treatment Plan Last Reviewed: May 2, 2019   PHQ-9 / VIGNESH-7: assessed regularly see flow sheets    DATA  Interactive Complexity: No  Crisis: No       Progress Since Last Session (Related to Symptoms / Goals / Homework):   Symptoms: Improving .    Homework: Achieved / completed to satisfaction      Episode of Care Goals: Satisfactory progress - ACTION (Actively working towards change); Intervened by reinforcing change plan / affirming steps taken     Current / Ongoing Stressors and Concerns:   - terminated from employment   - medical issues (see EPIC record)     Treatment Objective(s) Addressed in This Session:   Client will learn and integrate CBT/DBT strategies for more effectively managing emotions and relationships.      Intervention:   Reinforced learning from day treatment, particularly cognitive distortion identification and strategies. Client reports she began day treatment last week and is attending Monday through Thursday from 2:00 - 5:00. She described the schedule and the topics they have covered so far. Now that she is engaged in a higher level of care, we will meet less frequently until she completes the treatment. Processed emotions in session, validated, supportive counseling.     ASSESSMENT: Current Emotional / Mental Status (status of significant symptoms):   Risk status (Self / Other harm or suicidal ideation)   Client denies current fears or concerns for personal safety.   Client denies current or recent suicidal ideation or behaviors.   Client denies current or recent homicidal ideation or behaviors.   Client denies current or recent self injurious behavior or ideation.   Client denies other safety  concerns.   Client Client reports there has been no change in risk factors since their last session.     Client Client reports there has been no change in protective factors since their last session.     A safety and risk management has not been developed at this time.  Client was given the Suicide Prevention National Hotline, Text [place] 541491, the [facility] Crisis Number, or encouraged to Call 911 should there be a change in these risk factors.       Appearance:   Appropriate    Eye Contact:   Good    Psychomotor Behavior: Normal    Attitude:   Cooperative    Orientation:   All   Speech    Rate / Production: Normal     Volume:  Normal    Mood:    Anxious  Depressed    Affect:    Appropriate    Thought Content:  Clear    Thought Form:   Coherent  Logical    Insight:    Good      Medication Review:   No changes to current psychiatric medication(s)     Medication Compliance:   Yes     Changes in Health Issues:   None reported     Chemical Use Review:   Substance Use: Chemical use reviewed, no active concerns identified      Tobacco Use: No current tobacco use.      Diagnosis:  296.32 (F33.1) Major Depressive Disorder, Recurrent Episode, Moderate _  300.02 (F41.1) Generalized Anxiety Disorder    Collateral Reports Completed:   Not Applicable    PLAN: (Client Tasks / Therapist Tasks / Other)  Client will follow through with day treatment each week.        Maninder Redding MA, LMFT                                                       ______________________________________________________________________                                                   Treatment Plan    Client's Name: Lucie Whitehead  YOB: 1950    Date: May 2, 2019     DSM-V Diagnoses: 296.32 - Major Depressive Disorder, Recurrent, Moderate    300.02 - Generalized Anxiety Disorder  ; V71.09 - No Diagnosis  Psychosocial / Contextual Factors: recnet losses  WHODAS: 33    Referral / Collaboration:  Referral to another professional/service  is not indicated at this time..    Anticipated number of session or this episode of care: 26      Measurable Treatment Goal(s) related to diagnosis / functional impairment(s)   I will know I've met my goal when I have better tools to control my emotions     Goal 1: Client will achieve a significant reduction in PHQ-9 scores.     Objective #A (Client Action)   Client will identify cognitive distortions and negative self-talk contributing to depression.   Status: New - Date: May 2, 2019  Intervention(s)   Therapist will teach about identification and strategies to counter negative self-talk and cognitive distortions.    Objective #B (Client Action)   Client will learn and integrate CBT/DBT strategies for more effectively managing emotions and relationships.   Status: New - Date: May 2, 2019  Intervention(s)   Therapist will teach emotional regulation skills distress tolerance skills, interpersonal effectiveness skills and mindfulness skills.     Objective #C   Client will engage in positive self-care.  Status: New - Date: May 2, 2019  Intervention(s)   Therapist will teach self-care goals:  Maintain balance in schedule (time for self/others, relaxation/activities, leisure/tasks, home/out of the house), assert needs and set limits with others, challenge negative thoughts/use affirming and encouraging self-talk, engage with support people on regular basis, practice behavior activation behaviors (sleep hygiene, balanced eating, physical activity, medical needs, personal hygiene), acknowledge and accept your feelings.    Client has reviewed and agreed to the above plan.    Jorge Redding MA, LMFT May 2, 2019

## 2019-07-22 ENCOUNTER — TRANSFERRED RECORDS (OUTPATIENT)
Dept: HEALTH INFORMATION MANAGEMENT | Facility: CLINIC | Age: 69
End: 2019-07-22

## 2019-07-25 ENCOUNTER — TRANSFERRED RECORDS (OUTPATIENT)
Dept: HEALTH INFORMATION MANAGEMENT | Facility: CLINIC | Age: 69
End: 2019-07-25

## 2019-08-15 ENCOUNTER — OFFICE VISIT (OUTPATIENT)
Dept: PSYCHOLOGY | Facility: CLINIC | Age: 69
End: 2019-08-15
Payer: COMMERCIAL

## 2019-08-15 DIAGNOSIS — F33.1 MAJOR DEPRESSIVE DISORDER, RECURRENT EPISODE, MODERATE (H): Primary | ICD-10-CM

## 2019-08-15 DIAGNOSIS — F41.1 GENERALIZED ANXIETY DISORDER: ICD-10-CM

## 2019-08-15 PROCEDURE — 90834 PSYTX W PT 45 MINUTES: CPT | Performed by: MARRIAGE & FAMILY THERAPIST

## 2019-08-15 ASSESSMENT — ANXIETY QUESTIONNAIRES
7. FEELING AFRAID AS IF SOMETHING AWFUL MIGHT HAPPEN: MORE THAN HALF THE DAYS
6. BECOMING EASILY ANNOYED OR IRRITABLE: MORE THAN HALF THE DAYS
GAD7 TOTAL SCORE: 17
3. WORRYING TOO MUCH ABOUT DIFFERENT THINGS: NEARLY EVERY DAY
1. FEELING NERVOUS, ANXIOUS, OR ON EDGE: NEARLY EVERY DAY
2. NOT BEING ABLE TO STOP OR CONTROL WORRYING: NEARLY EVERY DAY
5. BEING SO RESTLESS THAT IT IS HARD TO SIT STILL: MORE THAN HALF THE DAYS
IF YOU CHECKED OFF ANY PROBLEMS ON THIS QUESTIONNAIRE, HOW DIFFICULT HAVE THESE PROBLEMS MADE IT FOR YOU TO DO YOUR WORK, TAKE CARE OF THINGS AT HOME, OR GET ALONG WITH OTHER PEOPLE: VERY DIFFICULT

## 2019-08-15 ASSESSMENT — COLUMBIA-SUICIDE SEVERITY RATING SCALE - C-SSRS
1. IN THE PAST MONTH, HAVE YOU WISHED YOU WERE DEAD OR WISHED YOU COULD GO TO SLEEP AND NOT WAKE UP?: NO
2. HAVE YOU ACTUALLY HAD ANY THOUGHTS OF KILLING YOURSELF LIFETIME?: NO
4. HAVE YOU HAD THESE THOUGHTS AND HAD SOME INTENTION OF ACTING ON THEM?: NO
4. HAVE YOU HAD THESE THOUGHTS AND HAD SOME INTENTION OF ACTING ON THEM?: NO
3. HAVE YOU BEEN THINKING ABOUT HOW YOU MIGHT KILL YOURSELF?: NO
2. HAVE YOU ACTUALLY HAD ANY THOUGHTS OF KILLING YOURSELF?: NO
5. HAVE YOU STARTED TO WORK OUT OR WORKED OUT THE DETAILS OF HOW TO KILL YOURSELF? DO YOU INTEND TO CARRY OUT THIS PLAN?: NO
5. HAVE YOU STARTED TO WORK OUT OR WORKED OUT THE DETAILS OF HOW TO KILL YOURSELF? DO YOU INTEND TO CARRY OUT THIS PLAN?: NO
1. IN THE PAST MONTH, HAVE YOU WISHED YOU WERE DEAD OR WISHED YOU COULD GO TO SLEEP AND NOT WAKE UP?: NO

## 2019-08-15 ASSESSMENT — PATIENT HEALTH QUESTIONNAIRE - PHQ9
5. POOR APPETITE OR OVEREATING: MORE THAN HALF THE DAYS
SUM OF ALL RESPONSES TO PHQ QUESTIONS 1-9: 18

## 2019-08-15 NOTE — PROGRESS NOTES
Progress Note    Client Name: Lucie Whitehead  Date: August 15, 2019              Service Type: Individual  Video Visit: No     Session Start Time: 12:30  Session End Time: 1:15     Session Length: 45 min    Session #: 11    Attendees: Client attended alone     Treatment Plan Last Reviewed: August 15, 2019   PHQ-9 / VIGNESH-7: assessed regularly see flow sheets    DATA  Interactive Complexity: No  Crisis: No       Progress Since Last Session (Related to Symptoms / Goals / Homework):   Symptoms: No change  - client has consistently worked on treatment goals between sessions, yet ongoing psychosocial and medical stressors mitigate response.     Homework: Achieved / completed to satisfaction      Episode of Care Goals: Satisfactory progress - ACTION (Actively working towards change); Intervened by reinforcing change plan / affirming steps taken     Current / Ongoing Stressors and Concerns:   - terminated from employment   - medical issues (see EPIC record)     Treatment Objective(s) Addressed in This Session:   Client will learn and integrate CBT/DBT strategies for more effectively managing emotions and relationships.      Intervention:   Reinforced learning from day treatment, particularly cognitive distortion identification and strategies. Client reports she is in the process of moving her elderly mother into assisted living, with much resistance. Processed emotions in session, validated, supportive counseling.     ASSESSMENT: Current Emotional / Mental Status (status of significant symptoms):   Risk status (Self / Other harm or suicidal ideation)   Client denies current fears or concerns for personal safety.   Client denies current or recent suicidal ideation or behaviors.   Client denies current or recent homicidal ideation or behaviors.   Client denies current or recent self injurious behavior or ideation.   Client denies other safety concerns.   Client Client reports  there has been no change in risk factors since their last session.     Client Client reports there has been no change in protective factors since their last session.     A safety and risk management has not been developed at this time.  Client was given the Suicide Prevention National Hotline, Text [place] 799717, the [facility] Crisis Number, or encouraged to Call 911 should there be a change in these risk factors.       Appearance:   Appropriate    Eye Contact:   Good    Psychomotor Behavior: Normal    Attitude:   Cooperative    Orientation:   All   Speech    Rate / Production: Normal     Volume:  Normal    Mood:    Anxious  Depressed    Affect:    Appropriate    Thought Content:  Clear    Thought Form:   Coherent  Logical    Insight:    Good      Medication Review:   No changes to current psychiatric medication(s)     Medication Compliance:   Yes     Changes in Health Issues:   None reported     Chemical Use Review:   Substance Use: Chemical use reviewed, no active concerns identified      Tobacco Use: No current tobacco use.      Diagnosis:  296.32 (F33.1) Major Depressive Disorder, Recurrent Episode, Moderate _  300.02 (F41.1) Generalized Anxiety Disorder    Collateral Reports Completed:   Not Applicable    PLAN: (Client Tasks / Therapist Tasks / Other)  Client will follow through with day treatment each week.        Maninder Redding MA, LMFT                                                       ______________________________________________________________________                                                   Treatment Plan    Client's Name: Lucie Whitehead  YOB: 1950    Date: August 15, 2019     DSM-V Diagnoses: 296.32 - Major Depressive Disorder, Recurrent, Moderate    300.02 - Generalized Anxiety Disorder  ; V71.09 - No Diagnosis  Psychosocial / Contextual Factors: recnet losses  WHODAS: 33    Referral / Collaboration:  Referral to another professional/service is not indicated at this  time..    Anticipated number of session or this episode of care: 26      Measurable Treatment Goal(s) related to diagnosis / functional impairment(s)   I will know I've met my goal when I have better tools to control my emotions     Goal 1: Client will achieve a significant reduction in PHQ-9 scores.     Objective #A (Client Action)   Client will identify cognitive distortions and negative self-talk contributing to depression.   Status: Continued: August 15, 2019  Intervention(s)   Therapist will teach about identification and strategies to counter negative self-talk and cognitive distortions.    Objective #B (Client Action)   Client will learn and integrate CBT/DBT strategies for more effectively managing emotions and relationships.   Status: Continued: August 15, 2019  Intervention(s)   Therapist will teach emotional regulation skills distress tolerance skills, interpersonal effectiveness skills and mindfulness skills.     Objective #C   Client will engage in positive self-care.  Status: Continued: August 15, 2019  Intervention(s)   Therapist will teach self-care goals:  Maintain balance in schedule (time for self/others, relaxation/activities, leisure/tasks, home/out of the house), assert needs and set limits with others, challenge negative thoughts/use affirming and encouraging self-talk, engage with support people on regular basis, practice behavior activation behaviors (sleep hygiene, balanced eating, physical activity, medical needs, personal hygiene), acknowledge and accept your feelings.    Client has reviewed and agreed to the above plan.    Jorge Redding MA, LMFT August 15, 2019

## 2019-08-16 ASSESSMENT — ANXIETY QUESTIONNAIRES: GAD7 TOTAL SCORE: 17

## 2019-08-29 ENCOUNTER — OFFICE VISIT (OUTPATIENT)
Dept: PSYCHOLOGY | Facility: CLINIC | Age: 69
End: 2019-08-29
Payer: COMMERCIAL

## 2019-08-29 DIAGNOSIS — F41.1 GENERALIZED ANXIETY DISORDER: ICD-10-CM

## 2019-08-29 DIAGNOSIS — F33.1 MAJOR DEPRESSIVE DISORDER, RECURRENT EPISODE, MODERATE (H): Primary | ICD-10-CM

## 2019-08-29 PROCEDURE — 90834 PSYTX W PT 45 MINUTES: CPT | Performed by: MARRIAGE & FAMILY THERAPIST

## 2019-08-30 NOTE — PROGRESS NOTES
Progress Note    Client Name: Lucie Whitehead  Date: August 29, 2019              Service Type: Individual  Video Visit: No     Session Start Time: 12:30  Session End Time: 1:15     Session Length: 45 min    Session #: 12    Attendees: Client attended alone     Treatment Plan Last Reviewed: August 15, 2019   PHQ-9 / VIGNESH-7: assessed regularly see flow sheets    DATA  Interactive Complexity: No  Crisis: No       Progress Since Last Session (Related to Symptoms / Goals / Homework):   Symptoms: No change  - client has consistently worked on treatment goals between sessions, yet ongoing psychosocial and medical stressors mitigate response.     Homework: Achieved / completed to satisfaction      Episode of Care Goals: Satisfactory progress - ACTION (Actively working towards change); Intervened by reinforcing change plan / affirming steps taken     Current / Ongoing Stressors and Concerns:   - terminated from employment   - medical issues (see EPIC record)     Treatment Objective(s) Addressed in This Session:   Client will learn and integrate CBT/DBT strategies for more effectively managing emotions and relationships.      Intervention:   Reinforced learning from day treatment, particularly cognitive distortion identification and strategies. Client reports she is not improving, despite 4 x weekly day treatment. She says she is getting much out of the treatment and is embracing the skills. She says she is probably grieving multiple losses, which has been triggered by her loss of job. Grief coping skills reviewed. Processed emotions in session, validated, supportive counseling.     ASSESSMENT: Current Emotional / Mental Status (status of significant symptoms):   Risk status (Self / Other harm or suicidal ideation)   Client denies current fears or concerns for personal safety.   Client denies current or recent suicidal ideation or behaviors.   Client denies current or recent  homicidal ideation or behaviors.   Client denies current or recent self injurious behavior or ideation.   Client denies other safety concerns.   Client Client reports there has been no change in risk factors since their last session.     Client Client reports there has been no change in protective factors since their last session.     A safety and risk management has not been developed at this time.  Client was given the Suicide Prevention National Hotline, Text [place] 767489, the [facility] Crisis Number, or encouraged to Call 911 should there be a change in these risk factors.       Appearance:   Appropriate    Eye Contact:   Good    Psychomotor Behavior: Normal    Attitude:   Cooperative    Orientation:   All   Speech    Rate / Production: Normal     Volume:  Normal    Mood:    Anxious  Depressed    Affect:    Appropriate    Thought Content:  Clear    Thought Form:   Coherent  Logical    Insight:    Good      Medication Review:   No changes to current psychiatric medication(s)     Medication Compliance:   Yes     Changes in Health Issues:   None reported     Chemical Use Review:   Substance Use: Chemical use reviewed, no active concerns identified      Tobacco Use: No current tobacco use.      Diagnosis:  296.32 (F33.1) Major Depressive Disorder, Recurrent Episode, Moderate _  300.02 (F41.1) Generalized Anxiety Disorder    Collateral Reports Completed:   Not Applicable    PLAN: (Client Tasks / Therapist Tasks / Other)  Client will follow through with day treatment each week and talk in sessions about her losses and grief.        Maninder Redding MA, LMFT                                                       ______________________________________________________________________                                                   Treatment Plan    Client's Name: Lucie Whitehead  YOB: 1950    Date: August 15, 2019     DSM-V Diagnoses: 296.32 - Major Depressive Disorder, Recurrent, Moderate    300.02 -  Generalized Anxiety Disorder  ; V71.09 - No Diagnosis  Psychosocial / Contextual Factors: recnet losses  WHODAS: 33    Referral / Collaboration:  Referral to another professional/service is not indicated at this time..    Anticipated number of session or this episode of care: 26      Measurable Treatment Goal(s) related to diagnosis / functional impairment(s)   I will know I've met my goal when I have better tools to control my emotions     Goal 1: Client will achieve a significant reduction in PHQ-9 scores.     Objective #A (Client Action)   Client will identify cognitive distortions and negative self-talk contributing to depression.   Status: Continued: August 15, 2019  Intervention(s)   Therapist will teach about identification and strategies to counter negative self-talk and cognitive distortions.    Objective #B (Client Action)   Client will learn and integrate CBT/DBT strategies for more effectively managing emotions and relationships.   Status: Continued: August 15, 2019  Intervention(s)   Therapist will teach emotional regulation skills distress tolerance skills, interpersonal effectiveness skills and mindfulness skills.     Objective #C   Client will engage in positive self-care.  Status: Continued: August 15, 2019  Intervention(s)   Therapist will teach self-care goals:  Maintain balance in schedule (time for self/others, relaxation/activities, leisure/tasks, home/out of the house), assert needs and set limits with others, challenge negative thoughts/use affirming and encouraging self-talk, engage with support people on regular basis, practice behavior activation behaviors (sleep hygiene, balanced eating, physical activity, medical needs, personal hygiene), acknowledge and accept your feelings.    Client has reviewed and agreed to the above plan.    Jorge Redding MA, LMFT August 15, 2019

## 2019-09-10 ENCOUNTER — MYC MEDICAL ADVICE (OUTPATIENT)
Dept: PEDIATRICS | Facility: CLINIC | Age: 69
End: 2019-09-10

## 2019-09-10 ENCOUNTER — TRANSFERRED RECORDS (OUTPATIENT)
Dept: HEALTH INFORMATION MANAGEMENT | Facility: CLINIC | Age: 69
End: 2019-09-10

## 2019-09-10 DIAGNOSIS — F33.2 SEVERE EPISODE OF RECURRENT MAJOR DEPRESSIVE DISORDER, WITHOUT PSYCHOTIC FEATURES (H): Primary | ICD-10-CM

## 2019-09-11 NOTE — TELEPHONE ENCOUNTER
MA/SUKHDEV, please see if you can get the patient in sooner than 10/1/19. She would like to discuss LTD paperwork & depression.     Next 5 appointments (look out 90 days)    Sep 12, 2019 12:30 PM CDT  Return Visit with Maninder Redding  [facility] ([facility]) [address] [place] [address] Dr Mannie [place] [id number]  [phone]   Sep 26, 2019 12:30 PM CDT  Return Visit with Maninder Redding  [facility] ([facility]) [address] [place] [address] Dr Mannie [place]ALLO [address]  [phone]   Oct 01, 2019  5:50 PM CDT  Office Visit with Nallely Moore MD, EA EXAM ROOM [facility]  [facility] [address] [place] [address] Drive  [address]  [place] [address]  [phone]

## 2019-09-12 ENCOUNTER — OFFICE VISIT (OUTPATIENT)
Dept: PSYCHOLOGY | Facility: CLINIC | Age: 69
End: 2019-09-12
Payer: COMMERCIAL

## 2019-09-12 DIAGNOSIS — F33.1 MAJOR DEPRESSIVE DISORDER, RECURRENT EPISODE, MODERATE (H): Primary | ICD-10-CM

## 2019-09-12 DIAGNOSIS — F41.1 GENERALIZED ANXIETY DISORDER: ICD-10-CM

## 2019-09-12 PROCEDURE — 90834 PSYTX W PT 45 MINUTES: CPT | Performed by: MARRIAGE & FAMILY THERAPIST

## 2019-09-12 NOTE — PROGRESS NOTES
Progress Note    Client Name: Lucie Whitehead  Date: September 12, 2019               Service Type: Individual  Video Visit: No     Session Start Time: 12:30  Session End Time: 1:15     Session Length: 45 min    Session #: 13     Attendees: Client attended alone     Treatment Plan Last Reviewed: August 15, 2019   PHQ-9 / VIGNESH-7: assessed regularly see flow sheets    DATA  Interactive Complexity: No  Crisis: No       Progress Since Last Session (Related to Symptoms / Goals / Homework):   Symptoms: No change  - client has consistently worked on treatment goals between sessions, yet ongoing psychosocial and medical stressors mitigate response.     Homework: Achieved / completed to satisfaction      Episode of Care Goals: Satisfactory progress - ACTION (Actively working towards change); Intervened by reinforcing change plan / affirming steps taken     Current / Ongoing Stressors and Concerns:   - terminated from employment   - medical issues (see EPIC record)     Treatment Objective(s) Addressed in This Session:   Client will learn and integrate CBT/DBT strategies for more effectively managing emotions and relationships.      Intervention:   Reinforced learning from day treatment, particularly cognitive distortion identification and strategies. Client reports she fatigued physically and mentally following a family wedding weekend. She says she was able to participate and perform well in social situations despite anxiety and depression symptoms. Chain analysis of use of skills to cope with distress. Processed emotions in session, validated, supportive counseling.     ASSESSMENT: Current Emotional / Mental Status (status of significant symptoms):   Risk status (Self / Other harm or suicidal ideation)   Client denies current fears or concerns for personal safety.   Client denies current or recent suicidal ideation or behaviors.   Client denies current or recent homicidal  ideation or behaviors.   Client denies current or recent self injurious behavior or ideation.   Client denies other safety concerns.   Client Client reports there has been no change in risk factors since their last session.     Client Client reports there has been no change in protective factors since their last session.     A safety and risk management has not been developed at this time.  Client was given the Suicide Prevention National Hotline, Text [place] 539405, the [facility] Crisis Number, or encouraged to Call 911 should there be a change in these risk factors.       Appearance:   Appropriate    Eye Contact:   Good    Psychomotor Behavior: Normal    Attitude:   Cooperative    Orientation:   All   Speech    Rate / Production: Normal     Volume:  Normal    Mood:    Anxious  Depressed    Affect:    Appropriate    Thought Content:  Clear    Thought Form:   Coherent  Logical    Insight:    Good      Medication Review:   No changes to current psychiatric medication(s)     Medication Compliance:   Yes     Changes in Health Issues:   None reported     Chemical Use Review:   Substance Use: Chemical use reviewed, no active concerns identified      Tobacco Use: No current tobacco use.      Diagnosis:  296.32 (F33.1) Major Depressive Disorder, Recurrent Episode, Moderate _  300.02 (F41.1) Generalized Anxiety Disorder    Collateral Reports Completed:   Not Applicable    PLAN: (Client Tasks / Therapist Tasks / Other)  Client will follow through with day treatment each week and talk in sessions about her losses and grief.        Maninder Redding MA, LMFT                                                       ______________________________________________________________________                                                   Treatment Plan    Client's Name: Lucie Whitehead  YOB: 1950    Date: August 15, 2019     DSM-V Diagnoses: 296.32 - Major Depressive Disorder, Recurrent, Moderate    300.02 - Generalized  Anxiety Disorder  ; V71.09 - No Diagnosis  Psychosocial / Contextual Factors: recnet losses  WHODAS: 33    Referral / Collaboration:  Referral to another professional/service is not indicated at this time..    Anticipated number of session or this episode of care: 26      Measurable Treatment Goal(s) related to diagnosis / functional impairment(s)   I will know I've met my goal when I have better tools to control my emotions     Goal 1: Client will achieve a significant reduction in PHQ-9 scores.     Objective #A (Client Action)   Client will identify cognitive distortions and negative self-talk contributing to depression.   Status: Continued: August 15, 2019  Intervention(s)   Therapist will teach about identification and strategies to counter negative self-talk and cognitive distortions.    Objective #B (Client Action)   Client will learn and integrate CBT/DBT strategies for more effectively managing emotions and relationships.   Status: Continued: August 15, 2019  Intervention(s)   Therapist will teach emotional regulation skills distress tolerance skills, interpersonal effectiveness skills and mindfulness skills.     Objective #C   Client will engage in positive self-care.  Status: Continued: August 15, 2019  Intervention(s)   Therapist will teach self-care goals:  Maintain balance in schedule (time for self/others, relaxation/activities, leisure/tasks, home/out of the house), assert needs and set limits with others, challenge negative thoughts/use affirming and encouraging self-talk, engage with support people on regular basis, practice behavior activation behaviors (sleep hygiene, balanced eating, physical activity, medical needs, personal hygiene), acknowledge and accept your feelings.    Client has reviewed and agreed to the above plan.    Jorge Redding MA, LMFT August 15, 2019

## 2019-09-12 NOTE — TELEPHONE ENCOUNTER
This could be done with phone visit and there are slots for those.    I will put in another order for MH

## 2019-09-13 NOTE — TELEPHONE ENCOUNTER
Pt is scheduled and aware of appt on 9/17/19 at 5:30pm.    Rochelle Garcia,     on 9/13/2019 at 11:15 AM

## 2019-09-17 ENCOUNTER — VIRTUAL VISIT (OUTPATIENT)
Dept: PEDIATRICS | Facility: CLINIC | Age: 69
End: 2019-09-17
Payer: COMMERCIAL

## 2019-09-17 DIAGNOSIS — F33.1 MAJOR DEPRESSIVE DISORDER, RECURRENT EPISODE, MODERATE (H): ICD-10-CM

## 2019-09-17 PROCEDURE — 99442 ZZC PHYSICIAN TELEPHONE EVALUATION 11-20 MIN: CPT | Performed by: INTERNAL MEDICINE

## 2019-09-17 RX ORDER — BUPROPION HYDROCHLORIDE 150 MG/1
150 TABLET ORAL EVERY MORNING
Qty: 90 TABLET | Refills: 0 | OUTPATIENT
Start: 2019-09-17

## 2019-09-17 RX ORDER — BUPROPION HYDROCHLORIDE 300 MG/1
300 TABLET ORAL EVERY MORNING
Qty: 30 TABLET | Refills: 1 | Status: SHIPPED | OUTPATIENT
Start: 2019-09-17 | End: 2019-11-12

## 2019-09-17 ASSESSMENT — ANXIETY QUESTIONNAIRES
GAD7 TOTAL SCORE: 19
3. WORRYING TOO MUCH ABOUT DIFFERENT THINGS: NEARLY EVERY DAY
7. FEELING AFRAID AS IF SOMETHING AWFUL MIGHT HAPPEN: MORE THAN HALF THE DAYS
5. BEING SO RESTLESS THAT IT IS HARD TO SIT STILL: NEARLY EVERY DAY
1. FEELING NERVOUS, ANXIOUS, OR ON EDGE: NEARLY EVERY DAY
2. NOT BEING ABLE TO STOP OR CONTROL WORRYING: NEARLY EVERY DAY
6. BECOMING EASILY ANNOYED OR IRRITABLE: MORE THAN HALF THE DAYS
IF YOU CHECKED OFF ANY PROBLEMS ON THIS QUESTIONNAIRE, HOW DIFFICULT HAVE THESE PROBLEMS MADE IT FOR YOU TO DO YOUR WORK, TAKE CARE OF THINGS AT HOME, OR GET ALONG WITH OTHER PEOPLE: VERY DIFFICULT

## 2019-09-17 ASSESSMENT — PATIENT HEALTH QUESTIONNAIRE - PHQ9
SUM OF ALL RESPONSES TO PHQ QUESTIONS 1-9: 23
5. POOR APPETITE OR OVEREATING: NEARLY EVERY DAY

## 2019-09-17 NOTE — PROGRESS NOTES
"Lucie Whitehead is a 69 year old female who is being evaluated via a billable telephone visit.      The patient has been notified of following:     \"This telephone visit will be conducted via a call between you and your physician/provider. We have found that certain health care needs can be provided without the need for a physical exam.  This service lets us provide the care you need with a short phone conversation.  If a prescription is necessary we can send it directly to your pharmacy.  If lab work is needed we can place an order for that and you can then stop by our lab to have the test done at a later time.    If during the course of the call the physician/provider feels a telephone visit is not appropriate, you will not be charged for this service.\"     Consent has been obtained for this service by 1 care team member: yes. See the scanned image in the medical record.    Lucie Whitehead complains of    Chief Complaint   Patient presents with     Depression     forms       I have reviewed and updated the patient's Past Medical History, Social History, Family History and Medication List.    ALLERGIES  Animal dander; Cats; Dogs; and Seasonal allergies    Sofia Ceja MA 5:21 PM 9/17/2019   (MA signature)      Depression and Anxiety Follow-Up    How are you doing with your depression since your last visit? No change    How are you doing with your anxiety since your last visit?  No change but worse then depression    Are you having other symptoms that might be associated with depression or anxiety? Yes:  tremors    Have you had a significant life event? Job Concerns     Do you have any concerns with your use of alcohol or other drugs? No     Depression has been bad, anxiety is new and worsening.      Seeing therapist - Jorge Redding and at intensive outpatient at [facility].    Waking in morning is the worst.    Still off work. 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  Is able to get out and go shopping, can drive, do banking.  Has difficulty dealing with stress - does not think will be able to deal with customers and co-workers.    Social History     Tobacco Use     Smoking status: Former Smoker     Last attempt to quit: 1975     Years since quittin.7     Smokeless tobacco: Never Used   Substance Use Topics     Alcohol use: Yes     Alcohol/week: 0.0 oz     Comment: 2-3 glass wine /week     Drug use: No     PHQ 2019 8/15/2019 2019   PHQ-9 Total Score 24 18 23   Q9: Thoughts of better off dead/self-harm past 2 weeks More than half the days Not at all More than half the days   F/U: Thoughts of suicide or self-harm - - No   F/U: Safety concerns - - No     VIGNESH-7 SCORE 2019 8/15/2019 2019   Total Score 19 17 19       Suicide Assessment Five-step Evaluation and Treatment (SAFE-T)    Assessment/Plan:  1. Major depressive disorder, recurrent episode, moderate (H)  Discussed stress and coping and continue with therapies.  Needs to see psychiatry and referral placed again.  Discussed medication changes, cymbalta at max but wellbutrin is low dose.  Increase per orders and notify if side effects.    - MENTAL HEALTH REFERRAL  - Adult; Psychiatry and Medication Management; Psychiatry; Other: [facility] [phone]; We will contact you to schedule the appointment or please call with any questions  - buPROPion (WELLBUTRIN XL) 300 MG 24 hr tablet; Take 1 tablet (300 mg) by mouth every morning  Dispense: 30 tablet; Refill: 1       Total time of call between patient and provider was 15 minutes     Nallely Moore MD

## 2019-09-17 NOTE — TELEPHONE ENCOUNTER
Requested Prescriptions   Pending Prescriptions Disp Refills     buPROPion (WELLBUTRIN XL) 150 MG 24 hr tablet [Pharmacy Med Name: BUPROPION HCL  MG TABLET] 90 tablet 0     Sig: TAKE 1 TABLET (150 MG) BY MOUTH EVERY MORNING  Last Written Prescription Date:  6/24/19  Last Fill Quantity: 90 tab,  # refills: 0   Last office visit: 6/25/2019 with prescribing provider:  Kole   Future Office Visit:   Next 5 appointments (look out 90 days)    Sep 17, 2019  5:30 PM CDT  Phone Visit with Nallely Moore MD  [facility] [place] ([facility]) [address]  [address]  [place] [address]  [phone]   Sep 26, 2019 12:30 PM CDT  Return Visit with Maninder Redding  [facility] [place] ([facility]) [address]  [place] [address]  [phone]   Oct 01, 2019  5:50 PM CDT  Office Visit with Nallely Moore MD,  EXAM ROOM 19  [facility] [place] ([facility]) [address]  [address]  [place] [address]  [phone]             SSRIs Protocol Failed - 9/17/2019  1:30 AM        Failed - PHQ-9 score less than 5 in past 6 months     Please review last PHQ-9 score.   PHQ-9 SCORE 4/23/2019 6/14/2019 8/15/2019   PHQ-9 Total Score - - -   PHQ-9 Total Score MyChart 23 (Severe depression) - -   PHQ-9 Total Score 23 24 18     VIGNESH-7 SCORE 4/15/2019 6/14/2019 8/15/2019   Total Score 17 19 17           Passed - Medication is Bupropion     If the medication is Bupropion (Wellbutrin), and the patient is taking for smoking cessation; OK to refill.          Passed - Medication is active on med list        Passed - Patient is age 18 or older        Passed - No active pregnancy on record        Passed - No positive pregnancy test in last 12 months        Passed - Recent (6 mo) or future (30 days) visit within the authorizing provider's specialty     Patient had office visit in the last 6 months or has a visit in the  "next 30 days with authorizing provider or within the authorizing provider's specialty.  See \"Patient Info\" tab in inbasket, or \"Choose Columns\" in Meds & Orders section of the refill encounter.               "

## 2019-09-17 NOTE — PATIENT INSTRUCTIONS
Talk to your pharmacist about the new shingles vaccine - it is really good but they can tell you cost.    Get your flu shot this fall.    Increase the wellbutrin to 300mg daily (2 of your current pills but only one of the new ones).    They will call you to schedule with psychiatry to get input - you can also check with [facility] if you could see one of their prescribers.

## 2019-09-18 ASSESSMENT — ANXIETY QUESTIONNAIRES: GAD7 TOTAL SCORE: 19

## 2019-09-30 ENCOUNTER — HEALTH MAINTENANCE LETTER (OUTPATIENT)
Age: 69
End: 2019-09-30

## 2019-10-01 ENCOUNTER — OFFICE VISIT (OUTPATIENT)
Dept: PEDIATRICS | Facility: CLINIC | Age: 69
End: 2019-10-01
Payer: COMMERCIAL

## 2019-10-01 VITALS
OXYGEN SATURATION: 97 % | DIASTOLIC BLOOD PRESSURE: 82 MMHG | TEMPERATURE: 98.3 F | HEART RATE: 93 BPM | WEIGHT: 223.3 LBS | BODY MASS INDEX: 35.89 KG/M2 | HEIGHT: 66 IN | SYSTOLIC BLOOD PRESSURE: 124 MMHG

## 2019-10-01 DIAGNOSIS — F33.2 SEVERE EPISODE OF RECURRENT MAJOR DEPRESSIVE DISORDER, WITHOUT PSYCHOTIC FEATURES (H): Primary | ICD-10-CM

## 2019-10-01 DIAGNOSIS — Z23 NEED FOR PROPHYLACTIC VACCINATION AND INOCULATION AGAINST INFLUENZA: ICD-10-CM

## 2019-10-01 PROCEDURE — 90471 IMMUNIZATION ADMIN: CPT | Performed by: INTERNAL MEDICINE

## 2019-10-01 PROCEDURE — 96127 BRIEF EMOTIONAL/BEHAV ASSMT: CPT | Mod: 59 | Performed by: INTERNAL MEDICINE

## 2019-10-01 PROCEDURE — 99214 OFFICE O/P EST MOD 30 MIN: CPT | Mod: 25 | Performed by: INTERNAL MEDICINE

## 2019-10-01 PROCEDURE — 90662 IIV NO PRSV INCREASED AG IM: CPT | Performed by: INTERNAL MEDICINE

## 2019-10-01 RX ORDER — LAMOTRIGINE 25 MG/1
TABLET ORAL
Refills: 0 | COMMUNITY
Start: 2019-09-24 | End: 2023-11-22

## 2019-10-01 RX ORDER — LIFITEGRAST 50 MG/ML
SOLUTION/ DROPS OPHTHALMIC
Refills: 4 | COMMUNITY
Start: 2019-09-16 | End: 2021-05-25

## 2019-10-01 ASSESSMENT — ANXIETY QUESTIONNAIRES
IF YOU CHECKED OFF ANY PROBLEMS ON THIS QUESTIONNAIRE, HOW DIFFICULT HAVE THESE PROBLEMS MADE IT FOR YOU TO DO YOUR WORK, TAKE CARE OF THINGS AT HOME, OR GET ALONG WITH OTHER PEOPLE: VERY DIFFICULT
1. FEELING NERVOUS, ANXIOUS, OR ON EDGE: NEARLY EVERY DAY
6. BECOMING EASILY ANNOYED OR IRRITABLE: NEARLY EVERY DAY
7. FEELING AFRAID AS IF SOMETHING AWFUL MIGHT HAPPEN: MORE THAN HALF THE DAYS
5. BEING SO RESTLESS THAT IT IS HARD TO SIT STILL: MORE THAN HALF THE DAYS
GAD7 TOTAL SCORE: 18
3. WORRYING TOO MUCH ABOUT DIFFERENT THINGS: NEARLY EVERY DAY
2. NOT BEING ABLE TO STOP OR CONTROL WORRYING: NEARLY EVERY DAY

## 2019-10-01 ASSESSMENT — PATIENT HEALTH QUESTIONNAIRE - PHQ9
SUM OF ALL RESPONSES TO PHQ QUESTIONS 1-9: 24
5. POOR APPETITE OR OVEREATING: MORE THAN HALF THE DAYS

## 2019-10-01 ASSESSMENT — MIFFLIN-ST. JEOR: SCORE: 1556.69

## 2019-10-01 NOTE — PROGRESS NOTES
Subjective     Lucie Whitehead is a 69 year old female who presents to clinic today for the following health issues:    HPI   Depression and Anxiety Follow-Up    How are you doing with your depression since your last visit? No change    How are you doing with your anxiety since your last visit?  No change    Are you having other symptoms that might be associated with depression or anxiety? Yes:  nightmares, trouble sleeping,    Have you had a significant life event? Job Concerns     Do you have any concerns with your use of alcohol or other drugs? No     Is doing intensive counseling    Did yoga today with group.    Nightmares often about work.    Saw psychiatry last week and is going to change some of medications to help with side effects and symptoms.      Is sewing - daughter due in December.    Social History     Tobacco Use     Smoking status: Former Smoker     Last attempt to quit: 1975     Years since quittin.7     Smokeless tobacco: Never Used   Substance Use Topics     Alcohol use: Yes     Alcohol/week: 0.0 standard drinks     Comment: 2-3 glass wine /week     Drug use: No     PHQ 8/15/2019 2019 10/1/2019   PHQ-9 Total Score 18 23 24   Q9: Thoughts of better off dead/self-harm past 2 weeks Not at all More than half the days More than half the days   F/U: Thoughts of suicide or self-harm - No -   F/U: Safety concerns - No -     VIGNESH-7 SCORE 8/15/2019 2019 10/1/2019   Total Score 17 19 18           Suicide Assessment Five-step Evaluation and Treatment (SAFE-T)      How many servings of fruits and vegetables do you eat daily?  4 or more    On average, how many sweetened beverages do you drink each day (soda, juice, sweet tea, etc)?   Rarely     How many days per week do you miss taking your medication? 0        Reviewed and updated as needed this visit by Provider  Allergies  Meds  Problems         Review of Systems         Objective    /82 (BP Location: Right arm, Patient  "Position: Chair, Cuff Size: Adult Regular)   Pulse 93   Temp 98.3  F (36.8  C) (Tympanic)   Ht 1.664 m (5' 5.5\")   Wt 101.3 kg (223 lb 4.8 oz)   LMP 11/12/2007 (LMP Unknown)   SpO2 97%   BMI 36.59 kg/m    Body mass index is 36.59 kg/m .  Physical Exam   GENERAL: healthy, alert and no distress  PSYCH: mentation appears normal and affect flat            Assessment & Plan     1. Severe episode of recurrent major depressive disorder, without psychotic features (H)  Discussed stress and coping, talked about reading to help with self-compassion and perfectionism.  Follow-up with psych NP as scheduled and adjust medication per her recommendations and continue group and individual counseling.  I can complete forms as needed     2. Need for prophylactic vaccination and inoculation against influenza    - INFLUENZA (HIGH DOSE) 3 VALENT VACCINE [34571]  - Vaccine Administration, Initial [57387]     BMI:   Estimated body mass index is 36.59 kg/m  as calculated from the following:    Height as of this encounter: 1.664 m (5' 5.5\").    Weight as of this encounter: 101.3 kg (223 lb 4.8 oz).           See Patient Instructions    Return in about 6 months (around 4/1/2020) for Physical Exam.    Nallely Moore MD  [facility]    25 min with pt and more than 50% of the time was spent in counseling and coordination of care of the above issues     "

## 2019-10-01 NOTE — PATIENT INSTRUCTIONS
Continue with your therapy    Try reading or watching on the internet: Michelle Boykin (I thought it was just me but it wasn't) or Jovanna Harrison (Mindfulness Based self-compassion).    Follow-up with your psychiatrist about the medication changes - they sound good to me.    Follow-up with me in April for your physical or sooner if needed.

## 2019-10-02 ASSESSMENT — ANXIETY QUESTIONNAIRES: GAD7 TOTAL SCORE: 18

## 2019-10-17 ENCOUNTER — OFFICE VISIT (OUTPATIENT)
Dept: PSYCHOLOGY | Facility: CLINIC | Age: 69
End: 2019-10-17
Payer: COMMERCIAL

## 2019-10-17 DIAGNOSIS — F33.1 MAJOR DEPRESSIVE DISORDER, RECURRENT EPISODE, MODERATE (H): Primary | ICD-10-CM

## 2019-10-17 DIAGNOSIS — F41.1 GENERALIZED ANXIETY DISORDER: ICD-10-CM

## 2019-10-17 PROCEDURE — 90834 PSYTX W PT 45 MINUTES: CPT | Performed by: MARRIAGE & FAMILY THERAPIST

## 2019-10-18 NOTE — PROGRESS NOTES
Progress Note    Client Name: Lucie Whitehead  Date: October 17, 2019                Service Type: Individual  Video Visit: No     Session Start Time: 9:30  Session End Time: 10:15     Session Length: 45 min    Session #: 13     Attendees: Client attended alone     Treatment Plan Last Reviewed: August 15, 2019   PHQ-9 / VIGNESH-7: assessed regularly see flow sheets    DATA  Interactive Complexity: No  Crisis: No       Progress Since Last Session (Related to Symptoms / Goals / Homework):   Symptoms: Improving .    Homework: Achieved / completed to satisfaction      Episode of Care Goals: Satisfactory progress - ACTION (Actively working towards change); Intervened by reinforcing change plan / affirming steps taken     Current / Ongoing Stressors and Concerns:   - terminated from employment   - medical issues (see EPIC record)     Treatment Objective(s) Addressed in This Session:   Client will learn and integrate CBT/DBT strategies for more effectively managing emotions and relationships.      Intervention:   Reinforced learning from day treatment, particularly mindfulness strategies. Client reports she is receiving a great deal of benefit from day treatment ans d will continue. She says she does wish she could talk more in group about her traumas and will ask the therapists there about group  boundaries. Processed emotions in session, validated, supportive counseling.     ASSESSMENT: Current Emotional / Mental Status (status of significant symptoms):   Risk status (Self / Other harm or suicidal ideation)   Client denies current fears or concerns for personal safety.   Client denies current or recent suicidal ideation or behaviors.   Client denies current or recent homicidal ideation or behaviors.   Client denies current or recent self injurious behavior or ideation.   Client denies other safety concerns.   Client Client reports there has been no change in risk factors since  their last session.     Client Client reports there has been no change in protective factors since their last session.     A safety and risk management has not been developed at this time.  Client was given the Suicide Prevention National Hotline, Text [place] 612937, the [facility] Crisis Number, or encouraged to Call 911 should there be a change in these risk factors.       Appearance:   Appropriate    Eye Contact:   Good    Psychomotor Behavior: Normal    Attitude:   Cooperative    Orientation:   All   Speech    Rate / Production: Normal     Volume:  Normal    Mood:    Anxious  Depressed    Affect:    Appropriate    Thought Content:  Clear    Thought Form:   Coherent  Logical    Insight:    Good      Medication Review:   No changes to current psychiatric medication(s)     Medication Compliance:   Yes     Changes in Health Issues:   None reported     Chemical Use Review:   Substance Use: Chemical use reviewed, no active concerns identified      Tobacco Use: No current tobacco use.      Diagnosis:  296.32 (F33.1) Major Depressive Disorder, Recurrent Episode, Moderate _  300.02 (F41.1) Generalized Anxiety Disorder    Collateral Reports Completed:   Not Applicable    PLAN: (Client Tasks / Therapist Tasks / Other)  Client will follow through with day treatment each week and talk in sessions about her losses and grief.      Maninder Redding MA, LMFT                                                       ______________________________________________________________________                                                   Treatment Plan    Client's Name: Lucie Whitehead  YOB: 1950    Date: August 15, 2019     DSM-V Diagnoses: 296.32 - Major Depressive Disorder, Recurrent, Moderate    300.02 - Generalized Anxiety Disorder  ; V71.09 - No Diagnosis  Psychosocial / Contextual Factors: recnet losses  WHODAS: 33    Referral / Collaboration:  Referral to another professional/service is not indicated at this  time..    Anticipated number of session or this episode of care: 26      Measurable Treatment Goal(s) related to diagnosis / functional impairment(s)   I will know I've met my goal when I have better tools to control my emotions     Goal 1: Client will achieve a significant reduction in PHQ-9 scores.     Objective #A (Client Action)   Client will identify cognitive distortions and negative self-talk contributing to depression.   Status: Continued: August 15, 2019  Intervention(s)   Therapist will teach about identification and strategies to counter negative self-talk and cognitive distortions.    Objective #B (Client Action)   Client will learn and integrate CBT/DBT strategies for more effectively managing emotions and relationships.   Status: Continued: August 15, 2019  Intervention(s)   Therapist will teach emotional regulation skills distress tolerance skills, interpersonal effectiveness skills and mindfulness skills.     Objective #C   Client will engage in positive self-care.  Status: Continued: August 15, 2019  Intervention(s)   Therapist will teach self-care goals:  Maintain balance in schedule (time for self/others, relaxation/activities, leisure/tasks, home/out of the house), assert needs and set limits with others, challenge negative thoughts/use affirming and encouraging self-talk, engage with support people on regular basis, practice behavior activation behaviors (sleep hygiene, balanced eating, physical activity, medical needs, personal hygiene), acknowledge and accept your feelings.    Client has reviewed and agreed to the above plan.    Jorge Redding MA, LMFT August 15, 2019

## 2019-11-07 DIAGNOSIS — F33.1 MAJOR DEPRESSIVE DISORDER, RECURRENT EPISODE, MODERATE (H): ICD-10-CM

## 2019-11-07 NOTE — TELEPHONE ENCOUNTER
"Requested Prescriptions   Pending Prescriptions Disp Refills     buPROPion (WELLBUTRIN XL) 300 MG 24 hr tablet    Last Written Prescription Date:  9/174/2019  Last Fill Quantity: 30,  # refills: 1   Last office visit: 10/1/2019 with prescribing provider:  Nallely Moore     Future Office Visit:     30 tablet 1     Sig: Take 1 tablet (300 mg) by mouth every morning       SSRIs Protocol Failed - 11/7/2019 11:59 AM        Failed - PHQ-9 score less than 5 in past 6 months     Please review last PHQ-9 score.     PHQ-9 SCORE 8/15/2019 9/17/2019 10/1/2019   PHQ-9 Total Score - - -   PHQ-9 Total Score MyChart - - -   PHQ-9 Total Score 18 23 24     VIGNESH-7 SCORE 8/15/2019 9/17/2019 10/1/2019   Total Score 17 19 18               Passed - Medication is Bupropion     If the medication is Bupropion (Wellbutrin), and the patient is taking for smoking cessation; OK to refill.          Passed - Medication is active on med list        Passed - Patient is age 18 or older        Passed - No active pregnancy on record        Passed - No positive pregnancy test in last 12 months        Passed - Recent (6 mo) or future (30 days) visit within the authorizing provider's specialty     Patient had office visit in the last 6 months or has a visit in the next 30 days with authorizing provider or within the authorizing provider's specialty.  See \"Patient Info\" tab in inbasket, or \"Choose Columns\" in Meds & Orders section of the refill encounter.              "

## 2019-11-08 NOTE — TELEPHONE ENCOUNTER
PHQ-9 score:    PHQ-9 SCORE 10/1/2019   PHQ-9 Total Score -   PHQ-9 Total Score MyChart -   PHQ-9 Total Score 24     Sent mychart to patient with PHQ-9 attached.   Lorraine GARCIA RN, BSN

## 2019-11-12 RX ORDER — BUPROPION HYDROCHLORIDE 300 MG/1
300 TABLET ORAL EVERY MORNING
Qty: 90 TABLET | Refills: 1 | Status: SHIPPED | OUTPATIENT
Start: 2019-11-12 | End: 2020-03-17

## 2019-11-12 NOTE — TELEPHONE ENCOUNTER
Prescription approved per [facility] Refill Protocol.    Lo Puga, RN, BSN

## 2019-11-22 ENCOUNTER — OFFICE VISIT (OUTPATIENT)
Dept: PSYCHOLOGY | Facility: CLINIC | Age: 69
End: 2019-11-22
Payer: COMMERCIAL

## 2019-11-22 DIAGNOSIS — F41.1 GENERALIZED ANXIETY DISORDER: ICD-10-CM

## 2019-11-22 DIAGNOSIS — F33.1 MAJOR DEPRESSIVE DISORDER, RECURRENT EPISODE, MODERATE (H): Primary | ICD-10-CM

## 2019-11-22 PROCEDURE — 90834 PSYTX W PT 45 MINUTES: CPT | Performed by: MARRIAGE & FAMILY THERAPIST

## 2019-11-22 NOTE — PROGRESS NOTES
Progress Note    Client Name: Lucie Whitehead  Date: November 22, 2019                 Service Type: Individual  Video Visit: No     Session Start Time: 10:30  Session End Time: 11:15     Session Length: 45 min    Session #: 15     Attendees: Client attended alone     Treatment Plan Last Reviewed: August 15, 2019   PHQ-9 / VIGNESH-7: assessed regularly see flow sheets    DATA  Interactive Complexity: No  Crisis: No       Progress Since Last Session (Related to Symptoms / Goals / Homework):   Symptoms: Improving .    Homework: Achieved / completed to satisfaction      Episode of Care Goals: Satisfactory progress - ACTION (Actively working towards change); Intervened by reinforcing change plan / affirming steps taken     Current / Ongoing Stressors and Concerns:   - terminated from employment   - medical issues (see EPIC record)     Treatment Objective(s) Addressed in This Session:   Client will learn and integrate CBT/DBT strategies for more effectively managing emotions and relationships.      Intervention:   Reinforced learning from day treatment, particularly interpersonal effectiveness skills. Client reports she is receiving a great deal of benefit from day treatment and will continue. Chain analysis of two recent major family social gatherings: a bridal shower and a large birthday party. She described her advance social anxiety strategizing, performance and lessons learned.  Processed emotions in session, validated, supportive counseling.     ASSESSMENT: Current Emotional / Mental Status (status of significant symptoms):   Risk status (Self / Other harm or suicidal ideation)   Client denies current fears or concerns for personal safety.   Client denies current or recent suicidal ideation or behaviors.   Client denies current or recent homicidal ideation or behaviors.   Client denies current or recent self injurious behavior or ideation.   Client denies other safety  concerns.   Client Client reports there has been no change in risk factors since their last session.     Client Client reports there has been no change in protective factors since their last session.     A safety and risk management has not been developed at this time.  Client was given the Suicide Prevention National Hotline, Text [place] 865567, the [facility] Crisis Number, or encouraged to Call 911 should there be a change in these risk factors.       Appearance:   Appropriate    Eye Contact:   Good    Psychomotor Behavior: Normal    Attitude:   Cooperative    Orientation:   All   Speech    Rate / Production: Normal     Volume:  Normal    Mood:    Anxious    Affect:    Appropriate    Thought Content:  Clear    Thought Form:   Coherent  Logical    Insight:    Good      Medication Review:   No changes to current psychiatric medication(s)     Medication Compliance:   Yes     Changes in Health Issues:   None reported     Chemical Use Review:   Substance Use: Chemical use reviewed, no active concerns identified      Tobacco Use: No current tobacco use.      Diagnosis:  296.32 (F33.1) Major Depressive Disorder, Recurrent Episode, Moderate _  300.02 (F41.1) Generalized Anxiety Disorder    Collateral Reports Completed:   Not Applicable    PLAN: (Client Tasks / Therapist Tasks / Other)  Client will follow through with day treatment each week.      Maninder Redding MA, LMFT                                                       ______________________________________________________________________                                                   Treatment Plan    Client's Name: Lucie Whitehead  YOB: 1950    Date: August 15, 2019     DSM-V Diagnoses: 296.32 - Major Depressive Disorder, Recurrent, Moderate    300.02 - Generalized Anxiety Disorder  ; V71.09 - No Diagnosis  Psychosocial / Contextual Factors: recnet losses  WHODAS: 33    Referral / Collaboration:  Referral to another professional/service is not  indicated at this time..    Anticipated number of session or this episode of care: 26      Measurable Treatment Goal(s) related to diagnosis / functional impairment(s)   I will know I've met my goal when I have better tools to control my emotions     Goal 1: Client will achieve a significant reduction in PHQ-9 scores.     Objective #A (Client Action)   Client will identify cognitive distortions and negative self-talk contributing to depression.   Status: Continued: August 15, 2019  Intervention(s)   Therapist will teach about identification and strategies to counter negative self-talk and cognitive distortions.    Objective #B (Client Action)   Client will learn and integrate CBT/DBT strategies for more effectively managing emotions and relationships.   Status: Continued: August 15, 2019  Intervention(s)   Therapist will teach emotional regulation skills distress tolerance skills, interpersonal effectiveness skills and mindfulness skills.     Objective #C   Client will engage in positive self-care.  Status: Continued: August 15, 2019  Intervention(s)   Therapist will teach self-care goals:  Maintain balance in schedule (time for self/others, relaxation/activities, leisure/tasks, home/out of the house), assert needs and set limits with others, challenge negative thoughts/use affirming and encouraging self-talk, engage with support people on regular basis, practice behavior activation behaviors (sleep hygiene, balanced eating, physical activity, medical needs, personal hygiene), acknowledge and accept your feelings.    Client has reviewed and agreed to the above plan.    Jorge Redding MA, LMFT August 15, 2019

## 2019-12-03 DIAGNOSIS — R73.01 IMPAIRED FASTING GLUCOSE: ICD-10-CM

## 2019-12-04 RX ORDER — METFORMIN HCL 500 MG
TABLET, EXTENDED RELEASE 24 HR ORAL
Qty: 360 TABLET | Refills: 1 | Status: SHIPPED | OUTPATIENT
Start: 2019-12-04 | End: 2020-06-05

## 2019-12-04 NOTE — TELEPHONE ENCOUNTER
metFORMIN (GLUCOPHAGE-XR) 500 MG 24 hr tablet        Last written prescription date: 6/14/19        Last fill quantity: 320, # refills: 1        Last office visit: 10/01/19        Future office visit: N/A        Is this a controlled substance?  No       Routing refill request to provider for review/approval because:   Patient has had a Microalbumin in the past 15 mos.    Patient has documented A1c within the specified period of time    Lorraine GARCIA RN, BSN

## 2020-01-22 ENCOUNTER — TRANSFERRED RECORDS (OUTPATIENT)
Dept: HEALTH INFORMATION MANAGEMENT | Facility: CLINIC | Age: 70
End: 2020-01-22

## 2020-02-26 ENCOUNTER — NURSE TRIAGE (OUTPATIENT)
Dept: PEDIATRICS | Facility: CLINIC | Age: 70
End: 2020-02-26

## 2020-02-26 NOTE — TELEPHONE ENCOUNTER
"Transferred to  to schedule follow up visit for chronic wheezing.     Wheezing is not worse, just ongoing. No CP, no edema.     Neva Hickman, RN   [facility] -- Triage Nurse      Reason for Disposition    [1] MODERATE longstanding difficulty breathing (e.g., speaks in phrases, SOB even at rest, pulse 100-120) AND [2] SAME as normal    Additional Information    Negative: [1] Breathing stopped AND [2] hasn't returned    Negative: Choking on something    Negative: Severe difficulty breathing (e.g., struggling for each breath, speaks in single words)    Negative: Bluish (or gray) lips or face now    Negative: Difficult to awaken or acting confused (e.g., disoriented, slurred speech)    Negative: Passed out (i.e., lost consciousness, collapsed and was not responding)    Negative: Wheezing started suddenly after medicine, an allergic food or bee sting    Negative: Stridor    Negative: Slow, shallow and weak breathing    Negative: Sounds like a life-threatening emergency to the triager    Negative: Chest pain    Negative: [1] Wheezing (high pitched whistling sound) AND [2] previous asthma attacks or use of asthma medicines    Negative: [1] Difficulty breathing AND [2] only present when coughing    Negative: [1] Difficulty breathing AND [2] only from stuffy or runny nose    Negative: [1] MODERATE difficulty breathing (e.g., speaks in phrases, SOB even at rest, pulse 100-120) AND [2] NEW-onset or WORSE than normal    Negative: Wheezing can be heard across the room    Negative: Drooling or spitting out saliva (because can't swallow)    Negative: History of prior \"blood clot\" in leg or lungs (i.e., deep vein thrombosis, pulmonary embolism)    Negative: History of inherited increased risk of blood clots (e.g., Factor 5 Leiden, Anti-thrombin 3, Protein C or Protein S deficiency, Prothrombin mutation)    Negative: Recent illness requiring prolonged bedrest (i.e., immobilization)    Negative: Hip or leg fracture in past " "2 months (e.g., had cast on leg or ankle)    Negative: Major surgery in the past month    Negative: Recent long-distance travel with prolonged time in car, bus, plane, or train (i.e., within past 2 weeks; 6 or  more hours duration)    Negative: Extra heart beats OR irregular heart beating   (i.e., \"palpitations\")    Negative: Fever > 103 F (39.4 C)    Negative: [1] Fever > 101 F (38.3 C) AND [2] age > 60    Negative: [1] Fever > 100.0 F (37.8 C) AND [2] bedridden (e.g., nursing home patient, CVA, chronic illness, recovering from surgery)    Negative: [1] Fever > 100.0 F (37.8 C) AND [2] diabetes mellitus or weak immune system (e.g., HIV positive, cancer chemo, splenectomy, chronic steroids)    Negative: [1] Periods where breathing stops and then resumes normally AND [2] bedridden (e.g., nursing home patient, CVA)    Negative: Pregnant or postpartum (< 1 month since delivery)    Negative: Patient sounds very sick or weak to the triager    Negative: [1] MILD difficulty breathing (e.g., minimal/no SOB at rest, SOB with walking, pulse <100) AND [2] NEW-onset or WORSE than normal    Negative: [1] Longstanding difficulty breathing (e.g., CHF, COPD, emphysema) AND [2] WORSE than normal    Negative: [1] Longstanding difficulty breathing AND [2] not responding to usual therapy    Negative: [1] Continuous (nonstop) coughing AND [2] keeps from working or sleeping    Answer Assessment - Initial Assessment Questions  1. RESPIRATORY STATUS: \"Describe your breathing?\" (e.g., wheezing, shortness of breath, unable to speak, severe coughing)       She has been having ongoing wheezing for more than 6 months now.   2. ONSET: \"When did this breathing problem begin?\"       Over 6 months from now.   3. PATTERN \"Does the difficult breathing come and go, or has it been constant since it started?\"       For the most part constant, worse during activity.   4. SEVERITY: \"How bad is your breathing?\" (e.g., mild, moderate, severe)     - MILD: No " "SOB at rest, mild SOB with walking, speaks normally in sentences, can lay down, no retractions, pulse < 100.     - MODERATE: SOB at rest, SOB with minimal exertion and prefers to sit, cannot lie down flat, speaks in phrases, mild retractions, audible wheezing, pulse 100-120.     - SEVERE: Very SOB at rest, speaks in single words, struggling to breathe, sitting hunched forward, retractions, pulse > 120       Mild to moderate.   5. RECURRENT SYMPTOM: \"Have you had difficulty breathing before?\" If so, ask: \"When was the last time?\" and \"What happened that time?\"       Yes, ongoing for 6 months.   6. CARDIAC HISTORY: \"Do you have any history of heart disease?\" (e.g., heart attack, angina, bypass surgery, angioplasty)       Denies.   7. LUNG HISTORY: \"Do you have any history of lung disease?\"  (e.g., pulmonary embolus, asthma, emphysema)      No  8. CAUSE: \"What do you think is causing the breathing problem?\"       She is worried about COPD.   9. OTHER SYMPTOMS: \"Do you have any other symptoms? (e.g., dizziness, runny nose, cough, chest pain, fever)      Denies all.   10. PREGNANCY: \"Is there any chance you are pregnant?\" \"When was your last menstrual period?\"        No  11. TRAVEL: \"Have you traveled out of the country in the last month?\" (e.g., travel history, exposures)        No.    Protocols used: BREATHING DIFFICULTY-A-AH    "

## 2020-02-27 ENCOUNTER — OFFICE VISIT (OUTPATIENT)
Dept: PEDIATRICS | Facility: CLINIC | Age: 70
End: 2020-02-27
Payer: COMMERCIAL

## 2020-02-27 VITALS
WEIGHT: 227 LBS | TEMPERATURE: 97.2 F | BODY MASS INDEX: 36.48 KG/M2 | DIASTOLIC BLOOD PRESSURE: 72 MMHG | HEIGHT: 66 IN | OXYGEN SATURATION: 99 % | SYSTOLIC BLOOD PRESSURE: 126 MMHG | RESPIRATION RATE: 22 BRPM | HEART RATE: 84 BPM

## 2020-02-27 DIAGNOSIS — R06.2 WHEEZE: Primary | ICD-10-CM

## 2020-02-27 DIAGNOSIS — R05.3 CHRONIC COUGH: ICD-10-CM

## 2020-02-27 DIAGNOSIS — R06.09 DYSPNEA ON EXERTION: ICD-10-CM

## 2020-02-27 PROCEDURE — 99214 OFFICE O/P EST MOD 30 MIN: CPT | Performed by: NURSE PRACTITIONER

## 2020-02-27 RX ORDER — HYDROXYZINE HYDROCHLORIDE 25 MG/1
25 TABLET, FILM COATED ORAL PRN
COMMUNITY
Start: 2019-12-03 | End: 2020-09-08

## 2020-02-27 ASSESSMENT — MIFFLIN-ST. JEOR: SCORE: 1573.48

## 2020-02-27 NOTE — PATIENT INSTRUCTIONS
Pulmonary function testing to be done at [facility], you will receive a call to set this up.    Schedule your follow-up with Dr. Moore in April as she previously recommended.

## 2020-02-27 NOTE — PROGRESS NOTES
"Subjective     Lucie Whitehead is a 69 year old female who presents to clinic today for the following health issues:    HPI   Acute Illness   Acute illness concerns: wheezing   Onset: few months     Fever: no     Chills/Sweats: no     Headache (location?): YES    Sinus Pressure:YES    Conjunctivitis:  no    Ear Pain: no    Rhinorrhea: no     Congestion: no     Sore Throat: no      Cough: YES-non-productive    Wheeze: YES- SOB and wheezing     Decreased Appetite: no     Nausea: no    Vomiting: no    Diarrhea:  YES- IBS     Dysuria/Freq.: no     Fatigue/Achiness: YES- both - normal     Sick/Strep Exposure: no      Therapies Tried and outcome: None     Hx of asthma as a child, has required nebs in the past with illness but no inhalers at home.  Non-smoker but had 'a lot\" of passive smoke exposure growing up. No current smoke exposure.   Last spring, was hiking and was wheezing so had to turn around.  Has hx of seasonal allergies, takes Zyrtec for this. Multiple allergies including horses, trees, grass to name a few.  Notes over the past year that she has intermittent wheeze, worsened over the past few months. Notes with rest or activity. Some shortness of breath with activity while wheezing.  Admits she has baseline weakness so she is unsure if this contributes to her weakness/SOB.   No chest pain. Had stress test done in 2009, normal.  Chronic PND, \"junk in throat\"      Reviewed and updated as needed this visit by Provider  Meds  Problems         Review of Systems   Otherwise ROS is negative except as stated above.        Objective    /72 (BP Location: Right arm, Patient Position: Chair, Cuff Size: Adult Large)   Pulse 84   Temp 97.2  F (36.2  C) (Tympanic)   Resp 22   Ht 1.664 m (5' 5.5\")   Wt 103 kg (227 lb)   LMP 11/12/2007 (LMP Unknown)   SpO2 99%   BMI 37.20 kg/m    Body mass index is 37.2 kg/m .  Physical Exam   GENERAL: healthy, alert and no distress  EYES: Eyes grossly normal to " inspection  HENT: ear canals and TM's normal, nose and mouth without ulcers or lesions  NECK: no adenopathy  RESP: lungs clear to auscultation - no rales, rhonchi or wheezes  CV: regular rate and rhythm, normal S1 S2, no S3 or S4, no murmur, click or rub    Diagnostic Test Results:  Labs reviewed in Epic        Assessment & Plan       ICD-10-CM    1. Wheeze R06.2 General PFT Lab (Please always keep checked)     Pulmonary Function Test   2. Chronic cough R05 General PFT Lab (Please always keep checked)     Pulmonary Function Test   3. Dyspnea on exertion R06.09 General PFT Lab (Please always keep checked)     Pulmonary Function Test     Will proceed with PFTs due to chronicity of symptoms to r/o obstructive airway disease.  If normal, would consider this being an allergic component that is causing wheeze and could consider trial of singulair.  Final plan pending results.    Patient Instructions   Pulmonary function testing to be done at [facility], you will receive a call to set this up.    Schedule your follow-up with Dr. Moore in April as she previously recommended.          Return in about 1 month (around 3/27/2020) for Routine Visit.    Eloisa Herrera NP  [facility]

## 2020-03-13 ENCOUNTER — TELEPHONE (OUTPATIENT)
Dept: PEDIATRICS | Facility: CLINIC | Age: 70
End: 2020-03-13

## 2020-03-13 NOTE — TELEPHONE ENCOUNTER
The pt is aware and scheduled for her upcoming appointment. She would still like to keep her appointment for 4/3/20.   Violet Pace on 3/13/2020 at 11:43 AM

## 2020-03-13 NOTE — TELEPHONE ENCOUNTER
General Call:   Who is calling:  Lucie  Reason for Call:  Appointment for paperwork, needs to be done before 03/31/20  What are your questions or concerns:  above  Date of last appointment with provider: 10/01/19  Okay to leave a detailed message:Yes at Home number on file [phone] (home)

## 2020-03-16 ENCOUNTER — HOSPITAL ENCOUNTER (OUTPATIENT)
Dept: RESPIRATORY THERAPY | Facility: CLINIC | Age: 70
Discharge: HOME | End: 2020-03-16
Attending: NURSE PRACTITIONER | Admitting: NURSE PRACTITIONER
Payer: COMMERCIAL

## 2020-03-16 DIAGNOSIS — R06.2 WHEEZE: ICD-10-CM

## 2020-03-16 DIAGNOSIS — R05.3 CHRONIC COUGH: ICD-10-CM

## 2020-03-16 DIAGNOSIS — R06.09 DYSPNEA ON EXERTION: ICD-10-CM

## 2020-03-16 LAB
DLCOUNC-%PRED-PRE: 120 %
DLCOUNC-PRE: 25.04 ML/MIN/MMHG
DLCOUNC-PRED: 20.7 ML/MIN/MMHG
ERV-%PRED-PRE: 138 %
ERV-PRE: 0.53 L
ERV-PRED: 0.38 L
EXPTIME-PRE: 7.53 SEC
FEF2575-%PRED-PRE: 138 %
FEF2575-PRE: 2.85 L/SEC
FEF2575-PRED: 2.05 L/SEC
FEFMAX-%PRED-PRE: 104 %
FEFMAX-PRE: 6.34 L/SEC
FEFMAX-PRED: 6.1 L/SEC
FEV1-%PRED-PRE: 113 %
FEV1-PRE: 2.73 L
FEV1FEV6-PRE: 84 %
FEV1FEV6-PRED: 80 %
FEV1FVC-PRE: 84 %
FEV1FVC-PRED: 78 %
FEV1SVC-PRE: 80 %
FEV1SVC-PRED: 73 %
FIFMAX-PRE: 5.94 L/SEC
FRCPLETH-%PRED-PRE: 93 %
FRCPLETH-PRE: 2.6 L
FRCPLETH-PRED: 2.79 L
FVC-%PRED-PRE: 104 %
FVC-PRE: 3.25 L
FVC-PRED: 3.1 L
IC-%PRED-PRE: 97 %
IC-PRE: 2.86 L
IC-PRED: 2.93 L
RVPLETH-%PRED-PRE: 99 %
RVPLETH-PRE: 2.07 L
RVPLETH-PRED: 2.08 L
TLCPLETH-%PRED-PRE: 105 %
TLCPLETH-PRE: 5.46 L
TLCPLETH-PRED: 5.19 L
VA-%PRED-PRE: 90 %
VA-PRE: 4.57 L
VC-%PRED-PRE: 102 %
VC-PRE: 3.39 L
VC-PRED: 3.31 L

## 2020-03-16 PROCEDURE — 94729 DIFFUSING CAPACITY: CPT

## 2020-03-16 PROCEDURE — 40000275 ZZH STATISTIC RCP TIME EA 10 MIN

## 2020-03-16 PROCEDURE — 94010 BREATHING CAPACITY TEST: CPT

## 2020-03-16 PROCEDURE — 94726 PLETHYSMOGRAPHY LUNG VOLUMES: CPT

## 2020-03-16 NOTE — PROGRESS NOTES
PFT Note:     Pt completed pulmonary function testing with DLCO.  Good Pt effort and cooperation.     Spirometry  Meets all ATS recommendations except that back-extrapolated volume was >5% of FVC.     Plethysmography  All plethysmographic measurements meet ATS recommendations.    DLCO  Meets all ATS recommendations.  DLCO is an average of 2 maneuvers.   No recent Hgb available for DLCO correction.    March 16, 2020.8:10 AM  Charles Chavarria, RT

## 2020-03-17 ENCOUNTER — VIRTUAL VISIT (OUTPATIENT)
Dept: PEDIATRICS | Facility: CLINIC | Age: 70
End: 2020-03-17
Payer: COMMERCIAL

## 2020-03-17 DIAGNOSIS — E66.01 MORBID OBESITY (H): ICD-10-CM

## 2020-03-17 DIAGNOSIS — N18.30 CKD (CHRONIC KIDNEY DISEASE) STAGE 3, GFR 30-59 ML/MIN (H): ICD-10-CM

## 2020-03-17 DIAGNOSIS — E78.5 HYPERLIPIDEMIA LDL GOAL <160: ICD-10-CM

## 2020-03-17 DIAGNOSIS — R73.01 IMPAIRED FASTING GLUCOSE: ICD-10-CM

## 2020-03-17 DIAGNOSIS — F33.2 SEVERE EPISODE OF RECURRENT MAJOR DEPRESSIVE DISORDER, WITHOUT PSYCHOTIC FEATURES (H): Primary | ICD-10-CM

## 2020-03-17 PROCEDURE — 99442 ZZC PHYSICIAN TELEPHONE EVALUATION 11-20 MIN: CPT | Performed by: INTERNAL MEDICINE

## 2020-03-17 RX ORDER — SIMVASTATIN 40 MG
40 TABLET ORAL AT BEDTIME
Qty: 90 TABLET | Refills: 1 | Status: SHIPPED | OUTPATIENT
Start: 2020-03-17 | End: 2020-09-08

## 2020-03-17 ASSESSMENT — PATIENT HEALTH QUESTIONNAIRE - PHQ9
5. POOR APPETITE OR OVEREATING: MORE THAN HALF THE DAYS
SUM OF ALL RESPONSES TO PHQ QUESTIONS 1-9: 20

## 2020-03-17 ASSESSMENT — ANXIETY QUESTIONNAIRES
1. FEELING NERVOUS, ANXIOUS, OR ON EDGE: MORE THAN HALF THE DAYS
6. BECOMING EASILY ANNOYED OR IRRITABLE: MORE THAN HALF THE DAYS
IF YOU CHECKED OFF ANY PROBLEMS ON THIS QUESTIONNAIRE, HOW DIFFICULT HAVE THESE PROBLEMS MADE IT FOR YOU TO DO YOUR WORK, TAKE CARE OF THINGS AT HOME, OR GET ALONG WITH OTHER PEOPLE: VERY DIFFICULT
2. NOT BEING ABLE TO STOP OR CONTROL WORRYING: NEARLY EVERY DAY
GAD7 TOTAL SCORE: 16
5. BEING SO RESTLESS THAT IT IS HARD TO SIT STILL: MORE THAN HALF THE DAYS
7. FEELING AFRAID AS IF SOMETHING AWFUL MIGHT HAPPEN: MORE THAN HALF THE DAYS
3. WORRYING TOO MUCH ABOUT DIFFERENT THINGS: NEARLY EVERY DAY

## 2020-03-17 NOTE — PROGRESS NOTES
"Lucie Whitehead is a 69 year old female who is being evaluated via a billable telephone visit.      The patient has been notified of following:     \"This telephone visit will be conducted via a call between you and your physician/provider. We have found that certain health care needs can be provided without the need for a physical exam.  This service lets us provide the care you need with a short phone conversation.  If a prescription is necessary we can send it directly to your pharmacy.  If lab work is needed we can place an order for that and you can then stop by our lab to have the test done at a later time.    If during the course of the call the physician/provider feels a telephone visit is not appropriate, you will not be charged for this service.\"       Lucie Whitehead complains of    Chief Complaint   Patient presents with     Forms     fmla      Depression     Anxiety       I have reviewed and updated the patient's Past Medical History, Social History, Family History and Medication List.    ALLERGIES  Animal dander; Cats; Dogs; and Seasonal allergies    Sofia Ceja MA 11:04 AM 3/17/2020   (MA signature)    Additional provider notes:   Depression and Anxiety Follow-Up/FMLA forms    How are you doing with your depression since your last visit? No change    How are you doing with your anxiety since your last visit?  Worsened     Are you having other symptoms that might be associated with depression or anxiety? Yes:  has been experiencing panic attacks and tremors    Have you had a significant life event? OTHER: everything going on in the world trouble getting a handle on depression/anxiety     Do you have any concerns with your use of alcohol or other drugs? No     Patient states has a deadline of 3/30 for when work decides on her leave.    Patient feels unable to return to work.    Is seeing a psychiatrist and going to therapy and participating in a group.  They are also being asked to provide " documentation.    Back also an issue - just had an MRI and seeing spinal surgery tomorrow.    Social History     Tobacco Use     Smoking status: Former Smoker     Last attempt to quit: 1975     Years since quittin.2     Smokeless tobacco: Never Used   Substance Use Topics     Alcohol use: Yes     Alcohol/week: 0.0 standard drinks     Comment: 2-3 glass wine /week     Drug use: No     PHQ 2019 10/1/2019 3/17/2020   PHQ-9 Total Score 23 24 20   Q9: Thoughts of better off dead/self-harm past 2 weeks More than half the days More than half the days Several days   F/U: Thoughts of suicide or self-harm No - -   F/U: Safety concerns No - -     VIGNESH-7 SCORE 2019 10/1/2019 3/17/2020   Total Score 19 18 16       Suicide Assessment Five-step Evaluation and Treatment (SAFE-T)      Assessment/Plan:  1. Severe episode of recurrent major depressive disorder, without psychotic features (H)  Uncontrolled.  Has goot follow-up with psychiatry, counseling and group therapy.  Forms completed.    2. Hyperlipidemia LDL goal <160  Refill and follow-up for labs and physical in the summer  - simvastatin (ZOCOR) 40 MG tablet; Take 1 tablet (40 mg) by mouth At Bedtime  Dispense: 90 tablet; Refill: 1  - Lipid panel reflex to direct LDL Fasting; Future    3. CKD (chronic kidney disease) stage 3, GFR 30-59 ml/min (H)  Recheck labs this summer    4. Impaired fasting glucose  Recheck labs.  Working on diet with [email or web address]  - **Basic metabolic panel FUTURE anytime; Future    5. Morbid obesity (H)  Working with [facility]  - **Basic metabolic panel FUTURE anytime; Future  - Lipid panel reflex to direct LDL Fasting; Future       Phone call contact time: 15 mins    Nallely Moore MD

## 2020-03-18 ASSESSMENT — ANXIETY QUESTIONNAIRES: GAD7 TOTAL SCORE: 16

## 2020-03-24 NOTE — PROCEDURES
Procedure Date: 2020      Please see medical chart for graphs and statistics related to this report.       REFERRING PHYSICIAN:   Javier FAGAN NP                                        TECHNICIAN:   Charles Chavarria      DIAGNOSIS:   FRANCO, Wheeze, Chronic Cough, Sleep Apnea, GERD                                                                 HEIGHT:   65.50 inches                                                                    WEIGHT:   220.00 Lbs.      DYSPNEA:   Walking less than 100 yards                                                                COUGH:   Non-Productive   WHEEZE:   Constant                                                                      TOBACCO PROD:   Cigarette   YEARS SMOKED:   2.0                                                     PKS/DAY:   0.2   YEARS QUIT:    46.0                                                     POST-TEST COMMENTS:   Patient competed pulmonary function testing with DLCO.  Good patient effort and cooperation.  Spirometry meets all ATS recommendations except that back-extrapolated volume was > 5% of FVC.  All plethysmographic and DLCO measurements meet ATS recommendations.  DLCO is an Average of 2 maneuvers.  No recent Hgb available for DLCO correction.         INTERPRETATION:      1.  Normal range mechanics   2.  Normal volumes   3.  Normal gas transfer and flow-volume loop         YEIMY FAITH MD             D: 2020   T: 2020   MT: HEATH      Name:     MIR CHAVEZ   MRN:      [id number]        Account:        [id number]   :      1950           Procedure Date: 2020      Document: [id number]       cc: Eloisa Herrera NP

## 2020-03-31 ENCOUNTER — TELEPHONE (OUTPATIENT)
Dept: PEDIATRICS | Facility: CLINIC | Age: 70
End: 2020-03-31

## 2020-03-31 NOTE — TELEPHONE ENCOUNTER
The pt is aware and scheduled for her upcoming appointment.   Violet Pace on 3/31/2020 at 10:40 AM

## 2020-03-31 NOTE — TELEPHONE ENCOUNTER
Spoke to patient regarding normal PFT results.  She would like to discuss this further with Dr. Moore.  Can you help patient set up a phone visit with Dr. Moore in the next  1 mos to discuss cough, wheeze, and PFT results?    Thank you!  LENNY Ramos, CNP

## 2020-04-30 DIAGNOSIS — F33.1 MAJOR DEPRESSIVE DISORDER, RECURRENT EPISODE, MODERATE (H): ICD-10-CM

## 2020-04-30 RX ORDER — BUPROPION HYDROCHLORIDE 300 MG/1
TABLET ORAL
Qty: 90 TABLET | Refills: 1 | OUTPATIENT
Start: 2020-04-30

## 2020-05-01 ENCOUNTER — VIRTUAL VISIT (OUTPATIENT)
Dept: PEDIATRICS | Facility: CLINIC | Age: 70
End: 2020-05-01
Payer: COMMERCIAL

## 2020-05-01 DIAGNOSIS — R73.01 IMPAIRED FASTING GLUCOSE: ICD-10-CM

## 2020-05-01 DIAGNOSIS — R06.2 WHEEZING: Primary | ICD-10-CM

## 2020-05-01 DIAGNOSIS — F33.1 MAJOR DEPRESSIVE DISORDER, RECURRENT EPISODE, MODERATE (H): ICD-10-CM

## 2020-05-01 PROCEDURE — 99213 OFFICE O/P EST LOW 20 MIN: CPT | Mod: TEL | Performed by: INTERNAL MEDICINE

## 2020-05-01 RX ORDER — ALBUTEROL SULFATE 90 UG/1
2 AEROSOL, METERED RESPIRATORY (INHALATION) EVERY 6 HOURS
Qty: 1 INHALER | Refills: 0 | Status: SHIPPED | OUTPATIENT
Start: 2020-05-01 | End: 2020-09-08

## 2020-05-01 NOTE — PATIENT INSTRUCTIONS
Try the inhaler 2 puffs four times daily as needed to help with cough or wheezing.  It should work within 15-20 mins.  You can do it before walking to prevent wheezing or cough from stopping.    Schedule your physical for the fall.    Patient Education   Ventolin HFA Inhaler  Medicine: Albuterol (al-BYOO-ter-ahl)  What it does  Works quickly to relax muscles around your airways and make breathing easier. The medicine usually lasts 3 to 4 hours. Use when you need fast relief.     Test spray (priming)  Prime before first use, if not used for 2 weeks, or if inhaler was dropped. Shake inhaler and spray 4 times, away from face.  How to use this inhaler  1. Wash and dry your hands well.  2. Remove the mouthpiece cover. Check for loose parts inside the mouthpiece.  3. Sit up straight or stand up.  4. Shake inhaler well before each spray.  5. Hold the inhaler so the mouthpiece is at the bottom and the canister is sticking straight up. Fully exhale (breathe out) through mouth.  6. Place the mouthpiece between your lips. Make sure that your tongue is flat under the mouthpiece and does not block the opening.  7. Seal your lips around the mouthpiece.  8. Push the canister all the way down as you slowly take a deep breath through your mouth over 5 seconds.  9. Hold your breath for 10 seconds. If you cannot hold your breath for 10 seconds, then hold it for as long as you can.  10. If you need another dose, wait one minute and repeat steps 4 to 9.  11. Replace the cover after each use. Store in a cool, dry place.  Cleaning  Clean each week. Remove canister and do not get it wet. Wash mouthpiece with warm water and let air-dry overnight. If not cleaned, the inhaler may not work well.  How to tell if the inhaler is empty  Each inhaler contains 200 puffs. The inhaler is empty when the dose counter reads 000. Be sure to replace the inhaler before it runs out.  If you have questions about the use of your inhaler, please ask your  pharmacist or provider.  For more information and video demos, go to [email or web address].  For informational purposes only. Not to replace the advice of your health care provider.  Copyright   2013 [facility]. All rights reserved. [employer] [id number] - REV 03/16.

## 2020-05-01 NOTE — PROGRESS NOTES
"Lucie Whitehead is a 69 year old female who is being evaluated via a billable telephone visit.      The patient has been notified of following:     \"This telephone visit will be conducted via a call between you and your physician/provider. We have found that certain health care needs can be provided without the need for a physical exam.  This service lets us provide the care you need with a short phone conversation.  If a prescription is necessary we can send it directly to your pharmacy.  If lab work is needed we can place an order for that and you can then stop by our lab to have the test done at a later time.    Telephone visits are billed at different rates depending on your insurance coverage. During this emergency period, for some insurers they may be billed the same as an in-person visit.  Please reach out to your insurance provider with any questions.    If during the course of the call the physician/provider feels a telephone visit is not appropriate, you will not be charged for this service.\"    Patient has given verbal consent for Telephone visit?  Yes    How would you like to obtain your AVS? Jbhart    Subjective     Lucie Whitehead is a 69 year old female who presents to clinic today for the following health issues:    HPI  Follow up Pt would like PFT results with PCP further.  Worried about COPD - it does run in the family.  Was not having symptoms when did the PFT's.    Still gets wheezy everyday with exhalation would like inhaler.  Gets winded easily with exercise.  Cough is a little better in the last wk due to allergies    Has reflux and gets chest wall pain but doesn't think has chest pain beyond that. Chronic pains but have been a little more frequent - thinks it could be anxiety related as well.        Pandemic has not been an issue - is sewing a lot, staying home.  Worries about daughter who is a nurse and mom in asst living.      Reviewed and updated as needed this visit by " Provider         Review of Systems          Objective   Reported vitals:  LMP 11/12/2007 (LMP Unknown)    healthy, alert and no distress  PSYCH: Alert and oriented times 3; coherent speech, normal   rate and volume, able to articulate logical thoughts, able   to abstract reason, no tangential thoughts, no hallucinations   or delusions  Her affect is normal  RESP: No cough, no audible wheezing, able to talk in full sentences  Remainder of exam unable to be completed due to telephone visits    Diagnostic Test Results:  Labs reviewed in Epic        Assessment/Plan:  1. Wheezing  Discussed possibly lung from asthma/copd.  Worry about anatomic or other issue given lack of imaging.  Given the pandemic, we elected to wait on imaging and try empiric inhaler.  If symptoms not better, proceed with lung imaging.  Consider stress test if that is normal.  - albuterol (PROAIR HFA/PROVENTIL HFA/VENTOLIN HFA) 108 (90 Base) MCG/ACT inhaler; Inhale 2 puffs into the lungs every 6 hours  Dispense: 1 Inhaler; Refill: 0    2. Major depressive disorder, recurrent episode, moderate (H)  Following with psychiatrist    3. Impaired fasting glucose  tolerating metformin          Return in about 3 days (around 5/4/2020), or if symptoms worsen or fail to improve.  Follow-up for physical in the fall    Phone call duration:  20 minutes    Nallely Moore MD

## 2020-05-06 ENCOUNTER — ANCILLARY PROCEDURE (OUTPATIENT)
Dept: GENERAL RADIOLOGY | Facility: CLINIC | Age: 70
End: 2020-05-06
Attending: PEDIATRICS
Payer: COMMERCIAL

## 2020-05-06 ENCOUNTER — OFFICE VISIT (OUTPATIENT)
Dept: PEDIATRICS | Facility: CLINIC | Age: 70
End: 2020-05-06
Payer: COMMERCIAL

## 2020-05-06 VITALS
TEMPERATURE: 98.7 F | DIASTOLIC BLOOD PRESSURE: 75 MMHG | SYSTOLIC BLOOD PRESSURE: 120 MMHG | HEART RATE: 89 BPM | OXYGEN SATURATION: 100 %

## 2020-05-06 DIAGNOSIS — R06.2 WHEEZING: Primary | ICD-10-CM

## 2020-05-06 DIAGNOSIS — R06.2 WHEEZING: ICD-10-CM

## 2020-05-06 DIAGNOSIS — R53.83 OTHER FATIGUE: ICD-10-CM

## 2020-05-06 DIAGNOSIS — R07.89 CHEST DISCOMFORT: ICD-10-CM

## 2020-05-06 LAB
BASOPHILS # BLD AUTO: 0 10E9/L (ref 0–0.2)
BASOPHILS NFR BLD AUTO: 0.3 %
DIFFERENTIAL METHOD BLD: NORMAL
EOSINOPHIL # BLD AUTO: 0.3 10E9/L (ref 0–0.7)
EOSINOPHIL NFR BLD AUTO: 5.3 %
ERYTHROCYTE [DISTWIDTH] IN BLOOD BY AUTOMATED COUNT: 13.4 % (ref 10–15)
HCT VFR BLD AUTO: 37.2 % (ref 35–47)
HGB BLD-MCNC: 12 G/DL (ref 11.7–15.7)
LYMPHOCYTES # BLD AUTO: 1.5 10E9/L (ref 0.8–5.3)
LYMPHOCYTES NFR BLD AUTO: 26.1 %
MCH RBC QN AUTO: 31.4 PG (ref 26.5–33)
MCHC RBC AUTO-ENTMCNC: 32.3 G/DL (ref 31.5–36.5)
MCV RBC AUTO: 97 FL (ref 78–100)
MONOCYTES # BLD AUTO: 0.5 10E9/L (ref 0–1.3)
MONOCYTES NFR BLD AUTO: 8.7 %
NEUTROPHILS # BLD AUTO: 3.5 10E9/L (ref 1.6–8.3)
NEUTROPHILS NFR BLD AUTO: 59.6 %
PLATELET # BLD AUTO: 300 10E9/L (ref 150–450)
RBC # BLD AUTO: 3.82 10E12/L (ref 3.8–5.2)
WBC # BLD AUTO: 5.9 10E9/L (ref 4–11)

## 2020-05-06 PROCEDURE — 99214 OFFICE O/P EST MOD 30 MIN: CPT | Mod: GE | Performed by: STUDENT IN AN ORGANIZED HEALTH CARE EDUCATION/TRAINING PROGRAM

## 2020-05-06 PROCEDURE — 85025 COMPLETE CBC W/AUTO DIFF WBC: CPT | Performed by: PEDIATRICS

## 2020-05-06 PROCEDURE — 71046 X-RAY EXAM CHEST 2 VIEWS: CPT

## 2020-05-06 PROCEDURE — 84443 ASSAY THYROID STIM HORMONE: CPT | Performed by: PEDIATRICS

## 2020-05-06 PROCEDURE — 36415 COLL VENOUS BLD VENIPUNCTURE: CPT | Performed by: PEDIATRICS

## 2020-05-06 PROCEDURE — 93000 ELECTROCARDIOGRAM COMPLETE: CPT | Performed by: STUDENT IN AN ORGANIZED HEALTH CARE EDUCATION/TRAINING PROGRAM

## 2020-05-06 NOTE — PATIENT INSTRUCTIONS
1) We will check labs for anemia and thyroid today  2) EKG for intermittent chest discomfort, and we will get chest x ray   3) We will call you with results and further follow up.

## 2020-05-06 NOTE — PROGRESS NOTES
"Subjective     Lucie Whitehead is a 69 year old female who presents to clinic today for the following health issues:    HPI     #SOB, wheezing  On long term disability for a variety of things (depresion, anxiety, back pain).   Did notice the SOB the past year, did notice it more with frequency in more recent months    Symptoms have not changed since 5/1/2020 (did have a virtual visit with PCP Dr. Moore). Did try inahler over the weekend, did use every 6 hours and she did not notice any difference. Did notice that she notices the SOB worse with movement.   Has dry coughing, non productive. Does have allergies- takes certirizine every morning.     No fevers.   Shortness of breath while expiring. Is concerned about this.   Did have asthma as a child.   Did work at [employer] previously  No exposure to mold  House is about 30 years old, unknown about other exposures regarding house  No pets, no other animal exposures that she can think of  1/2020 was on cruise.   CPAP  - cleaning regularly  (has been using >10 years)  Not waking up in the middle of the night.   Have not smoked in 40 years.   No new swelling in lower extremities. Lies flat and sleeps. No orthopnea.     #fatigue  As the year has gone by, states that she has been getting \"more tired\"  Exhaustion and weakness, she thinks that it has been weeks- months. Does go to bed 11P - 6A. Is having sleeping issues. Has not been sleeping well.     #chest discomfort  Discomfort and pressure bilateral chest area, thinks it might be from shoulders   Did get told she has bursitis in her sternum in the past  Has had several back surgeries, believes that it could be contributing.       Patient Active Problem List   Diagnosis     Hirsutism     Leiomyoma of uterus     Ovarian cyst     Hyperlipidemia LDL goal <160     Lumbar spinal stenosis     PEÑA (obstructive sleep apnea)     Impaired fasting glucose     Macular degeneration     Tubular adenoma of colon     Major depressive " disorder, recurrent episode (H)     Non morbid obesity due to excess calories     Osteopenia     Advance care planning     Lumbar radiculopathy     Obesity (BMI 35.0-39.9) with comorbidity (H)     Pain of both shoulder joints     Hip pain, right     Hip pain, left     Trochanteric bursitis of both hips     Impingement syndrome of both shoulders     Chronic pain of both knees     CKD (chronic kidney disease) stage 3, GFR 30-59 ml/min (H)     Past Surgical History:   Procedure Laterality Date     ARTHROSCOPY SHOULDER DISTAL CLAVICLE REPAIR Right 2014    Procedure: ARTHROSCOPY SHOULDER DISTAL CLAVICLE RESECTION;  Surgeon: John Paul Rodriguez MD;  Location: [facility] OR     C NONSPECIFIC PROCEDURE      Bicep tendon repair    Abstracted 02     C/SECTION, LOW TRANSVERSE  x 3    , Low Transverse x 4     COLONOSCOPY       COLONOSCOPY N/A 2019    Procedure: COLONOSCOPY;  Surgeon: Hernán Gonzales MD;  Location:  GI     DECOMPRESSION, FUSION CERVICAL ANTERIOR TWO LEVELS, COMBINED  2012    Procedure:COMBINED DECOMPRESSION, FUSION CERVICAL ANTERIOR TWO LEVELS; DECOMPRESSION, FUSION CERVICAL ANTERIOR C4-6; Surgeon:SHALONDA ALCALA; Location:[facility] OR     DECOMPRESSION, FUSION LUMBAR POSTERIOR ONE LEVEL, COMBINED  3/7/2013    Procedure: COMBINED DECOMPRESSION, FUSION LUMBAR POSTERIOR ONE LEVEL;  Posterior Fusion L2-3, Hardware Removal L3-5;  Surgeon: Shalonda Alcala MD;  Location: [facility] OR     DECOMPRESSION, FUSION LUMBAR POSTERIOR TWO LEVELS, COMBINED N/A 7/3/2018    Procedure: COMBINED DECOMPRESSION, FUSION LUMBAR POSTERIOR TWO LEVELS;  Bilateral decompression L1-L2 with left total facetectomy   Transforaminal lumbar interbody fusion L1-L2 from left-sided approach   Insertion of intervertebral biomechanical device L1-L2  Posterolateral fusion L1-L2; Removal of instrumentation L2-3  RE-Instrumentation L1-L3;  Surgeon: Shalonda Alcala MD;  Location:  OR     EXPLORE SPINE, REMOVE HARDWARE,  COMBINED N/A 7/3/2018    Procedure: COMBINED EXPLORE SPINE, REMOVE HARDWARE;;  Surgeon: Davide Tamez MD;  Location: [facility] OR     FUSION LUMBAR ANTERIOR ONE LEVEL  2013    Procedure: FUSION LUMBAR ANTERIOR ONE LEVEL;  Anterior Fusion L2-3;  Surgeon: Davide Tamez MD;  Location: [facility] OR     LAMINECTOMY, FUSION LUMBAR TWO LEVELS, COMBINED  3/11    AP fusion L3-5       Social History     Tobacco Use     Smoking status: Former Smoker     Last attempt to quit: 1975     Years since quittin.3     Smokeless tobacco: Never Used   Substance Use Topics     Alcohol use: Yes     Alcohol/week: 0.0 standard drinks     Comment: 2-3 glass wine /week     Family History   Problem Relation Age of Onset     Prostate Cancer Father      Cancer - colorectal Mother         born  HTN and DMII     Diabetes Mother         type II     Coronary Artery Disease Mother         CHF     Colon Cancer Mother      Dementia Mother      Diabetes Maternal Grandmother         type II     Hypertension Maternal Grandmother      Cancer - colorectal Maternal Grandmother      Colon Cancer Maternal Grandmother      Diabetes Maternal Uncle         type II     Hypertension Maternal Uncle      Respiratory Paternal Grandmother         asthma     Diabetes Son         dx age 21type I         Current Outpatient Medications   Medication Sig Dispense Refill     Acetaminophen (TYLENOL PO) Take 1,300 mg by mouth 2 times daily        albuterol (PROAIR HFA/PROVENTIL HFA/VENTOLIN HFA) 108 (90 Base) MCG/ACT inhaler Inhale 2 puffs into the lungs every 6 hours 1 Inhaler 0     CALCIUM PO Take 950 mg by mouth daily        Calcium-Phosphorus-Vitamin D (CITRACAL +D3 PO) Take by mouth daily       DULoxetine (CYMBALTA) 30 MG capsule TAKE ONE CAPSULE BY MOUTH TWICE A  capsule 0     hydrOXYzine (ATARAX) 25 MG tablet 25 mg by Oral or Feeding Tube route as needed       lamoTRIgine (LAMICTAL) 25 MG tablet 1 TAB ORAL DAILY X2 WK THEN TAKE 2 TABS DAILY  0      loratadine (CLARITIN) 10 MG tablet Take 10 mg by mouth daily       metFORMIN (GLUCOPHAGE-XR) 500 MG 24 hr tablet TAKE 1 ORAL TAB IN AM X 1WK, 2TABS IN AM X 1WK, 3TABS IN AM X 1WK, THEN 4TABS DAILY IN  tablet 1     Multiple Vitamins-Minerals (OCUVITE ADULT 50+ PO) Take 1 tablet by mouth daily        NONFORMULARY Sustained night time lubricant eye ointment night before bed       Omega-3 Fatty Acids (OMEGA 3 PO) Take 1 tablet by mouth daily        pramipexole (MIRAPEX) 0.25 MG tablet 0.5 mg At Bedtime   3     ranitidine (ZANTAC 75) 75 MG tablet Take 75 mg by mouth 2 times daily as needed.       simvastatin (ZOCOR) 40 MG tablet Take 1 tablet (40 mg) by mouth At Bedtime 90 tablet 1     traMADol (ULTRAM) 50 MG tablet TAKE 1 - 2 TABLET BY ORAL ROUTE EVERY 4 - 6 HOURS AS NEEDED  0     XIIDRA 5 % opthalmic solution INSTILL 1 DROP TWICE A DAY IN BOTH EYES  4     ZYRTEC 10 MG OR TABS 1 TABLET EACH MORNING       Allergies   Allergen Reactions     Animal Dander      Cats      Dogs      Seasonal Allergies      Trees and mold       Reviewed and updated as needed this visit by Provider         Review of Systems   ROS COMP: Constitutional, HEENT, cardiovascular, pulmonary, gi and gu systems are negative, except as otherwise noted.      Objective    /75   Pulse 89   Temp 98.7  F (37.1  C)   LMP 11/12/2007 (LMP Unknown)   SpO2 100%   There is no height or weight on file to calculate BMI.  Physical Exam   GENERAL: healthy, alert and no distress  EYES: Eyes grossly normal to inspection, PERRL and conjunctivae and sclerae normal  HENT: nose and mouth without ulcers or lesions  NECK: no adenopathy, no asymmetry, masses, or scars and thyroid normal to palpation  RESP: lungs clear to auscultation - no rales, rhonchi or wheezes  CV: regular rate and rhythm, normal S1 S2, no S3 or S4, no murmur, click or rub, no peripheral edema and peripheral pulses strong. Mildly reproducible discomfort when palpation R of sternum.    ABDOMEN: soft, nontender, no hepatosplenomegaly, no masses and bowel sounds normal  MS: no gross musculoskeletal defects noted, no edema  SKIN: no suspicious lesions or rashes  NEURO: Normal strength and tone, mentation intact and speech normal  PSYCH: mentation appears normal, affect normal/bright    Diagnostic Test Results:  Labs reviewed in Epic  Results for orders placed or performed in visit on 05/06/20 (from the past 24 hour(s))   CBC with platelets and differential   Result Value Ref Range    WBC 5.9 4.0 - 11.0 10e9/L    RBC Count 3.82 3.8 - 5.2 10e12/L    Hemoglobin 12.0 11.7 - 15.7 g/dL    Hematocrit 37.2 35.0 - 47.0 %    MCV 97 78 - 100 fl    MCH 31.4 26.5 - 33.0 pg    MCHC 32.3 31.5 - 36.5 g/dL    RDW 13.4 10.0 - 15.0 %    Platelet Count 300 150 - 450 10e9/L    % Neutrophils 59.6 %    % Lymphocytes 26.1 %    % Monocytes 8.7 %    % Eosinophils 5.3 %    % Basophils 0.3 %    Absolute Neutrophil 3.5 1.6 - 8.3 10e9/L    Absolute Lymphocytes 1.5 0.8 - 5.3 10e9/L    Absolute Monocytes 0.5 0.0 - 1.3 10e9/L    Absolute Eosinophils 0.3 0.0 - 0.7 10e9/L    Absolute Basophils 0.0 0.0 - 0.2 10e9/L    Diff Method Automated Method            Assessment & Plan     1. Wheezing  Noted by patient for the past 1 year, getting worse in the past several months. Did have recent PFTs which were largely normal (3/2020). On history, no other obvious long term exposures noted (animals, CPAP is being cleaned, no other travel or building/occupational exposures). No wheezing on exam today and CXR negative for any focal opacities. Did also think about cardiac etiology for wheezing, per her PCP Dr. Moore's note, if pulmonary work up is negative, would think about cardiac stress test. Will inform patient of CXR results and place stress test. EKG discussed below. She did not exhibit any swelling of lower extremities on exam today; no orthopnea on hx. Last TTE 2009.   - XR Chest 2 Views; Future  - EKG 12-lead complete w/read -  "Clinics    2. Other fatigue  Reporting fatigue for the last several weeks-months. CBC and TSH ordered as below. CBC negative for anemia; thyroid studies pending. Pt did endorse sleeping issues that could be contributing.   - CBC with platelets and differential  - TSH with free T4 reflex    3. Chest discomfort  Reporting intermittent chest discomfort that is mildy reproducible and patient reporting that she had sternal bursitis diagnosis prior. EKG in clinic negative for any ST level changes. Story seems unlikely to be anginal in origin for her chest discomfort, coupled with the fact that her pain is mildly reproducible on exam today on R side of sternum. Per discussed above, if pulmonary etiology is not found for her wheezing, we will proceed with stress test.       Update 18:00: Did call patient and update her on CBC/EKG/CXR results. Discussed that the next step would be a cardiac stress test; patient is understanding of this plan. Did discuss that we would inform her of her thyroid results when they return.        BMI:   Estimated body mass index is 37.2 kg/m  as calculated from the following:    Height as of 2/27/20: 1.664 m (5' 5.5\").    Weight as of 2/27/20: 103 kg (227 lb).     This patient was seen and discussed with Dr. Marilu Benson MD  [facility]    I have discussed the patient with the resident and agree with the jointly developed plan as documented above    May Michel MD  Internal Medicine - Pediatrics      "

## 2020-05-07 LAB — TSH SERPL DL<=0.005 MIU/L-ACNC: 2.22 MU/L (ref 0.4–4)

## 2020-05-12 ENCOUNTER — HOSPITAL ENCOUNTER (OUTPATIENT)
Dept: CARDIOLOGY | Facility: CLINIC | Age: 70
Discharge: HOME | End: 2020-05-12
Attending: PEDIATRICS | Admitting: PEDIATRICS
Payer: COMMERCIAL

## 2020-05-12 DIAGNOSIS — R07.89 CHEST DISCOMFORT: ICD-10-CM

## 2020-05-12 DIAGNOSIS — R53.83 OTHER FATIGUE: ICD-10-CM

## 2020-05-12 PROCEDURE — 93325 DOPPLER ECHO COLOR FLOW MAPG: CPT | Mod: 26 | Performed by: INTERNAL MEDICINE

## 2020-05-12 PROCEDURE — 93321 DOPPLER ECHO F-UP/LMTD STD: CPT | Mod: 26 | Performed by: INTERNAL MEDICINE

## 2020-05-12 PROCEDURE — 93018 CV STRESS TEST I&R ONLY: CPT | Performed by: INTERNAL MEDICINE

## 2020-05-12 PROCEDURE — 25000128 H RX IP 250 OP 636

## 2020-05-12 PROCEDURE — 93350 STRESS TTE ONLY: CPT | Mod: 26 | Performed by: INTERNAL MEDICINE

## 2020-05-12 PROCEDURE — 25000125 ZZHC RX 250: Performed by: PEDIATRICS

## 2020-05-12 PROCEDURE — 93017 CV STRESS TEST TRACING ONLY: CPT

## 2020-05-12 PROCEDURE — 25500064 ZZH RX 255 OP 636: Performed by: PEDIATRICS

## 2020-05-12 PROCEDURE — 93016 CV STRESS TEST SUPVJ ONLY: CPT | Performed by: INTERNAL MEDICINE

## 2020-05-12 RX ORDER — DOBUTAMINE HYDROCHLORIDE 200 MG/100ML
INJECTION INTRAVENOUS
Status: COMPLETED
Start: 2020-05-12 | End: 2020-05-12

## 2020-05-12 RX ORDER — DOBUTAMINE HYDROCHLORIDE 200 MG/100ML
10-50 INJECTION INTRAVENOUS CONTINUOUS
Status: ACTIVE | OUTPATIENT
Start: 2020-05-12 | End: 2020-05-12

## 2020-05-12 RX ORDER — METOPROLOL TARTRATE 1 MG/ML
1-20 INJECTION, SOLUTION INTRAVENOUS
Status: ACTIVE | OUTPATIENT
Start: 2020-05-12 | End: 2020-05-12

## 2020-05-12 RX ORDER — SODIUM CHLORIDE 9 MG/ML
INJECTION, SOLUTION INTRAVENOUS CONTINUOUS
Status: ACTIVE | OUTPATIENT
Start: 2020-05-12 | End: 2020-05-12

## 2020-05-12 RX ORDER — ATROPINE SULFATE 0.1 MG/ML
.2-2 INJECTION INTRAVENOUS
Status: ACTIVE | OUTPATIENT
Start: 2020-05-12 | End: 2020-05-12

## 2020-05-12 RX ADMIN — HUMAN ALBUMIN MICROSPHERES AND PERFLUTREN 3 ML: 10; .22 INJECTION, SOLUTION INTRAVENOUS at 13:03

## 2020-05-12 RX ADMIN — DOBUTAMINE IN DEXTROSE 10 MCG/KG/MIN: 200 INJECTION, SOLUTION INTRAVENOUS at 13:13

## 2020-05-12 RX ADMIN — METOPROLOL TARTRATE 2 MG: 5 INJECTION INTRAVENOUS at 13:30

## 2020-07-15 ENCOUNTER — THERAPY VISIT (OUTPATIENT)
Dept: PHYSICAL THERAPY | Facility: CLINIC | Age: 70
End: 2020-07-15
Payer: COMMERCIAL

## 2020-07-15 DIAGNOSIS — M54.41 BILATERAL LOW BACK PAIN WITH BILATERAL SCIATICA: Primary | ICD-10-CM

## 2020-07-15 DIAGNOSIS — M54.42 BILATERAL LOW BACK PAIN WITH BILATERAL SCIATICA: Primary | ICD-10-CM

## 2020-07-15 PROCEDURE — 97161 PT EVAL LOW COMPLEX 20 MIN: CPT | Mod: GP | Performed by: PHYSICAL THERAPIST

## 2020-07-15 PROCEDURE — 97110 THERAPEUTIC EXERCISES: CPT | Mod: GP | Performed by: PHYSICAL THERAPIST

## 2020-07-15 NOTE — LETTER
[facility]  [address] [address]   [address]  [place] [address]  [phone]    July 15, 2020    Re: Lucie Whitehead   :   1950  MRN:  [id number]   REFERRING PHYSICIAN:   Davide [facility] PT    Date of Initial Evaluation:  7/15/2020  Visits:  Rxs Used: 1  Reason for Referral:  Bilateral low back pain with bilateral sciatica    EVALUATION SUMMARY    [facility] Initial Evaluation  Subjective:  The history is provided by the patient.   Therapist Generated HPI Evaluation  Problem details: Lucie is being referred for physical therapy today for treatment of lumbar DDD at L5/S1.  She reports a relevant past surgical history of multiple abdominal surgeries and well as multiple lumbar fusions which had resulted in her being fused from L1-L5.  Current therapy referral is to determine if surgery will be indicated for current low back and leg pain..         Type of problem:  Lumbar.    This is a chronic (May 2019) condition.  Condition occurred with:  Degenerative joint disease.  Where condition occurred: for unknown reasons.  Patient reports pain:  Mid lumbar spine, lumbar spine right and lumbar spine left.  Pain is described as aching, sharp and cramping and is intermittent.  Radiates to: lateral thighs to knees and down front of lower legs bilaterally. Pain is the same all the time.  Since onset symptoms are gradually worsening.  Associated symptoms:  Numbness and tingling. Symptoms are exacerbated by standing, walking, twisting and bending  Relieved by: nothing.  Imaging testing: none reported.  Past treatment: none reported for current episode.                  Objective:  Standing Alignment:      Shoulder/UE:  Rounded shoulders  Gait:    Gait Type:  Normal   Assistive Devices:  None  Flexibility/Screens:   Positive screens:  Lumbar      Re: Lucie Whitehead   :   1950         Lumbar/SI Evaluation  ROM:    AROM Lumbar:   Flexion:           50%  Ext:                    0%   Side Bend:        Left:  25%    Right:  50%  Rotation:           Left:     Right:   Side Glide:        Left:     Right:         Strength: Core strength = 1/5    Lumbar Myotomes:    T12-L3 (Hip Flex):  Left: 5    Right: 4  L2-4 (Quads):  Left:  5    Right:  4+  L4 (Ankle DF):  Left:  5    Right:  5  Lumbar DTR's:  not assessed  Neural Tension/Mobility:    Left side:SLR or SLR w/DF  negative.   Right side:   SLR w/DF or SLR  negative.   Lumbar Provocation:  not assessed  Assessment/Plan:    Patient is a 69 year old female with lumbar complaints.    Patient has the following significant findings with corresponding treatment plan.                Diagnosis 1:  Low Back Pain   Pain -  hot/cold therapy, self management, education and home program  Decreased ROM/flexibility - manual therapy and therapeutic exercise  Decreased strength - therapeutic exercise and therapeutic activities  Impaired muscle performance - neuro re-education  Decreased function - therapeutic activities    Therapy Evaluation Codes:   1) History comprised of:   Personal factors that impact the plan of care:      Age, Past/current experiences and Time since onset of symptoms.    Comorbidity factors that impact the plan of care are:      Diabetes, Depression, Osteoarthritis, Overweight and Anxiety.     Medications impacting care: Anti-depressant, Anti-inflammatory, Pain and Sleep.  2) Examination of Body Systems comprised of:   Body structures and functions that impact the plan of care:      Lumbar spine.   Activity limitations that impact the plan of care are:      Bathing, Bending, Cooking, Driving, Dressing and Graspinglifting, walking, standing, sleeping.  3) Clinical presentation characteristics are:   Stable/Uncomplicated.  4) Decision-Making    Low complexity using standardized patient assessment instrument and/or measureable assessment of functional outcome.  Cumulative Therapy Evaluation is: Low  Complexity.      Re: Lucie Whitehead   :   1950    Previous and current functional limitations:  (See Goal Flow Sheet for this information)    Short term and Long term goals: (See Goal Flow Sheet for this information)     Communication ability:  Patient appears to be able to clearly communicate and understand verbal and written communication and follow directions correctly.  Treatment Explanation - The following has been discussed with the patient:   RX ordered/plan of care  Anticipated outcomes  Possible risks and side effects  This patient would benefit from PT intervention to resume normal activities.   Rehab potential is fair.    Frequency:  2 X week, once daily  Duration:  for 6 weeks  Discharge Plan:  Achieve all LTG.  Independent in home treatment program.  Reach maximal therapeutic benefit.    Thank you for your referral.    INQUIRIES  Therapist: Jovon Hernandez PT  [facility]  [address] [address] [address]  Phone: [phone]  Fax: [phone]

## 2020-07-15 NOTE — PROGRESS NOTES
[facility] Initial Evaluation  Subjective:  The history is provided by the patient.   Therapist Generated HPI Evaluation  Problem details: Lucie is being referred for physical therapy today for treatment of lumbar DDD at L5/S1.  She reports a relevant past surgical history of multiple abdominal surgeries and well as multiple lumbar fusions which had resulted in her being fused from L1-L5.  Current therapy referral is to determine if surgery will be indicated for current low back and leg pain..         Type of problem:  Lumbar.    This is a chronic (May 2019) condition.  Condition occurred with:  Degenerative joint disease.  Where condition occurred: for unknown reasons.  Patient reports pain:  Mid lumbar spine, lumbar spine right and lumbar spine left.  Pain is described as aching, sharp and cramping and is intermittent.  Radiates to: lateral thighs to knees and down front of lower legs bilaterally. Pain is the same all the time.  Since onset symptoms are gradually worsening.  Associated symptoms:  Numbness and tingling. Symptoms are exacerbated by standing, walking, twisting and bending  Relieved by: nothing.  Imaging testing: none reported.  Past treatment: none reported for current episode.                           Objective:  Standing Alignment:      Shoulder/UE:  Rounded shoulders              Gait:    Gait Type:  Normal   Assistive Devices:  None      Flexibility/Screens:   Positive screens:  Lumbar                   Lumbar/SI Evaluation  ROM:    AROM Lumbar:   Flexion:          50%  Ext:                    0%   Side Bend:        Left:  25%    Right:  50%  Rotation:           Left:     Right:   Side Glide:        Left:     Right:         Strength: Core strength = 1/5  Lumbar Myotomes:    T12-L3 (Hip Flex):  Left: 5    Right: 4  L2-4 (Quads):  Left:  5    Right:  4+  L4 (Ankle DF):  Left:  5    Right:  5      Lumbar DTR's:  not assessed          Neural Tension/Mobility:      Left  side:SLR or SLR w/DF  negative.     Right side:   SLR w/DF or SLR  negative.       Lumbar Provocation:  not assessed                                                     General     ROS    Assessment/Plan:    Patient is a 69 year old female with lumbar complaints.    Patient has the following significant findings with corresponding treatment plan.                Diagnosis 1:  Low Back Pain   Pain -  hot/cold therapy, self management, education and home program  Decreased ROM/flexibility - manual therapy and therapeutic exercise  Decreased strength - therapeutic exercise and therapeutic activities  Impaired muscle performance - neuro re-education  Decreased function - therapeutic activities    Therapy Evaluation Codes:   1) History comprised of:   Personal factors that impact the plan of care:      Age, Past/current experiences and Time since onset of symptoms.    Comorbidity factors that impact the plan of care are:      Diabetes, Depression, Osteoarthritis, Overweight and Anxiety.     Medications impacting care: Anti-depressant, Anti-inflammatory, Pain and Sleep.  2) Examination of Body Systems comprised of:   Body structures and functions that impact the plan of care:      Lumbar spine.   Activity limitations that impact the plan of care are:      Bathing, Bending, Cooking, Driving, Dressing and Graspinglifting, walking, standing, sleeping.  3) Clinical presentation characteristics are:   Stable/Uncomplicated.  4) Decision-Making    Low complexity using standardized patient assessment instrument and/or measureable assessment of functional outcome.  Cumulative Therapy Evaluation is: Low Complexity.    Previous and current functional limitations:  (See Goal Flow Sheet for this information)    Short term and Long term goals: (See Goal Flow Sheet for this information)     Communication ability:  Patient appears to be able to clearly communicate and understand verbal and written communication and follow directions  correctly.  Treatment Explanation - The following has been discussed with the patient:   RX ordered/plan of care  Anticipated outcomes  Possible risks and side effects  This patient would benefit from PT intervention to resume normal activities.   Rehab potential is fair.    Frequency:  2 X week, once daily  Duration:  for 6 weeks  Discharge Plan:  Achieve all LTG.  Independent in home treatment program.  Reach maximal therapeutic benefit.    Please refer to the daily flowsheet for treatment today, total treatment time and time spent performing 1:1 timed codes.

## 2020-07-20 ENCOUNTER — THERAPY VISIT (OUTPATIENT)
Dept: PHYSICAL THERAPY | Facility: CLINIC | Age: 70
End: 2020-07-20
Payer: COMMERCIAL

## 2020-07-20 DIAGNOSIS — M54.42 CHRONIC BILATERAL LOW BACK PAIN WITH BILATERAL SCIATICA: ICD-10-CM

## 2020-07-20 DIAGNOSIS — M54.41 CHRONIC BILATERAL LOW BACK PAIN WITH BILATERAL SCIATICA: ICD-10-CM

## 2020-07-20 DIAGNOSIS — G89.29 CHRONIC BILATERAL LOW BACK PAIN WITH BILATERAL SCIATICA: ICD-10-CM

## 2020-07-20 PROCEDURE — 97110 THERAPEUTIC EXERCISES: CPT | Mod: GP | Performed by: PHYSICAL THERAPY ASSISTANT

## 2020-07-20 PROCEDURE — 97112 NEUROMUSCULAR REEDUCATION: CPT | Mod: GP | Performed by: PHYSICAL THERAPY ASSISTANT

## 2020-07-27 ENCOUNTER — THERAPY VISIT (OUTPATIENT)
Dept: PHYSICAL THERAPY | Facility: CLINIC | Age: 70
End: 2020-07-27
Payer: COMMERCIAL

## 2020-07-27 DIAGNOSIS — G89.29 CHRONIC BILATERAL LOW BACK PAIN WITH BILATERAL SCIATICA: ICD-10-CM

## 2020-07-27 DIAGNOSIS — M54.42 CHRONIC BILATERAL LOW BACK PAIN WITH BILATERAL SCIATICA: ICD-10-CM

## 2020-07-27 DIAGNOSIS — M54.41 CHRONIC BILATERAL LOW BACK PAIN WITH BILATERAL SCIATICA: ICD-10-CM

## 2020-07-27 PROCEDURE — 97112 NEUROMUSCULAR REEDUCATION: CPT | Mod: GP | Performed by: PHYSICAL THERAPY ASSISTANT

## 2020-07-27 PROCEDURE — 97110 THERAPEUTIC EXERCISES: CPT | Mod: GP | Performed by: PHYSICAL THERAPY ASSISTANT

## 2020-07-29 ENCOUNTER — THERAPY VISIT (OUTPATIENT)
Dept: PHYSICAL THERAPY | Facility: CLINIC | Age: 70
End: 2020-07-29
Payer: COMMERCIAL

## 2020-07-29 DIAGNOSIS — G89.29 CHRONIC BILATERAL LOW BACK PAIN WITH BILATERAL SCIATICA: ICD-10-CM

## 2020-07-29 DIAGNOSIS — M54.41 CHRONIC BILATERAL LOW BACK PAIN WITH BILATERAL SCIATICA: ICD-10-CM

## 2020-07-29 DIAGNOSIS — M54.42 CHRONIC BILATERAL LOW BACK PAIN WITH BILATERAL SCIATICA: ICD-10-CM

## 2020-07-29 PROCEDURE — 97112 NEUROMUSCULAR REEDUCATION: CPT | Mod: GP | Performed by: PHYSICAL THERAPIST

## 2020-07-29 PROCEDURE — 97110 THERAPEUTIC EXERCISES: CPT | Mod: GP | Performed by: PHYSICAL THERAPIST

## 2020-07-29 NOTE — PROGRESS NOTES
DISCHARGE REPORT    Progress reporting from 7/29/20.       SUBJECTIVE  Lucie has been seen for 4 therapy sessions for her radicular low back pain.  She reports that she has still been having the feeling of numbness and weakness in her legs bilaterally, Still having pain in the right side and leg.  While she feels the strengthening is helpful overall and will put her in a better place if she has surgery, overall there has not been any change in symptoms.  Current Pain level: 7/10.     Initial Pain level: 7/10.   Changes in function:  None  Adverse reaction to treatment or activity: soreness and weakness with some of the exercises    OBJECTIVE     AROM Lumbar:   Flexion:          50%  Ext:                    0%   Side Bend:        Left:  25%    Right:  50%    Lumbar Myotomes:    T12-L3 (Hip Flex):  Left: 5    Right: 4  L2-4 (Quads):  Left:  5    Right:  4+  L4 (Ankle DF):  Left:  5    Right:  5         Neural Tension/Mobility:      Left side:SLR or SLR w/DF  negative.     Right side:   SLR w/DF or SLR  negative.     LE Strength:   Right Hip:    Hip ABD = 3/5    Hip Extension = 3/5   Left Hip    Hip ABD = 3/5    Hip Extension = 3/5     ASSESSMENT/PLAN  Updated problem list and treatment plan: Diagnosis 1:  Low Back Pain  Pain -  hot/cold therapy and home program  Decreased ROM/flexibility - home program  Decreased strength - home program  Impaired muscle performance - home program  Decreased function - home program  Impaired posture - home program  STG/LTGs have been met or progress has been made towards goals:  Yes (See Goal flow sheet completed today.) and None  Assessment of Progress: The patient's condition is unchanged.  Self Management Plans:  Patient has been instructed in a home treatment program.  I have re-evaluated this patient and find that the nature, scope, duration and intensity of the therapy is appropriate for the medical condition of the patient.  Lucie continues to require the following  intervention to meet STG and LTG's:  Follow-up with Orthopedics due to lack of progress with physical therapy.    Recommendations:  This patient would benefit from further evaluation.  Strengthening and stretching while beneficial overall for core strength and posture has not been able to change her low back and radicular symptoms of pain and intermittent neurological numbness and weakness in her legs.    Please refer to the daily flowsheet for treatment today, total treatment time and time spent performing 1:1 timed codes.

## 2020-08-11 ENCOUNTER — TELEPHONE (OUTPATIENT)
Dept: PEDIATRICS | Facility: CLINIC | Age: 70
End: 2020-08-11

## 2020-08-11 DIAGNOSIS — F33.2 SEVERE EPISODE OF RECURRENT MAJOR DEPRESSIVE DISORDER, WITHOUT PSYCHOTIC FEATURES (H): Primary | ICD-10-CM

## 2020-08-11 NOTE — TELEPHONE ENCOUNTER
Panel Management Review      Patient has the following on her problem list:     Depression / Dysthymia review    Measure:  Needs PHQ-9 score of 4 or less during index window.  Administer PHQ-9 and if score is 5 or more, send encounter to provider for next steps.    5 - 7 month window range    PHQ-9 SCORE 9/17/2019 10/1/2019 3/17/2020   PHQ-9 Total Score - - -   PHQ-9 Total Score MyChart - - -   PHQ-9 Total Score 23 24 20       If PHQ-9 recheck is 5 or more, route to provider for next steps.    Patient is due for:  PHQ9      Composite cancer screening  Chart review shows that this patient is due/due soon for the following None  Summary:    Patient is due/failing the following:   PHQ9    Action needed:   Patient needs to do PHQ9.    Type of outreach:    Sent letter.    Questions for provider review:    None                                                                                                                                    Gayathri Everett MA     Chart routed to Care Team .

## 2020-08-11 NOTE — LETTER
[address] [address]  [phone]      August 11, 2020    Lucie Whitehead                                                                                                                                                       [id number] [facility] [id number]              Dear Lucie,      You are currently due to update your depression and anxiety screenings. I will attach the questionnaire with this letter for you to complete. I will also attach a envelope stamped for you to use when you mail questionnaire back to us.   If you have nay questions or concerns please let us know.                 Sincerely,  [facility] Support Staff

## 2020-08-17 ASSESSMENT — ANXIETY QUESTIONNAIRES
1. FEELING NERVOUS, ANXIOUS, OR ON EDGE: MORE THAN HALF THE DAYS
6. BECOMING EASILY ANNOYED OR IRRITABLE: NEARLY EVERY DAY
5. BEING SO RESTLESS THAT IT IS HARD TO SIT STILL: SEVERAL DAYS
GAD7 TOTAL SCORE: 11
2. NOT BEING ABLE TO STOP OR CONTROL WORRYING: MORE THAN HALF THE DAYS
3. WORRYING TOO MUCH ABOUT DIFFERENT THINGS: MORE THAN HALF THE DAYS
7. FEELING AFRAID AS IF SOMETHING AWFUL MIGHT HAPPEN: NOT AT ALL
IF YOU CHECKED OFF ANY PROBLEMS ON THIS QUESTIONNAIRE, HOW DIFFICULT HAVE THESE PROBLEMS MADE IT FOR YOU TO DO YOUR WORK, TAKE CARE OF THINGS AT HOME, OR GET ALONG WITH OTHER PEOPLE: SOMEWHAT DIFFICULT

## 2020-08-17 ASSESSMENT — PATIENT HEALTH QUESTIONNAIRE - PHQ9
SUM OF ALL RESPONSES TO PHQ QUESTIONS 1-9: 12
5. POOR APPETITE OR OVEREATING: SEVERAL DAYS

## 2020-08-17 NOTE — TELEPHONE ENCOUNTER
Should do labs.  Had BP in May so that is sufficient for vitals.  Please call to schedule lab, orders already placed

## 2020-08-17 NOTE — TELEPHONE ENCOUNTER
Call placed to pt to follow up PHQ-9 questions.    Pt reports she does not feel suicidal or like hurting herself  For a long time she has had thoughts that she doesn't contribute to society.  Multiple surgeries and illnesses have added to her depression     Pt does individual therapy and group therapy.  Pt has a an appt in Sept.  Advised pt to notify the clinic if anything changes or if needs our assistance before the appt    Dr. Moore, Do you want pt to have labs before her appt in Sept?  Per appt notes pt needs labs and BP check prior to appt.  Pt feels like she has a hormonal imbalance    Thank you  Gabriel Mora RN on 8/17/2020 at 11:19 AM

## 2020-08-17 NOTE — TELEPHONE ENCOUNTER
PHQ 10/1/2019 3/17/2020 8/17/2020   PHQ-9 Total Score 24 20 12   Q9: Thoughts of better off dead/self-harm past 2 weeks More than half the days Several days Several days   F/U: Thoughts of suicide or self-harm - - -   F/U: Safety concerns - - -     VIGNESH-7 SCORE 10/1/2019 3/17/2020 8/17/2020   Total Score 18 16 11       -Huddled with provider because patient answer questions #9    Question: Thoughts that you would be better off dead or of hurting your self in some way. As a one   -Nurse will call patient to triage an scheduled appropriately.      Gayathri Everett MA

## 2020-08-18 ASSESSMENT — ANXIETY QUESTIONNAIRES: GAD7 TOTAL SCORE: 11

## 2020-08-27 ENCOUNTER — OFFICE VISIT (OUTPATIENT)
Dept: NURSING | Facility: CLINIC | Age: 70
End: 2020-08-27
Payer: COMMERCIAL

## 2020-08-27 VITALS — DIASTOLIC BLOOD PRESSURE: 82 MMHG | SYSTOLIC BLOOD PRESSURE: 140 MMHG | HEART RATE: 80 BPM

## 2020-08-27 DIAGNOSIS — R73.01 IMPAIRED FASTING GLUCOSE: ICD-10-CM

## 2020-08-27 DIAGNOSIS — F33.2 SEVERE EPISODE OF RECURRENT MAJOR DEPRESSIVE DISORDER, WITHOUT PSYCHOTIC FEATURES (H): ICD-10-CM

## 2020-08-27 DIAGNOSIS — E66.01 MORBID OBESITY (H): ICD-10-CM

## 2020-08-27 DIAGNOSIS — E78.5 HYPERLIPIDEMIA LDL GOAL <160: ICD-10-CM

## 2020-08-27 DIAGNOSIS — Z01.30 BLOOD PRESSURE CHECK: Primary | ICD-10-CM

## 2020-08-27 PROCEDURE — 80061 LIPID PANEL: CPT | Performed by: INTERNAL MEDICINE

## 2020-08-27 PROCEDURE — 99207 ZZC NO CHARGE NURSE ONLY: CPT

## 2020-08-27 PROCEDURE — 80048 BASIC METABOLIC PNL TOTAL CA: CPT | Performed by: INTERNAL MEDICINE

## 2020-08-27 PROCEDURE — 36415 COLL VENOUS BLD VENIPUNCTURE: CPT | Performed by: INTERNAL MEDICINE

## 2020-08-27 PROCEDURE — 84443 ASSAY THYROID STIM HORMONE: CPT | Performed by: INTERNAL MEDICINE

## 2020-08-27 NOTE — PROGRESS NOTES
Lucie Whitehead is a 69 year old patient who comes in today for a Blood Pressure check.  Initial BP:  BP (!) 140/82 (BP Location: Right arm, Patient Position: Sitting, Cuff Size: Adult Large)   Pulse 80   LMP 11/12/2007 (LMP Unknown)      Data Unavailable  Disposition: results routed to provider  Nerissa Garcia LPN

## 2020-08-28 LAB
ANION GAP SERPL CALCULATED.3IONS-SCNC: 8 MMOL/L (ref 3–14)
BUN SERPL-MCNC: 12 MG/DL (ref 7–30)
CALCIUM SERPL-MCNC: 9.5 MG/DL (ref 8.5–10.1)
CHLORIDE SERPL-SCNC: 105 MMOL/L (ref 94–109)
CHOLEST SERPL-MCNC: 162 MG/DL
CO2 SERPL-SCNC: 25 MMOL/L (ref 20–32)
CREAT SERPL-MCNC: 0.85 MG/DL (ref 0.52–1.04)
GFR SERPL CREATININE-BSD FRML MDRD: 71 ML/MIN/{1.73_M2}
GLUCOSE SERPL-MCNC: 93 MG/DL (ref 70–99)
HDLC SERPL-MCNC: 46 MG/DL
LDLC SERPL CALC-MCNC: 72 MG/DL
NONHDLC SERPL-MCNC: 116 MG/DL
POTASSIUM SERPL-SCNC: 4.7 MMOL/L (ref 3.4–5.3)
SODIUM SERPL-SCNC: 138 MMOL/L (ref 133–144)
TRIGL SERPL-MCNC: 220 MG/DL
TSH SERPL DL<=0.005 MIU/L-ACNC: 1.93 MU/L (ref 0.4–4)

## 2020-09-08 ENCOUNTER — VIRTUAL VISIT (OUTPATIENT)
Dept: PEDIATRICS | Facility: CLINIC | Age: 70
End: 2020-09-08
Payer: COMMERCIAL

## 2020-09-08 DIAGNOSIS — E66.01 MORBID OBESITY (H): ICD-10-CM

## 2020-09-08 DIAGNOSIS — Z00.00 ENCOUNTER FOR MEDICARE ANNUAL WELLNESS EXAM: Primary | ICD-10-CM

## 2020-09-08 DIAGNOSIS — K21.9 GASTROESOPHAGEAL REFLUX DISEASE WITHOUT ESOPHAGITIS: ICD-10-CM

## 2020-09-08 DIAGNOSIS — R03.0 ELEVATED BLOOD PRESSURE READING WITHOUT DIAGNOSIS OF HYPERTENSION: ICD-10-CM

## 2020-09-08 DIAGNOSIS — R73.01 IMPAIRED FASTING GLUCOSE: ICD-10-CM

## 2020-09-08 DIAGNOSIS — E78.5 HYPERLIPIDEMIA LDL GOAL <160: ICD-10-CM

## 2020-09-08 PROCEDURE — 99397 PER PM REEVAL EST PAT 65+ YR: CPT | Mod: GT | Performed by: INTERNAL MEDICINE

## 2020-09-08 RX ORDER — METFORMIN HCL 500 MG
2000 TABLET, EXTENDED RELEASE 24 HR ORAL
Qty: 360 TABLET | Refills: 3 | Status: SHIPPED | OUTPATIENT
Start: 2020-09-08 | End: 2021-07-26

## 2020-09-08 RX ORDER — FAMOTIDINE 40 MG/1
40 TABLET, FILM COATED ORAL DAILY PRN
Qty: 90 TABLET | Refills: 3 | Status: SHIPPED | OUTPATIENT
Start: 2020-09-08 | End: 2023-11-22

## 2020-09-08 RX ORDER — SIMVASTATIN 40 MG
40 TABLET ORAL AT BEDTIME
Qty: 90 TABLET | Refills: 3 | Status: SHIPPED | OUTPATIENT
Start: 2020-09-08 | End: 2022-01-07

## 2020-09-08 NOTE — PROGRESS NOTES
"Lucie Whitehead is a 70 year old female who is being evaluated via a billable video visit.      The patient has been notified of following:     \"This video visit will be conducted via a call between you and your physician/provider. We have found that certain health care needs can be provided without the need for an in-person physical exam.  This service lets us provide the care you need with a video conversation.  If a prescription is necessary we can send it directly to your pharmacy.  If lab work is needed we can place an order for that and you can then stop by our lab to have the test done at a later time.    Video visits are billed at different rates depending on your insurance coverage.  Please reach out to your insurance provider with any questions.    If during the course of the call the physician/provider feels a video visit is not appropriate, you will not be charged for this service.\"    Patient has given verbal consent for Video visit? Yes  How would you like to obtain your AVS? MyChart  If you are dropped from the video visit, the video invite should be resent to: Send to e-mail at:  [email or web address]  Will anyone else be joining your video visit? No      Subjective     Lucie Whitehead is a 70 year old female who presents today via video visit for the following health issues:    History of Present Illness    She consumes 0 sweetened beverage(s) daily.She exercises with enough effort to increase her heart rate 9 or less minutes per day.  She exercises with enough effort to increase her heart rate 3 or less days per week.   She is taking medications regularly.      Annual Wellness Visit    Are you in the first 12 months of your Medicare Part B coverage?  No    Physical Health:    In general, how would you rate your overall physical health? fair    Outside of work, how many days during the week do you exercise?2-3 days/week    Outside of work, approximately how many minutes a day do you " "exercise?15-30 minutes    If you drink alcohol do you typically have >3 drinks per day or >7 drinks per week? No    Do you usually eat at least 4 servings of fruit and vegetables a day, include whole grains & fiber and avoid regularly eating high fat or \"junk\" foods? Yes    Do you have any problems taking medications regularly? No    Do you have any side effects from medications? none    Needs assistance for the following daily activities: no assistance needed    Which of the following safety concerns are present in your home?  none identified     Hearing impairment: No    In the past 6 months, have you been bothered by leaking of urine? yes    LMP 11/12/2007 (LMP Unknown)   Weight: Unable to obtain due to video visit  Height: Unable to obtain due to video visit  BMI: Unable to obtain due to video visit  Blood Pressure: Unable to obtain due to video visit    Mental Health:    In general, how would you rate your overall mental or emotional health? fair  PHQ-2 Score:     PHQ-2 Score:   Depression -   PHQ-2 ( 1999 [employer]) 9/8/2020 4/23/2019   Q1: Little interest or pleasure in doing things 1 3   Q2: Feeling down, depressed or hopeless 1 3   PHQ-2 Score 2 6   Q1: Little interest or pleasure in doing things Several days Nearly every day   Q2: Feeling down, depressed or hopeless Several days Nearly every day   PHQ-2 Score 2 6     PHQ-9 SCORE 10/1/2019 3/17/2020 8/17/2020   PHQ-9 Total Score - - -   PHQ-9 Total Score MyChart - - -   PHQ-9 Total Score 24 20 12      VIGNESH-7 SCORE 10/1/2019 3/17/2020 8/17/2020   Total Score 18 16 11      Has nightmares which impact mood during the day - has to work to bring self out of that.  Has done intensive outpatient and now sees individual therapist weekly and in a body image group weekly.  Her weight also feeds her depression.  Has a DNP to prescriptions.       Back pain - was going to have back surgery but delayed with covid.  Trying to get records from  to her surgeon.  Working on " "getting that pre-auth'ed.       Fatigue - is always exhausted.  Thinks it is pain and depression.   Uses cpap nightly.   Saw sleep specialist within the year.      Wheezing - has a sound on the exhale.  Inhaler didn't help.  PFT normal.  Stress test normal.  CXR normal.      Hyperlipid - watching diet, not losing weight.    Obesity - normal thyroid.   Frustrated with inability to lose weight.        Do you feel safe in your environment? Yes    Have you ever done Advance Care Planning? (For example, a Health Directive, POLST, or a discussion with a medical provider or your loved ones about your wishes)? No, advance care planning information given to patient to review.  Patient plans to discuss their wishes with loved ones or provider.      Fall risk:   Fallen 2 or more times in the past year?: Yes  Any fall with injury in the past year?: No  Relates to her back issues - feet \"don't list\" so has to focus on walking.  Has walking stick and cane and walker as needed   Cognitive Screening: Unable to complete due to virtual visit; need for additional assessment in future face-to-face visit    Do you have sleep apnea, excessive snoring or daytime drowsiness?: yes has machine    Current providers sharing in care for this patient include:   Patient Care Team:  Nallely Moore MD as PCP - General  Nallely Moore MD as Assigned PCP       Video Start Time: 10:03 AM    Review of Systems   Chronic issues with diarrhea/constipation but better with increased veggies in diet.  Vision stable.  Constitutional, psych, skin, cardiovascular, pulmonary, gi and gu systems are negative, except as otherwise noted.      Objective           Vitals:  No vitals were obtained today due to virtual visit.  BP Readings from Last 3 Encounters:   08/27/20 (!) 140/82   05/06/20 120/75   02/27/20 126/72        Physical Exam      GENERAL: Healthy, alert and no distress  EYES: Eyes grossly normal to inspection.  No discharge or erythema, or obvious " "scleral/conjunctival abnormalities.  RESP: No audible wheeze, cough, or visible cyanosis.  No visible retractions or increased work of breathing.    SKIN: Visible skin clear. No significant rash, abnormal pigmentation or lesions.  NEURO: Cranial nerves grossly intact.  Mentation and speech appropriate for age.  PSYCH: Mentation appears normal, affect normal/bright, judgement and insight intact, normal speech and appearance well-groomed.            Assessment & Plan     Encounter for Medicare annual wellness exam  Routine health education discussed: calcium, diet, exercise, weight, safety.     Obesity (BMI 35.0-39.9) with comorbidity (H)  Discussed options, interested in more help and will refer  - COMPREHENSIVE WEIGHT MANAGEMENT    Hyperlipidemia LDL goal <160  Continue statin  - simvastatin (ZOCOR) 40 MG tablet; Take 1 tablet (40 mg) by mouth At Bedtime    Impaired fasting glucose  Discussed glucose elevation.  Discuss this is a pre-diabetic condition.  Recommended eating healthily, exercising and maintaining a healthy weight to prevent the development of diabetes.  Recommended blood sugar checks at least yearly to monitor this.  Recommended dietary consultation which the patient declined.   - metFORMIN (GLUCOPHAGE-XR) 500 MG 24 hr tablet; Take 4 tablets (2,000 mg) by mouth daily (with dinner)    Gastroesophageal reflux disease without esophagitis  refill  - famotidine (PEPCID) 40 MG tablet; Take 1 tablet (40 mg) by mouth daily as needed for heartburn    Elevated blood pressure reading without diagnosis of hypertension  Discussed risk of developing true hypertension, need for ongoing BP monitoring and healthy lifestyle with exercise being especially import.     Depression:  Ongoing issues, continue therapy and medication management appointments.  Discussed stress and coping       BMI:   Estimated body mass index is 37.2 kg/m  as calculated from the following:    Height as of 2/27/20: 1.664 m (5' 5.5\").    Weight as " of 2/27/20: 103 kg (227 lb).   Weight management plan: Patient referred to endocrine and/or weight management specialty        See Patient Instructions    Return in about 53 weeks (around 9/14/2021) for Annual Wellness Visit and recheck BP in 3 mos.    Nallely Moore MD  [facility]      Video-Visit Details    Type of service:  Video Visit    Video End Time:10:37 AM    Originating Location (pt. Location): Home    Distant Location (provider location):  [facility]     Platform used for Video Visit: Omar          She is at risk for falling and has been provided with information to reduce the risk of falling at home.  The patient s PHQ-9 score is consistent with moderate depression. She will follow-up with her mental health providers

## 2020-09-16 ENCOUNTER — TELEPHONE (OUTPATIENT)
Dept: PEDIATRICS | Facility: CLINIC | Age: 70
End: 2020-09-16

## 2020-09-16 NOTE — TELEPHONE ENCOUNTER
General Call:     Who is calling:  Lucie     Reason for Call: Requesting a pre op for spinal fusion, DOS is 10/21. Needs appointment to be scheduled between 09/23-10/21, no openings were found by TC.    What are your questions or concerns:  Pt also stated she needs a COVID test prior to surgery, to be done on 10/15 or 10/16 (specifically on those dates).     Date of last appointment with provider: 05/06/20    Okay to leave a detailed message:Yes at Cell number on file:    Telephone Information:   Mobile [phone]        Kaylen Miranda MA 10:13 AM 9/16/2020

## 2020-09-16 NOTE — TELEPHONE ENCOUNTER
The pt is aware and scheduled for her upcoming appointment.   Violet Pace on 9/16/2020 at 10:21 AM

## 2020-09-30 ENCOUNTER — OFFICE VISIT (OUTPATIENT)
Dept: PEDIATRICS | Facility: CLINIC | Age: 70
End: 2020-09-30
Payer: COMMERCIAL

## 2020-09-30 VITALS
SYSTOLIC BLOOD PRESSURE: 136 MMHG | HEART RATE: 92 BPM | OXYGEN SATURATION: 97 % | TEMPERATURE: 96.9 F | HEIGHT: 66 IN | DIASTOLIC BLOOD PRESSURE: 80 MMHG | BODY MASS INDEX: 38.39 KG/M2 | WEIGHT: 238.9 LBS

## 2020-09-30 DIAGNOSIS — R73.01 IMPAIRED FASTING GLUCOSE: ICD-10-CM

## 2020-09-30 DIAGNOSIS — M54.16 LUMBAR RADICULOPATHY: ICD-10-CM

## 2020-09-30 DIAGNOSIS — Z01.818 PREOP GENERAL PHYSICAL EXAM: Primary | ICD-10-CM

## 2020-09-30 LAB — HGB BLD-MCNC: 13 G/DL (ref 11.7–15.7)

## 2020-09-30 PROCEDURE — 36415 COLL VENOUS BLD VENIPUNCTURE: CPT | Performed by: INTERNAL MEDICINE

## 2020-09-30 PROCEDURE — 85018 HEMOGLOBIN: CPT | Performed by: INTERNAL MEDICINE

## 2020-09-30 PROCEDURE — 99214 OFFICE O/P EST MOD 30 MIN: CPT | Mod: GC | Performed by: STUDENT IN AN ORGANIZED HEALTH CARE EDUCATION/TRAINING PROGRAM

## 2020-09-30 ASSESSMENT — MIFFLIN-ST. JEOR: SCORE: 1622.45

## 2020-09-30 NOTE — PROGRESS NOTES
[facility]  [address]  [address]  [place] [address]  [phone]  Dept: [phone]    PRE-OP EVALUATION:  Today's date: 2020    Lucie Whitehead (: 1950) presents for pre-operative evaluation assessment as requested by Dr. Tamez and Therese .  She requires evaluation and anesthesia risk assessment prior to undergoing surgery/procedure for treatment of spinal fusion  .    Proposed Surgery/ Procedure: spinal fusion  Date of Surgery/ Procedure: 10/21/2020  Time of Surgery/ Procedure: Unsure  Hospital/Surgical Facility: [facility]  Surgery Fax Number: unknown  Primary Physician: Nallely Moore  Type of Anesthesia Anticipated: General    Preoperative Questionnaire:   No - Have you ever had a heart attack or stroke?  No - Have you ever had surgery on your heart or blood vessels, such as a stent, coronary (heart) bypass, or surgery on an artery in the head, neck, heart, or legs?  No - Do you have chest pain when you are physically active?  No - Do you have a history of heart failure?  No - Do you currently have a cold, bronchitis, or symptoms of other respiratory (head and chest) infections?  No - Do you have a cough, shortness of breath, or wheezing?  No - Do you or anyone in your family have a history of blood clots?  No - Do you or anyone in your family have a serious bleeding problem, such as long-lasting bleeding after surgeries or cuts?  No - Have you ever had anemia or been told to take iron pills?  YES - Have you had any abnormal blood loss such as black, tarry or bloody stools, or abnormal vaginal bleeding?  YES - Have you ever had a blood transfusion?  Yes - Are you willing to have a blood transfusion if it is medically needed before, during, or after your surgery?  No - Have you or anyone in your family ever had problems with anesthesia (sedation for surgery)?  YES - Do you have sleep apnea, excessive snoring, or daytime drowsiness?   No - Do you  have any artifical heart valves or other implanted medical devices, such as a pacemaker, defibrillator, or continuous glucose monitor?  No - Do you have any artifical joints?  No - Are you allergic to latex?  No - Is there any chance that you may be pregnant?    Patient has a Health Care Directive or Living Will:  YES     HPI:     HPI related to upcoming procedure:     Has had many years of back issues. Multiple     Has been having pain and numbness. From around buttocks and thighs bilaterally. Gets flush of numbness after walking up stair specifically.       HYPERLIPIDEMIA - Patient has a long history of significant Hyperlipidemia requiring medication for treatment with recent fair control. Patient reports no problems or side effects with the medication.     SLEEP PROBLEM - Patient has a longstanding history of PEÑA.. Patient has been well managed on CPAP.       MEDICAL HISTORY:     Patient Active Problem List    Diagnosis Date Noted     CKD (chronic kidney disease) stage 3, GFR 30-59 ml/min (H) 03/17/2020     Priority: Medium     Trochanteric bursitis of both hips 04/03/2019     Priority: Medium     Impingement syndrome of both shoulders 04/03/2019     Priority: Medium     Chronic pain of both knees 04/03/2019     Priority: Medium     Pain of both shoulder joints 03/28/2019     Priority: Medium     Hip pain, right 03/28/2019     Priority: Medium     Hip pain, left 03/28/2019     Priority: Medium     Obesity (BMI 35.0-39.9) with comorbidity (H) 03/26/2019     Priority: Medium     Lumbar radiculopathy 07/03/2018     Priority: Medium     Advance care planning 03/23/2018     Priority: Medium     Osteopenia 05/13/2016     Priority: Medium     Non morbid obesity due to excess calories 11/08/2015     Priority: Medium     Major depressive disorder, recurrent episode (H) 09/12/2014     Priority: Medium     prozac caused problems with mentation.  zoloft caused sleepiness.  Celexa was working well but then failed 2019.   Change to cymbalta       Tubular adenoma of colon 07/11/2013     Priority: Medium     On colonoscopy 2013       Macular degeneration 07/08/2013     Priority: Medium     Dry, OU, follows with retinal surgery       Bilateral low back pain with bilateral sciatica 04/30/2013     Priority: Medium     Diagnosis updated by automated process. Provider to review and confirm.       PEÑA (obstructive sleep apnea) 03/05/2013     Priority: Medium     On CPAP and doing well       Impaired fasting glucose 03/05/2013     Priority: Medium     Lumbar spinal stenosis 04/14/2010     Priority: Medium     S/p lumbar surgery 3/13 and 4/13.       Hyperlipidemia LDL goal <160 02/10/2010     Priority: Medium     Cameron 10-year CHD Risk Score: 1% (12 Total Points)   Values used to calculate score:     Age: 61 years -- Points: 8     Total Cholesterol: 232 mg/dL -- Points: 4     HDL Cholesterol: 55 mg/dL -- Points: 0     Systolic BP (untreated): 110 mmHg -- Points: 0    The patient is not a smoker. -- Points: 0    The patient has not been diagnosed with diabetes. -- Points: 0    The patient does not have a family history of CHD. -- Points:0          Ovarian cyst 11/06/2006     Priority: Medium     Problem list name updated by automated process. Provider to review       Hirsutism 01/25/2004     Priority: Medium     Leiomyoma of uterus 01/25/2004     Priority: Medium     Pelvic US 2006: Contour is irreg w/myomata: 1)anterior mid submucosal 4.1x3.6cm, 2)anterior fundal subserosal 2.4x1.9cm, 3)lt mid intramural 2.4x1.9cm, 4)posterior fundal intramural 2.6x2.4cm, others smaller.  Problem list name updated by automated process. Provider to review        Past Medical History:   Diagnosis Date     Complication of anesthesia     extremely dry eyes     Diabetes (H)      Gastro-oesophageal reflux disease      Hirsutism     Abstracted 06/14/02     History of blood transfusion      Leiomyoma of uterus, unspecified     Abstracted 06/14/02     Obesity  "2010    Estimated Body mass index is 28.97 kg/(m^2) as calculated from the following:   Height as of 7/10/13: 5' 7\"(1.702 m).   Weight as of 7/10/13: 185 lb(83.915 kg).       Other and unspecified hyperlipidemia     Abstracted 02     Other chronic pain     low back and legs and feet     Sleep apnea     CPAP     Past Surgical History:   Procedure Laterality Date     ARTHROSCOPY SHOULDER DISTAL CLAVICLE REPAIR Right 2014    Procedure: ARTHROSCOPY SHOULDER DISTAL CLAVICLE RESECTION;  Surgeon: John Paul Rodriguez MD;  Location:  OR     C NONSPECIFIC PROCEDURE      Bicep tendon repair    Abstracted 02     C/SECTION, LOW TRANSVERSE  x 3    , Low Transverse x 4     COLONOSCOPY       COLONOSCOPY N/A 2019    Procedure: COLONOSCOPY;  Surgeon: Hernán Gonzales MD;  Location:  GI     DECOMPRESSION, FUSION CERVICAL ANTERIOR TWO LEVELS, COMBINED  2012    Procedure:COMBINED DECOMPRESSION, FUSION CERVICAL ANTERIOR TWO LEVELS; DECOMPRESSION, FUSION CERVICAL ANTERIOR C4-6; Surgeon:SHALONDA ALCALA; Location: OR     DECOMPRESSION, FUSION LUMBAR POSTERIOR ONE LEVEL, COMBINED  3/7/2013    Procedure: COMBINED DECOMPRESSION, FUSION LUMBAR POSTERIOR ONE LEVEL;  Posterior Fusion L2-3, Hardware Removal L3-5;  Surgeon: Shalonda Alcala MD;  Location:  OR     DECOMPRESSION, FUSION LUMBAR POSTERIOR TWO LEVELS, COMBINED N/A 7/3/2018    Procedure: COMBINED DECOMPRESSION, FUSION LUMBAR POSTERIOR TWO LEVELS;  Bilateral decompression L1-L2 with left total facetectomy   Transforaminal lumbar interbody fusion L1-L2 from left-sided approach   Insertion of intervertebral biomechanical device L1-L2  Posterolateral fusion L1-L2; Removal of instrumentation L2-3  RE-Instrumentation L1-L3;  Surgeon: Shalonda Alcala MD;  Location:  OR     EXPLORE SPINE, REMOVE HARDWARE, COMBINED N/A 7/3/2018    Procedure: COMBINED EXPLORE SPINE, REMOVE HARDWARE;;  Surgeon: Shalonda Alcala MD;  Location:  " OR     FUSION LUMBAR ANTERIOR ONE LEVEL  2013    Procedure: FUSION LUMBAR ANTERIOR ONE LEVEL;  Anterior Fusion L2-3;  Surgeon: Davide Tamez MD;  Location: [facility] OR     LAMINECTOMY, FUSION LUMBAR TWO LEVELS, COMBINED  3/11    AP fusion L3-5     Current Outpatient Medications   Medication Sig Dispense Refill     Acetaminophen (TYLENOL PO) Take 1,300 mg by mouth 2 times daily        CALCIUM PO Take 950 mg by mouth daily        Calcium-Phosphorus-Vitamin D (CITRACAL +D3 PO) Take by mouth daily       DULoxetine (CYMBALTA) 30 MG capsule TAKE ONE CAPSULE BY MOUTH TWICE A  capsule 0     famotidine (PEPCID) 40 MG tablet Take 1 tablet (40 mg) by mouth daily as needed for heartburn 90 tablet 3     lamoTRIgine (LAMICTAL) 25 MG tablet 1 TAB ORAL DAILY X2 WK THEN TAKE 2 TABS DAILY  0     loratadine (CLARITIN) 10 MG tablet Take 10 mg by mouth daily       metFORMIN (GLUCOPHAGE-XR) 500 MG 24 hr tablet Take 4 tablets (2,000 mg) by mouth daily (with dinner) 360 tablet 3     Multiple Vitamins-Minerals (OCUVITE ADULT 50+ PO) Take 1 tablet by mouth daily        NONFORMULARY Sustained night time lubricant eye ointment night before bed       Omega-3 Fatty Acids (OMEGA 3 PO) Take 1 tablet by mouth daily        pramipexole (MIRAPEX) 0.25 MG tablet 0.5 mg At Bedtime   3     simvastatin (ZOCOR) 40 MG tablet Take 1 tablet (40 mg) by mouth At Bedtime 90 tablet 3     traMADol (ULTRAM) 50 MG tablet TAKE 1 - 2 TABLET BY ORAL ROUTE EVERY 4 - 6 HOURS AS NEEDED  0     XIIDRA 5 % opthalmic solution INSTILL 1 DROP TWICE A DAY IN BOTH EYES  4     ZYRTEC 10 MG OR TABS 1 TABLET EACH MORNING       OTC products: NSAIDS    Allergies   Allergen Reactions     Animal Dander      Cats      Dogs      Seasonal Allergies      Trees and mold      Latex Allergy: NO    Social History     Tobacco Use     Smoking status: Former Smoker     Last attempt to quit: 1975     Years since quittin.7     Smokeless tobacco: Never Used   Substance Use  "Topics     Alcohol use: Yes     Alcohol/week: 0.0 standard drinks     Comment: 2-3 glass wine /week     History   Drug Use No     REVIEW OF SYSTEMS:   CONSTITUTIONAL: NEGATIVE for fever, chills, change in weight  EYES: NEGATIVE for vision changes or irritation  ENT/MOUTH: NEGATIVE for ear, mouth and throat problems  RESP: NEGATIVE for significant cough or SOB  CV: NEGATIVE for chest pain, palpitations or peripheral edema  GI: NEGATIVE for nausea, abdominal pain, heartburn, or change in bowel habits  : NEGATIVE for frequency, dysuria, or hematuria  MUSCULOSKELETAL: NEGATIVE for significant myalgia  NEURO: POSITIVE for weakness, paresthesias. Negative for dizziness  ENDOCRINE: NEGATIVE for temperature intolerance, skin/hair changes  HEME: NEGATIVE for bleeding problems  PSYCHIATRIC: NEGATIVE for changes in mood or affect    EXAM:   /80 (BP Location: Right arm, Patient Position: Chair, Cuff Size: Adult Regular)   Pulse 92   Temp 96.9  F (36.1  C) (Tympanic)   Ht 1.664 m (5' 5.5\")   Wt 108.4 kg (238 lb 14.4 oz)   LMP 11/12/2007 (LMP Unknown)   SpO2 97%   BMI 39.15 kg/m      GENERAL APPEARANCE: healthy, alert and no distress     EYES: EOMI, PERRL     HENT: External ears normal, nose and mouth without ulcers or lesions     NECK: no adenopathy, no asymmetry, masses, or scars and thyroid normal to palpation     RESP: lungs clear to auscultation - no rales, rhonchi or wheezes     CV: regular rates and rhythm, normal S1 S2, no S3 or S4 and no murmur, click or rub     ABDOMEN:  soft, nontender, no HSM or masses and bowel sounds normal     MS: extremities normal- no gross deformities noted, no evidence of inflammation in joints, FROM in all extremities.     SKIN: no suspicious lesions or rashes     NEURO: Normal strength and tone, sensory exam grossly normal, mentation intact and speech normal     PSYCH: mentation appears normal. and affect normal/bright     LYMPHATICS: No cervical adenopathy    DIAGNOSTICS: "   EKG: Not indicated due to non-vascular surgery and last EKG on 05/6/20 (within 30 days for CAD history or last year for cardiac risk factors)    Follow up Stress test without evidence of ischemia on 5/12/20    Hgb 13.0    Recent Labs   Lab Test 08/27/20  1046 05/06/20  1527 06/25/19  1124  06/19/18  1846  04/08/13  0729   HGB  --  12.0 13.1   < > 13.9   < > 12.3   PLT  --  300  --   --  267   < > 369   INR  --   --   --   --   --   --  0.90     --  140   < >  --    < > 144   POTASSIUM 4.7  --  5.0   < > 4.4   < > 4.7   CR 0.85  --  1.00   < >  --    < > 0.75    < > = values in this interval not displayed.      IMPRESSION:   Reason for surgery/procedure: Spinal fusion  Diagnosis/reason for consult: Pre op clearance    The proposed surgical procedure is considered INTERMEDIATE risk.    REVISED CARDIAC RISK INDEX  The patient has the following serious cardiovascular risks for perioperative complications such as (MI, PE, VFib and 3  AV Block):  No serious cardiac risks  INTERPRETATION: 0 risks: Class I (very low risk - 0.4% complication rate)    Patient has had normal stress test in 05/2020.    The patient has the following additional risks for perioperative complications:  Morbid obesity      ICD-10-CM    1. Preop general physical exam  Z01.818 Hemoglobin   2. Lumbar radiculopathy  M54.16    3. Impaired fasting glucose  R73.01      RECOMMENDATIONS:       Obstructive Sleep Apnea (or suspected sleep apnea)  Patient is to bring their home CPAP with them on the day of surgery    --Patient is to take all scheduled medications on the day of surgery EXCEPT for modifications listed below.   - HOLD NSAIDS/ASA for 10 days prior to surgery   - HOLD metformin on the day prior to surgery    Pending review of diagnostic evaluation, APPROVAL GIVEN to proceed with proposed procedure, without further diagnostic evaluation.     Signed Electronically by: Clinton Gutierrez MD    Copy of this evaluation report is provided to  requesting physician.    [facility] Preop Guidelines    Revised Cardiac Risk Index    I have seen and discussed the patient with Dr. Gutierrez and agree with the jointly developed plan as documented above.    Christina Leone MD  Internal Medicine-Pediatrics

## 2020-09-30 NOTE — PATIENT INSTRUCTIONS
"Great seeing you clinic today. We discussed your up coming spinal fusion surgery. We would recommend holding NSAIDs/Aspirin for at least 10 days. Hold metformin the day before surgery as well. Make sure to bring your own CPAP to the hospital to help you sleep after surgery.        We ordered the following lab tests/procedures for you:  Hemoglobin    Follow up:   As needed for prevention    Preparing for Your Surgery  Getting started  A surgery nurse will call you to review your health history and instructions. They will give you an arrival time based on your scheduled surgery time.  Please be ready to share the following:    Your doctor's clinic name and phone number    Your medical, surgical and anesthesia history    A list of allergies and sensitivities    A list of medicines, including herbal treatments and over-the-counter drugs    Whether the patient has a legal guardian (ask how to send us the papers in advance)  If your child is having surgery, please ask for a copy of Preparing for Your Child's Surgery.    Preparing for surgery    Within 30 days of surgery: Have an exam at your family clinic (primary care clinic), or go to a pre-operative clinic. This exam is called a \"History and Physical,\" or H&P.    At your H&P exam, talk to your care team about all medicines you take. If you need to stop any medicines before surgery, ask when to start taking them again.  ? We do this for your safety. Many medicines can make you bleed too much during surgery. Some change how well surgery (anesthesia) drugs work.    Call your insurance company to see what it will and won't pay for. Ask if they need to pre-approve the surgery. (If no insurance, call [phone].)    Call your surgeon's clinic if there's any change in your health. This includes signs of a cold or flu (sore throat, runny nose, cough, rash, fever). It also includes a scrape or scratch near the surgery site.    If you have questions on the day of surgery, call " your surgery center.  Eating and drinking guidelines  For your safety: Unless your surgeon tells you otherwise, follow the guidelines below.    Eat and drink as usual until 8 hours before surgery. After that, no food or milk.    Drink clear liquids until 2 hours before surgery. These are liquids you can see through, like water, Gatorade and Propel Water. You may also have black coffee and tea (no cream or milk).    Nothing by mouth within 2 hours of surgery. This includes gum, candy and breath mints.    Stop alcohol the midnight before surgery.    If your family clinic tells you to take medicine on the morning of surgery, it's okay to take it with a sip of water.  Preventing infection    Shower or bathe the night before and morning of your surgery. Follow the instructions your clinic gave you. (If no instructions, use regular soap.)    Don't shave or clip hair near your surgery site. This can lead to skin infection.    Don't smoke the morning of surgery. Smoking increases the risk of infection. You may chew nicotine gum up to 2 hours before surgery. A nicotine patch is okay.  ? Note: Some surgeries require you to completely quit smoking and nicotine. Check with your surgeon.    Your care team will make every effort to keep you safe from infection. We will:  ? Clean our hands often with soap and water (or an alcohol-based hand rub).  ? Clean the skin at your surgery site with a special soap that kills germs. We'll also remove hair from the site as needed.  ? Wear special hair covers, masks, gowns and gloves during surgery.  ? Give antibiotic medicine, if prescribed. Not all surgeries need antibiotics.  What to bring on the day of surgery    Photo ID and insurance card    Copy of your health care directive, if you have one    Glasses and hearing aides (bring cases)  ? You can't wear contacts during surgery    Inhaler and eye drops, if you use them (tell us about these when you arrive)    CPAP machine or breathing  device, if you use them    A few personal items, if spending the night    If you have . . .  ? A pacemaker or ICD (cardiac defibrillator): Bring the ID card.  ? An implanted stimulator: Bring the remote control.  ? A legal guardian: Bring a copy of the certified (court-stamped) guardianship papers.  Please remove any jewelry, including body piercings. Leave jewelry and other valuables at home.  If you're going home the day of surgery  Important: If you don't follow the rules below, we must cancel your surgery.     Arrange for someone to drive you home after surgery. You may not drive, take a taxi or take public transportation by yourself (unless you'll have local anesthesia only).    Arrange for a responsible adult to stay with you overnight. If you don't, we may keep you in the hospital overnight, and you may need to pay the costs yourself.  Questions?   If you have any questions for your care team, list them here: _________________________________________________________________________________________________________________________________________________________________________________________________________________________________________________________________________________________________________________________  For informational purposes only. Not to replace the advice of your health care provider. Copyright   1440-9374 [facility]. All rights reserved. Clinically reviewed by Kateryna Springer MD. [employer] [id number] - REV 07/19.

## 2020-09-30 NOTE — PROGRESS NOTES
"MEDICAL WEIGHT LOSS INITIAL EVALUATION  DIAGNOSIS:  Class II Obesity    NUTRITION HISTORY:    Diet Recall Review with Patient per [facility]LENA note 10/6/20 10/5/2020   Do you typically eat breakfast? Yes   If you do eat breakfast, what types of food do you eat? Cottage cheese, cheese, eggs, veg soup- 9:30-10:00   Do you typically eat lunch? Yes   If you do eat lunch, what types of food do you typically eat?  Veg soup with cabbage   Do you typically eat supper? Yes   If you do eat supper, what types of food do you typically eat? Protein, vegetable or lettuce salad, starch    Do you typically eat snacks? Yes-mostly after dinner   If you do snack, what types of food do you typically eat? Fruit, veg, crackers, raisins, Honey Nut cheerios       Beverage choices: water, coffee, Diet soda  Dining out: rare  Binge eating: no, but over eat raisins  Emotional eating: yes-stress, depression, anxiety  Night time eating: no  Exercise: PT for back-2X per week  Other: Patient sees her therapist weekly and is in a body image group [facility]X per week at [facility]. She does most of the cooking for her and her spouse. Does not tolerate \"rich foods\" and peanuts. Evenings are her most difficult time with snacking. Having a spine surgery on 10/21/20.    MEDICATION FOR WEIGHT LOSS:  none at this time    ANTHROPOMETRICS:  Height: 5'5.5\"  Weight: 237 lb  BMI:  38.97 kg/m2    NUTRITION DIAGNOSIS:   Obese, class II related to excess energy intake as evidence by BMI of  38.97  NUTRITION INTERVENTIONS  Nutrition Prescription:  Recommend modified energy- nutrient intake  Implementation:  Nutrition Education (Content):    Discussed portion sizes     Determined alternative to emotional eating    Reviewed foods rich in protein    Provided  My Plate Guidelines    Nutrition Education (Application):     Patient to practice goals as stated below    Patient verbalizes understanding by listing diversions she can try to avoid snacking    Anticipate fair-good " compliance    Goals:  Practice alternatives to emotional eating (knitting, drawing, coloring)  Include a protein rich food at each meal  Go to bed 30 minutes earlier    FOLLOW UP AND MONITORING:    Other  - follow up in 8 weeks.     TIME SPENT WITH PATIENT:   20 minutes   Nakul Sepulveda RD, DESIREE  [facility] [place]  [phone]

## 2020-10-02 ENCOUNTER — TELEPHONE (OUTPATIENT)
Dept: SURGERY | Facility: CLINIC | Age: 70
End: 2020-10-02

## 2020-10-02 NOTE — PROGRESS NOTES
"  [facility] Consult    PATIENT:  Lucie Whitehead  MRN:         [id number]  :         1950  TALIA:         10/6/2020    Dear Nallely Moore MD,    I had the pleasure of seeing your patient, Lucie Whitehead. Full intake/assessment was done to determine barriers to weight loss success and develop a treatment plan. Lucie Whitehead is a 70 year old female interested in treatment of medical problems associated with excess weight. She has a height of 5' 5.5\", a weight of 237 lbs 12.8 oz, and the calculated Body mass index is 38.97 kg/m .    ASSESSMENT/PLAN:  Obesity  PEÑA    PROGRAM OVERVIEW  Reviewed options at [facility] including provider visits, dietician, 24 week program, health coaching, food supplements, Get Moving Program, and psychological support.  All of patients questions about the weight loss program were answered fully.      MEDICATIONS:  We discussed healthy habits to assist with weight loss. We reviewed medications associated with weight gain. We discussed the role of pharmacological agents in the treatment of obesity and the \"off-label\" use of medications in this practice. We reviewed medication that may assist with weight loss. Indications, contraindications, risks/benefits, and potential side effects were discussed.  Discussed that medications must always be used together with lifestyle changes such as improvements in diet choices, portion control and establishing and maintaining a regular exercise program. Discussed starting naltrexone to help with nighttime snacking.  Patient self describes having an addictive personality and often Naltrexone works well this type of patient. Pt will be having back surgery at the end of the month. Will wait to prescribe this until she is post op and off narcotic pain medication. Pt can mychart me once recovered and off narcotic pain medication and I can order.  Otherwise, we can start at follow up visit in 6 weeks. " "    CURRENT GOALS:   Have labs drawn  See dietician today  Continue working with therapist at [facility] (signed ROSALES today to collaborate with them.  Will send my dictation from today and get their recent records and treatment plan.      PSYCH:    Pt has a significant mental health history with anxiety and depression.  When her depression worsens, her emotional eat worsens.  She has the most trouble with snacking in the evening. She also has some disordered thinking regarding body image.  Would recommend she continue seeing her group once a month.      Pt displayed a lot of negative self talk, self blame \"should be doing this....) during the appointment today. I would love to work on changing some of this self talk in the future. Recommend she continue working with therapist at [facility] (signed ROSALES today to collaborate with them.  Will send my dictation from today and get their recent records and treatment plan.      NUTRITION: referral ordered today     - NUTRITION REFERRAL    LABS:   Ordered   HgA1C  Vitamin D- result was 32 3/2018 Started 50,000 international unit(s) vitamin D2 pre-op for upcoming surgery  PTH    BARRIERS to weight loss identified thus far:   Depression  Emotional Eating  Negative self talk     STRENGTHS that may help weight loss:   Overall good nutritional knowledge  Works well with a routine    COUNSELING:   We discussed Bariatric Basics including:  -eating 3 meals daily  -eating protein first  -eating slowly, chewing food well  -avoiding/limiting calorie containing beverages  -limiting carbohydrates and changing to whole grains  -limiting restaurant or cafeteria eating to twice a week or less    We discussed the importance of restorative sleep and stress management in maintaining a healthy weight.  We discussed insulin resistance and glycemic index as it relates to appetite and weight control.   We discussed the importance of physical activity including cardiovascular and strength " "training in maintaining a healthier weight and explored viable options.  We discussed the National Weight Control Registry healthy weight maintenance strategies and ways to optimize metabolism.  Patient education of above written in AVS.     Follow up: Return to clinic in 6 weeks.      She has the following co-morbidities:       10/5/2020   I have the following health issues associated with obesity: Pre-Diabetes, High Cholesterol, Sleep Apnea, Polycystic Ovarian Syndrome, Infertility, GERD (Reflux), Asthma, Osteoarthritis (joint disease)   I have the following symptoms associated with obesity: Knee Pain, Depression, Fatigue, Hip Pain     Back pain - was going to have back surgery but delayed with covid.  Now has  L5-S1 fusion surgery at the end of the month.     Patient Goals 10/5/2020   I am interested in having a healthier weight to diminish current health problems: Yes   I am interested in having a healthier weight in order to prevent future health problems: Yes   I am interested in having a healthier weight in order to have a future surgery: Yes       Referring Provider 10/5/2020   Please name the provider who referred you to [facility].  If you do not know, please answer: \"I Don't Know\". Dr Moore       Weight History 10/5/2020   How concerned are you about your weight? Very Concerned   Would you describe your weight gain as gradual? Yes   I became overweight: As an Adult   The following factors have contributed to my weight gain:  Mental Health Issues, Change in Schedule, Eating Too Much, Lack of Exercise, Genetic (Runs in the Family)   I have tried the following methods to lose weight: Watching Portions or Calories, Exercise, Slim Fast or Other Liquid Diets, Fasting   My lowest weight since age 20 was: 160   My highest weight since age 20 was: 238   The most weight I have ever lost was: (lbs) 30   I have the following family history of obesity/being overweight:  My mother is overweight, Many of " "my relatives are overweight   Has anyone in your family had weight loss surgery? No   How has your weight changed over the last year?  Gained     What brought pt here: Patient had clinical significant depression and anxiety that had interfered with her ability to function  About 1 year ago. She is on long term disability.  Is not trying to \"clean up her life.\"  Having a healthy relationship with food is part of that process.   Has high anxiety and depression.  Lost her cognitive skills for a while with the anxiety/depression.  States every time she has a surgery her depression worsens.  The depression manifests it self in many ways-one self coping mechanism is emotional eating.  When she overeats at night, she feels back and this fuels more anxiety.  Cooking and baking makes her feel good but then she eats it, feels bad and the cycle continues.    Pt has a routine she follows daily and this helps her with her depression.     Diet Recall Review with Patient 10/5/2020   Do you typically eat breakfast? Yes Wake up 6-7 am   If you do eat breakfast, what types of food do you eat? Cottage cheese, cheese, 1 egg- 9 am    Do you typically eat lunch? Yes- 1 PM   If you do eat lunch, what types of food do you typically eat?  Veg soup- Cabbage soup.     Do you typically eat supper? Yes 6-6:30 pm   If you do eat supper, what types of food do you typically eat? Chicken or beef.  Usually a protein, vegetable    Do you typically eat snacks? Yes, veggies fore snack between  Breakfast and lunch   If you do snack, what types of food do you typically eat? Fruit- between lunch and supper   Do you like vegetables?  Yes   Do you drink water? Yes   How many glasses of juice do you drink in a typical day? 0   How many of glasses of milk do you drink in a typical day? 0   How many 8oz glasses of sugar containing drinks such as Camacho-Aid/sweet tea do you drink in a day? 0   How many cans/bottles of sugar pop/soda/tea/sports drinks do you drink " "in a day? 0   How many cans/bottles of diet pop/soda/tea or sports drink do you drink in a day? 1   How often do you have a drink of alcohol? 2-4 Times a Month   If you do drink, how many drinks might you have in a day? 1 or 2   Finishes dinner and then she feels like she needs something to eat.  Tries not to take the whole container otherwise she will eat the whole thing. Feels like she has an addictive personality.  Is careful with alcohol for this reason. (see Social hx for more info)    Nighttime snacking is her downfall.  She takes her nightly meds which cause her \"feel tired and not care.\" By that time she is tired she doesn't have the energy to figure out healthy food choices and gravitates to snacky foods.     Eating Habits 10/5/2020   Generally, my meals include foods like these: bread, pasta, rice, potatoes, corn, crackers, sweet dessert, pop, or juice. A Few Times a Week   Generally, my meals include foods like these: fried meats, brats, burgers, french fries, pizza, cheese, chips, or ice cream. Less Than Weekly   Eat fast food (like McDonalds, Burger [employer]acio, Taco Bell). Less Than Weekly   Eat at a buffet or sit-down restaurant. Less Than Weekly   Eat most of my meals in front of the TV or computer. A Few Times a Week (when snacking at night she is on her phone, reading, watching TV).     Often skip meals, eat at random times, have no regular eating times. A Few Times a Week   Rarely sit down for a meal but snack or graze throughout.  Less Than Weekly   Eat extra snacks between meals. Less Than Weekly   Eat most of my food at the end of the day. Almost Everyday    Eat in the middle of the night or wake up at night to eat. Never   Eat extra snacks to prevent or correct low blood sugar. Never   Eat to prevent acid reflux or stomach pain. Never   Worry about not having enough food to eat. Never   Have you been to the food shelf at least a few times this year? No   I eat when I am depressed. Weekly   I eat when " I am stressed. Weekly   I eat when I am bored. Weekly   I eat when I am anxious. A Few Times a Week   I eat when I am happy or as a reward. Once a Week   I feel hungry all the time even if I just have eaten. Less Than Weekly   Feeling full is important to me. Less Than Weekly   I finish all the food on my plate even if I am already full. Once a Week   I can't resist eating delicious food or walk past the good food/smell. Less Than Weekly   I eat/snack without noticing that I am eating. Once a Week   I eat when I am preparing the meal. Less Than Weekly   I eat more than usual when I see others eating. Less Than Weekly   I have trouble not eating sweets, ice cream, cookies, or chips if they are around the house. Less Than Weekly   I think about food all day. Never   What foods, if any, do you crave? She craves carbs (chips, crackers),raisins, craisins, and nuts.,         Amount of Food 10/5/2020   I make myself vomit what I have eaten or use laxatives to get rid of food. Never   I eat a large amount of food, like a loaf of bread, a box of cookies, a pint/quart of ice cream, all at once. Monthly   I eat a large amount of food even when I am not hungry. Monthly   I eat rapidly. Monthly   I eat alone because I feel embarrassed and do not want others to see how much I have eaten. Never   I eat until I am uncomfortably full. Monthly   I feel bad, disgusted, or guilty after I overeat. Weekly   I make myself vomit what I have eaten or use laxatives to get rid of food. Never       Activity/Exercise History 10/5/2020   How much of a typical 12 hour day do you spend sitting? Half the Day   How much of a typical 12 hour day do you spend lying down? Less Than Half the Day   How much of a typical day do you spend walking/standing? Less Than Half the Day   How many hours (not including work) do you spend on the TV/Video Games/Computer/Tablet/Phone? 2-3 Hours   How many times a week are you active for the purpose of exercise? Once a  "Week   What keeps you from being more active? Pain   How many total minutes do you spend doing some activity for the purpose of exercising when you exercise? 15-30 Minutes       PAST MEDICAL HISTORY:  Past Medical History:   Diagnosis Date     Complication of anesthesia     extremely dry eyes     Diabetes (H)      Gastro-oesophageal reflux disease      Hirsutism     Abstracted 06/14/02     History of blood transfusion      Leiomyoma of uterus, unspecified     Abstracted 06/14/02     Obesity 4/14/2010    Estimated Body mass index is 28.97 kg/(m^2) as calculated from the following:   Height as of 7/10/13: 5' 7\"(1.702 m).   Weight as of 7/10/13: 185 lb(83.915 kg).       Other and unspecified hyperlipidemia     Abstracted 06/14/02     Other chronic pain     low back and legs and feet     Sleep apnea     CPAP     .  Family History   Problem Relation Age of Onset     Prostate Cancer Father      Cancer - colorectal Mother         born 1978 HTN and DMII     Diabetes Mother         type II     Coronary Artery Disease Mother         CHF     Colon Cancer Mother      Dementia Mother      Diabetes Maternal Grandmother         type II     Hypertension Maternal Grandmother      Cancer - colorectal Maternal Grandmother      Colon Cancer Maternal Grandmother      Diabetes Maternal Uncle         type II     Hypertension Maternal Uncle      Respiratory Paternal Grandmother         asthma     Diabetes Son         dx age 21type I   She has a significant family hx of alcoholism at dad's side of family      Work/Social History Reviewed With Patient 10/5/2020   My employment status is: Disabled do to depression   My job is:  [employer]- on long term disability secondary to severe depression or the past year   What is your marital status? /In a Relationship   If in a relationship, is your significant other overweight? Yes   Do you have children? Yes   If you have children, are they overweight? No   Who do you live with?  "    Are they supportive of your health goals? Yes   Who does the food shopping?         Mental Health History Reviewed With Patient 10/5/2020   Have you ever been physically or sexually abused? No   If yes, do you feel that the abuse is affecting your weight? No   If yes, would you like to talk to a counselor about the abuse? No   How often in the past 2 weeks have you felt little interest or pleasure in doing things? More Than Half the Days   Over the past 2 weeks how often have you felt down, depressed, or hopeless? Nearly Everyday     Has nightmares ( PTSD like) most nights which impact mood during the day - has to work to bring self out of that. Has done intensive outpatient and now sees individual therapist weekly and in a body image group weekly through [facility].  Her weight also feeds her depression.  Has a DNP who prescribes her psychiatric medications.  Nightmares are always work related.       PHQ 10/1/2019 3/17/2020 2020   PHQ-9 Total Score 24 20 12   Q9: Thoughts of better off dead/self-harm past 2 weeks More than half the days Several days Several days   F/U: Thoughts of suicide or self-harm - - -   F/U: Safety concerns - - -       Sleep History Reviewed With Patient 10/5/2020   How many hours do you sleep at night? 8   Do you think that you snore loudly or has anybody ever heard you snore loudly (louder than talking or so loud it can be heard behind a shut door)? No   Has anyone seen or heard you stop breathing during your sleep? No   Do you often feel tired, fatigued, or sleepy during the day? No   Do you have a TV/Computer in your bedroom? No   Fatigue - is always exhausted.  Thinks it is secondary to pain and depression.   Uses CPAP nightly.   Saw sleep specialist within the year.     Social History     Tobacco Use     Smoking status: Former Smoker     Quit date: 1975     Years since quittin.7     Smokeless tobacco: Never Used   Substance Use Topics     Alcohol use:  Yes     Alcohol/week: 0.0 standard drinks     Comment: seldom     Drug use: No   Pt states she needs to be careful with alcohol.  If she has 1-2 drinks there isn;t an off switch and she can drink the entire bottle.  She has a significant family hx of alcoholism.      MEDICATIONS:   Current Outpatient Medications   Medication Sig Dispense Refill     Acetaminophen (TYLENOL PO) Take 1,300 mg by mouth 2 times daily        CALCIUM PO Take 950 mg by mouth daily        Calcium-Phosphorus-Vitamin D (CITRACAL +D3 PO) Take by mouth daily       DULoxetine (CYMBALTA) 30 MG capsule TAKE ONE CAPSULE BY MOUTH TWICE A  capsule 0     famotidine (PEPCID) 40 MG tablet Take 1 tablet (40 mg) by mouth daily as needed for heartburn 90 tablet 3     lamoTRIgine (LAMICTAL) 25 MG tablet 1 TAB ORAL DAILY X2 WK THEN TAKE 2 TABS DAILY  0     loratadine (CLARITIN) 10 MG tablet Take 10 mg by mouth daily       metFORMIN (GLUCOPHAGE-XR) 500 MG 24 hr tablet Take 4 tablets (2,000 mg) by mouth daily (with dinner) 360 tablet 3     Multiple Vitamins-Minerals (OCUVITE ADULT 50+ PO) Take 1 tablet by mouth daily        NONFORMULARY Sustained night time lubricant eye ointment night before bed       Omega-3 Fatty Acids (OMEGA 3 PO) Take 1 tablet by mouth daily        pramipexole (MIRAPEX) 0.25 MG tablet 0.5 mg At Bedtime   3     simvastatin (ZOCOR) 40 MG tablet Take 1 tablet (40 mg) by mouth At Bedtime 90 tablet 3     XIIDRA 5 % opthalmic solution INSTILL 1 DROP TWICE A DAY IN BOTH EYES  4     ZYRTEC 10 MG OR TABS 1 TABLET EACH MORNING         ALLERGIES:   Allergies   Allergen Reactions     Animal Dander      Cats      Dogs      Seasonal Allergies      Trees and mold     Vitamin D Deficiency screening   Date Value Ref Range Status   03/23/2018 32 20 - 75 ug/L Final     Comment:     Season, race, dietary intake, and treatment affect the concentration of   25-hydroxy-Vitamin D. Values may decrease during winter months and increase   during summer months.  Values 20-29 ug/L may indicate Vitamin D insufficiency   and values <20 ug/L may indicate Vitamin D deficiency.  Vitamin D determination is routinely performed by an immunoassay specific for   25 hydroxyvitamin D3.  If an individual is on vitamin D2 (ergocalciferol)   supplementation, please specify 25 OH vitamin D2 and D3 level determination by   LCMSMS test [id number].       TSH   Date Value Ref Range Status   08/27/2020 1.93 0.40 - 4.00 mU/L Final     Sodium   Date Value Ref Range Status   08/27/2020 138 133 - 144 mmol/L Final     Potassium   Date Value Ref Range Status   08/27/2020 4.7 3.4 - 5.3 mmol/L Final     Chloride   Date Value Ref Range Status   08/27/2020 105 94 - 109 mmol/L Final     Carbon Dioxide   Date Value Ref Range Status   08/27/2020 25 20 - 32 mmol/L Final     Anion Gap   Date Value Ref Range Status   08/27/2020 8 3 - 14 mmol/L Final     Glucose   Date Value Ref Range Status   08/27/2020 93 70 - 99 mg/dL Final     Urea Nitrogen   Date Value Ref Range Status   08/27/2020 12 7 - 30 mg/dL Final     Creatinine   Date Value Ref Range Status   08/27/2020 0.85 0.52 - 1.04 mg/dL Final     GFR Estimate   Date Value Ref Range Status   08/27/2020 71 >60 mL/min/[1.73_m2] Final     Comment:     Non  GFR Calc  Starting 12/18/2018, serum creatinine based estimated GFR (eGFR) will be   calculated using the Chronic Kidney Disease Epidemiology Collaboration   (CKD-EPI) equation.       Calcium   Date Value Ref Range Status   08/27/2020 9.5 8.5 - 10.1 mg/dL Final     Bilirubin Total   Date Value Ref Range Status   03/23/2018 0.3 0.2 - 1.3 mg/dL Final     Alkaline Phosphatase   Date Value Ref Range Status   03/23/2018 83 40 - 150 U/L Final     ALT   Date Value Ref Range Status   06/25/2019 31 0 - 50 U/L Final     AST   Date Value Ref Range Status   03/23/2018 17 0 - 45 U/L Final     Cholesterol   Date Value Ref Range Status   08/27/2020 162 <200 mg/dL Final     HDL Cholesterol   Date Value Ref Range  "Status   08/27/2020 46 (L) >49 mg/dL Final     LDL Cholesterol Calculated   Date Value Ref Range Status   08/27/2020 72 <100 mg/dL Final     Comment:     Desirable:       <100 mg/dl     Triglycerides   Date Value Ref Range Status   08/27/2020 220 (H) <150 mg/dL Final     Comment:     Borderline high:  150-199 mg/dl  High:             200-499 mg/dl  Very high:       >499 mg/dl       WBC   Date Value Ref Range Status   05/06/2020 5.9 4.0 - 11.0 10e9/L Final     Hemoglobin   Date Value Ref Range Status   09/30/2020 13.0 11.7 - 15.7 g/dL Final     Hematocrit   Date Value Ref Range Status   05/06/2020 37.2 35.0 - 47.0 % Final     MCV   Date Value Ref Range Status   05/06/2020 97 78 - 100 fl Final     Platelet Count   Date Value Ref Range Status   05/06/2020 300 150 - 450 10e9/L Final      PHYSICAL EXAM:  /82   Pulse 95   Ht 5' 5.5\" (1.664 m)   Wt 237 lb 12.8 oz (107.9 kg)   LMP 11/12/2007 (LMP Unknown)   SpO2 97%   BMI 38.97 kg/m    GENERAL:  Good development and normal affect in no acute distress.   HEENT: Head is normocephalic, nontraumatic. Pupils equal and round without conjunctival injection. Neck is supple without LAD or thyromegaly.  No acanthosis nigrans present.  CARDIOVASCULAR: RRR no MRG  RESPIRATORY: Lungs are clear to auscultation bilaterally with good breath sounds.  GASTROINTESTINAL: Abdomen is obese  LOWER EXTREMITIES: No LE edema bilaterally, no cyanosis, ulceration, or chronic venous stasis noted.  MUSCULOSKELETAL:  Moves all 4 extremities equal and strong. Has a normal gait.   NEUROLOGIC: Patient is alert and orientated x 3 and answers all questions appropriately.  SKIN: No intertriginous irritation or rash. No skin tags, No acanthosis nigrans  Location of obesity: [facility]    TIME: 60 min spent on evaluation, management, counseling, education, & motivational interviewing with greater than 50 % of the total time was spent on counseling and coordinating care    Sincerely,    Chandrika MARTEL" ALENA Macias

## 2020-10-02 NOTE — PATIENT INSTRUCTIONS
"Have labs drawn  Follow up in mid November  MEDICATION   We will start naltrexone in the future.  . Start with 1/2 tab 1-2 hours prior to the time you have the most trouble with cravings or extra hunger. If you are doing well you may switch to a whole tablet taken at the same time period.     WARNING: This medication blocks the action of opioid type pain medications. If you routinely take any medication like Codeine, Oxycontin,Percocet,Morphine,Dilaudid or Methodone, do not take this until you have talked with weight management staff. If you are planning surgery you should stop Naltrexone 4 days prior to the surgery. If you have an injury that requires pain medication, make sure the health care staff knows you take Naltrexone.     Call the nurse at [phone] if you have any questions or concerns. (Do not stop taking it if you don't think it's working. For some people it works without them knowing it.)    Naltrexone is a medication that is used most often to help people who are troubled by dependence on prescription pain killers or alcohol. It has also been found to help with weight loss. Although it's not currently FDA approved for weight loss, it has been used safely for a number of years to help people who are carrying extra weight.     Just how Naltrexone helps with weight loss has not been exactly determined.  It seems to work by quieting down brain signals related to strong food cravings. Many of our patients use the word \"addiction\" to describe their feelings and constant thoughts about food. It makes sense then to treat the feeling of dependence on food, outside of real hunger, with a medication designed to help with other sorts of dependence.     Our patients on Naltrexone find that they:    >feel less interest in food   >think less about food and eating and have more time to think of other things   >find it easier to push the plate away   >have an easier time eating less    For some of our patients, " these feelings are very immediate. Other patients, don't feel much of a change but find they've lost weight. Like all weight loss medications, Naltrexone works best when you help it work. This means:  1. Having less tempting high calorie (fattening) food around the house or office. (For people with strong cravings this is very important.)   2. Staying away from situations or people that may trigger your cravings .   3. Eating out only one time or less each week.  4. Eating your meals at a table with the TV or computer off.    Side-effects. Naltrexone is generally well tolerated. The main side-effect we see is  nausea or a woozy feeling. A small number of people feel quite ill. Most people have a mild reaction and some people have no reaction at all.  The good news is that this feeling does go away.     In order to avoid nausea, please start the medication with half a pill for the first few days. Go on to a full pill if you are feeling well.      If you  are nauseated on 1/2 a pill it is okay to cut back to 1/4 pill ( a very small amount). Take this for a couple of days and work your way back up to a 1/2 pill and then a whole pill. Taking the medication at night or with food  to start also may help prevent the feeling of nausea.         Please refer to the pharmacy insert for more information on side-effects. Since many pharmacists are not familiar with the use of naltrexone in weight loss, calling the nurse at [phone] will get you the most accurate information.  In order to get refills of this or any medication we prescribe you must be seen in the medical weight mgmt clinic every 2-3 months.         10 Healthy Habits  Eat Better ? Move More ? Live Well  1.  Eat breakfast daily.      Try to eat within the first hour after you wake up. This helps you control your appetite during the day, and you are less likely to snack in the evening.     80% of people who skip meals are overweight or obese.    2.   Include  protein with every meal, even breakfast.     Protein will suppress your appetite longer than other types of food. This helps you  feel more satisfied with the amount of food you have eaten.    3.  Fill up on Fiber    Fiber comes from plants--fruits, veggies, whole grains, nuts/seeds and beans    Fiber is low in calories, high in phytonutrients and helps you stay full longer    4.  Eat S-L-O-W-L-Y    Take 20-30 minutes to eat each meal by taking small bites, chewing foods to applesauce consistency or 20-30 times before you swallow    Eating foods too fast can delay satiety/fullness signals and increase overeating     5.  Avoid Mindless Eating    Be present when you eat--take note of the smell, taste and quality of your food    Make a list of alternative activities you could do to prevent boredom/stress eating  Go for a walk, call a friend, chew gum, paint your nails    6.  Keep a Journal    Writing down what you eat, how you feel and when you are active helps you identify new changes to work on from week to week          Look for ways to cut 100 calories from your current diet 2-3 times per day    7.  Drink 64 ounces of Non Calorie drinks between meals    Water    Zero calorie Propel , Vitamin Water , Crystal Light     Avoid regular soda pop    8.  Stop thinking about food choices as a  diet.     Instead, think of them as healthy, lifelong habits--an effort toward a new way of eating.    Weigh yourself once a week instead of everyday.     9.  Get enough sleep--at least 7 to 8 hours each night.     Lack of sleep is one reason that fat builds up around the waist.    10.  Exercise five to six times per week     Do a combination aerobic exercise (fast walking, biking, swimming) and strength training (Dumbbells, pushups, weight machines, resistance bands).    Start at 10 minutes per day and slowly work your way up to 30 minutes or more.   Taking the stairs instead of the elevator, park at the far end of the parking lot,  pace while on the phone, take the dog for a walk.     [email or web address]

## 2020-10-02 NOTE — TELEPHONE ENCOUNTER
Spoke to patient on 10/02/2020 reminding them to fill out the new patient questionnaire by 4 pm on 10/05/2020 or their appointment will be cancelled    Lorraine Bravo MA

## 2020-10-05 ENCOUNTER — TRANSFERRED RECORDS (OUTPATIENT)
Dept: HEALTH INFORMATION MANAGEMENT | Facility: CLINIC | Age: 70
End: 2020-10-05

## 2020-10-06 ENCOUNTER — ALLIED HEALTH/NURSE VISIT (OUTPATIENT)
Dept: SURGERY | Facility: CLINIC | Age: 70
End: 2020-10-06
Payer: COMMERCIAL

## 2020-10-06 ENCOUNTER — OFFICE VISIT (OUTPATIENT)
Dept: SURGERY | Facility: CLINIC | Age: 70
End: 2020-10-06
Payer: COMMERCIAL

## 2020-10-06 VITALS
OXYGEN SATURATION: 97 % | WEIGHT: 237.8 LBS | BODY MASS INDEX: 38.22 KG/M2 | SYSTOLIC BLOOD PRESSURE: 134 MMHG | HEART RATE: 95 BPM | DIASTOLIC BLOOD PRESSURE: 82 MMHG | HEIGHT: 66 IN

## 2020-10-06 DIAGNOSIS — G47.33 OSA (OBSTRUCTIVE SLEEP APNEA): ICD-10-CM

## 2020-10-06 DIAGNOSIS — E66.01 MORBID OBESITY (H): Primary | ICD-10-CM

## 2020-10-06 DIAGNOSIS — E28.2 PCOS (POLYCYSTIC OVARIAN SYNDROME): ICD-10-CM

## 2020-10-06 DIAGNOSIS — M85.80 OSTEOPENIA, UNSPECIFIED LOCATION: ICD-10-CM

## 2020-10-06 DIAGNOSIS — E88.819 INSULIN RESISTANCE: ICD-10-CM

## 2020-10-06 DIAGNOSIS — E78.5 HYPERLIPIDEMIA LDL GOAL <160: ICD-10-CM

## 2020-10-06 DIAGNOSIS — E66.09 NON MORBID OBESITY DUE TO EXCESS CALORIES: ICD-10-CM

## 2020-10-06 DIAGNOSIS — F33.2 SEVERE EPISODE OF RECURRENT MAJOR DEPRESSIVE DISORDER, WITHOUT PSYCHOTIC FEATURES (H): ICD-10-CM

## 2020-10-06 PROCEDURE — 97802 MEDICAL NUTRITION INDIV IN: CPT | Performed by: DIETITIAN, REGISTERED

## 2020-10-06 PROCEDURE — 99205 OFFICE O/P NEW HI 60 MIN: CPT | Mod: GT | Performed by: PHYSICIAN ASSISTANT

## 2020-10-06 ASSESSMENT — MIFFLIN-ST. JEOR: SCORE: 1617.46

## 2020-10-06 NOTE — LETTER
[facility]          [address]  [address]  [place] [address]                                         Tel:  [phone]  Fax: [phone]    October 6, 2020    Regarding:  Name: Lucie Whitehead  Address: [address] [place] [id number]  YOB: 1950    [facility] Staff  To whom it may concern:    This patient is being treated by our clinic.  We would like to discuss the patient's care with her provider at [facility].  The patient is not sure of the provider's name.    Attached is the patient consent form allowing verbal communication and releasing your records to us.  We would prefer to have the consultation and records prior to the patient's next visit with us in 6 weeks.  Our fax number is [phone].    Please call the triage number for our clinic at [phone] and leave a phone number and time for us to contact you for a provider to provider conversation.    Thank you for your assistance with this patient.  Please call with any concerns or questions.        Chandrika Macias PA-C  [facility]

## 2020-10-15 ENCOUNTER — TELEPHONE (OUTPATIENT)
Dept: SURGERY | Facility: CLINIC | Age: 70
End: 2020-10-15

## 2020-11-05 ENCOUNTER — TRANSFERRED RECORDS (OUTPATIENT)
Dept: HEALTH INFORMATION MANAGEMENT | Facility: CLINIC | Age: 70
End: 2020-11-05

## 2020-12-02 ENCOUNTER — TRANSFERRED RECORDS (OUTPATIENT)
Dept: HEALTH INFORMATION MANAGEMENT | Facility: CLINIC | Age: 70
End: 2020-12-02

## 2020-12-08 ENCOUNTER — TRANSFERRED RECORDS (OUTPATIENT)
Dept: HEALTH INFORMATION MANAGEMENT | Facility: CLINIC | Age: 70
End: 2020-12-08

## 2020-12-10 ENCOUNTER — ALLIED HEALTH/NURSE VISIT (OUTPATIENT)
Dept: PEDIATRICS | Facility: CLINIC | Age: 70
End: 2020-12-10
Payer: COMMERCIAL

## 2020-12-10 VITALS — DIASTOLIC BLOOD PRESSURE: 80 MMHG | HEART RATE: 92 BPM | SYSTOLIC BLOOD PRESSURE: 140 MMHG

## 2020-12-10 DIAGNOSIS — R03.0 ELEVATED BP WITHOUT DIAGNOSIS OF HYPERTENSION: Primary | ICD-10-CM

## 2020-12-10 PROCEDURE — 99207 PR NO CHARGE NURSE ONLY: CPT

## 2020-12-10 NOTE — NURSING NOTE
Lucie Whitehead is a 70 year old patient who comes in today for a Blood Pressure check.  Initial BP:  BP (!) 140/80 (BP Location: Left arm, Patient Position: Sitting, Cuff Size: Adult Large)   Pulse 92   LMP 11/12/2007 (LMP Unknown)      92  Disposition: results routed to provider. Pt wants provider to be aware that she has had back surgery in the past and is very uncomfortable and thinks that a factor to reading today

## 2020-12-14 ENCOUNTER — TELEPHONE (OUTPATIENT)
Dept: PEDIATRICS | Facility: CLINIC | Age: 70
End: 2020-12-14

## 2020-12-14 NOTE — TELEPHONE ENCOUNTER
Patient Quality Outreach      Summary:        Patient is due/failing the following:   PHQ-9 Needed    Type of outreach:    Sent NMRKThart message.    Questions for provider review:    None                                                                                                                                     Amanda Garcia CMA([facility])

## 2020-12-29 DIAGNOSIS — M85.80 OSTEOPENIA, UNSPECIFIED LOCATION: ICD-10-CM

## 2020-12-29 DIAGNOSIS — E66.01 MORBID OBESITY (H): ICD-10-CM

## 2020-12-29 DIAGNOSIS — E28.2 PCOS (POLYCYSTIC OVARIAN SYNDROME): ICD-10-CM

## 2020-12-29 DIAGNOSIS — E88.819 INSULIN RESISTANCE: ICD-10-CM

## 2020-12-29 LAB
HBA1C MFR BLD: 5.7 % (ref 0–5.6)
PTH-INTACT SERPL-MCNC: 45 PG/ML (ref 18–80)

## 2020-12-29 PROCEDURE — 82306 VITAMIN D 25 HYDROXY: CPT | Performed by: PHYSICIAN ASSISTANT

## 2020-12-29 PROCEDURE — 83970 ASSAY OF PARATHORMONE: CPT | Performed by: PHYSICIAN ASSISTANT

## 2020-12-29 PROCEDURE — 36415 COLL VENOUS BLD VENIPUNCTURE: CPT | Performed by: PHYSICIAN ASSISTANT

## 2020-12-29 PROCEDURE — 83036 HEMOGLOBIN GLYCOSYLATED A1C: CPT | Performed by: PHYSICIAN ASSISTANT

## 2020-12-30 LAB — DEPRECATED CALCIDIOL+CALCIFEROL SERPL-MC: 32 UG/L (ref 20–75)

## 2021-01-08 ENCOUNTER — MYC MEDICAL ADVICE (OUTPATIENT)
Dept: PEDIATRICS | Facility: CLINIC | Age: 71
End: 2021-01-08

## 2021-01-11 NOTE — TELEPHONE ENCOUNTER
"Dr Moore: Pt experiencing elevated BP with home machine. Asked a few more additional questions for clarity.    Please advise. If pt admits to not experiencing red-flag symptoms, schedule virtual appt?    - Kirby \"Johan\" JUAN Preston - Patient Advocate Liason (PAL)  [facility]    "

## 2021-01-13 ENCOUNTER — ALLIED HEALTH/NURSE VISIT (OUTPATIENT)
Dept: PEDIATRICS | Facility: CLINIC | Age: 71
End: 2021-01-13
Payer: COMMERCIAL

## 2021-01-13 VITALS — HEART RATE: 101 BPM | OXYGEN SATURATION: 97 % | SYSTOLIC BLOOD PRESSURE: 133 MMHG | DIASTOLIC BLOOD PRESSURE: 88 MMHG

## 2021-01-13 DIAGNOSIS — R03.0 ELEVATED BLOOD PRESSURE READING WITHOUT DIAGNOSIS OF HYPERTENSION: Primary | ICD-10-CM

## 2021-01-13 PROCEDURE — 99207 PR NO CHARGE NURSE ONLY: CPT

## 2021-01-13 ASSESSMENT — ANXIETY QUESTIONNAIRES
6. BECOMING EASILY ANNOYED OR IRRITABLE: MORE THAN HALF THE DAYS
2. NOT BEING ABLE TO STOP OR CONTROL WORRYING: MORE THAN HALF THE DAYS
GAD7 TOTAL SCORE: 11
1. FEELING NERVOUS, ANXIOUS, OR ON EDGE: MORE THAN HALF THE DAYS
7. FEELING AFRAID AS IF SOMETHING AWFUL MIGHT HAPPEN: SEVERAL DAYS
3. WORRYING TOO MUCH ABOUT DIFFERENT THINGS: MORE THAN HALF THE DAYS
5. BEING SO RESTLESS THAT IT IS HARD TO SIT STILL: NOT AT ALL

## 2021-01-13 ASSESSMENT — PATIENT HEALTH QUESTIONNAIRE - PHQ9
5. POOR APPETITE OR OVEREATING: MORE THAN HALF THE DAYS
SUM OF ALL RESPONSES TO PHQ QUESTIONS 1-9: 14

## 2021-01-13 NOTE — PROGRESS NOTES
Lucie Whitehead is a 70 year old patient who comes in today for a Blood Pressure check.  Initial BP:  BP (!) 146/80 (BP Location: Left arm, Patient Position: Sitting)   Pulse 101   LMP 11/12/2007 (LMP Unknown)   SpO2 97%      Data Unavailable  Disposition: results routed to provider      Huddled with RNalana to send chart to provider.    Rosana iPneda MA on 1/13/2021 at 11:00 AM

## 2021-01-14 ASSESSMENT — ANXIETY QUESTIONNAIRES: GAD7 TOTAL SCORE: 11

## 2021-01-25 NOTE — TELEPHONE ENCOUNTER
Patient Quality Outreach 2nd Attempt      Summary:    Type of outreach:    None, patient completed PHQ9 on 1-13-21 with score of 14.    Next Steps:  Reach out within 90 days via NA.    Max number of attempts reached: NA. Will try again in 90 days if patient still on fail list.    Questions for provider review:    PHQ9 score of 14, advise on how to proceed.                                                                                                                    Amanda Garcia CMA([facility])       Chart routed to Provider.

## 2021-02-03 ENCOUNTER — THERAPY VISIT (OUTPATIENT)
Dept: PHYSICAL THERAPY | Facility: CLINIC | Age: 71
End: 2021-02-03
Payer: COMMERCIAL

## 2021-02-03 DIAGNOSIS — Z98.1 S/P LUMBAR SPINAL FUSION: ICD-10-CM

## 2021-02-03 PROCEDURE — 97112 NEUROMUSCULAR REEDUCATION: CPT | Mod: GP | Performed by: PHYSICAL THERAPIST

## 2021-02-03 PROCEDURE — 97110 THERAPEUTIC EXERCISES: CPT | Mod: GP | Performed by: PHYSICAL THERAPIST

## 2021-02-03 PROCEDURE — 97161 PT EVAL LOW COMPLEX 20 MIN: CPT | Mod: GP | Performed by: PHYSICAL THERAPIST

## 2021-02-03 NOTE — PROGRESS NOTES
[facility] Initial Evaluation  Subjective:  The history is provided by the patient. No  was used.   Therapist Generated HPI Evaluation  Problem details: Pt c/o LBP and weakness S/P L5-S1 10/21/2021.  States pain in low back and legs has improved greatly since having surgery but still noting soreness and weakness.  Pt has had previous L1-4 fusions.  Currently using SEC or walking sticks for longer/community walks..         Type of problem:  Lumbar.    This is a new condition.  Condition occurred with:  Degenerative joint disease and insidious onset.  Where condition occurred: for unknown reasons.  Patient reports pain:  Lower thoracic spine, central thoracic spine, thoracic spine left, thoracic spine right, upper lumbar spine, mid lumbar spine, lumbar spine right, lumbar spine left, lower lumbar spine and central lumbar spine.  Pain is described as aching and shooting and is constant.  Pain radiates to:  Gluteals left and thigh left. Pain is the same all the time.  Since onset symptoms are unchanged.  Associated symptoms:  Loss of balance, loss of strength, loss of motion and numbness. Symptoms are exacerbated by bending, twisting, lying down, sitting, standing, lifting, certain positions and walking  and relieved by rest and activity/movement.      Work activity restrictions: pt is on long term disability.  Barriers include:  None as reported by patient.    Patient Health History  Lucie Whitehead being seen for recovery from L5-S1 fusion.     Date of Onset: 2 years ago start of pain, surgery 10/21/2020.   Problem occurred: degenerative   Pain is reported as 3/10 on pain scale.  General health as reported by patient is fair.  Pertinent medical history includes: asthma, concussions/dizziness, depression, high blood pressure, numbness/tingling, mental illness, osteoarthritis, osteoporosis, overweight and sleep disorder/apnea.   Red flags:  Changes in bowel and bladder habits  and pain at rest/night.   Other medical allergies details: see EPIC.   Surgeries include:  Orthopedic surgery and other. Other surgery history details: multiple back and c-sections.    Current medications:  Anti-depressants, high blood pressure medication and pain medication.    Current occupation is Disability.   Primary job tasks include:  Repetitive tasks.                                    Objective:  System         Lumbar/SI Evaluation  ROM:    AROM Lumbar:   Flexion:          Min loss +/-  Ext:                    Max loss +   Side Bend:        Left:  Mod/max loss +    Right:  Mod/max loss +  Rotation:           Left:     Right:   Side Glide:        Left:     Right:         Strength: Fair/poor core stab recruitment.  B glute max 4/5, med 4-/5  Lumbar Myotomes:    T12-L3 (Hip Flex):  Left: 4    Right: 4  L2-4 (Quads):  Left:  4    Right:  4  L4 (Ankle DF):  Left:  5-    Right:  5-  L5 (Great Toe Ext): Left: 5-    Right: 5-   S1 (Toe Raise):  Left: 5-    Right: 5-          Lumbar Palpation:    Tenderness present at Left:    Erector Spinae  Tenderness present at Right: Erector Spinae                                                     General     ROS    Assessment/Plan:    Patient is a 70 year old female with lumbar complaints.    Patient has the following significant findings with corresponding treatment plan.                Diagnosis 1:  S/P L5-S1 fusion  Pain -  hot/cold therapy and home program  Decreased ROM/flexibility - manual therapy and therapeutic exercise  Decreased joint mobility - manual therapy and therapeutic exercise  Decreased strength - therapeutic exercise and therapeutic activities  Decreased proprioception - neuro re-education and therapeutic activities  Impaired gait - gait training  Impaired muscle performance - neuro re-education  Decreased function - therapeutic activities  Impaired posture - neuro re-education    Therapy Evaluation Codes:   Cumulative Therapy Evaluation is: Low  complexity.    Previous and current functional limitations:  (See Goal Flow Sheet for this information)    Short term and Long term goals: (See Goal Flow Sheet for this information)     Communication ability:  Patient appears to be able to clearly communicate and understand verbal and written communication and follow directions correctly.  Treatment Explanation - The following has been discussed with the patient:   RX ordered/plan of care  Anticipated outcomes  Possible risks and side effects  This patient would benefit from PT intervention to resume normal activities.   Rehab potential is good.    Frequency:  2 X week, once daily  Duration:  for 6 weeks  Discharge Plan:  Achieve all LTG.  Independent in home treatment program.  Reach maximal therapeutic benefit.    Please refer to the daily flowsheet for treatment today, total treatment time and time spent performing 1:1 timed codes.

## 2021-02-03 NOTE — LETTER
[facility]  [address]  [facility] [address]  [phone]    February 3, 2021    Re: Lucie Whitehead   :   1950  MRN:  [id number]   REFERRING PHYSICIAN:   Stas Baker    [facility]  Date of Initial Evaluation:  2/3/21  Visits:  Rxs Used: 1  Reason for Referral:  S/P lumbar spinal fusion    EVALUATION SUMMARY    [facility] Initial Evaluation  Subjective:  The history is provided by the patient. No  was used.   Therapist Generated HPI Evaluation  Problem details: Pt c/o LBP and weakness S/P L5-S1 10/21/2021.  States pain in low back and legs has improved greatly since having surgery but still noting soreness and weakness.  Pt has had previous L1-4 fusions.  Currently using SEC or walking sticks for longer/community walks..      Type of problem:  Lumbar.  This is a new condition.  Condition occurred with:  Degenerative joint disease and insidious onset.  Where condition occurred: for unknown reasons.  Patient reports pain:  Lower thoracic spine, central thoracic spine, thoracic spine left, thoracic spine right, upper lumbar spine, mid lumbar spine, lumbar spine right, lumbar spine left, lower lumbar spine and central lumbar spine.  Pain is described as aching and shooting and is constant.  Pain radiates to:  Gluteals left and thigh left. Pain is the same all the time.  Since onset symptoms are unchanged.  Associated symptoms:  Loss of balance, loss of strength, loss of motion and numbness. Symptoms are exacerbated by bending, twisting, lying down, sitting, standing, lifting, certain positions and walking  and relieved by rest and activity/movement.  Work activity restrictions: pt is on long term disability.  Barriers include:  None as reported by patient.  Patient Health History  Lucie Whitehead being seen for recovery from L5-S1  fusion.   Date of Onset: 2 years ago start of pain, surgery 10/21/2020.   Problem occurred: degenerative   Pain is reported as 3/10 on pain scale.  General health as reported by patient is fair.  Pertinent medical history includes: asthma, concussions/dizziness, depression, high blood pressure, numbness/tingling, mental illness, osteoarthritis, osteoporosis, overweight and sleep disorder/apnea.   Red flags:  Changes in bowel and bladder habits and pain at rest/night.  Other medical allergies details: see EPIC.   Surgeries include:  Orthopedic surgery and other. Other surgery history details: multiple back and c-sections.    Current medications:  Anti-depressants, high blood pressure medication and pain medication.    Current occupation is Disability.   Primary job tasks include:  Repetitive tasks.   Re: Lucie Whitehead   :   1950             Objective:  Lumbar/SI Evaluation  ROM:    AROM Lumbar:   Flexion:          Min loss +/-  Ext:                    Max loss +   Side Bend:        Left:  Mod/max loss +    Right:  Mod/max loss +  Rotation:           Left:     Right:   Side Glide:        Left:     Right:   Strength: Fair/poor core stab recruitment.  B glute max 4/5, med 4-/5  Lumbar Myotomes:    T12-L3 (Hip Flex):  Left: 4    Right: 4  L2-4 (Quads):  Left:  4    Right:  4  L4 (Ankle DF):  Left:  5-    Right:  5-  L5 (Great Toe Ext): Left: 5-    Right: 5-   S1 (Toe Raise):  Left: 5-    Right: 5-  Lumbar Palpation:    Tenderness present at Left:    Erector Spinae  Tenderness present at Right: Erector Spinae    Assessment/Plan:    Patient is a 70 year old female with lumbar complaints.    Patient has the following significant findings with corresponding treatment plan.                Diagnosis 1:  S/P L5-S1 fusion  Pain -  hot/cold therapy and home program  Decreased ROM/flexibility - manual therapy and therapeutic exercise  Decreased joint mobility - manual therapy and therapeutic exercise  Decreased  strength - therapeutic exercise and therapeutic activities  Decreased proprioception - neuro re-education and therapeutic activities  Impaired gait - gait training  Impaired muscle performance - neuro re-education  Decreased function - therapeutic activities  Impaired posture - neuro re-education  Therapy Evaluation Codes: Cumulative Therapy Evaluation is: Low complexity.  Previous and current functional limitations:  (See Goal Flow Sheet for this information)    Short term and Long term goals: (See Goal Flow Sheet for this information)   Communication ability:  Patient appears to be able to clearly communicate and understand verbal and written communication and follow directions correctly.  Treatment Explanation - The following has been discussed with the patient:   RX ordered/plan of care  This patient would benefit from PT intervention to resume normal activities.   Rehab potential is good.  Frequency:  2 X week, once daily  Duration:  for 6 weeks  Discharge Plan:  Achieve all LTG.  Independent in home treatment program.  Reach maximal therapeutic benefit.    Thank you for your referral.    INQUIRIES  Therapist: Tunde Padilla, [facility]   [facility]  [address] [place] [id number]  Phone: [phone] / Fax: [phone]

## 2021-02-10 ENCOUNTER — THERAPY VISIT (OUTPATIENT)
Dept: PHYSICAL THERAPY | Facility: CLINIC | Age: 71
End: 2021-02-10
Payer: COMMERCIAL

## 2021-02-10 DIAGNOSIS — Z98.1 S/P LUMBAR SPINAL FUSION: ICD-10-CM

## 2021-02-10 PROCEDURE — 97112 NEUROMUSCULAR REEDUCATION: CPT | Mod: GP | Performed by: PHYSICAL THERAPIST

## 2021-02-10 PROCEDURE — 97110 THERAPEUTIC EXERCISES: CPT | Mod: GP | Performed by: PHYSICAL THERAPIST

## 2021-02-10 PROCEDURE — 97530 THERAPEUTIC ACTIVITIES: CPT | Mod: GP | Performed by: PHYSICAL THERAPIST

## 2021-02-17 ENCOUNTER — THERAPY VISIT (OUTPATIENT)
Dept: PHYSICAL THERAPY | Facility: CLINIC | Age: 71
End: 2021-02-17
Payer: COMMERCIAL

## 2021-02-17 DIAGNOSIS — Z98.1 S/P LUMBAR SPINAL FUSION: ICD-10-CM

## 2021-02-17 PROCEDURE — 97112 NEUROMUSCULAR REEDUCATION: CPT | Mod: GP | Performed by: PHYSICAL THERAPIST

## 2021-02-17 PROCEDURE — 97530 THERAPEUTIC ACTIVITIES: CPT | Mod: GP | Performed by: PHYSICAL THERAPIST

## 2021-02-17 PROCEDURE — 97110 THERAPEUTIC EXERCISES: CPT | Mod: GP | Performed by: PHYSICAL THERAPIST

## 2021-02-17 NOTE — PROGRESS NOTES
"SUBJECTIVE  Subjective changes as noted by pt:   Doing well with exercises.   Current pain level:  2/10   Changes in function:  Yes (See Goal flowsheet attached for changes in current functional level)     Adverse reaction to treatment or activity:  None    OBJECTIVE  Changes in objective findings:  Yes, Poor TA recruitment with abdominal set. Fair to poor glut max control with bridge. SLS 30\" max on each side.      ASSESSMENT  Lucie continues to require intervention to meet STG and LTG's: PT  Patient is progressing as expected.  Response to therapy has shown an improvement in  pain level, muscle control and function  Progress made towards STG/LTG?  Yes (See Goal flowsheet attached for updates on achievement of STG and LTG)    PLAN  Current treatment program is being advanced to more complex exercises.    PTA/ATC plan:  N/A    Please refer to the daily flowsheet for treatment today, total treatment time and time spent performing 1:1 timed codes.      "

## 2021-02-24 ENCOUNTER — THERAPY VISIT (OUTPATIENT)
Dept: PHYSICAL THERAPY | Facility: CLINIC | Age: 71
End: 2021-02-24
Payer: COMMERCIAL

## 2021-02-24 DIAGNOSIS — Z98.1 S/P LUMBAR SPINAL FUSION: ICD-10-CM

## 2021-02-24 PROCEDURE — 97110 THERAPEUTIC EXERCISES: CPT | Mod: GP | Performed by: PHYSICAL THERAPIST

## 2021-02-24 PROCEDURE — 97112 NEUROMUSCULAR REEDUCATION: CPT | Mod: GP | Performed by: PHYSICAL THERAPIST

## 2021-02-24 PROCEDURE — 97530 THERAPEUTIC ACTIVITIES: CPT | Mod: GP | Performed by: PHYSICAL THERAPIST

## 2021-03-03 ENCOUNTER — THERAPY VISIT (OUTPATIENT)
Dept: PHYSICAL THERAPY | Facility: CLINIC | Age: 71
End: 2021-03-03
Payer: COMMERCIAL

## 2021-03-03 DIAGNOSIS — Z98.1 S/P LUMBAR SPINAL FUSION: ICD-10-CM

## 2021-03-03 PROCEDURE — 97110 THERAPEUTIC EXERCISES: CPT | Mod: GP | Performed by: PHYSICAL THERAPIST

## 2021-03-03 PROCEDURE — 97530 THERAPEUTIC ACTIVITIES: CPT | Mod: GP | Performed by: PHYSICAL THERAPIST

## 2021-03-03 PROCEDURE — 97112 NEUROMUSCULAR REEDUCATION: CPT | Mod: GP | Performed by: PHYSICAL THERAPIST

## 2021-03-14 ENCOUNTER — HEALTH MAINTENANCE LETTER (OUTPATIENT)
Age: 71
End: 2021-03-14

## 2021-04-14 ENCOUNTER — MYC MEDICAL ADVICE (OUTPATIENT)
Dept: PEDIATRICS | Facility: CLINIC | Age: 71
End: 2021-04-14

## 2021-04-14 ENCOUNTER — OFFICE VISIT (OUTPATIENT)
Dept: PHYSICAL THERAPY | Facility: CLINIC | Age: 71
End: 2021-04-14
Payer: COMMERCIAL

## 2021-04-14 DIAGNOSIS — Z98.1 S/P LUMBAR SPINAL FUSION: ICD-10-CM

## 2021-04-14 PROCEDURE — 97110 THERAPEUTIC EXERCISES: CPT | Mod: GP | Performed by: PHYSICAL THERAPIST

## 2021-04-14 PROCEDURE — 97530 THERAPEUTIC ACTIVITIES: CPT | Mod: GP | Performed by: PHYSICAL THERAPIST

## 2021-04-14 PROCEDURE — 97112 NEUROMUSCULAR REEDUCATION: CPT | Mod: GP | Performed by: PHYSICAL THERAPIST

## 2021-04-14 NOTE — TELEPHONE ENCOUNTER
Left message for patient to call back. We should see her for appt. Chanel Burk RN on 4/14/2021 at 10:58 AM

## 2021-04-21 ENCOUNTER — OFFICE VISIT (OUTPATIENT)
Dept: PEDIATRICS | Facility: CLINIC | Age: 71
End: 2021-04-21
Payer: COMMERCIAL

## 2021-04-21 ENCOUNTER — NURSE TRIAGE (OUTPATIENT)
Dept: NURSING | Facility: CLINIC | Age: 71
End: 2021-04-21

## 2021-04-21 VITALS
DIASTOLIC BLOOD PRESSURE: 80 MMHG | OXYGEN SATURATION: 98 % | WEIGHT: 253.9 LBS | SYSTOLIC BLOOD PRESSURE: 156 MMHG | BODY MASS INDEX: 41.61 KG/M2 | TEMPERATURE: 97.8 F | RESPIRATION RATE: 18 BRPM | HEART RATE: 104 BPM

## 2021-04-21 DIAGNOSIS — R03.0 ELEVATED BLOOD PRESSURE READING WITHOUT DIAGNOSIS OF HYPERTENSION: ICD-10-CM

## 2021-04-21 DIAGNOSIS — G44.52 NEW DAILY PERSISTENT HEADACHE: Primary | ICD-10-CM

## 2021-04-21 DIAGNOSIS — K21.9 GASTROESOPHAGEAL REFLUX DISEASE, UNSPECIFIED WHETHER ESOPHAGITIS PRESENT: ICD-10-CM

## 2021-04-21 DIAGNOSIS — Z86.59 HISTORY OF CLAUSTROPHOBIA: ICD-10-CM

## 2021-04-21 PROCEDURE — 99207 PR FIRST ORDER ACUTE REFERRAL: CPT | Performed by: NURSE PRACTITIONER

## 2021-04-21 RX ORDER — OMEPRAZOLE 40 MG/1
40 CAPSULE, DELAYED RELEASE ORAL DAILY
Qty: 30 CAPSULE | Refills: 0 | Status: SHIPPED | OUTPATIENT
Start: 2021-04-21 | End: 2021-05-19

## 2021-04-21 RX ORDER — DIAZEPAM 10 MG
TABLET ORAL
Qty: 1 TABLET | Refills: 0 | Status: SHIPPED | OUTPATIENT
Start: 2021-04-21 | End: 2021-04-22

## 2021-04-21 ASSESSMENT — ENCOUNTER SYMPTOMS
PHOTOPHOBIA: 0
TREMORS: 0
LIGHT-HEADEDNESS: 0
SINUS PRESSURE: 0
FACIAL ASYMMETRY: 0
ABDOMINAL PAIN: 0
NUMBNESS: 1
FEVER: 0
HEADACHES: 1
DIZZINESS: 0
SPEECH DIFFICULTY: 0
APPETITE CHANGE: 0
PARESTHESIAS: 1
NECK PAIN: 1
HEARTBURN: 1
SINUS PAIN: 0
BACK PAIN: 1
WEAKNESS: 1

## 2021-04-21 NOTE — PATIENT INSTRUCTIONS
Patient Education     GERD (Adult)    The esophagus is a tube that carries food from the mouth to the stomach. A valve (the LES, lower esophageal sphincter) at the lower end of the esophagus prevents stomach acid from flowing upward. When this valve doesn't work properly, stomach contents may repeatedly flow back up (reflux) into the esophagus. This is called gastroesophageal reflux disease (GERD). GERD can irritate the esophagus. It can cause problems with pain, swallowing or breathing. In severe cases, GERD can cause recurrent pneumonia (from aspiration or breathing in particles) or other serious problems.  Symptoms of reflux include burning, pressure or sharp pain in the upper abdomen or mid to lower chest. The pain can spread to the neck, back, or shoulder. There may be belching, an acid taste in the back of the throat, chronic cough, or sore throat, or hoarseness. GERD symptoms often occur during the day after a big meal. They can also occur at night when lying down.   Home care  Lifestyle changes can help reduce symptoms. If needed, your healthcare provider may prescribe medicines. Symptoms often improve with treatment, but if treatment is stopped, the symptoms often return after a few months. So most persons with GERD will need to continue treatment or get treatment on and off.  Lifestyle changes    Limit or avoid fatty, fried, and spicy foods, as well as coffee, chocolate, mint, and foods with high acid content such as tomatoes and citrus fruit and juices (orange, grapefruit, lemon).    Don t eat large meals, especially at night. Frequent, smaller meals are best. Don't lie down right after eating. And don t eat anything 3 hours before going to bed.    Don't drink alcohol or smoke. As much as possible, stay away from second hand smoke.    If you are overweight, losing weight will reduce symptoms.     Don't wear tight clothing around your stomach area.    If your symptoms occur during sleep, use a foam  "wedge to elevate your upper body (not just your head.) Or, place 4\" blocks under the head of your bed. Or use 2 bed risers under your bedframe.  Medicines  If needed, medicines can help relieve the symptoms of GERD and prevent damage to the esophagus. Discuss a medicine plan with your healthcare provider. This may include one or more of the following medicines:    Antacids to help neutralize the normal acids in your stomach.    Acid blockers (Histamine or H2 blockers) to decrease acid production.    Acid inhibitors (proton pump inhibitors PPIs) to decrease acid production in a different way than the blockers. They may work better, but can take a little longer to take effect.  Take an antacid 30 to 60 minutes after eating and at bedtime, but not at the same time as an acid blocker.  Try not to take medicines such as ibuprofen and aspirin. If you are taking aspirin for your heart or other medical reasons, talk to your healthcare provider about stopping it.  Follow-up care  Follow up with your healthcare provider or as advised by our staff.  When to seek medical advice  Call your healthcare provider if any of the following occur:    Stomach pain gets worse or moves to the lower right abdomen (appendix area)    Chest pain appears or gets worse, or spreads to the back, neck, shoulder, or arm    An over-the-counter trial of medicine doesn't relieve your symptoms    Weight loss that can't be explained    Trouble or pain swallowing    Frequent vomiting (can t keep down liquids)    Blood in the stool or vomit (red or black in color)    Feeling weak or dizzy    Fever of 100.4 F (38 C) or higher, or as directed by your healthcare provider  [employer] last reviewed this educational content on 3/1/2018    5210-2921 [employer]. All rights reserved. This information is not intended as a substitute for professional medical care. Always follow your healthcare professional's instructions.           "

## 2021-04-21 NOTE — TELEPHONE ENCOUNTER
Lucie left a message in [employer] for headaches and not feeling well and could not get back with clinic.  Today is having heartburn and headaches which started a few weeks ago.  Patient had reflux before and took medication for reflux.  Now headaches are getting intense and heart burn is happening every day and with anything she eats.    Lucie is requesting an in person clinic visit.    COVID 19 Nurse Triage Plan/Patient Instructions    Please be aware that novel coronavirus (COVID-19) may be circulating in the community. If you develop symptoms such as fever, cough, or SOB or if you have concerns about the presence of another infection including coronavirus (COVID-19), please contact your health care provider or visit [email or web address].     Disposition/Instructions    In-Person Visit with provider recommended. Reference Visit Selection Guide.    Thank you for taking steps to prevent the spread of this virus.  o Limit your contact with others.  o Wear a simple mask to cover your cough.  o Wash your hands well and often.    Resources    [facility]: About COVID-19: [email or web address]    CDC: What to Do If You're Sick: www.cdc.gov/coronavirus/2019-ncov/about/steps-when-sick.html    CDC: Ending Home Isolation: www.cdc.gov/coronavirus/2019-ncov/hcp/disposition-in-home-patients.html     CDC: Caring for Someone: www.cdc.gov/coronavirus/2019-ncov/if-you-are-sick/care-for-someone.html     [facility]: Interim Guidance for Hospital Discharge to Home: www.health.[facility].mn.us/diseases/coronavirus/hcp/hospdischarge.pdf    [facility] clinical trials (COVID-19 research studies): [email or web address]-clinical-trials     Below are the COVID-19 hotlines at the [facility] of Health ([facility]). Interpreters are available.   o For health questions: Call 099-763-2052 or 1-283.851.7824 (7 a.m. to 7 p.m.)  o For questions about schools and childcare: Call 526-110-8900 or 1-704.620.5472 (9  a.m. to 7 p.m.)                       Additional Information    Negative: Difficult to awaken or acting confused (e.g., disoriented, slurred speech)    Negative: Weakness of the face, arm or leg on one side of the body and new onset    Negative: Numbness of the face, arm or leg on one side of the body and new onset    Negative: Loss of speech or garbled speech and new onset    Negative: Passed out (i.e., fainted, collapsed and was not responding)    Negative: Sounds like a life-threatening emergency to the triager    Negative: Unable to walk without falling    Negative: Stiff neck (can't touch chin to chest)    Negative: Possibility of carbon monoxide exposure    Negative: SEVERE headache, states 'worst headache' of life    Negative: SEVERE headache, sudden onset (i.e., reaching maximum intensity within 30 seconds)    Negative: Severe pain in one eye    Negative: Loss of vision or double vision    Negative: Patient sounds very sick or weak to the triager    Negative: Fever > 103 F (39.4 C)    Negative: Fever > 100.0 F (37.8 C) and has diabetes mellitus or a weak immune system (e.g., HIV positive, cancer chemotherapy, organ transplant, splenectomy, chronic steroids)    Negative: SEVERE headache (e.g., excruciating) and has had severe headaches before    Negative: SEVERE headache and not relieved by pain meds    Negative: SEVERE headache and vomiting    Negative: SEVERE headache and fever    Negative: New headache and weak immune system (e.g., HIV positive, cancer chemotherapy, chronic steroid treatment)    Negative: Fever present > 3 days (72 hours)    Negative: Patient wants to be seen    Unexplained headache that is present > 24 hours    Protocols used: HEADACHE-A-OH

## 2021-04-21 NOTE — PROGRESS NOTES
Assessment & Plan     New daily persistent headache  Pt with 1 month hx of new daily persistent headache, which is a new pattern. Has had occasional headaches in the past but nothing this consistent. No known trauma or injury but does have history of chronic neck and back pain, s/p cervical and lumbar fusion. Neuro exam unremarkable. Given new headache/change to pattern with age >50, recommend imaging with MRI. Referral to [facility] placed, appt at 9 am tomorrow. Counseled on red flag symptoms warranting pt seek immediate attention in the ED.   Plan:   - Referral to Acute and Diagnostic Services (Day of diagnostic / First order acute); Future    History of claustrophobia  Will pre-treat with valium prior to MRI brain. Pt instructed to have  to and from imaging appt.   Plan:   - diazepam (VALIUM) 10 MG tablet; Take 1 tablet (10 mg) 30-60 minutes prior to MRI.    Gastroesophageal reflux disease, unspecified whether esophagitis present  Worsening. Currently taking pepcid 40 mg daily, admits to sometimes taking twice daily. Discussed prevention measures through lifestyle changes. Will trial step up therapy with omeprazole for short period of time, however do not recommend taking this medication long term, discussed consequences of long term PPI use with patient. May consider EGD if symptoms persisting despite use of PPI.  Plan:   - omeprazole (PRILOSEC) 40 MG DR capsule; Take 1 capsule (40 mg) by mouth daily    Elevated blood pressure reading without diagnosis of hypertension  BP elevated today. Reports it has been high in the past but has never been formally diagnosed with hypertension. Recommend return to pharmacy in 2-3 weeks for BP recheck. If BP remains >140/90, recommend follow-up in clinic.          55 minutes spent on the date of the encounter doing chart review, patient visit, documentation and discussion with other provider(s)          MEDICATIONS:        - Start taking omeprazole  CONSULTATION/REFERRAL to  Acute Diagnostic Services  FUTURE APPOINTMENTS:       - Follow-up visit in 1 day at Acute Diagnostic Services    Return in about 1 day (around 4/22/2021) for Acute Diagnostic Services.    LENNY Torres CNP [facility] REJI Faulkner is a 70 year old who presents for the following health issues:      HPI     Headache  Onset/Duration: 1 month. Has had headaches in the past but nothing this consistent. Doesn't want to move her head because this increases the pain. History of chronic neck and back pain, s/p lumbar and cervical fusion.  Description  Location: bilateral in the occipital area   Character: Hard to describe. Not dull or sharp.   Frequency: Severity vaires but pain is constantly there.   Duration:  Constant  Wake with headaches: no  Able to do daily activities when headache present: YES- ADLs aremore difficult.   Intensity:  At it's worst, 6/10  Progression of Symptoms:  Worsening since onset  Accompanying signs and symptoms:  Stiff neck: YES  Neck or upper back pain: YES - hx of cervical fusion  Sinus or URI symptoms YES - hx of allergies  Fever: no  Nausea or vomiting: no  Dizziness: no  Numbness/tingling: YES - hands more tingly than normal. History of carpal tunnel syndrome. L>R.  Weakness: YES - unsure if it's associated with the headache. Hx of frequent tripping due to neve damage in lower extremities.   Visual changes: none  History  Head trauma: no  Family history of migraines: no  Daily pain medication use: YES - tylenol up to 6-8 tablets daily, unsure of dosage. Has been using for treatment of neck/back pain and also for headaches.    Previous tests for headaches: no  Neurologist evaluation: YES - for sleep apnea and RLS, never for headaches  Precipitating or Alleviating factors (light/sound/sleep/caffeine):   Therapies tried and outcome: Tylenol          Outcome - temporary relief.   Frequent/daily pain medication use: YES - tylenol    Admits to some  "additional stress around time of onset of symptoms.  recently decided to retire. However notes she has had worse stressors in the past without current symptoms.     #GERD  Reports about a 1 month history of uncontrolled and worsening acid reflux. Currently taking famotidine at least once daily, sometimes twice. Described as burning in chest, reflux into her throat. Associated with eating. No particular dietary triggers unless eating especially spicy foods. Drinks 2 cups coffee daily in the morning. Admits to liking chocolate but tries to be careful with this. Loves spicy foods.     Past Medical History:   Diagnosis Date     Complication of anesthesia     extremely dry eyes     Diabetes (H)      Gastro-oesophageal reflux disease      Hirsutism     Abstracted 06/14/02     History of blood transfusion      Leiomyoma of uterus, unspecified     Abstracted 06/14/02     Obesity 4/14/2010    Estimated Body mass index is 28.97 kg/(m^2) as calculated from the following:   Height as of 7/10/13: 5' 7\"(1.702 m).   Weight as of 7/10/13: 185 lb(83.915 kg).       Other and unspecified hyperlipidemia     Abstracted 06/14/02     Other chronic pain     low back and legs and feet     Sleep apnea     CPAP     Current Outpatient Medications   Medication     diazepam (VALIUM) 10 MG tablet     omeprazole (PRILOSEC) 40 MG DR capsule     Acetaminophen (TYLENOL PO)     CALCIUM PO     DULoxetine (CYMBALTA) 30 MG capsule     famotidine (PEPCID) 40 MG tablet     lamoTRIgine (LAMICTAL) 25 MG tablet     loratadine (CLARITIN) 10 MG tablet     metFORMIN (GLUCOPHAGE-XR) 500 MG 24 hr tablet     Multiple Vitamins-Minerals (OCUVITE ADULT 50+ PO)     NONFORMULARY     Omega-3 Fatty Acids (OMEGA 3 PO)     pramipexole (MIRAPEX) 0.25 MG tablet     simvastatin (ZOCOR) 40 MG tablet     XIIDRA 5 % opthalmic solution     No current facility-administered medications for this visit.         Allergies   Allergen Reactions     Animal Dander      Cats      " Dogs      Seasonal Allergies      Trees and mold         Review of Systems   Constitutional: Negative for appetite change and fever.   HENT: Negative for congestion, sinus pressure and sinus pain.    Eyes: Negative for photophobia and visual disturbance.   Gastrointestinal: Positive for heartburn. Negative for abdominal pain.   Musculoskeletal: Positive for back pain and neck pain.   Neurological: Positive for weakness, numbness, headaches and paresthesias. Negative for dizziness, tremors, syncope, facial asymmetry, speech difficulty and light-headedness.            Objective    BP (!) 156/80 (BP Location: Right arm, Patient Position: Sitting, Cuff Size: Adult Large)   Pulse 104   Temp 97.8  F (36.6  C) (Temporal)   Resp 18   Wt 115.2 kg (253 lb 14.4 oz)   LMP 11/12/2007 (LMP Unknown)   SpO2 98%   BMI 41.61 kg/m    Body mass index is 41.61 kg/m .     Physical Exam  Constitutional:       General: She is not in acute distress.     Appearance: Normal appearance. She is not ill-appearing or toxic-appearing.   HENT:      Mouth/Throat:      Mouth: Mucous membranes are moist.      Pharynx: Oropharynx is clear. Uvula midline. No oropharyngeal exudate or posterior oropharyngeal erythema.   Eyes:      Extraocular Movements: Extraocular movements intact.      Pupils: Pupils are equal, round, and reactive to light.   Cardiovascular:      Rate and Rhythm: Tachycardia present.      Heart sounds: Normal heart sounds. No murmur. No friction rub. No gallop.    Pulmonary:      Effort: Pulmonary effort is normal. No respiratory distress.      Breath sounds: Normal breath sounds. No wheezing, rhonchi or rales.   Neurological:      General: No focal deficit present.      Mental Status: She is alert and oriented to person, place, and time.      Cranial Nerves: No cranial nerve deficit.      Motor: No weakness.      Coordination: Coordination normal.      Gait: Gait normal.   Psychiatric:         Mood and Affect: Mood normal.          Behavior: Behavior normal.

## 2021-04-22 ENCOUNTER — HOSPITAL ENCOUNTER (OUTPATIENT)
Dept: MRI IMAGING | Facility: CLINIC | Age: 71
Discharge: HOME | End: 2021-04-22
Attending: PHYSICIAN ASSISTANT | Admitting: PHYSICIAN ASSISTANT
Payer: COMMERCIAL

## 2021-04-22 ENCOUNTER — OFFICE VISIT (OUTPATIENT)
Dept: PEDIATRICS | Facility: CLINIC | Age: 71
End: 2021-04-22
Payer: COMMERCIAL

## 2021-04-22 VITALS
RESPIRATION RATE: 18 BRPM | OXYGEN SATURATION: 94 % | DIASTOLIC BLOOD PRESSURE: 97 MMHG | BODY MASS INDEX: 41.13 KG/M2 | SYSTOLIC BLOOD PRESSURE: 157 MMHG | HEART RATE: 110 BPM | TEMPERATURE: 99.2 F | WEIGHT: 251 LBS

## 2021-04-22 DIAGNOSIS — G44.52 NEW DAILY PERSISTENT HEADACHE: Primary | ICD-10-CM

## 2021-04-22 DIAGNOSIS — M62.838 NECK MUSCLE SPASM: ICD-10-CM

## 2021-04-22 LAB
ANION GAP SERPL CALCULATED.3IONS-SCNC: 5 MMOL/L (ref 3–14)
BASOPHILS # BLD AUTO: 0.1 10E9/L (ref 0–0.2)
BASOPHILS NFR BLD AUTO: 0.7 %
BUN SERPL-MCNC: 18 MG/DL (ref 7–30)
CALCIUM SERPL-MCNC: 9.9 MG/DL (ref 8.5–10.1)
CHLORIDE SERPL-SCNC: 103 MMOL/L (ref 94–109)
CO2 SERPL-SCNC: 30 MMOL/L (ref 20–32)
CREAT SERPL-MCNC: 0.93 MG/DL (ref 0.52–1.04)
DIFFERENTIAL METHOD BLD: ABNORMAL
EOSINOPHIL # BLD AUTO: 0.3 10E9/L (ref 0–0.7)
EOSINOPHIL NFR BLD AUTO: 3.9 %
ERYTHROCYTE [DISTWIDTH] IN BLOOD BY AUTOMATED COUNT: 13.9 % (ref 10–15)
GFR SERPL CREATININE-BSD FRML MDRD: 63 ML/MIN/{1.73_M2}
GLUCOSE SERPL-MCNC: 99 MG/DL (ref 70–99)
HCT VFR BLD AUTO: 42.5 % (ref 35–47)
HGB BLD-MCNC: 13.3 G/DL (ref 11.7–15.7)
IMM GRANULOCYTES # BLD: 0 10E9/L (ref 0–0.4)
IMM GRANULOCYTES NFR BLD: 0.3 %
LYMPHOCYTES # BLD AUTO: 1.7 10E9/L (ref 0.8–5.3)
LYMPHOCYTES NFR BLD AUTO: 23.9 %
MCH RBC QN AUTO: 30.9 PG (ref 26.5–33)
MCHC RBC AUTO-ENTMCNC: 31.3 G/DL (ref 31.5–36.5)
MCV RBC AUTO: 99 FL (ref 78–100)
MONOCYTES # BLD AUTO: 0.6 10E9/L (ref 0–1.3)
MONOCYTES NFR BLD AUTO: 8.1 %
NEUTROPHILS # BLD AUTO: 4.6 10E9/L (ref 1.6–8.3)
NEUTROPHILS NFR BLD AUTO: 63.1 %
NRBC # BLD AUTO: 0 10*3/UL
NRBC BLD AUTO-RTO: 0 /100
PLATELET # BLD AUTO: 298 10E9/L (ref 150–450)
POTASSIUM SERPL-SCNC: 4.3 MMOL/L (ref 3.4–5.3)
RBC # BLD AUTO: 4.31 10E12/L (ref 3.8–5.2)
SODIUM SERPL-SCNC: 138 MMOL/L (ref 133–144)
WBC # BLD AUTO: 7.3 10E9/L (ref 4–11)

## 2021-04-22 PROCEDURE — 99214 OFFICE O/P EST MOD 30 MIN: CPT | Performed by: PHYSICIAN ASSISTANT

## 2021-04-22 PROCEDURE — 80048 BASIC METABOLIC PNL TOTAL CA: CPT | Performed by: PHYSICIAN ASSISTANT

## 2021-04-22 PROCEDURE — 85025 COMPLETE CBC W/AUTO DIFF WBC: CPT | Performed by: PHYSICIAN ASSISTANT

## 2021-04-22 PROCEDURE — 36415 COLL VENOUS BLD VENIPUNCTURE: CPT | Performed by: PHYSICIAN ASSISTANT

## 2021-04-22 PROCEDURE — 70551 MRI BRAIN STEM W/O DYE: CPT

## 2021-04-22 RX ORDER — DIAZEPAM 10 MG
TABLET ORAL
Qty: 6 TABLET | Refills: 0 | Status: SHIPPED | OUTPATIENT
Start: 2021-04-22 | End: 2023-11-22

## 2021-04-22 NOTE — PROGRESS NOTES
".(G44.52) New daily persistent headache  (primary encounter diagnosis)  Comment:   Plan: MR Brain w/o Contrast, Basic metabolic panel,         CBC with platelets differential, NEUROLOGY         ADULT REFERRAL, diazepam (VALIUM) 10 MG tablet            (M62.838) Neck muscle spasm  Comment:   Plan: diazepam (VALIUM) 10 MG tablet        Heat to area for 15 minutes followed bygentle stretches,  followed by ice to area for 20 minutes 3 times a day  Diazepam 1/2 to 1 pill at bedtime as needed for muscle spasm or moderate headache.      Appointment made for follow up with Neurology at the  [facility]   Dr. Singh May 24th at 8:00 AM via TELEPHONE   You have been added to a cancellation list and they will call you if another appointment comes available.       Adriano Faulkner is a 70 year old who was referred to the [facility] by Myrna Walters CNP at [facility] [place] [facility] for further evaluation of a new chronic headache, onset about a month ago.     Headache is on the left side and is typically at worst a 7/10.     No trauma/injury.     She does have a h/o cervical and lumbar disc fusions.     She denies any URI symptoms or fevers.     She took diazepam just prior to arrival in clinic, driven here by her , to decrease her claustrophobia symptoms in the MRI.      She reports that her headache today is a 4/7.      HPI     Headache  Onset/Duration: X 2 months  Description  Location: \"back of my head, most pain on left side.\"   Character: squeezing pain  Frequency:  daily  Duration:  Usually awakes with HA, lasting all day.  On and off depending on how much Tylenol she takes  Wake with headaches: YES  Able to do daily activities when headache present: YES- tries her best  Intensity:  severe  Progression of Symptoms:  worsening  Accompanying signs and symptoms:  Stiff neck: YES- notes previous history of cervical fusions  Neck or upper back pain: no  Sinus or URI symptoms YES- seasonal allergies, " dry eyes  Fever: no  Nausea or vomiting: no  Dizziness: YES- only when bending over long periods  Numbness/tingling: YES- but has chronic symptoms  Weakness: YES  Visual changes: none  History  Head trauma: no  Family history of migraines: no  Daily pain medication use: Tylenol PRN for HA's.  Can not take NSAIDS due to cervical fusions  Previous tests for headaches: no  Neurologist evaluation: no  Precipitating or Alleviating factors (light/sound/sleep/caffeine): has never been a good sleeper, has been under increased stress over the last few years.  Therapies tried and outcome: Tylenol              Outcome - effective  Frequent/daily pain medication use: no      Review of Systems   Constitutional, HEENT, cardiovascular, pulmonary, GI, , musculoskeletal, neuro, skin, endocrine and psych systems are negative, except as otherwise noted.      Objective    LMP 11/12/2007 (LMP Unknown)   There is no height or weight on file to calculate BMI.  Physical Exam   GENERAL: healthy, alert and no distress  NECK: no adenopathy, no asymmetry, masses, or scars and thyroid normal to palpation  NECK: palpable muscle spasm on left trapezius, mild to moderate  RESP: lungs clear to auscultation - no rales, rhonchi or wheezes  CV: regular rate and rhythm, normal S1 S2, no S3 or S4, no murmur, click or rub, no peripheral edema and peripheral pulses strong  ABDOMEN: soft, nontender, no hepatosplenomegaly, no masses and bowel sounds normal  MS: no gross musculoskeletal defects noted, no edema

## 2021-04-22 NOTE — PATIENT INSTRUCTIONS
(G44.52) New daily persistent headache  (primary encounter diagnosis)  Comment:   Plan: MR Brain w/o Contrast, Basic metabolic panel,         CBC with platelets differential, NEUROLOGY         ADULT REFERRAL, diazepam (VALIUM) 10 MG tablet            (M62.838) Neck muscle spasm  Comment:   Plan: diazepam (VALIUM) 10 MG tablet        Heat to area for 15 minutes followed bygentle stretches,  followed by ice to area for 20 minutes 3 times a day  Diazepam 1/2 to 1 pill at bedtime as needed for muscle spasm or moderate headache.      Appointment made for follow up with Neurology at the  [facility]   Dr. Singh May 24th at 8:00 AM via TELEPHONE   You have been added to a cancellation list and they will call you if another appointment comes available.

## 2021-04-22 NOTE — Clinical Note
"EUGENE Norris, thank you so much for your referral to [facility].    Lucie's scan was negative for any bleed or clot.  \"1. No intracranial hemorrhage or acute infarction.  2. Mild to moderate periventricular FLAIR hyperintensity is most  suggestive of cerebral small vessel disease (hypertensive  arteriolosclerosis). Confluent white matter FLAIR hyperintense signal  changes within the dianelys also is most likely related to cerebral small  vessel disease, however the configuration of the pontine signal  abnormality raises the possibility of osmotic demyelination and  clinical correlation is recommended.\"    Basic chemistry was normal as was CBC. She has been referred to [facility] for further evaluation, should her symptoms persist.  Some of her symptoms may be secondary to a muscle spasm in her left trapezius, as her symptoms improved on the diazepam.  I asked her to follow up with you in a week if the symptoms persist, as I only have her a few diazepam to take prn.    Thanks again for the referral!  Shayy"

## 2021-04-23 ENCOUNTER — TELEPHONE (OUTPATIENT)
Dept: PEDIATRICS | Facility: CLINIC | Age: 71
End: 2021-04-23

## 2021-04-23 NOTE — TELEPHONE ENCOUNTER
Patient Quality Outreach      Summary:        Patient is due/failing the following:   PHQ-9 Needed    Type of outreach:    Sent [employer] message.    Questions for provider review:    None                                                                                                                                     Amanda Garcia CMA([facility])

## 2021-04-28 ENCOUNTER — THERAPY VISIT (OUTPATIENT)
Dept: PHYSICAL THERAPY | Facility: CLINIC | Age: 71
End: 2021-04-28
Payer: COMMERCIAL

## 2021-04-28 DIAGNOSIS — Z98.1 S/P LUMBAR SPINAL FUSION: ICD-10-CM

## 2021-04-28 PROCEDURE — 97530 THERAPEUTIC ACTIVITIES: CPT | Mod: GP | Performed by: PHYSICAL THERAPIST

## 2021-04-28 PROCEDURE — 97110 THERAPEUTIC EXERCISES: CPT | Mod: GP | Performed by: PHYSICAL THERAPIST

## 2021-04-28 PROCEDURE — 97112 NEUROMUSCULAR REEDUCATION: CPT | Mod: GP | Performed by: PHYSICAL THERAPIST

## 2021-05-05 ENCOUNTER — THERAPY VISIT (OUTPATIENT)
Dept: PHYSICAL THERAPY | Facility: CLINIC | Age: 71
End: 2021-05-05
Payer: COMMERCIAL

## 2021-05-05 DIAGNOSIS — Z98.1 S/P LUMBAR SPINAL FUSION: ICD-10-CM

## 2021-05-05 PROCEDURE — 97110 THERAPEUTIC EXERCISES: CPT | Mod: GP | Performed by: PHYSICAL THERAPIST

## 2021-05-05 PROCEDURE — 97530 THERAPEUTIC ACTIVITIES: CPT | Mod: GP | Performed by: PHYSICAL THERAPIST

## 2021-05-05 PROCEDURE — 97112 NEUROMUSCULAR REEDUCATION: CPT | Mod: GP | Performed by: PHYSICAL THERAPIST

## 2021-05-24 ENCOUNTER — TRANSFERRED RECORDS (OUTPATIENT)
Dept: HEALTH INFORMATION MANAGEMENT | Facility: CLINIC | Age: 71
End: 2021-05-24

## 2021-05-25 ENCOUNTER — VIRTUAL VISIT (OUTPATIENT)
Dept: PEDIATRICS | Facility: CLINIC | Age: 71
End: 2021-05-25
Payer: COMMERCIAL

## 2021-05-25 ENCOUNTER — MYC MEDICAL ADVICE (OUTPATIENT)
Dept: PEDIATRICS | Facility: CLINIC | Age: 71
End: 2021-05-25

## 2021-05-25 DIAGNOSIS — E66.01 MORBID OBESITY (H): ICD-10-CM

## 2021-05-25 DIAGNOSIS — Z87.448 HISTORY OF CHRONIC KIDNEY DISEASE: ICD-10-CM

## 2021-05-25 DIAGNOSIS — F33.2 SEVERE EPISODE OF RECURRENT MAJOR DEPRESSIVE DISORDER, WITHOUT PSYCHOTIC FEATURES (H): ICD-10-CM

## 2021-05-25 DIAGNOSIS — I10 ESSENTIAL HYPERTENSION: Primary | ICD-10-CM

## 2021-05-25 DIAGNOSIS — Z12.31 VISIT FOR SCREENING MAMMOGRAM: ICD-10-CM

## 2021-05-25 DIAGNOSIS — R73.01 IMPAIRED FASTING GLUCOSE: Primary | ICD-10-CM

## 2021-05-25 DIAGNOSIS — I10 ESSENTIAL HYPERTENSION: ICD-10-CM

## 2021-05-25 PROBLEM — N18.30 CKD (CHRONIC KIDNEY DISEASE) STAGE 3, GFR 30-59 ML/MIN (H): Status: RESOLVED | Noted: 2020-03-17 | Resolved: 2021-05-25

## 2021-05-25 PROCEDURE — 99214 OFFICE O/P EST MOD 30 MIN: CPT | Mod: 95 | Performed by: INTERNAL MEDICINE

## 2021-05-25 RX ORDER — CYCLOSPORINE 0.5 MG/ML
1 EMULSION OPHTHALMIC 2 TIMES DAILY
COMMUNITY

## 2021-05-25 RX ORDER — PROPRANOLOL HYDROCHLORIDE 80 MG/1
80 CAPSULE, EXTENDED RELEASE ORAL DAILY
Qty: 30 CAPSULE | Refills: 1 | Status: SHIPPED | OUTPATIENT
Start: 2021-05-25 | End: 2021-06-17 | Stop reason: DRUGHIGH

## 2021-05-25 RX ORDER — CETIRIZINE HYDROCHLORIDE 10 MG/1
10 TABLET ORAL DAILY
COMMUNITY

## 2021-05-25 NOTE — PATIENT INSTRUCTIONS
Start the propranolol once daily in the evening.    Let me know how your BP and pulse are doing in a couple of weeks and we can adjust the dose if needed.    They will call you to schedule a mammogram.       Patient Education     Controlling High Blood Pressure   High blood pressure (hypertension) is often called the silent killer. This is because many people who have it, don t know it. It can be very dangerous. High blood pressure can raise your risk of heart attack, stroke, heart disease, and heart failure. Controlling your blood pressure can decrease your risk of these problems. It's important to know the appropriate blood pressure range and remember to check your blood pressure regularly. Doing so can save your life.   Blood pressure measurements are given as 2 numbers. Systolic blood pressure is the upper number. This is the pressure when the heart contracts. Diastolic blood pressure is the lower number. This is the pressure when the heart relaxes between beats.   Blood pressure is categorized as normal, elevated, or stage 1 or stage 2 high blood pressure:     Normal blood pressure is systolic of less than 120 and diastolic of less than 80 (120/80)    Elevated blood pressure is systolic of 120 to 129 and diastolic less than 80    Stage 1 high blood pressure is systolic of 130 to 139 or diastolic between 80 to 89    Stage 2 high blood pressure is when systolic is 140 or higher or the diastolic is 90 or higher  A heart-healthy lifestyle can help you control your blood pressure without medicines. Here are some things you can do to pursue a heart-healthy lifestyle:     Choose heart-healthy foods     Select low-salt, low-fat foods. Limit sodium intake to 2,400 mg per day or the amount suggested by your healthcare provider.    Limit canned, dried, cured, packaged, and fast foods. These can contain a lot of salt.    Eat 8 to 10 servings of fruits and vegetables every day.    Choose lean meats, fish, or chicken.    Eat  whole-grain pasta, brown rice, and beans.    Eat 2 to 3 servings of low-fat or fat-free dairy products.    Ask your doctor about the DASH eating plan. This plan helps reduce blood pressure.    When you go to a restaurant, ask that your meal be prepared with no added salt.    Stay at a healthy weight     Ask your healthcare provider how many calories to eat a day. Then stick to that number.    Ask your healthcare provider what weight range is healthiest for you. If you are overweight, a weight loss of only 3% to 5% of your body weight can help lower blood pressure. Generally, a good weight loss goal is to lose 10% of your body weight in a year.    Limit snacks and sweets.    Get regular exercise.    Get up and get active     Find activities you enjoy that can be done alone or with friends or family. Such activities might include bicycling, dancing, walking, or jogging.    Park farther away from building entrances to walk more.    Use stairs instead of the elevator.    When you can, walk or bike instead of driving.    [place] leaves, garden, or do household repairs.    Be active at a moderate to vigorous level of physical activity for at least 40 minutes for a minimum of 3 to 4 days a week.     Manage stress     Make time to relax and enjoy life. Find time to laugh.    Communicate your concerns with your loved ones and your healthcare provider.    Visit with family and friends, and keep up with hobbies.    Limit alcohol and quit smoking     Men should have no more than 2 drinks per day.    Women should have no more than 1 drink per day.    Talk with your healthcare provider about quitting smoking. Smoking significantly increases your risk for heart disease and stroke. Ask your healthcare provider about community smoking cessation programs and other options.    Medicines  If lifestyle changes aren t enough, your healthcare provider may prescribe high blood pressure medicine. Take all medicines as prescribed. If you have  any questions about your medicines, ask your healthcare provider before stopping or changing them.   [employer] last reviewed this educational content on 6/1/2019 2000-2021 [employer]. All rights reserved. This information is not intended as a substitute for professional medical care. Always follow your healthcare professional's instructions.

## 2021-05-25 NOTE — PROGRESS NOTES
"Lucie is a 70 year old who is being evaluated via a billable video visit.      How would you like to obtain your AVS? MyChart  If the video visit is dropped, the invitation should be resent by: Text to cell phone: [phone]  Will anyone else be joining your video visit? No      Video Start Time: 11:11 AM    Assessment & Plan     Essential hypertension  Discussed sodium restriction, maintaining ideal body weight and regular exercise program as physiologic means to achieve blood pressure control. The patient will strive towards this. Meanwhile, it is appropriate to lower BP with medications, while observing for therapeutic effect and if appropriate later, can discontinue medications if physiologic methods appear to be effective. The patient indicates understanding of these issues and agrees with the plan. The various types of antihypertensives are discussed fully. [facility] Side effects explained in detail. Continue home readings and see me for followup as scheduled and via [facility] to adjust dose  - propranolol ER (INDERAL LA) 80 MG 24 hr capsule; Take 1 capsule (80 mg) by mouth daily    Severe episode of recurrent major depressive disorder, without psychotic features (H)  Continue medications and counseling    Morbid obesity (H)  Continue counseling    History of chronic kidney disease  Resolved now.  Continue to monitor and consider ACE if recurs     Visit for screening mammogram    - MA SCREENING DIGITAL BILAT - Future  (s+30); Future             BMI:   Estimated body mass index is 41.13 kg/m  as calculated from the following:    Height as of 10/6/20: 1.664 m (5' 5.5\").    Weight as of 4/22/21: 113.9 kg (251 lb).   Weight management plan: Discussed healthy diet and exercise guidelines    See Patient Instructions    Return in about 4 months (around 9/10/2021) for Physical Exam.    Nallely Moore MD  [facility]    Adriano Faulkner is a 70 year old who presents " for the following health issues     History of Present Illness       She eats 4 or more servings of fruits and vegetables daily.She consumes 0 sweetened beverage(s) daily. She is missing 1 dose(s) of medications per week.     [facility] neurology follow-up  Brain MRI f/u - neurology concerned about small vessel disease  Headache and balance issues.  Going to PT but still can't walk straight line with tandem gait.  Memory issues.    Hypertension Follow-up    Do you check your blood pressure regularly outside of the clinic? No  - not recently    Are you following a low salt diet? Yes    Are your blood pressures ever more than 140 on the top number (systolic) OR more   than 90 on the bottom number (diastolic), for example 140/90? Yes.  As high as 190SBP at home.    BP Readings from Last 6 Encounters:   04/22/21 (!) 157/97   04/21/21 (!) 156/80   01/13/21 133/88   12/10/20 (!) 140/80   10/06/20 134/82   09/30/20 136/80    has gained some weight.  Balance is interfering with ability to exercise.  Working on some basic exercise with PT.  Is trying to eat healthily.  Loves vegetables but also loves carbs.  Is seeing a therapist for eating     Depression - sees psychiatrist  PHQ-9 SCORE 12/16/2020 1/13/2021 4/25/2021   PHQ-9 Total Score - - -   PHQ-9 Total Score MyChart 11 (Moderate depression) - 13 (Moderate depression)   PHQ-9 Total Score 11 14 13        Review of Systems         Objective           Vitals:  No vitals were obtained today due to virtual visit.    Physical Exam   GENERAL: Healthy, alert and no distress  EYES: Eyes grossly normal to inspection.  No discharge or erythema, or obvious scleral/conjunctival abnormalities.  RESP: No audible wheeze, cough, or visible cyanosis.  No visible retractions or increased work of breathing.    SKIN: Visible skin clear. No significant rash, abnormal pigmentation or lesions.  NEURO: Cranial nerves grossly intact.  Mentation and speech appropriate for age.  PSYCH: Mentation appears  normal, affect normal/bright, judgement and insight intact, normal speech and appearance well-groomed.                Video-Visit Details    Type of service:  Video Visit    Video End Time:11:36 AM    Originating Location (pt. Location): Home    Distant Location (provider location):  [facility]     Platform used for Video Visit: [employer]

## 2021-05-26 ENCOUNTER — ANCILLARY PROCEDURE (OUTPATIENT)
Dept: MAMMOGRAPHY | Facility: CLINIC | Age: 71
End: 2021-05-26
Attending: INTERNAL MEDICINE
Payer: COMMERCIAL

## 2021-05-26 DIAGNOSIS — Z12.31 VISIT FOR SCREENING MAMMOGRAM: ICD-10-CM

## 2021-05-26 PROCEDURE — 77063 BREAST TOMOSYNTHESIS BI: CPT | Mod: TC | Performed by: RADIOLOGY

## 2021-05-26 PROCEDURE — 77067 SCR MAMMO BI INCL CAD: CPT | Mod: TC | Performed by: RADIOLOGY

## 2021-06-04 NOTE — TELEPHONE ENCOUNTER
Patient Quality Outreach 2nd Attempt      Summary:    Type of outreach:    None, patient seen 5-25-21.    Next Steps:  Reach out within 90 days via NA.    Max number of attempts reached: NA. Will try again in 90 days if patient still on fail list.    Questions for provider review:    None                                                                                                                    Amanda Garcia CMA([facility])

## 2021-06-11 ENCOUNTER — TRANSFERRED RECORDS (OUTPATIENT)
Dept: HEALTH INFORMATION MANAGEMENT | Facility: CLINIC | Age: 71
End: 2021-06-11

## 2021-06-17 RX ORDER — PROPRANOLOL HYDROCHLORIDE 160 MG/1
160 CAPSULE, EXTENDED RELEASE ORAL DAILY
Qty: 180 CAPSULE | Refills: 3 | Status: SHIPPED | OUTPATIENT
Start: 2021-06-17 | End: 2022-06-20

## 2021-06-17 NOTE — TELEPHONE ENCOUNTER
"Most recent A1c was on 12/29/20, 5.7. BMP shows WNL for glucose on 04/22/21.        - Kirby \"Johan\" JUAN Preston - Patient Advocate Liason (PAL)  [facility]    "

## 2021-06-24 NOTE — PROGRESS NOTES
Discharge Note    Progress reporting period is from initial eval to May 5, 2021.     Lucie failed to return for next follow up visit and current status is unknown.  Please see information below for last relevant information on current status.  Patient seen for Rxs Used: 8 visits.  SUBJECTIVE  Subjective changes noted by patient:  Subjective: Slowly improving symptoms and HEP routine  .  Current pain level is Current Pain level: 2/10.     Previous pain level was  Initial Pain level: 3/10.   Changes in function:  Yes (See Goal flowsheet attached for changes in current functional level)  Adverse reaction to treatment or activity: None    OBJECTIVE  Changes noted in objective findings:       ASSESSMENT/PLAN  Diagnosis: S/P L5-S1 fusion   DIAGP:  The encounter diagnosis was S/P lumbar spinal fusion.  Updated problem list and treatment plan:   Pain - HEP  Decreased ROM/flexibility - HEP  Decreased function - HEP  Decreased strength - HEP  Impaired muscle performance - HEP  Decreased proprioception - HEP  Impaired posture - HEP  STG/LTGs have been met or progress has been made towards goals:  Yes, please see goal flowsheet for most current information  Assessment of Progress: current status is unknown.  Last current status:     Self Management Plans:  HEP  I have re-evaluated this patient and find that the nature, scope, duration and intensity of the therapy is appropriate for the medical condition of the patient.  Lucie continues to require the following intervention to meet STG and LTG's:  HEP.    Recommendations:  Discharge with current home program.  Patient to follow up with MD as needed.    Please refer to the daily flowsheet for treatment today, total treatment time and time spent performing 1:1 timed codes.

## 2021-08-04 ENCOUNTER — LAB (OUTPATIENT)
Dept: LAB | Facility: CLINIC | Age: 71
End: 2021-08-04
Payer: COMMERCIAL

## 2021-08-04 DIAGNOSIS — R73.01 IMPAIRED FASTING GLUCOSE: ICD-10-CM

## 2021-08-04 LAB — HBA1C MFR BLD: 5.8 % (ref 0–5.6)

## 2021-08-04 PROCEDURE — 83036 HEMOGLOBIN GLYCOSYLATED A1C: CPT

## 2021-08-04 PROCEDURE — 36415 COLL VENOUS BLD VENIPUNCTURE: CPT

## 2021-09-30 NOTE — TELEPHONE ENCOUNTER
Pt out to ct    Reason for call:  Other   Patient called regarding (reason for call): call back  Additional comments: Luli at [employer], work comp, needs more information than what is on the letter dated 6/14.  She needs a return to work date.    Phone number to reach patient:  Other phone number: [phone]  Luli    Best Time:  any    Can we leave a detailed message on this number?  YES

## 2021-10-18 ASSESSMENT — PATIENT HEALTH QUESTIONNAIRE - PHQ9
10. IF YOU CHECKED OFF ANY PROBLEMS, HOW DIFFICULT HAVE THESE PROBLEMS MADE IT FOR YOU TO DO YOUR WORK, TAKE CARE OF THINGS AT HOME, OR GET ALONG WITH OTHER PEOPLE: SOMEWHAT DIFFICULT
SUM OF ALL RESPONSES TO PHQ QUESTIONS 1-9: 6
SUM OF ALL RESPONSES TO PHQ QUESTIONS 1-9: 6

## 2021-10-19 ENCOUNTER — VIRTUAL VISIT (OUTPATIENT)
Dept: PEDIATRICS | Facility: CLINIC | Age: 71
End: 2021-10-19
Payer: COMMERCIAL

## 2021-10-19 DIAGNOSIS — U07.1 INFECTION DUE TO 2019 NOVEL CORONAVIRUS: ICD-10-CM

## 2021-10-19 DIAGNOSIS — R19.7 DIARRHEA, UNSPECIFIED TYPE: Primary | ICD-10-CM

## 2021-10-19 DIAGNOSIS — R73.01 IMPAIRED FASTING GLUCOSE: ICD-10-CM

## 2021-10-19 DIAGNOSIS — E78.5 HYPERLIPIDEMIA LDL GOAL <160: ICD-10-CM

## 2021-10-19 PROCEDURE — 99214 OFFICE O/P EST MOD 30 MIN: CPT | Mod: 95 | Performed by: INTERNAL MEDICINE

## 2021-10-19 ASSESSMENT — PATIENT HEALTH QUESTIONNAIRE - PHQ9: SUM OF ALL RESPONSES TO PHQ QUESTIONS 1-9: 6

## 2021-10-19 NOTE — PROGRESS NOTES
uLcie is a 71 year old who is being evaluated via a billable telephone visit.       What phone number would you like to be contacted at? [phone]  How would you like to obtain your AVS? MyChart    Assessment & Plan     Diarrhea, unspecified type  Likely due to metformin, drop dose further    Infection due to 2019 novel coronavirus  Mostly resolved.  Discussed booster and delaying    Hyperlipidemia LDL goal <160  Recheck, follow-up as scheduled  - Lipid panel reflex to direct LDL Fasting; Future    Impaired fasting glucose  Encourage exercise and recheck in January  - Basic metabolic panel; Future  - Hemoglobin A1c; Future             See Patient Instructions    Return in about 3 months (around 1/19/2022) for Physical Exam, as scheduled.    Nallely Moore MD  [facility]    Adriano Faulkner is a 71 year old who presents for the following health issues   History of Present Illness       She eats 2-3 servings of fruits and vegetables daily.She consumes 0 sweetened beverage(s) daily.She exercises with enough effort to increase her heart rate 20 to 29 minutes per day.  She exercises with enough effort to increase her heart rate 3 or less days per week.   She is taking medications regularly.       Follow up on COVID and diarrhea issues.    Diarrhea - told to cut back on metformin to 2 tabs on 10/9.  Has gotten better.  Can live with it this way but still some symptoms.    Impaired fasting glucose - 5.8    Covid - diagnosed 9/30 in [place].  Breakthrough from moderna vaccines.  Got regeneron.  No further symptoms other than mild fatigue.  Was really bad first week but better.    Doesn't sleep well.  Doesn't exercise as it has hard      Review of Systems         Objective           Vitals:  No vitals were obtained today due to virtual visit.    Physical Exam   healthy, alert and no distress  PSYCH: Alert and oriented times 3; coherent speech, normal   rate and volume, able to articulate  logical thoughts, able   to abstract reason, no tangential thoughts, no hallucinations   or delusions  Her affect is normal  RESP: No cough, no audible wheezing, able to talk in full sentences  Remainder of exam unable to be completed due to telephone visits            Phone call duration: 20 minutes

## 2021-10-19 NOTE — PATIENT INSTRUCTIONS
Cut back the metformin to one tab a day.    Check your blood pressure and send me a message with that.    Try to exercise - every little bit is good for you.  An exerbike or recumbent stepper or warm pool are good options.  Check out the kids swim schools as they often offer water walking.  [facility] [place] also [facility] warm [facility].  Check with your insurance about gym coverage.       Wait to get the covid booster until 3-6 months after you had covid.     Get the flu shot

## 2021-10-24 ENCOUNTER — HEALTH MAINTENANCE LETTER (OUTPATIENT)
Age: 71
End: 2021-10-24

## 2022-01-07 ENCOUNTER — OFFICE VISIT (OUTPATIENT)
Dept: PEDIATRICS | Facility: CLINIC | Age: 72
End: 2022-01-07
Payer: COMMERCIAL

## 2022-01-07 VITALS
TEMPERATURE: 97.8 F | HEART RATE: 72 BPM | BODY MASS INDEX: 40.18 KG/M2 | DIASTOLIC BLOOD PRESSURE: 78 MMHG | OXYGEN SATURATION: 97 % | WEIGHT: 250 LBS | RESPIRATION RATE: 22 BRPM | HEIGHT: 66 IN | SYSTOLIC BLOOD PRESSURE: 128 MMHG

## 2022-01-07 DIAGNOSIS — M25.512 CHRONIC PAIN OF BOTH SHOULDERS: ICD-10-CM

## 2022-01-07 DIAGNOSIS — R73.01 IMPAIRED FASTING GLUCOSE: ICD-10-CM

## 2022-01-07 DIAGNOSIS — F33.2 SEVERE EPISODE OF RECURRENT MAJOR DEPRESSIVE DISORDER, WITHOUT PSYCHOTIC FEATURES (H): ICD-10-CM

## 2022-01-07 DIAGNOSIS — M54.16 LUMBAR RADICULOPATHY: ICD-10-CM

## 2022-01-07 DIAGNOSIS — Z00.00 ENCOUNTER FOR MEDICARE ANNUAL WELLNESS EXAM: Primary | ICD-10-CM

## 2022-01-07 DIAGNOSIS — E66.813 CLASS 3 SEVERE OBESITY DUE TO EXCESS CALORIES WITH SERIOUS COMORBIDITY AND BODY MASS INDEX (BMI) OF 40.0 TO 44.9 IN ADULT (H): ICD-10-CM

## 2022-01-07 DIAGNOSIS — E66.01 CLASS 3 SEVERE OBESITY DUE TO EXCESS CALORIES WITH SERIOUS COMORBIDITY AND BODY MASS INDEX (BMI) OF 40.0 TO 44.9 IN ADULT (H): ICD-10-CM

## 2022-01-07 DIAGNOSIS — M25.552 HIP PAIN, BILATERAL: ICD-10-CM

## 2022-01-07 DIAGNOSIS — M25.511 CHRONIC PAIN OF BOTH SHOULDERS: ICD-10-CM

## 2022-01-07 DIAGNOSIS — M85.80 OSTEOPENIA, UNSPECIFIED LOCATION: ICD-10-CM

## 2022-01-07 DIAGNOSIS — Z78.0 POSTMENOPAUSAL: ICD-10-CM

## 2022-01-07 DIAGNOSIS — G47.33 OSA (OBSTRUCTIVE SLEEP APNEA): ICD-10-CM

## 2022-01-07 DIAGNOSIS — R13.10 DYSPHAGIA, UNSPECIFIED TYPE: ICD-10-CM

## 2022-01-07 DIAGNOSIS — M25.551 HIP PAIN, BILATERAL: ICD-10-CM

## 2022-01-07 DIAGNOSIS — E78.5 HYPERLIPIDEMIA LDL GOAL <160: ICD-10-CM

## 2022-01-07 DIAGNOSIS — G89.29 CHRONIC PAIN OF BOTH SHOULDERS: ICD-10-CM

## 2022-01-07 LAB — HBA1C MFR BLD: 6 % (ref 0–5.6)

## 2022-01-07 PROCEDURE — 36415 COLL VENOUS BLD VENIPUNCTURE: CPT | Performed by: INTERNAL MEDICINE

## 2022-01-07 PROCEDURE — 90662 IIV NO PRSV INCREASED AG IM: CPT | Performed by: INTERNAL MEDICINE

## 2022-01-07 PROCEDURE — 80048 BASIC METABOLIC PNL TOTAL CA: CPT | Performed by: INTERNAL MEDICINE

## 2022-01-07 PROCEDURE — G0008 ADMIN INFLUENZA VIRUS VAC: HCPCS | Performed by: INTERNAL MEDICINE

## 2022-01-07 PROCEDURE — 99397 PER PM REEVAL EST PAT 65+ YR: CPT | Mod: 25 | Performed by: INTERNAL MEDICINE

## 2022-01-07 PROCEDURE — 0064A COVID-19,PF,MODERNA (18+ YRS BOOSTER .25ML): CPT | Performed by: INTERNAL MEDICINE

## 2022-01-07 PROCEDURE — 96127 BRIEF EMOTIONAL/BEHAV ASSMT: CPT | Performed by: INTERNAL MEDICINE

## 2022-01-07 PROCEDURE — 83036 HEMOGLOBIN GLYCOSYLATED A1C: CPT | Performed by: INTERNAL MEDICINE

## 2022-01-07 PROCEDURE — 99214 OFFICE O/P EST MOD 30 MIN: CPT | Mod: 25 | Performed by: INTERNAL MEDICINE

## 2022-01-07 PROCEDURE — 91306 COVID-19,PF,MODERNA (18+ YRS BOOSTER .25ML): CPT | Performed by: INTERNAL MEDICINE

## 2022-01-07 PROCEDURE — 80061 LIPID PANEL: CPT | Performed by: INTERNAL MEDICINE

## 2022-01-07 RX ORDER — DULOXETIN HYDROCHLORIDE 60 MG/1
60 CAPSULE, DELAYED RELEASE ORAL 2 TIMES DAILY
COMMUNITY
Start: 2021-12-11

## 2022-01-07 RX ORDER — PREGABALIN 25 MG/1
CAPSULE ORAL
Qty: 120 CAPSULE | Refills: 1 | Status: SHIPPED | OUTPATIENT
Start: 2022-02-07 | End: 2024-02-02

## 2022-01-07 RX ORDER — BUPROPION HYDROCHLORIDE 150 MG/1
1 TABLET ORAL EVERY MORNING
COMMUNITY
Start: 2021-12-16

## 2022-01-07 RX ORDER — PRAZOSIN HYDROCHLORIDE 1 MG/1
1 CAPSULE ORAL AT BEDTIME
COMMUNITY
Start: 2021-08-17

## 2022-01-07 RX ORDER — SIMVASTATIN 40 MG
40 TABLET ORAL AT BEDTIME
Qty: 90 TABLET | Refills: 3 | Status: SHIPPED | OUTPATIENT
Start: 2022-01-07 | End: 2023-01-27

## 2022-01-07 RX ORDER — HYDROXYZINE HYDROCHLORIDE 25 MG/1
1-2 TABLET, FILM COATED ORAL 3 TIMES DAILY PRN
COMMUNITY
Start: 2021-12-16

## 2022-01-07 RX ORDER — PREGABALIN 25 MG/1
CAPSULE ORAL
Qty: 69 CAPSULE | Refills: 0 | Status: SHIPPED | OUTPATIENT
Start: 2022-01-07 | End: 2022-02-06

## 2022-01-07 ASSESSMENT — ACTIVITIES OF DAILY LIVING (ADL): CURRENT_FUNCTION: NO ASSISTANCE NEEDED

## 2022-01-07 ASSESSMENT — ENCOUNTER SYMPTOMS
PARESTHESIAS: 1
JOINT SWELLING: 1
NERVOUS/ANXIOUS: 1
HEMATOCHEZIA: 0
CONSTIPATION: 0
SORE THROAT: 0
DIARRHEA: 0
HEARTBURN: 1
PALPITATIONS: 0
HEADACHES: 1
BREAST MASS: 0
EYE PAIN: 1
WEAKNESS: 1
FREQUENCY: 1
NAUSEA: 0
SHORTNESS OF BREATH: 1
FEVER: 0
ABDOMINAL PAIN: 0
DIZZINESS: 0
HEMATURIA: 0
MYALGIAS: 1
ARTHRALGIAS: 1
CHILLS: 0
DYSURIA: 0
COUGH: 0

## 2022-01-07 ASSESSMENT — PATIENT HEALTH QUESTIONNAIRE - PHQ9
SUM OF ALL RESPONSES TO PHQ QUESTIONS 1-9: 10
SUM OF ALL RESPONSES TO PHQ QUESTIONS 1-9: 10
10. IF YOU CHECKED OFF ANY PROBLEMS, HOW DIFFICULT HAVE THESE PROBLEMS MADE IT FOR YOU TO DO YOUR WORK, TAKE CARE OF THINGS AT HOME, OR GET ALONG WITH OTHER PEOPLE: SOMEWHAT DIFFICULT

## 2022-01-07 ASSESSMENT — MIFFLIN-ST. JEOR: SCORE: 1667.8

## 2022-01-07 NOTE — PATIENT INSTRUCTIONS
Patient Education   Personalized Prevention Plan  You are due for the preventive services outlined below.  Your care team is available to assist you in scheduling these services.  If you have already completed any of these items, please share that information with your care team to update in your medical record.  Health Maintenance Due   Topic Date Due     Cholesterol Lab  08/27/2021     Flu Vaccine (1) 09/01/2021     COVID-19 Vaccine (3 - Booster for Moderna series) 10/01/2021     For dry mouth, try lemon drops and/or xylimelts to help.  If the swallowing isn't getting better please send me a [employer] message.    Schedule with your eye doctor     Patient Education     Dysphagia (Adult)    Dysphagia is trouble swallowing. If you have dysphagia, you may have symptoms that include:    Choking or coughing when you eat or drink    Food getting stuck    Drooling    Inability to swallow    Pain behind the breastbone after swallowing    Vomiting after you eat or drink    Aspirating (inhaling into the lungs) foods or liquids when you swallow  The main causes of dysphagia are problems that affect the mouth, throat or tongue. Dysphagia may be caused by a problem with the esophagus (tube that carries food from the mouth to the stomach). These include blockage, swelling, or irritation from acid reflux or injury. An infection of the esophagus or an allergic reaction in the esophagus can also cause dysphagia. Problems in the brain, such as a stroke or Parkinson's disease, can affect the muscles that coordinate swallowing.  Dysphagia is treated by treating the cause. Your healthcare provider may evaluate you using X-ray, special esophagus monitors, a fiber optic evaluation of swallowing, or an upper endoscopy, which uses a thin, lighted tube sent through the mouth to the esophagus. You may be given medicine to reduce stomach acid or control muscle spasms. If the problem doesn't go away, a procedure can be done to widen (dilate)  the esophagus. If you have muscle or nerve problems, you may be advised to see a speech or occupational therapist. He or she may give you exercises and instructions to help make eating safer. If you have an infection or allergic condition, your healthcare provide will prescribe medicine for it.  Home care  To help ease the symptoms of dysphagia:    Take any medicine you ve been given exactly as directed. Ask for liquid medicines if you need them.    To make eating easier:  ? Eat slowly.   ? Eat in a relaxed setting.  ? Don t talk while you eat.  ? Take small bites. Chew slowly and completely before you swallow.  ? Sit upright during and after meals. Chewing food releases enzymes in your mouth that start the digestive process. Chew soft foods at least 5 to10 times. Chew more dense food (meats and vegetables) up to 30 times before swallowing. Count the number of times you chew until you get a sense of how soft the food needs to be before swallowing.  ? Don't eat dry bread products or meat fibers.  ? Puree solid foods if needed. Thicken liquids with milk, juice, broth, gravy, or starch to make them easier to swallow.  ? Ask your healthcare provider if a liquid diet may be better for you.  Follow-up care  Follow up with your healthcare provider or as directed. Your healthcare provider can give you information about tests you may need.   When to seek medical advice  Call your healthcare provider right away for any of the following:    Inability to keep down food or liquid    Symptoms that get worse quickly    Coughing that won't stop    Continuing to lose weight    Fever of 100.4 F (38 C) or higher, or as directed by your healthcare provider    Other symptoms as indicated by your healthcare provider  Call 911  Call 911 for any of the following:    Trouble breathing    Inability to talk    Drooling, inability to control secretions    Loss of consciousness  [employer] last reviewed this educational content on 3/1/2018     5919-3415 [employer]. All rights reserved. This information is not intended as a substitute for professional medical care. Always follow your healthcare professional's instructions.           Your Health Risk Assessment indicates you feel you are not in good health    A healthy lifestyle helps keep the body fit and the mind alert. It helps protect you from disease, helps you fight disease, and helps prevent chronic disease (disease that doesn't go away) from getting worse. This is important as you get older and begin to notice twinges in muscles and joints and a decline in the strength and stamina you once took for granted. A healthy lifestyle includes good healthcare, good nutrition, weight control, recreation, and regular exercise. Avoid harmful substances and do what you can to keep safe. Another part of a healthy lifestyle is stay mentally active and socially involved.    Good healthcare     Have a wellness visit every year.     If you have new symptoms, let us know right away. Don't wait until the next checkup.     Take medicines exactly as prescribed and keep your medicines in a safe place. Tell us if your medicine causes problems.   Healthy diet and weight control     Eat 3 or 4 small, nutritious, low-fat, high-fiber meals a day. Include a variety of fruits, vegetables, and whole-grain foods.     Make sure you get enough calcium in your diet. Calcium, vitamin D, and exercise help prevent osteoporosis (bone thinning).     If you live alone, try eating with others when you can. That way you get a good meal and have company while you eat it.     Try to keep a healthy weight. If you eat more calories than your body uses for energy, it will be stored as fat and you will gain weight.     Recreation   Recreation is not limited to sports and team events. It includes any activity that provides relaxation, interest, enjoyment, and exercise. Recreation provides an outlet for physical, mental, and social  energy. It can give a sense of worth and achievement. It can help you stay healthy.    Mental Exercise and Social Involvement  Mental and emotional health is as important as physical health. Keep in touch with friends and family. Stay as active as possible. Continue to learn and challenge yourself.   Things you can do to stay mentally active are:    Learn something new, like a foreign language or musical instrument.     Play SCRABBLE or do crossword puzzles. If you cannot find people to play these games with you at home, you can play them with others on your computer through the Internet.     Join a games club--anything from card games to chess or checkers or lawn bowling.     Start a new hobby.     Go back to school.     Volunteer.     Read.   Keep up with world events.    Exercise for a Healthier Heart  You may wonder how you can improve the health of your heart. If you re thinking about exercise, you re on the right track. You don t need to become an athlete. But you do need a certain amount of brisk exercise to help strengthen your heart. If you have been diagnosed with a heart condition, your healthcare provider may advise exercise to help stabilize your condition. To help make exercise a habit, choose safe, fun activities.      Exercise with a friend. When activity is fun, you're more likely to stick with it.   Before you start  Check with your healthcare provider before starting an exercise program. This is especially important if you have not been active for a while. It's also important if you have a long-term (chronic) health problem such as heart disease, diabetes, or obesity. Or if you are at high risk for having these problems.   Why exercise?  Exercising regularly offers many healthy rewards. It can help you do all of the following:     Improve your blood cholesterol level to help prevent further heart trouble    Lower your blood pressure to help prevent a stroke or heart attack    Control diabetes, or  reduce your risk of getting this disease    Improve your heart and lung function    Reach and stay at a healthy weight    Make your muscles stronger so you can stay active    Prevent falls and fractures by slowing the loss of bone mass (osteoporosis)    Manage stress better    Reduce your blood pressure    Improve your sense of self and your body image  Exercise tips      Ease into your routine. Set small goals. Then build on them. If you are not sure what your activity level should be, talk with your healthcare provider first before starting an exercise routine.    Exercise on most days. Aim for a total of 150 minutes (2 hours and 30 minutes) or more of moderate-intensity aerobic activity each week. Or 75 minutes (1 hour and 15 minutes) or more of vigorous-intensity aerobic activity each week. Or try for a combination of both. Moderate activity means that you breathe heavier and your heart rate increases but you can still talk. Think about doing 40 minutes of moderate exercise, 3 to 4 times a week. For best results, activity should last for about 40 minutes to lower blood pressure and cholesterol. It's OK to work up to the 40-minute period over time. Examples of moderate-intensity activity are walking 1 mile in 15 minutes. Or doing 30 to 45 minutes of yard work.    Step up your daily activity level.  Along with your exercise program, try being more active the whole day. Walk instead of drive. Or park further away so that you take more steps each day. Do more household tasks or yard work. You may not be able to meet the advised mount of physical activity. But doing some moderate- or vigorous-intensity aerobic activity can help reduce your risk for heart disease. Your healthcare provider can help you figure out what is best for you.    Choose 1 or more activities you enjoy.  Walking is one of the easiest things you can do. You can also try swimming, riding a bike, dancing, or taking an exercise class.    When to call  your healthcare provider  Call your healthcare provider if you have any of these:     Chest pain or feel dizzy or lightheaded    Burning, tightness, pressure, or heaviness in your chest, neck, shoulders, back, or arms    Abnormal shortness of breath    More joint or muscle pain    A very fast or irregular heartbeat (palpitations)  Edward last reviewed this educational content on 7/1/2019 2000-2021 [employer]. All rights reserved. This information is not intended as a substitute for professional medical care. Always follow your healthcare professional's instructions.        Your Health Risk Assessment indicates you feel you are not in good emotional health.    Recreation   Recreation is not limited to sports and team events. It includes any activity that provides relaxation, interest, enjoyment, and exercise. Recreation provides an outlet for physical, mental, and social energy. It can give a sense of worth and achievement. It can help you stay healthy.    Mental Exercise and Social Involvement  Mental and emotional health is as important as physical health. Keep in touch with friends and family. Stay as active as possible. Continue to learn and challenge yourself.   Things you can do to stay mentally active are:    Learn something new, like a foreign language or musical instrument.     Play SCRABBLE or do crossword puzzles. If you cannot find people to play these games with you at home, you can play them with others on your computer through the Internet.     Join a games club--anything from card games to chess or checkers or lawn bowling.     Start a new hobby.     Go back to school.     Volunteer.     Read.   Keep up with world events.    Preventing Falls at Home  A person can fall for many reasons. Older adults may fall because reaction time slows down as we age. Your muscles and joints may get stiff, weak, or less flexible because of illness, medicines, or a physical condition.   Other health  problems that make falls more likely include:     Arthritis    Dizziness or lightheadedness when you stand up (orthostatic hypotension)    History of a stroke    Dizziness    Anemia    Certain medicines taken for mental illness or to control blood pressure.    Problems with balance or gait    Bladder or urinary problems    History of falling    Changes in vision (vision impairment)    Changes in thinking skills and memory (cognitive impairment)  Injuries from a fall can include serious injuries such as broken bones, dislocated joints, internal bleeding and cuts. Injuries like these can limit your independence.   Prevention tips  To help prevent falls and fall-related injuries, follow the tips below.    Floors  To make floors safer:     Put nonskid pads under area rugs.    Remove small rugs.    Replace worn floor coverings.    Tack carpets firmly to each step on carpeted stairs. Put nonskid strips on the edges of uncarpeted stairs.    Keep floors and stairs free of clutter and cords.    Arrange furniture so there are clear pathways.    Clean up any spills right away.  Bathrooms    To make bathrooms safer:     Install grab bars in the tub or shower.    Apply nonskid strips or put a nonskid rubber mat in the tub or shower.    Sit on a bath chair to bathe.    Use bathmats with nonskid backing.  Lighting  To improve visibility in your home:      Keep a flashlight in each room. Or put a lamp next to the bed within easy reach.    Put nightlights in the bedrooms, hallways, kitchen, and bathrooms.    Make sure all stairways have good lighting.    Take your time when going up and down stairs.    Put handrails on both sides of stairs and in walkways for more support. To prevent injury to your wrist or arm, don t use handrails to pull yourself up.    Install grab bars to pull yourself up.    Move or rearrange items that you use often. This will make them easier to find or reach.    Look at your home to find any safety hazards.  Especially look at doorways, walkways, and the driveway. Remove or repair any safety problems that you find.  Other changes to make    Look around to find any safety hazards. Look closely at doorways, walkways, and the driveway. Remove or repair any safety problems that you find.    Wear shoes that fit well.    Take your time when going up and down stairs.    Put handrails on both sides of stairs and in walkways for more support. To prevent injury to your wrist or arm, don t use handrails to pull yourself up.    Install grab bars wherever needed to pull yourself up.    Arrange items that you use often. This will make them easier to find or reach.    [employer] last reviewed this educational content on 3/1/2020    2907-1175 [employer]. All rights reserved. This information is not intended as a substitute for professional medical care. Always follow your healthcare professional's instructions.

## 2022-01-07 NOTE — PROGRESS NOTES
"SUBJECTIVE:   Lucie Whitehead is a 71 year old female who presents for Preventive Visit.    Patient has been advised of split billing requirements and indicates understanding: Yes   Are you in the first 12 months of your Medicare coverage?  No    Healthy Habits:     In general, how would you rate your overall health?  Fair    Frequency of exercise:  1 day/week    Duration of exercise:  15-30 minutes    Do you usually eat at least 4 servings of fruit and vegetables a day, include whole grains    & fiber and avoid regularly eating high fat or \"junk\" foods?  Yes    Taking medications regularly:  Yes    Ability to successfully perform activities of daily living:  No assistance needed    Home Safety:  No safety concerns identified    Hearing Impairment:  No hearing concerns    In the past 6 months, have you been bothered by leaking of urine?  No    In general, how would you rate your overall mental or emotional health?  Fair      PHQ-2 Total Score: 4    Additional concerns today:  Yes  had covid and had regeneron but was end of September so 3 months have passed.    Mood - seeing psychiatry and counselor.  Continues to struggle  PHQ-9 SCORE 4/25/2021 10/18/2021 1/7/2022   PHQ-9 Total Score - - -   PHQ-9 Total Score MyChart 13 (Moderate depression) 6 (Mild depression) 10 (Moderate depression)   PHQ-9 Total Score 13 6 10        Do you feel safe in your environment? Yes    Have you ever done Advance Care Planning? (For example, a Health Directive, POLST, or a discussion with a medical provider or your loved ones about your wishes): Yes, advance care planning is on file.      Fall risk  Fallen 2 or more times in the past year?: Yes  Any fall with injury in the past year?: No    Cognitive Screening   1) Repeat 3 items (Leader, Season, Table)    2) Clock draw: NORMAL  3) 3 item recall: Recalls 1 object   Results: NORMAL clock, 1-2 items recalled: COGNITIVE IMPAIRMENT LESS LIKELY    Mini-CogTM Copyright MARCIA Zamora. Licensed by " the author for use in [facility]; reprinted with permission (shanelle@[facility]). All rights reserved.      Do you have sleep apnea, excessive snoring or daytime drowsiness?: yes    Reviewed and updated as needed this visit by clinical staff  Tobacco  Allergies  Meds  Problems  Med Hx  Surg Hx  Fam Hx  Soc Hx         Reviewed and updated as needed this visit by Provider  Tobacco  Allergies  Meds  Problems  Med Hx  Surg Hx  Fam Hx        Social History     Tobacco Use     Smoking status: Former Smoker     Packs/day: 0.50     Years: 5.00     Pack years: 2.50     Types: Cigarettes     Quit date: 1975     Years since quittin.0     Smokeless tobacco: Never Used   Substance Use Topics     Alcohol use: Yes     Alcohol/week: 0.0 standard drinks     Comment: 2-3 glass wine /week         Alcohol Use 2022   Prescreen: >3 drinks/day or >7 drinks/week? No   Prescreen: >3 drinks/day or >7 drinks/week? -         Current providers sharing in care for this patient include:   Patient Care Team:  Nallely Moore MD as PCP - General (Internal Medicine - Pediatrics)  Nallely Moore MD as Assigned PCP  Chandrika Macias PA-C as Assigned Surgical Provider    The following health maintenance items are reviewed in Epic and correct as of today:  Health Maintenance Due   Topic Date Due     LIPID  2021     INFLUENZA VACCINE (1) 2021     FALL RISK ASSESSMENT  2021     COVID-19 Vaccine (3 - Booster for Moderna series) 10/01/2021     Labs reviewed in EPIC      FHS-7: No flowsheet data found.    Mammogram Screening: Recommended mammography every 1-2 years with patient discussion and risk factor consideration  Pertinent mammograms are reviewed under the imaging tab.    Review of Systems   Constitutional: Negative for chills and fever.   HENT: Negative for congestion, ear pain, hearing loss and sore throat.    Eyes: Positive for pain and visual disturbance.   Respiratory: Positive for  "shortness of breath. Negative for cough.    Cardiovascular: Positive for peripheral edema. Negative for chest pain and palpitations.   Gastrointestinal: Positive for heartburn. Negative for abdominal pain, constipation, diarrhea, hematochezia and nausea.   Breasts:  Negative for tenderness, breast mass and discharge.   Genitourinary: Positive for frequency and pelvic pain. Negative for dysuria, genital sores, hematuria, urgency, vaginal bleeding and vaginal discharge.   Musculoskeletal: Positive for arthralgias, joint swelling and myalgias.   Skin: Negative for rash.   Neurological: Positive for weakness, headaches and paresthesias. Negative for dizziness.   Psychiatric/Behavioral: Positive for mood changes. The patient is nervous/anxious.    shoulder are bad, they grind.  Hips painful and \"catch\"  Knees hurt if don't wear correct shoes  Has some memory issues - seeing neurology, just had evaluation with them last month but not yet in chart.  Mood - seeing specialist, better.  Swallowing - feels like doesn't swallow well, rarely feels like throat forgot to swallow.  Dryness is part of the problem.  Thinks inhales food bits more than normal.  More at back of throat.  Vision change - needs to see eye doctor for recheck  Shortness of breath - relates to general fatigue and out of shape  Pelvic pain - related to helps  PEÑA - followed at [facility]    OBJECTIVE:   /78 (BP Location: Right arm, Patient Position: Chair, Cuff Size: Adult Large)   Pulse 72   Temp 97.8  F (36.6  C) (Temporal)   Resp 22   Ht 1.664 m (5' 5.5\")   Wt 113.4 kg (250 lb)   LMP 11/12/2007 (LMP Unknown)   SpO2 97%   BMI 40.97 kg/m   Estimated body mass index is 40.97 kg/m  as calculated from the following:    Height as of this encounter: 1.664 m (5' 5.5\").    Weight as of this encounter: 113.4 kg (250 lb).  Physical Exam  GENERAL: healthy, alert and no distress  EYES: Eyes grossly normal to inspection, PERRL and conjunctivae and sclerae " normal  HENT: ear canals and TM's normal, nose and mouth without ulcers or lesions  NECK: no adenopathy, thyroid normal to palpation and fullness erik base of neck that is mildly tender without masses  RESP: lungs clear to auscultation - no rales, rhonchi or wheezes  CV: regular rate and rhythm, normal S1 S2, no S3 or S4, no murmur, click or rub, no peripheral edema and peripheral pulses strong  ABDOMEN: soft, nontender, no hepatosplenomegaly, no masses and bowel sounds normal  MS: no gross musculoskeletal defects noted, no edema  SKIN: no suspicious lesions or rashes  NEURO: Normal strength and tone, mentation intact and speech normal  PSYCH: mentation appears normal, affect normal/bright    Diagnostic Test Results:  Labs reviewed in Epic    ASSESSMENT / PLAN:   Encounter for Medicare annual wellness exam  Routine health education discussed: calcium, diet, exercise, weight, safety.   - COVID-19,PF,MODERNA (18+ YRS BOOSTER .25ML)    Severe episode of recurrent major depressive disorder, without psychotic features (H)  Discussed stress and coping, continue to follow-up with mental health    PEÑA (obstructive sleep apnea)  Continue follow-up with neurology, they are addressing.  Has appointment scheduled    Hyperlipidemia LDL goal <160  Check labs and refill  - Lipid panel reflex to direct LDL Fasting; Future  - simvastatin (ZOCOR) 40 MG tablet; Take 1 tablet (40 mg) by mouth At Bedtime  - Lipid panel reflex to direct LDL Fasting    Impaired fasting glucose  Discussed glucose elevation.  Discuss this is a pre-diabetic condition.  Recommended eating healthily, exercising and maintaining a healthy weight to prevent the development of diabetes.  Recommended blood sugar checks at least yearly to monitor this.    - Basic metabolic panel  (Ca, Cl, CO2, Creat, Gluc, K, Na, BUN); Future  - Hemoglobin A1c; Future  - Basic metabolic panel  (Ca, Cl, CO2, Creat, Gluc, K, Na, BUN)  - Hemoglobin A1c    Class 3 severe obesity due to  "excess calories with serious comorbidity and body mass index (BMI) of 40.0 to 44.9 in adult (H)  Encouraged healthy diet and activity.  Pain is limiting    Chronic pain of both shoulders  refer  - Orthopedic  Referral; Future    Hip pain, bilateral  refer  - Orthopedic  Referral; Future    Dysphagia, unspecified type  Discussed.  Will try changes at home and if not improving will notify me for referral    Lumbar radiculopathy  Possible component of fibromyalgia with multiple pain.  Side effects with gabapentin.  Trial lyrica  - pregabalin (LYRICA) 25 MG capsule; Take 1 capsule (25 mg) by mouth At Bedtime for 7 days, THEN 1 capsule (25 mg) 2 times daily for 7 days, THEN 1 capsule (25 mg) 3 times daily for 16 days.  - pregabalin (LYRICA) 25 MG capsule; Take 1 capsule (25 mg) by mouth 2 times daily AND 2 capsules (50 mg) At Bedtime.    Osteopenia, unspecified location    - DX Hip/Pelvis/Spine; Future    Postmenopausal    - DX Hip/Pelvis/Spine; Future       Patient has been advised of split billing requirements and indicates understanding: No  COUNSELING:  Reviewed preventive health counseling, as reflected in patient instructions    The 10-year ASCVD risk score (Keke SUPA Jr., et al., 2013) is: 11%    Values used to calculate the score:      Age: 71 years      Sex: Female      Is Non- : No      Diabetic: No      Tobacco smoker: No      Systolic Blood Pressure: 128 mmHg      Is BP treated: Yes      HDL Cholesterol: 46 mg/dL      Total Cholesterol: 162 mg/dL       Estimated body mass index is 40.97 kg/m  as calculated from the following:    Height as of this encounter: 1.664 m (5' 5.5\").    Weight as of this encounter: 113.4 kg (250 lb).    Weight management plan: Discussed healthy diet and exercise guidelines    She reports that she quit smoking about 47 years ago. Her smoking use included cigarettes. She has a 2.50 pack-year smoking history. She has never used smokeless " tobacco.      Appropriate preventive services were discussed with this patient, including applicable screening as appropriate for cardiovascular disease, diabetes, osteopenia/osteoporosis, and glaucoma.  As appropriate for age/gender, discussed screening for colorectal cancer, prostate cancer, breast cancer, and cervical cancer. Checklist reviewing preventive services available has been given to the patient.    Reviewed patients plan of care and provided an AVS. The Intermediate Care Plan ( asthma action plan, low back pain action plan, and migraine action plan) for Lucie meets the Care Plan requirement. This Care Plan has been established and reviewed with the Patient.    Counseling Resources:  ATP IV Guidelines  Pooled Cohorts Equation Calculator  Breast Cancer Risk Calculator  Breast Cancer: Medication to Reduce Risk  FRAX Risk Assessment  ICSI Preventive Guidelines  Dietary Guidelines for Americans, 2010  [employer] MyPlate  ASA Prophylaxis  Lung CA Screening    Nallely Moore MD  [facility]    Identified Health Risks:    The patient was provided with suggestions to help her develop a healthy physical lifestyle.  She is at risk for lack of exercise and has been provided with information to increase physical activity for the benefit of her well-being.  The patient was provided with suggestions to help her develop a healthy emotional lifestyle.  She is at risk for falling and has been provided with information to reduce the risk of falling at home.

## 2022-01-08 LAB
ANION GAP SERPL CALCULATED.3IONS-SCNC: 4 MMOL/L (ref 3–14)
BUN SERPL-MCNC: 17 MG/DL (ref 7–30)
CALCIUM SERPL-MCNC: 8.8 MG/DL (ref 8.5–10.1)
CHLORIDE BLD-SCNC: 106 MMOL/L (ref 94–109)
CHOLEST SERPL-MCNC: 166 MG/DL
CO2 SERPL-SCNC: 28 MMOL/L (ref 20–32)
CREAT SERPL-MCNC: 1.01 MG/DL (ref 0.52–1.04)
FASTING STATUS PATIENT QL REPORTED: YES
GFR SERPL CREATININE-BSD FRML MDRD: 60 ML/MIN/1.73M2
GLUCOSE BLD-MCNC: 95 MG/DL (ref 70–99)
HDLC SERPL-MCNC: 40 MG/DL
LDLC SERPL CALC-MCNC: 90 MG/DL
NONHDLC SERPL-MCNC: 126 MG/DL
POTASSIUM BLD-SCNC: 4.8 MMOL/L (ref 3.4–5.3)
SODIUM SERPL-SCNC: 138 MMOL/L (ref 133–144)
TRIGL SERPL-MCNC: 178 MG/DL

## 2022-01-08 ASSESSMENT — PATIENT HEALTH QUESTIONNAIRE - PHQ9: SUM OF ALL RESPONSES TO PHQ QUESTIONS 1-9: 10

## 2022-01-14 ENCOUNTER — ANCILLARY PROCEDURE (OUTPATIENT)
Dept: GENERAL RADIOLOGY | Facility: CLINIC | Age: 72
End: 2022-01-14
Attending: STUDENT IN AN ORGANIZED HEALTH CARE EDUCATION/TRAINING PROGRAM
Payer: COMMERCIAL

## 2022-01-14 ENCOUNTER — OFFICE VISIT (OUTPATIENT)
Dept: ORTHOPEDICS | Facility: CLINIC | Age: 72
End: 2022-01-14
Attending: INTERNAL MEDICINE
Payer: COMMERCIAL

## 2022-01-14 VITALS
BODY MASS INDEX: 40.18 KG/M2 | HEIGHT: 66 IN | WEIGHT: 250 LBS | SYSTOLIC BLOOD PRESSURE: 128 MMHG | DIASTOLIC BLOOD PRESSURE: 86 MMHG

## 2022-01-14 DIAGNOSIS — M25.551 HIP PAIN, BILATERAL: ICD-10-CM

## 2022-01-14 DIAGNOSIS — G89.29 CHRONIC PAIN OF BOTH SHOULDERS: ICD-10-CM

## 2022-01-14 DIAGNOSIS — M75.41 IMPINGEMENT SYNDROME OF BOTH SHOULDERS: ICD-10-CM

## 2022-01-14 DIAGNOSIS — M25.552 HIP PAIN, BILATERAL: ICD-10-CM

## 2022-01-14 DIAGNOSIS — M19.011 ARTHRITIS OF BOTH GLENOHUMERAL JOINTS: ICD-10-CM

## 2022-01-14 DIAGNOSIS — M16.0 PRIMARY OSTEOARTHRITIS OF BOTH HIPS: ICD-10-CM

## 2022-01-14 DIAGNOSIS — M25.512 CHRONIC PAIN OF BOTH SHOULDERS: Primary | ICD-10-CM

## 2022-01-14 DIAGNOSIS — G89.29 CHRONIC PAIN OF BOTH SHOULDERS: Primary | ICD-10-CM

## 2022-01-14 DIAGNOSIS — S46.211S RUPTURE OF RIGHT BICEPS TENDON, SEQUELA: ICD-10-CM

## 2022-01-14 DIAGNOSIS — M25.511 CHRONIC PAIN OF BOTH SHOULDERS: ICD-10-CM

## 2022-01-14 DIAGNOSIS — M48.062 SPINAL STENOSIS OF LUMBAR REGION WITH NEUROGENIC CLAUDICATION: ICD-10-CM

## 2022-01-14 DIAGNOSIS — M67.959 TENDINOPATHY OF GLUTEUS MEDIUS: ICD-10-CM

## 2022-01-14 DIAGNOSIS — M25.512 CHRONIC PAIN OF BOTH SHOULDERS: ICD-10-CM

## 2022-01-14 DIAGNOSIS — M25.511 CHRONIC PAIN OF BOTH SHOULDERS: Primary | ICD-10-CM

## 2022-01-14 DIAGNOSIS — M75.42 IMPINGEMENT SYNDROME OF BOTH SHOULDERS: ICD-10-CM

## 2022-01-14 DIAGNOSIS — M19.012 ARTHRITIS OF BOTH GLENOHUMERAL JOINTS: ICD-10-CM

## 2022-01-14 PROCEDURE — 73522 X-RAY EXAM HIPS BI 3-4 VIEWS: CPT | Performed by: RADIOLOGY

## 2022-01-14 PROCEDURE — 73030 X-RAY EXAM OF SHOULDER: CPT | Mod: LT | Performed by: RADIOLOGY

## 2022-01-14 PROCEDURE — 99204 OFFICE O/P NEW MOD 45 MIN: CPT | Performed by: STUDENT IN AN ORGANIZED HEALTH CARE EDUCATION/TRAINING PROGRAM

## 2022-01-14 ASSESSMENT — MIFFLIN-ST. JEOR: SCORE: 1667.8

## 2022-01-14 NOTE — PATIENT INSTRUCTIONS
1. Chronic pain of both shoulders    2. Arthritis of both glenohumeral joints    3. Impingement syndrome of both shoulders    4. History of biceps tendon rupture 2014    5. Hip pain, bilateral    6. Primary osteoarthritis of both hips    7. Tendinopathy of gluteus medius/minimus, bilateral    8. Spinal stenosis of lumbar region with neurogenic claudication      Lucie Whitehead is a 71 year old female presenting for evaluation of chronic worsening bilateral shoulder pain (R>L). History, examination and imaging are consistent with moderate glenohumeral arthritis with a component of rotator cuff impingement and mild persistent biceps tendon pain s/p rupture in 2014. We reviewed treatment plan inclusive of pain management (topicals, NSAIDS, steroid injection), activity modification (avoiding painful activities), formal physical therapy and timing of repeat advance imaging (ie MRI).      At this time, plan to proceed with the following:  - Activity as tolerated based on pain. Avoid painful activities such as heavy lifting or repetitive overhead activity.   - Physical therapy to address pain-free range of motion of the shoulder, scapular stabilization, stretching of the shoulder capsule, strengthening of the periscapular, rotator cuff, and deltoid muscles, with progression to functional rehabilitation with home exercise prescription.  - If unable to progress with PT secondary to pain then return for consideration of steroid injection. If shoulder pain is not improved / improving throughout course of PT then return for re-evaluation / consideration for advanced imaging.    Additionally history, exam and imaging of her bilateral hips are consistent with moderate osteoarthritis of both hips in addition to gluteus medius/minimus tendinosis (R>L) and calcific tendinosis of the TFL. This is in the setting of deconditioning and osteoarthritis of the lumbar spine with lumbar spinal stenosis s/p distant decompression/fusion.  No red flags on examination to warrant urgent advanced imaging or intervention. I recommend beginning a trial of physical therapy to work on hip and core strengthening and mobility. If no improvement with physical therapy, we can consider cortisone injection or proceeding with MRI for further evaluation.     Please schedule a follow up appointment to see me in 6-8 weeks, or sooner as needed for persistence or worsening of pain. You may call our direct clinic number ([phone]) at any time with questions or concerns.    Lorraine Beltran MD, CAM  [facility]

## 2022-01-14 NOTE — PROGRESS NOTES
ASSESSMENT & PLAN    1. Chronic pain of both shoulders    2. Arthritis of both glenohumeral joints    3. Impingement syndrome of both shoulders    4. History of biceps tendon rupture 2014    5. Hip pain, bilateral    6. Primary osteoarthritis of both hips    7. Tendinopathy of gluteus medius/minimus, bilateral    8. Spinal stenosis of lumbar region with neurogenic claudication      Lucie Whitehead is a 71 year old female presenting for evaluation of chronic worsening bilateral shoulder pain (R>L). History, examination and imaging are consistent with moderate-severe glenohumeral arthritis with component of rotator cuff impingement and mild persistent biceps tendon pain s/p rupture and arthroscopy with decompression and distal clavicle resection 9/17/2014. We reviewed treatment plan inclusive of pain management (topicals, NSAIDS, steroid injection), activity modification (avoiding painful activities), formal physical therapy and timing of repeat advance imaging (ie MRI).      At this time, plan to proceed with the following:  - Activity as tolerated based on pain. Avoid painful activities such as heavy lifting or repetitive overhead activity.   - Physical therapy to address pain-free range of motion of the shoulder, scapular stabilization, stretching of the shoulder capsule, strengthening of the periscapular, rotator cuff, and deltoid muscles, with progression to functional rehabilitation with home exercise prescription.  - If unable to progress with PT secondary to pain then return for consideration of steroid injection. If shoulder pain is not improved / improving throughout course of PT then return for re-evaluation / consideration for advanced imaging.    Additionally, history, exam and imaging of her bilateral hips are consistent with moderate osteoarthritis of both hips in addition to gluteus medius/minimus tendinosis (R>L) and calcific tendinosis of the TFL. This is in the setting of deconditioning and  osteoarthritis of the lumbar spine with lumbar spinal stenosis s/p lumbar decompression/fusion in 2020. No red flags on examination to warrant urgent advanced imaging or intervention.   - I recommend beginning a trial of physical therapy to work on hip and core strengthening and mobility.   - If no improvement with physical therapy, we can consider cortisone injection or proceeding with MRI for further evaluation.  - Additionally, I recommend follow up with neurosurgery (Dr. Tamez) for chronic neck and low back pain with spinal stenosis s/p decompression and fusion of the cervical (1/26/2012) and lumbar spine (10/21/2020).     Please schedule a follow up appointment to see me in 6-8 weeks, or sooner as needed for persistence or worsening of pain. You may call our direct clinic number ([phone]) at any time with questions or concerns.    Lorraine Beltran MD, [facility]    -----    SUBJECTIVE  Lucie Whitehead is a/an 71 year old Right handed female who is seen in consultation at the request of  Nallely Moore M.D. for evaluation of bilateral shoulder pain. The patient is seen by themselves.    Onset: Patient reports h/o chronic bilateral shoulder pain with pain worsening over the last 6 months. Reports insidious onset without acute precipitating event.  Location of Pain: bilateral lateral shoulder  Rating of Pain at worst: 8/10  Rating of Pain Currently: 5/10  Worsened by: pain after sewing / crafting, reaching overhead, lifting, difficulty sleeping - pain wakes her / keeps her up at night  Better with: rest / activity avoidance, ibuprofen  Treatments tried: rest/activity avoidance  Associated symptoms: numbness and tingling in bilateral hands    Orthopedic history: YES - patient also complains of bilateral hip pain increasing for ~ 6 months. Pain is located in bilateral lateral hips (R>L). Pain increases with walking. She notes clicking in hips.    Relevant  "surgical history: YES - right shoulder arthroscopy, decompression and distal clavicle resection Date: 2014, Dr. Rodriguez; left elbow surgery in the early s; lumbar fusion 10/21/2020; cervical fusion 2012; bilateral carpal tunnel releases    Social history: retired; patient has been doing water aerobics    Past Medical History:   Diagnosis Date     Arthritis      Complication of anesthesia     extremely dry eyes     Depressive disorder      Diabetes (H)      Gastro-oesophageal reflux disease      Hirsutism     Abstracted 02     History of blood transfusion      Hypertension      Leiomyoma of uterus, unspecified     Abstracted 02     Obesity 2010    Estimated Body mass index is 28.97 kg/(m^2) as calculated from the following:   Height as of 7/10/13: 5' 7\"(1.702 m).   Weight as of 7/10/13: 185 lb(83.915 kg).       Other and unspecified hyperlipidemia     Abstracted 02     Other chronic pain     low back and legs and feet     Sleep apnea     CPAP     Uncomplicated asthma      Social History     Socioeconomic History     Marital status:      Spouse name: Not on file     Number of children: Not on file     Years of education: Not on file     Highest education level: Not on file   Occupational History     Not on file   Tobacco Use     Smoking status: Former Smoker     Packs/day: 0.50     Years: 5.00     Pack years: 2.50     Types: Cigarettes     Quit date: 1975     Years since quittin.0     Smokeless tobacco: Never Used   Substance and Sexual Activity     Alcohol use: Yes     Alcohol/week: 0.0 standard drinks     Comment: 2-3 glass wine /week     Drug use: No     Sexual activity: Not Currently     Partners: Male     Birth control/protection: Post-menopausal   Other Topics Concern     Parent/sibling w/ CABG, MI or angioplasty before 65F 55M? No   Social History Narrative     Not on file     Social Determinants of Health     Financial Resource Strain: Not on file   Food " "Insecurity: Not on file   Transportation Needs: Not on file   Physical Activity: Not on file   Stress: Not on file   Social Connections: Not on file   Intimate Partner Violence: Not on file   Housing Stability: Not on file         Patient's past medical, surgical, social, and family histories were reviewed today and no changes are noted.    REVIEW OF SYSTEMS:  10 point ROS is negative other than symptoms noted above in HPI, Past Medical History or as stated below  Constitutional: NEGATIVE for fever, chills, change in weight  Skin: NEGATIVE for worrisome rashes, moles or lesions  GI/: NEGATIVE for bowel or bladder changes  Neuro: NEGATIVE for weakness, dizziness or paresthesias    OBJECTIVE:  /86   Ht 1.664 m (5' 5.5\")   Wt 113.4 kg (250 lb)   LMP 11/12/2007 (LMP Unknown)   BMI 40.97 kg/m     General: healthy, alert and in no distress  HEENT: no scleral icterus or conjunctival erythema  Skin: no suspicious lesions or rash. No jaundice.  CV: regular rhythm by palpation  Resp: normal respiratory effort without conversational dyspnea   Psych: normal mood and affect  Gait: antlagic gait with appropriate coordination and balance  Neuro: normal light touch sensory exam of the bilateral upper extremities.    MSK:  BILATERAL SHOULDER  Inspection:    no swelling, bruising, discoloration, or obvious deformity or asymmetry  Palpation:    Tender about the anterior capsule, bicipital groove (mild), greater tuberosity, supraspinatus insertion (mild), posterior capsule. Remainder of bony and tendinous landmarks are nontender.  Active Range of Motion:     Right: Abduction 1650, FF 1650, , IR L4.    Left: Abduction 1650, FF 1650, , IR L4.    Strength:    Scapular plane abduction 5-/5,  ER 5/5, IR 5/5, biceps 5-/5, triceps 5/5  Special Tests:    Positive: Neer's, Baker', painful arc. Equivocal biceps testing and empty can.    CERVICAL SPINE  Inspection:    normal cervical lordosis present, rounded shoulders, " forward head posture  Palpation:    Tender about the paracervical musculature (bilateral). Otherwise remainder of the landmarks and nontender.  Range of Motion:     Grossly reduced due to stiffness  Strength:    Full strength throughout all neck muscles  Special Tests:    Negative: Spurling's (bilateral)    BILATERAL HIP  Inspection:    No swelling, bruising, discoloration, or obvious deformity or asymmetry  Palpation:    Tender about the anterior groin/joint line, greater trochanteric region, gluteus medius insertion, gluteal muscles, ASIS and SI joint. Otherwise all other landmarks are nontender.    Crepitus is Absent  Active Range of Motion:  WFL  Strength:    Flexion 5-/5 / extension 4+/5 / adduction 5-/5 / abduction 4-/5 (painful)  Special Tests:    Positive: resisted gluteus medius provocation, ANNA, anterior impingement (FADIR). Equivocal SLR and femoral stretch test.      Independent visualization of the below image:  Recent Results (from the past 24 hour(s))   XR Shoulder Bilateral G/E 2 Views    Narrative    XR SHOULDER BILATERAL G/E 2 VIEWS 1/14/2022 10:21 AM     HISTORY: Chronic bilateral shoulder pain.    COMPARISON: 8/4/2014 right shoulder.      Impression    IMPRESSION:     Right: Moderate to advanced osteoarthritis has progressed in the  interval. Normal glenohumeral alignment. Mild degenerative changes in  the acromioclavicular joint. Postoperative changes in the cervical  spine.    Left: Moderate osteoarthritis of the glenohumeral joint. Normal  glenohumeral alignment. Mild degenerative changes in the  acromioclavicular joint.    KATHRYN GOODRICH MD         SYSTEM ID:  [id number]   XR Pelvis and Hip Bilateral 1 View    Narrative    XR PELVIS AND HIP BILATERAL 1 VIEW 1/14/2022 10:25 AM     HISTORY: Bilateral hip pain.    COMPARISON: None.      Impression    IMPRESSION: Moderate osteoarthritis of both hip joints. Dystrophic  calcification adjacent to the right hip joint. Postoperative  changes  lumbosacral spine with iliac anchors. No fractures are evident.  Osteopenia.    KATHRYN GOODRICH MD         SYSTEM ID:  [id number]       MR SHOULDER RIGHT WITHOUT CONTRAST August 15, 2014 5:07 PM      HISTORY: Right shoulder pain. Disorders of bursae and tendons in  shoulder region.     TECHNIQUE: Axial dual echo T2. Coronal T1. Coronal T2 with and without  fat suppression. Sagittal T2.     FINDINGS:  Osseous acromion outlet: Changes of active AC arthrosis including  reactive bone marrow edema; there is mild-moderate inferior  hypertrophic change of the AC joint, which results in an impression on  the supraspinatus musculotendinous junction. There is mild anterior  acromial downsloping and the acromion is otherwise type I.     Rotator Cuff: There is some motion artifact. Findings consistent with  mild supraspinatus tendinosis. No focal tear identified.     Labral Structures: There is an abnormal appearance of the superior  labrum; this is felt to represent at least intrasubstance  degeneration, if not tearing. I can also not exclude subtle tearing of  the posterior labrum. If indicated clinically, MR arthrography would  be considered the study of choice in this regard.     Biceps Tendon: Findings consistent with rupture at the level of the  biceps anchor.     Osseous and Cartilaginous Structures: Changes of at least mild, if not  mild-moderate, glenohumeral osteoarthritis.     Additional Findings: No significant joint effusion. There is some  fluid extending along the biceps tendon sheath. There appears be  mildly dilated vein within the spinal glenoid notch. No supraspinatus  or infraspinatus muscle edema or selective atrophy.     IMPRESSION  IMPRESSION:  1. Biceps tendon rupture.  2. Superior labral degeneration or tearing. Possible posterior labral  tearing.  3. Mild supraspinatus tendinosis. Some findings which can be seen in  association with impingement syndrome, in the appropriate clinical  setting.    4. At least mild glenohumeral osteoarthritis.     MD Lorraine GIRON MD, CAM  [facility]

## 2022-01-14 NOTE — LETTER
1/14/2022         RE: Lucie Whitehead  [address] [address] [id number]        Dear Colleague,    Thank you for referring your patient, Lucie Whitehead, to the [facility] [place]. Please see a copy of my visit note below.    ASSESSMENT & PLAN    1. Chronic pain of both shoulders    2. Arthritis of both glenohumeral joints    3. Impingement syndrome of both shoulders    4. History of biceps tendon rupture 2014    5. Hip pain, bilateral    6. Primary osteoarthritis of both hips    7. Tendinopathy of gluteus medius/minimus, bilateral    8. Spinal stenosis of lumbar region with neurogenic claudication      Lucie Whitehead is a 71 year old female presenting for evaluation of chronic worsening bilateral shoulder pain (R>L). History, examination and imaging are consistent with moderate-severe glenohumeral arthritis with component of rotator cuff impingement and mild persistent biceps tendon pain s/p rupture and arthroscopy with decompression and distal clavicle resection 9/17/2014. We reviewed treatment plan inclusive of pain management (topicals, NSAIDS, steroid injection), activity modification (avoiding painful activities), formal physical therapy and timing of repeat advance imaging (ie MRI).      At this time, plan to proceed with the following:  - Activity as tolerated based on pain. Avoid painful activities such as heavy lifting or repetitive overhead activity.   - Physical therapy to address pain-free range of motion of the shoulder, scapular stabilization, stretching of the shoulder capsule, strengthening of the periscapular, rotator cuff, and deltoid muscles, with progression to functional rehabilitation with home exercise prescription.  - If unable to progress with PT secondary to pain then return for consideration of steroid injection. If shoulder pain is not improved / improving throughout course of PT then return for re-evaluation / consideration  for advanced imaging.    Additionally, history, exam and imaging of her bilateral hips are consistent with moderate osteoarthritis of both hips in addition to gluteus medius/minimus tendinosis (R>L) and calcific tendinosis of the TFL. This is in the setting of deconditioning and osteoarthritis of the lumbar spine with lumbar spinal stenosis s/p lumbar decompression/fusion in 2020. No red flags on examination to warrant urgent advanced imaging or intervention.   - I recommend beginning a trial of physical therapy to work on hip and core strengthening and mobility.   - If no improvement with physical therapy, we can consider cortisone injection or proceeding with MRI for further evaluation.  - Additionally, I recommend follow up with neurosurgery (Dr. Tamez) for chronic neck and low back pain with spinal stenosis s/p decompression and fusion of the cervical (1/26/2012) and lumbar spine (10/21/2020).     Please schedule a follow up appointment to see me in 6-8 weeks, or sooner as needed for persistence or worsening of pain. You may call our direct clinic number ([phone]) at any time with questions or concerns.    Lorraine Beltran MD, [facility]    -----    SUBJECTIVE  Lucie Whitehead is a/an 71 year old Right handed female who is seen in consultation at the request of  Nallely Moore M.D. for evaluation of bilateral shoulder pain. The patient is seen by themselves.    Onset: Patient reports h/o chronic bilateral shoulder pain with pain worsening over the last 6 months. Reports insidious onset without acute precipitating event.  Location of Pain: bilateral lateral shoulder  Rating of Pain at worst: 8/10  Rating of Pain Currently: 5/10  Worsened by: pain after sewing / crafting, reaching overhead, lifting, difficulty sleeping - pain wakes her / keeps her up at night  Better with: rest / activity avoidance, ibuprofen  Treatments tried: rest/activity  "avoidance  Associated symptoms: numbness and tingling in bilateral hands    Orthopedic history: YES - patient also complains of bilateral hip pain increasing for ~ 6 months. Pain is located in bilateral lateral hips (R>L). Pain increases with walking. She notes clicking in hips.    Relevant surgical history: YES - right shoulder arthroscopy, decompression and distal clavicle resection Date: 2014, Dr. Rodriguez; left elbow surgery in the early ; lumbar fusion 10/21/2020; cervical fusion 2012; bilateral carpal tunnel releases    Social history: retired; patient has been doing water aerobics    Past Medical History:   Diagnosis Date     Arthritis      Complication of anesthesia     extremely dry eyes     Depressive disorder      Diabetes (H)      Gastro-oesophageal reflux disease      Hirsutism     Abstracted 02     History of blood transfusion      Hypertension      Leiomyoma of uterus, unspecified     Abstracted 02     Obesity 2010    Estimated Body mass index is 28.97 kg/(m^2) as calculated from the following:   Height as of 7/10/13: 5' 7\"(1.702 m).   Weight as of 7/10/13: 185 lb(83.915 kg).       Other and unspecified hyperlipidemia     Abstracted 02     Other chronic pain     low back and legs and feet     Sleep apnea     CPAP     Uncomplicated asthma      Social History     Socioeconomic History     Marital status:      Spouse name: Not on file     Number of children: Not on file     Years of education: Not on file     Highest education level: Not on file   Occupational History     Not on file   Tobacco Use     Smoking status: Former Smoker     Packs/day: 0.50     Years: 5.00     Pack years: 2.50     Types: Cigarettes     Quit date: 1975     Years since quittin.0     Smokeless tobacco: Never Used   Substance and Sexual Activity     Alcohol use: Yes     Alcohol/week: 0.0 standard drinks     Comment: 2-3 glass wine /week     Drug use: No     Sexual activity: Not " "Currently     Partners: Male     Birth control/protection: Post-menopausal   Other Topics Concern     Parent/sibling w/ CABG, MI or angioplasty before 65F 55M? No   Social History Narrative     Not on file     Social Determinants of Health     Financial Resource Strain: Not on file   Food Insecurity: Not on file   Transportation Needs: Not on file   Physical Activity: Not on file   Stress: Not on file   Social Connections: Not on file   Intimate Partner Violence: Not on file   Housing Stability: Not on file         Patient's past medical, surgical, social, and family histories were reviewed today and no changes are noted.    REVIEW OF SYSTEMS:  10 point ROS is negative other than symptoms noted above in HPI, Past Medical History or as stated below  Constitutional: NEGATIVE for fever, chills, change in weight  Skin: NEGATIVE for worrisome rashes, moles or lesions  GI/: NEGATIVE for bowel or bladder changes  Neuro: NEGATIVE for weakness, dizziness or paresthesias    OBJECTIVE:  /86   Ht 1.664 m (5' 5.5\")   Wt 113.4 kg (250 lb)   LMP 11/12/2007 (LMP Unknown)   BMI 40.97 kg/m     General: healthy, alert and in no distress  HEENT: no scleral icterus or conjunctival erythema  Skin: no suspicious lesions or rash. No jaundice.  CV: regular rhythm by palpation  Resp: normal respiratory effort without conversational dyspnea   Psych: normal mood and affect  Gait: antlagic gait with appropriate coordination and balance  Neuro: normal light touch sensory exam of the bilateral upper extremities.    MSK:  BILATERAL SHOULDER  Inspection:    no swelling, bruising, discoloration, or obvious deformity or asymmetry  Palpation:    Tender about the anterior capsule, bicipital groove (mild), greater tuberosity, supraspinatus insertion (mild), posterior capsule. Remainder of bony and tendinous landmarks are nontender.  Active Range of Motion:     Right: Abduction 1650, FF 1650, , IR L4.    Left: Abduction 1650, FF 1650, " , IR L4.    Strength:    Scapular plane abduction 5-/5,  ER 5/5, IR 5/5, biceps 5-/5, triceps 5/5  Special Tests:    Positive: Neer's, Baker', painful arc. Equivocal biceps testing and empty can.    CERVICAL SPINE  Inspection:    normal cervical lordosis present, rounded shoulders, forward head posture  Palpation:    Tender about the paracervical musculature (bilateral). Otherwise remainder of the landmarks and nontender.  Range of Motion:     Grossly reduced due to stiffness  Strength:    Full strength throughout all neck muscles  Special Tests:    Negative: Spurling's (bilateral)    BILATERAL HIP  Inspection:    No swelling, bruising, discoloration, or obvious deformity or asymmetry  Palpation:    Tender about the anterior groin/joint line, greater trochanteric region, gluteus medius insertion, gluteal muscles, ASIS and SI joint. Otherwise all other landmarks are nontender.    Crepitus is Absent  Active Range of Motion:  WFL  Strength:    Flexion 5-/5 / extension 4+/5 / adduction 5-/5 / abduction 4-/5 (painful)  Special Tests:    Positive: resisted gluteus medius provocation, ANNA, anterior impingement (FADIR). Equivocal SLR and femoral stretch test.      Independent visualization of the below image:  Recent Results (from the past 24 hour(s))   XR Shoulder Bilateral G/E 2 Views    Narrative    XR SHOULDER BILATERAL G/E 2 VIEWS 1/14/2022 10:21 AM     HISTORY: Chronic bilateral shoulder pain.    COMPARISON: 8/4/2014 right shoulder.      Impression    IMPRESSION:     Right: Moderate to advanced osteoarthritis has progressed in the  interval. Normal glenohumeral alignment. Mild degenerative changes in  the acromioclavicular joint. Postoperative changes in the cervical  spine.    Left: Moderate osteoarthritis of the glenohumeral joint. Normal  glenohumeral alignment. Mild degenerative changes in the  acromioclavicular joint.    KATHRYN GOODRICH MD         SYSTEM ID:  [id number]   XR Pelvis and Hip Bilateral 1  View    Narrative    XR PELVIS AND HIP BILATERAL 1 VIEW 1/14/2022 10:25 AM     HISTORY: Bilateral hip pain.    COMPARISON: None.      Impression    IMPRESSION: Moderate osteoarthritis of both hip joints. Dystrophic  calcification adjacent to the right hip joint. Postoperative changes  lumbosacral spine with iliac anchors. No fractures are evident.  Osteopenia.    KATHRYN GOODRICH MD         SYSTEM ID:  [id number]       MR SHOULDER RIGHT WITHOUT CONTRAST August 15, 2014 5:07 PM      HISTORY: Right shoulder pain. Disorders of bursae and tendons in  shoulder region.     TECHNIQUE: Axial dual echo T2. Coronal T1. Coronal T2 with and without  fat suppression. Sagittal T2.     FINDINGS:  Osseous acromion outlet: Changes of active AC arthrosis including  reactive bone marrow edema; there is mild-moderate inferior  hypertrophic change of the AC joint, which results in an impression on  the supraspinatus musculotendinous junction. There is mild anterior  acromial downsloping and the acromion is otherwise type I.     Rotator Cuff: There is some motion artifact. Findings consistent with  mild supraspinatus tendinosis. No focal tear identified.     Labral Structures: There is an abnormal appearance of the superior  labrum; this is felt to represent at least intrasubstance  degeneration, if not tearing. I can also not exclude subtle tearing of  the posterior labrum. If indicated clinically, MR arthrography would  be considered the study of choice in this regard.     Biceps Tendon: Findings consistent with rupture at the level of the  biceps anchor.     Osseous and Cartilaginous Structures: Changes of at least mild, if not  mild-moderate, glenohumeral osteoarthritis.     Additional Findings: No significant joint effusion. There is some  fluid extending along the biceps tendon sheath. There appears be  mildly dilated vein within the spinal glenoid notch. No supraspinatus  or infraspinatus muscle edema or selective  atrophy.     IMPRESSION  IMPRESSION:  1. Biceps tendon rupture.  2. Superior labral degeneration or tearing. Possible posterior labral  tearing.  3. Mild supraspinatus tendinosis. Some findings which can be seen in  association with impingement syndrome, in the appropriate clinical  setting.   4. At least mild glenohumeral osteoarthritis.     MD Lorraine GIRON MD, [facility]        Again, thank you for allowing me to participate in the care of your patient.        Sincerely,        Lorraine Beltran MD

## 2022-01-19 ENCOUNTER — TRANSFERRED RECORDS (OUTPATIENT)
Dept: HEALTH INFORMATION MANAGEMENT | Facility: CLINIC | Age: 72
End: 2022-01-19

## 2022-01-19 ENCOUNTER — THERAPY VISIT (OUTPATIENT)
Dept: PHYSICAL THERAPY | Facility: CLINIC | Age: 72
End: 2022-01-19
Attending: STUDENT IN AN ORGANIZED HEALTH CARE EDUCATION/TRAINING PROGRAM
Payer: COMMERCIAL

## 2022-01-19 DIAGNOSIS — M67.959 TENDINOPATHY OF GLUTEUS MEDIUS: ICD-10-CM

## 2022-01-19 DIAGNOSIS — M19.011 ARTHRITIS OF BOTH GLENOHUMERAL JOINTS: ICD-10-CM

## 2022-01-19 DIAGNOSIS — S46.211S RUPTURE OF RIGHT BICEPS TENDON, SEQUELA: ICD-10-CM

## 2022-01-19 DIAGNOSIS — M25.551 HIP PAIN, BILATERAL: ICD-10-CM

## 2022-01-19 DIAGNOSIS — M25.552 HIP PAIN, BILATERAL: ICD-10-CM

## 2022-01-19 DIAGNOSIS — G89.29 CHRONIC PAIN OF BOTH SHOULDERS: ICD-10-CM

## 2022-01-19 DIAGNOSIS — M16.0 PRIMARY OSTEOARTHRITIS OF BOTH HIPS: ICD-10-CM

## 2022-01-19 DIAGNOSIS — M25.511 CHRONIC PAIN OF BOTH SHOULDERS: ICD-10-CM

## 2022-01-19 DIAGNOSIS — M19.012 ARTHRITIS OF BOTH GLENOHUMERAL JOINTS: ICD-10-CM

## 2022-01-19 DIAGNOSIS — M75.41 IMPINGEMENT SYNDROME OF BOTH SHOULDERS: ICD-10-CM

## 2022-01-19 DIAGNOSIS — M25.512 CHRONIC PAIN OF BOTH SHOULDERS: ICD-10-CM

## 2022-01-19 DIAGNOSIS — M75.42 IMPINGEMENT SYNDROME OF BOTH SHOULDERS: ICD-10-CM

## 2022-01-19 DIAGNOSIS — M48.062 SPINAL STENOSIS OF LUMBAR REGION WITH NEUROGENIC CLAUDICATION: ICD-10-CM

## 2022-01-19 PROCEDURE — 97110 THERAPEUTIC EXERCISES: CPT | Mod: GP | Performed by: PHYSICAL THERAPIST

## 2022-01-19 PROCEDURE — 97161 PT EVAL LOW COMPLEX 20 MIN: CPT | Mod: GP | Performed by: PHYSICAL THERAPIST

## 2022-01-19 PROCEDURE — 97112 NEUROMUSCULAR REEDUCATION: CPT | Mod: GP | Performed by: PHYSICAL THERAPIST

## 2022-01-19 NOTE — PROGRESS NOTES
Physical Therapy Initial Evaluation  Subjective:  The history is provided by the patient.   Patient Health History  Lucie Whitehead being seen for B shoulders, B hip.     Problem began: 1/14/2022 (date of MD appt).   Problem occurred: no known injury, continued slow progression of B shoulder pain and B hip pain/weakness that limits walking tolerance   Pain is reported as 4/10 on pain scale.  General health as reported by patient is fair.  Pertinent medical history includes: depression, asthma, high blood pressure, mental illness, osteoarthritis, overweight and sleep disorder/apnea.   Red flags:  Pain at rest/night and significant weakness.  Medical allergies: none.   Surgeries include:  Orthopedic surgery. Other surgery history details: L biceps distal repair, R shoulder proximal biceps injury, R shoulder decompressions.  LumbarSacral fusion, Cervical Fusion.    Current medications:  Anti-depressants, anti-inflammatory, pain medication and sleep medication.    Current occupation is Retired.   Primary job tasks include:  Prolonged sitting.                  Therapist Generated HPI Evaluation         Type of problem:  Bilateral shoulders.    This is a chronic condition.  Condition occurred with:  Unknown cause.  Where condition occurred: for unknown reasons.  Patient reports pain:  Lateral.  Pain is described as aching and sharp and is intermittent.  Pain is worse during the day and worse during the night.  Since onset symptoms are unchanged.  Associated symptoms:  Loss of motion/stiffness and loss of strength. Symptoms are exacerbated by lying on extremity, certain positions and using arm at shoulder level  and relieved by analgesics.  Special tests included:  X-ray.    Barriers include:  None as reported by patient.    Therapist Generated HPI Evaluation         Type of problem:  Bilateral hips.    This is a chronic condition.  Condition occurred with:  Insidious onset and degenerative joint disease.  Where  condition occurred: for unknown reasons.  Patient reports pain:  Lateral, groin and anterior.  and is intermittent.  Pain radiates to:  Thigh. Pain is worse during the day.  Since onset symptoms are unchanged.  Associated symptoms:  Loss of strength. Symptoms are exacerbated by activity and walking  and relieved by rest.  Special tests included:  X-ray.                            Objective:    Gait:    Gait Type:  Antalgic   Weight Bearing Status:  WBAT   Assistive Devices:  None  Deviations:  Hip:  Decr dynamic control L and decr dynamic control RGeneral Deviations:  Stride length decr                         Shoulder Evaluation:  ROM:  AROM:    Flexion:  Left:  120    Right:  125    Abduction:  Left: 110   Right:  130      External Rotation:  Left:  42    Right:  44            Extension/Internal Rotation:  Left:  T10    Right:  T10      Pain: pain with Both Flex/Abd     Strength:        Abduction:  Left: 5-/5   Pain:+    Right: 5/5     Pain:    Internal Rotation:  Left:5/5     Pain:    Right: 5/5     Pain:  External Rotation:   Left:4+/5      Pain:+   Right:5/5     Pain:                                          Hip Evaluation  Hip PROM:  Hip PROM:  Left Hip:    Normal  Right Hip:  Normal                          Hip Strength:    Flexion:   Left: 5/5   Pain:  Right: 5/5   Pain:                    Extension:  Left: 5-/5  Pain:Right: 5-/5    Pain:    Abduction:  Left: 3+/5     Pain:Right: 3/5    Pain:  Adduction:  Left: 5-/5    Pain:Right: 5-/5   Pain:      Knee Flexion:  Left: 4+/5   Pain:Right: 4+/5   Pain:  Knee Extension:  Left: 5-/5   Pain:Right: 5-/5    Pain:        Hip Special Testing:      Left hip negative for the following special tests:  Fadir/Labrum; SLR or Chinedu's  Right hip negative for the following special tests:  Fadir/Labrum; SLR or Chinedu's                 General     ROS    Assessment/Plan:    Patient is a 71 year old female with both sides shoulder and both sides hip complaints.    Patient has the  following significant findings with corresponding treatment plan.                Diagnosis 1:  B shoulder pain, B hip pain with limited strength  Pain -  hot/cold therapy, self management, education and home program  Decreased ROM/flexibility - manual therapy and therapeutic exercise  Decreased strength - therapeutic exercise and therapeutic activities  Impaired balance - neuro re-education and therapeutic activities  Decreased proprioception - neuro re-education and therapeutic activities  Impaired gait - gait training  Decreased function - therapeutic activities    Previous and current functional limitations:  (See Goal Flow Sheet for this information)    Short term and Long term goals: (See Goal Flow Sheet for this information)     Communication ability:  Patient appears to be able to clearly communicate and understand verbal and written communication and follow directions correctly.  Treatment Explanation - The following has been discussed with the patient:   RX ordered/plan of care  Anticipated outcomes  Possible risks and side effects  This patient would benefit from PT intervention to resume normal activities.   Rehab potential is good.    Frequency:  1 X week, once daily  Duration:  for 8 weeks  Discharge Plan:  Achieve all LTG.  Independent in home treatment program.  Reach maximal therapeutic benefit.    Please refer to the daily flowsheet for treatment today, total treatment time and time spent performing 1:1 timed codes.

## 2022-01-20 PROBLEM — M16.0 PRIMARY OSTEOARTHRITIS OF BOTH HIPS: Status: ACTIVE | Noted: 2022-01-20

## 2022-01-20 PROBLEM — M25.512 CHRONIC PAIN OF BOTH SHOULDERS: Status: ACTIVE | Noted: 2022-01-20

## 2022-01-20 PROBLEM — G89.29 CHRONIC PAIN OF BOTH SHOULDERS: Status: ACTIVE | Noted: 2022-01-20

## 2022-01-20 PROBLEM — M67.959 TENDINOPATHY OF GLUTEUS MEDIUS: Status: ACTIVE | Noted: 2022-01-20

## 2022-01-20 PROBLEM — M25.551 HIP PAIN, BILATERAL: Status: ACTIVE | Noted: 2022-01-20

## 2022-01-20 PROBLEM — M25.552 HIP PAIN, BILATERAL: Status: ACTIVE | Noted: 2022-01-20

## 2022-01-20 PROBLEM — M25.511 CHRONIC PAIN OF BOTH SHOULDERS: Status: ACTIVE | Noted: 2022-01-20

## 2022-01-20 ASSESSMENT — ACTIVITIES OF DAILY LIVING (ADL)
WALKING_INITIALLY: MODERATE DIFFICULTY
STANDING_FOR_15_MINUTES: MODERATE DIFFICULTY
GOING_UP_1_FLIGHT_OF_STAIRS: MODERATE DIFFICULTY
TWISTING/PIVOTING_ON_INVOLVED_LEG: UNABLE TO DO
RECREATIONAL_ACTIVITIES: MODERATE DIFFICULTY
WALKING_APPROXIMATELY_10_MINUTES: EXTREME DIFFICULTY
WALKING_DOWN_STEEP_HILLS: EXTREME DIFFICULTY
GETTING_INTO_AND_OUT_OF_AN_AVERAGE_CAR: MODERATE DIFFICULTY
WALKING_UP_STEEP_HILLS: EXTREME DIFFICULTY
ROLLING_OVER_IN_BED: MODERATE DIFFICULTY
GETTING_INTO_AND_OUT_OF_A_BATHTUB: MODERATE DIFFICULTY
WALKING_15_MINUTES_OR_GREATER: UNABLE TO DO
HEAVY_WORK: UNABLE TO DO
HOS_ADL_ITEM_SCORE_TOTAL: 23
HOS_ADL_HIGHEST_POTENTIAL_SCORE: 68
LIGHT_TO_MODERATE_WORK: MODERATE DIFFICULTY
STEPPING_UP_AND_DOWN_CURBS: MODERATE DIFFICULTY
GOING_DOWN_1_FLIGHT_OF_STAIRS: MODERATE DIFFICULTY
HOS_ADL_SCORE(%): 33.82
HOS_ADL_COUNT: 17
DEEP_SQUATTING: UNABLE TO DO
HOW_WOULD_YOU_RATE_YOUR_CURRENT_LEVEL_OF_FUNCTION_DURING_YOUR_USUAL_ACTIVITIES_OF_DAILY_LIVING_FROM_0_TO_100_WITH_100_BEING_YOUR_LEVEL_OF_FUNCTION_PRIOR_TO_YOUR_HIP_PROBLEM_AND_0_BEING_THE_INABILITY_TO_PERFORM_ANY_OF_YOUR_USUAL_DAILY_ACTIVITIES?: 40
PUTTING_ON_SOCKS_AND_SHOES: EXTREME DIFFICULTY
SITTING_FOR_15_MINUTES: SLIGHT DIFFICULTY

## 2022-01-20 NOTE — PROGRESS NOTES
[facility]    OUTPATIENT Physical Therapy ORTHOPEDIC EVALUATION  PLAN OF TREATMENT FOR OUTPATIENT REHABILITATION  (COMPLETE FOR INITIAL CLAIMS ONLY)  Patient's Last Name, First Name, M.I.  YOB: 1950  Lucie Whitehead    Provider s Name:  [facility]   Medical Record No.  [id number]   Start of Care Date:  01/20/22   Onset Date:   01/14/22   Type:     _X__PT   ___OT Medical Diagnosis:    Encounter Diagnoses   Name Primary?     Chronic pain of both shoulders      Hip pain, bilateral      Impingement syndrome of both shoulders      History of biceps tendon rupture 2014      Spinal stenosis of lumbar region with neurogenic claudication      Arthritis of both glenohumeral joints      Tendinopathy of gluteus medius/minimus, bilateral      Primary osteoarthritis of both hips         Treatment Diagnosis:  B hip        Goals:     01/19/22 0925   Body Part   Goals listed below are for Shoulder, Hip   Goal #1   Goal #1 reaching   Previous Functional Level No restrictions   Current Functional Level Can reach    Performance level w pain 8/10   STG Target Performance Reach    Performance level pain less than 6/10   Rationale for dressing   Due date 02/03/22   LTG Target Performance Reach   Performance Level pain less than 2/10   Rationale for independent personal hygiene   Due date 02/24/22   Goal #2   Goal #2 ambulation   Previous Functional Level No restrictions   Current Functional Level Minutes patient can walk   Performance Level 5-10 min is difficult w hip pain   STG Target Performance Minutes patient will be able to walk   Performance Level 10   Rationale for safe household ambulation   Due Date 02/10/22    LTG Target Performance Minutes patient will be able to  walk   Performance Level 30, pain less than 3/10   Rationale for safe community ambulation   Due Date 03/06/22        Therapy Frequency:  1x/week  Predicted Duration of Therapy Intervention:  8 weeks    Rishi Mcmanus, PT                 I CERTIFY THE NEED FOR THESE SERVICES FURNISHED UNDER        THIS PLAN OF TREATMENT AND WHILE UNDER MY CARE     (Physician attestation of this document indicates review and certification of the therapy plan).                       Certification Date From:  01/20/22   Certification Date To:  04/19/22    Referring Provider:  Lorraine Beltran    Initial Assessment        See Epic Evaluation SOC Date: 01/20/22

## 2022-01-21 ENCOUNTER — ANCILLARY PROCEDURE (OUTPATIENT)
Dept: BONE DENSITY | Facility: CLINIC | Age: 72
End: 2022-01-21
Attending: INTERNAL MEDICINE
Payer: COMMERCIAL

## 2022-01-21 DIAGNOSIS — Z78.0 POSTMENOPAUSAL: ICD-10-CM

## 2022-01-21 DIAGNOSIS — M85.80 OSTEOPENIA, UNSPECIFIED LOCATION: ICD-10-CM

## 2022-01-21 PROCEDURE — 77080 DXA BONE DENSITY AXIAL: CPT | Performed by: INTERNAL MEDICINE

## 2022-01-21 PROCEDURE — 77081 DXA BONE DENSITY APPENDICULR: CPT | Mod: 59 | Performed by: INTERNAL MEDICINE

## 2022-01-23 PROBLEM — M25.552 HIP PAIN, BILATERAL: Status: RESOLVED | Noted: 2022-01-20 | Resolved: 2022-01-23

## 2022-01-23 PROBLEM — M25.551 HIP PAIN, BILATERAL: Status: RESOLVED | Noted: 2022-01-20 | Resolved: 2022-01-23

## 2022-01-27 ENCOUNTER — THERAPY VISIT (OUTPATIENT)
Dept: PHYSICAL THERAPY | Facility: CLINIC | Age: 72
End: 2022-01-27
Payer: COMMERCIAL

## 2022-01-27 DIAGNOSIS — M25.511 CHRONIC PAIN OF BOTH SHOULDERS: ICD-10-CM

## 2022-01-27 DIAGNOSIS — M16.0 PRIMARY OSTEOARTHRITIS OF BOTH HIPS: ICD-10-CM

## 2022-01-27 DIAGNOSIS — M25.512 CHRONIC PAIN OF BOTH SHOULDERS: ICD-10-CM

## 2022-01-27 DIAGNOSIS — G89.29 CHRONIC PAIN OF BOTH SHOULDERS: ICD-10-CM

## 2022-01-27 DIAGNOSIS — Z98.1 S/P LUMBAR SPINAL FUSION: ICD-10-CM

## 2022-01-27 DIAGNOSIS — M67.959 TENDINOPATHY OF GLUTEUS MEDIUS: ICD-10-CM

## 2022-01-27 PROCEDURE — 97112 NEUROMUSCULAR REEDUCATION: CPT | Mod: GP | Performed by: PHYSICAL THERAPIST

## 2022-01-27 PROCEDURE — 97110 THERAPEUTIC EXERCISES: CPT | Mod: GP | Performed by: PHYSICAL THERAPIST

## 2022-06-02 PROBLEM — M16.0 PRIMARY OSTEOARTHRITIS OF BOTH HIPS: Status: RESOLVED | Noted: 2022-01-20 | Resolved: 2022-06-02

## 2022-06-02 PROBLEM — G89.29 CHRONIC PAIN OF BOTH SHOULDERS: Status: RESOLVED | Noted: 2022-01-20 | Resolved: 2022-06-02

## 2022-06-02 PROBLEM — M25.511 CHRONIC PAIN OF BOTH SHOULDERS: Status: RESOLVED | Noted: 2022-01-20 | Resolved: 2022-06-02

## 2022-06-02 PROBLEM — M25.512 CHRONIC PAIN OF BOTH SHOULDERS: Status: RESOLVED | Noted: 2022-01-20 | Resolved: 2022-06-02

## 2022-06-02 PROBLEM — M67.959 TENDINOPATHY OF GLUTEUS MEDIUS: Status: RESOLVED | Noted: 2022-01-20 | Resolved: 2022-06-02

## 2022-06-02 NOTE — PROGRESS NOTES
Lucie Whitehead was seen 2 times for evaluation and treatment.  Patient did not return for further treatment and current status is unknown.  Due to short treatment duration, no objective or functional changes were made.  Please see goal flow sheet from episode noted date below and initial evaluation for further information.    Linked Episodes   Type: Episode: Status: Noted: Resolved: Last update: Updated by:   PHYSICAL THERAPY [id number] Active 1/19/2022 1/27/2022  2:21 PM Rishi Mcmanus, PT      Comments:

## 2022-06-17 DIAGNOSIS — I10 ESSENTIAL HYPERTENSION: ICD-10-CM

## 2022-06-20 RX ORDER — PROPRANOLOL HYDROCHLORIDE 160 MG/1
160 CAPSULE, EXTENDED RELEASE ORAL DAILY
Qty: 90 CAPSULE | Refills: 1 | Status: SHIPPED | OUTPATIENT
Start: 2022-06-20 | End: 2023-01-27

## 2022-06-20 NOTE — TELEPHONE ENCOUNTER
Routing refill request to provider for review/approval because:  Drug interaction warning    Srinath HARPER RN

## 2022-07-31 ENCOUNTER — HEALTH MAINTENANCE LETTER (OUTPATIENT)
Age: 72
End: 2022-07-31

## 2022-08-16 ENCOUNTER — HOSPITAL ENCOUNTER (OUTPATIENT)
Dept: MAMMOGRAPHY | Facility: CLINIC | Age: 72
Discharge: HOME | End: 2022-08-16
Attending: INTERNAL MEDICINE | Admitting: INTERNAL MEDICINE
Payer: COMMERCIAL

## 2022-08-16 DIAGNOSIS — Z12.31 VISIT FOR SCREENING MAMMOGRAM: ICD-10-CM

## 2022-08-16 PROCEDURE — 77067 SCR MAMMO BI INCL CAD: CPT

## 2022-10-15 ENCOUNTER — HEALTH MAINTENANCE LETTER (OUTPATIENT)
Age: 72
End: 2022-10-15

## 2022-12-03 DIAGNOSIS — K21.9 GASTROESOPHAGEAL REFLUX DISEASE, UNSPECIFIED WHETHER ESOPHAGITIS PRESENT: ICD-10-CM

## 2022-12-05 DIAGNOSIS — K21.9 GASTROESOPHAGEAL REFLUX DISEASE, UNSPECIFIED WHETHER ESOPHAGITIS PRESENT: ICD-10-CM

## 2022-12-05 RX ORDER — OMEPRAZOLE 40 MG/1
CAPSULE, DELAYED RELEASE ORAL
Qty: 30 CAPSULE | Refills: 1 | Status: SHIPPED | OUTPATIENT
Start: 2022-12-05 | End: 2024-02-02

## 2022-12-07 RX ORDER — OMEPRAZOLE 40 MG/1
CAPSULE, DELAYED RELEASE ORAL
Qty: 90 CAPSULE | OUTPATIENT
Start: 2022-12-07

## 2023-01-26 DIAGNOSIS — E78.5 HYPERLIPIDEMIA LDL GOAL <160: ICD-10-CM

## 2023-01-26 DIAGNOSIS — I10 ESSENTIAL HYPERTENSION: ICD-10-CM

## 2023-01-27 RX ORDER — SIMVASTATIN 40 MG
40 TABLET ORAL AT BEDTIME
Qty: 90 TABLET | Refills: 0 | Status: SHIPPED | OUTPATIENT
Start: 2023-01-27 | End: 2023-03-27

## 2023-01-27 RX ORDER — PROPRANOLOL HYDROCHLORIDE 160 MG/1
160 CAPSULE, EXTENDED RELEASE ORAL DAILY
Qty: 90 CAPSULE | Refills: 0 | Status: SHIPPED | OUTPATIENT
Start: 2023-01-27 | End: 2023-03-27

## 2023-01-27 NOTE — TELEPHONE ENCOUNTER
Routing refill request to provider for review/approval because:  Labs not current: LDL   Patient needs to be seen because it has been more than 1 year since last office visit.   - Patient has an upcoming appointment on 3/27/2023 with Dr. Daniel.   Patient does not have a blood pressure reading on file in the past 12 months.     BP Readings from Last 3 Encounters:   01/14/22 128/86   01/07/22 128/78   04/22/21 (!) 157/97     LDL Cholesterol Calculated   Date Value Ref Range Status   01/07/2022 90 <=100 mg/dL Final   08/27/2020 72 <100 mg/dL Final     Comment:     Desirable:       <100 mg/dl     Otilia OLSEN RN   [facility]

## 2023-03-26 ENCOUNTER — HEALTH MAINTENANCE LETTER (OUTPATIENT)
Age: 73
End: 2023-03-26

## 2023-03-27 ENCOUNTER — OFFICE VISIT (OUTPATIENT)
Dept: PEDIATRICS | Facility: CLINIC | Age: 73
End: 2023-03-27
Payer: COMMERCIAL

## 2023-03-27 VITALS
HEIGHT: 66 IN | SYSTOLIC BLOOD PRESSURE: 130 MMHG | RESPIRATION RATE: 17 BRPM | DIASTOLIC BLOOD PRESSURE: 78 MMHG | TEMPERATURE: 97.8 F | OXYGEN SATURATION: 98 % | WEIGHT: 247.1 LBS | HEART RATE: 65 BPM | BODY MASS INDEX: 39.71 KG/M2

## 2023-03-27 DIAGNOSIS — E66.01 CLASS 3 SEVERE OBESITY DUE TO EXCESS CALORIES WITH SERIOUS COMORBIDITY AND BODY MASS INDEX (BMI) OF 40.0 TO 44.9 IN ADULT (H): ICD-10-CM

## 2023-03-27 DIAGNOSIS — F33.2 SEVERE EPISODE OF RECURRENT MAJOR DEPRESSIVE DISORDER, WITHOUT PSYCHOTIC FEATURES (H): ICD-10-CM

## 2023-03-27 DIAGNOSIS — R73.03 PREDIABETES: ICD-10-CM

## 2023-03-27 DIAGNOSIS — Z12.31 VISIT FOR SCREENING MAMMOGRAM: ICD-10-CM

## 2023-03-27 DIAGNOSIS — S92.515S CLOSED NONDISPLACED FRACTURE OF PROXIMAL PHALANX OF LESSER TOE OF LEFT FOOT, SEQUELA: ICD-10-CM

## 2023-03-27 DIAGNOSIS — M25.512 CHRONIC LEFT SHOULDER PAIN: ICD-10-CM

## 2023-03-27 DIAGNOSIS — I10 ESSENTIAL HYPERTENSION: Primary | ICD-10-CM

## 2023-03-27 DIAGNOSIS — G89.29 CHRONIC LEFT SHOULDER PAIN: ICD-10-CM

## 2023-03-27 DIAGNOSIS — E66.813 CLASS 3 SEVERE OBESITY DUE TO EXCESS CALORIES WITH SERIOUS COMORBIDITY AND BODY MASS INDEX (BMI) OF 40.0 TO 44.9 IN ADULT (H): ICD-10-CM

## 2023-03-27 DIAGNOSIS — E78.5 HYPERLIPIDEMIA LDL GOAL <160: ICD-10-CM

## 2023-03-27 LAB
ALBUMIN SERPL BCG-MCNC: 4.7 G/DL (ref 3.5–5.2)
ALP SERPL-CCNC: 84 U/L (ref 35–104)
ALT SERPL W P-5'-P-CCNC: 28 U/L (ref 10–35)
ANION GAP SERPL CALCULATED.3IONS-SCNC: 14 MMOL/L (ref 7–15)
AST SERPL W P-5'-P-CCNC: 22 U/L (ref 10–35)
BILIRUB SERPL-MCNC: 0.3 MG/DL
BUN SERPL-MCNC: 20.2 MG/DL (ref 8–23)
CALCIUM SERPL-MCNC: 10 MG/DL (ref 8.8–10.2)
CHLORIDE SERPL-SCNC: 104 MMOL/L (ref 98–107)
CHOLEST SERPL-MCNC: 261 MG/DL
CREAT SERPL-MCNC: 0.92 MG/DL (ref 0.51–0.95)
DEPRECATED HCO3 PLAS-SCNC: 24 MMOL/L (ref 22–29)
GFR SERPL CREATININE-BSD FRML MDRD: 67 ML/MIN/1.73M2
GLUCOSE SERPL-MCNC: 108 MG/DL (ref 70–99)
HBA1C MFR BLD: 6 % (ref 0–5.6)
HDLC SERPL-MCNC: 40 MG/DL
LDLC SERPL CALC-MCNC: ABNORMAL MG/DL
LDLC SERPL DIRECT ASSAY-MCNC: 146 MG/DL
NONHDLC SERPL-MCNC: 221 MG/DL
POTASSIUM SERPL-SCNC: 4.9 MMOL/L (ref 3.4–5.3)
PROT SERPL-MCNC: 7.5 G/DL (ref 6.4–8.3)
SODIUM SERPL-SCNC: 142 MMOL/L (ref 136–145)
TRIGL SERPL-MCNC: 436 MG/DL

## 2023-03-27 PROCEDURE — 80053 COMPREHEN METABOLIC PANEL: CPT | Performed by: STUDENT IN AN ORGANIZED HEALTH CARE EDUCATION/TRAINING PROGRAM

## 2023-03-27 PROCEDURE — 80061 LIPID PANEL: CPT | Performed by: STUDENT IN AN ORGANIZED HEALTH CARE EDUCATION/TRAINING PROGRAM

## 2023-03-27 PROCEDURE — 83721 ASSAY OF BLOOD LIPOPROTEIN: CPT | Mod: 59 | Performed by: STUDENT IN AN ORGANIZED HEALTH CARE EDUCATION/TRAINING PROGRAM

## 2023-03-27 PROCEDURE — 36415 COLL VENOUS BLD VENIPUNCTURE: CPT | Performed by: STUDENT IN AN ORGANIZED HEALTH CARE EDUCATION/TRAINING PROGRAM

## 2023-03-27 PROCEDURE — 90677 PCV20 VACCINE IM: CPT | Performed by: STUDENT IN AN ORGANIZED HEALTH CARE EDUCATION/TRAINING PROGRAM

## 2023-03-27 PROCEDURE — G0009 ADMIN PNEUMOCOCCAL VACCINE: HCPCS | Performed by: STUDENT IN AN ORGANIZED HEALTH CARE EDUCATION/TRAINING PROGRAM

## 2023-03-27 PROCEDURE — 83036 HEMOGLOBIN GLYCOSYLATED A1C: CPT | Performed by: STUDENT IN AN ORGANIZED HEALTH CARE EDUCATION/TRAINING PROGRAM

## 2023-03-27 PROCEDURE — 99214 OFFICE O/P EST MOD 30 MIN: CPT | Mod: 25 | Performed by: STUDENT IN AN ORGANIZED HEALTH CARE EDUCATION/TRAINING PROGRAM

## 2023-03-27 RX ORDER — SIMVASTATIN 40 MG
40 TABLET ORAL AT BEDTIME
Qty: 90 TABLET | Refills: 4 | Status: SHIPPED | OUTPATIENT
Start: 2023-03-27 | End: 2024-02-05

## 2023-03-27 RX ORDER — PROPRANOLOL HYDROCHLORIDE 160 MG/1
160 CAPSULE, EXTENDED RELEASE ORAL DAILY
Qty: 90 CAPSULE | Refills: 3 | Status: SHIPPED | OUTPATIENT
Start: 2023-03-27 | End: 2024-05-13

## 2023-03-27 ASSESSMENT — PATIENT HEALTH QUESTIONNAIRE - PHQ9
10. IF YOU CHECKED OFF ANY PROBLEMS, HOW DIFFICULT HAVE THESE PROBLEMS MADE IT FOR YOU TO DO YOUR WORK, TAKE CARE OF THINGS AT HOME, OR GET ALONG WITH OTHER PEOPLE: SOMEWHAT DIFFICULT
SUM OF ALL RESPONSES TO PHQ QUESTIONS 1-9: 13
SUM OF ALL RESPONSES TO PHQ QUESTIONS 1-9: 13

## 2023-03-27 ASSESSMENT — PAIN SCALES - GENERAL: PAINLEVEL: NO PAIN (0)

## 2023-03-27 NOTE — LETTER
March 29, 2023      Lucie Whitehead  [address] [address] [id number]        Dear ,    We are writing to inform you of your test results.    Dear Lucie,     Here are your recent lab results:     Your metabolic panel (kidney function, electrolytes, liver enzymes) was normal.     Your cholesterol levels are stable.     Your A1c is stable in the prediabetes range.     Please let us know if you have questions or concerns.       Resulted Orders   Comprehensive metabolic panel (BMP + Alb, Alk Phos, ALT, AST, Total. Bili, TP)   Result Value Ref Range    Sodium 142 136 - 145 mmol/L    Potassium 4.9 3.4 - 5.3 mmol/L    Chloride 104 98 - 107 mmol/L    Carbon Dioxide (CO2) 24 22 - 29 mmol/L    Anion Gap 14 7 - 15 mmol/L    Urea Nitrogen 20.2 8.0 - 23.0 mg/dL    Creatinine 0.92 0.51 - 0.95 mg/dL    Calcium 10.0 8.8 - 10.2 mg/dL    Glucose 108 (H) 70 - 99 mg/dL    Alkaline Phosphatase 84 35 - 104 U/L    AST 22 10 - 35 U/L    ALT 28 10 - 35 U/L    Protein Total 7.5 6.4 - 8.3 g/dL    Albumin 4.7 3.5 - 5.2 g/dL    Bilirubin Total 0.3 <=1.2 mg/dL    GFR Estimate 67 >60 mL/min/1.73m2      Comment:      eGFR calculated using 2021 CKD-EPI equation.   Lipid panel reflex to direct LDL Fasting   Result Value Ref Range    Cholesterol 261 (H) <200 mg/dL    Triglycerides 436 (H) <150 mg/dL    Direct Measure HDL 40 (L) >=50 mg/dL    LDL Cholesterol Calculated        Comment:      Cannot estimate LDL when triglyceride exceeds 400 mg/dL    Non HDL Cholesterol 221 (H) <130 mg/dL    Narrative    Cholesterol  Desirable:  <200 mg/dL    Triglycerides  Normal:  Less than 150 mg/dL  Borderline High:  150-199 mg/dL  High:  200-499 mg/dL  Very High:  Greater than or equal to 500 mg/dL    Direct Measure HDL  Female:  Greater than or equal to 50 mg/dL   Male:  Greater than or equal to 40 mg/dL    LDL Cholesterol  Desirable:  <100mg/dL  Above Desirable:  100-129 mg/dL   Borderline High:  130-159 mg/dL   High:  160-189 mg/dL    Very High:  >= 190 mg/dL    Non HDL Cholesterol  Desirable:  130 mg/dL  Above Desirable:  130-159 mg/dL  Borderline High:  160-189 mg/dL  High:  190-219 mg/dL  Very High:  Greater than or equal to 220 mg/dL   Hemoglobin A1c   Result Value Ref Range    Hemoglobin A1C 6.0 (H) 0.0 - 5.6 %      Comment:      Normal <5.7%   Prediabetes 5.7-6.4%    Diabetes 6.5% or higher     Note: Adopted from ADA consensus guidelines.   LDL cholesterol direct   Result Value Ref Range    LDL Cholesterol Direct 146 (H) <100 mg/dL      Comment:      Age 2-19 years:  Desirable: 0-110 mg/dL   Borderline high: 110-129 mg/dL   High: >= 130 mg/dL    Age 20 years and older:  Desirable: <100mg/dL  Above desirable: 100-129 mg/dL   Borderline high: 130-159 mg/dL   High: 160-189 mg/dL   Very high: >= 190 mg/dL       If you have any questions or concerns, please call the clinic at the number listed above.       Sincerely,      Dolly Daniel MD

## 2023-03-27 NOTE — PROGRESS NOTES
Assessment & Plan     Essential hypertension  Also for anxiety.  - propranolol ER (INDERAL LA) 160 MG 24 hr capsule; Take 1 capsule (160 mg) by mouth daily    Hyperlipidemia LDL goal <160  - simvastatin (ZOCOR) 40 MG tablet; Take 1 tablet (40 mg) by mouth At Bedtime  - Comprehensive metabolic panel (BMP + Alb, Alk Phos, ALT, AST, Total. Bili, TP); Future  - Lipid panel reflex to direct LDL Fasting; Future  - Comprehensive metabolic panel (BMP + Alb, Alk Phos, ALT, AST, Total. Bili, TP)  - Lipid panel reflex to direct LDL Fasting    Visit for screening mammogram  - MA Screen Bilateral w/Dre; Future    Prediabetes  - Hemoglobin A1c; Future  - Hemoglobin A1c    Severe episode of recurrent major depressive disorder, without psychotic features (H)  Follows with outside provider for medication management.    Class 3 severe obesity due to excess calories with serious comorbidity and body mass index (BMI) of 40.0 to 44.9 in adult (H)  Complicated by hyperlipidemia. Patient reports increase caloric consumption in evenings and is related to mood symptoms. Reviewed the treatment options for obesity including diet and exercise regimens.  Emphasized that lifestyle change is a critical component of all treatment plans and offered nutrion referral.  Discussed reasonable weight loss goals and time-frame.     Closed nondisplaced fracture of proximal phalanx of lesser toe of left foot, sequela  Recommend see podiatry.  - Orthopedic  Referral; Future    Chronic left shoulder pain  Recommend see orthopedic or sports medicine for possible cortisone injection vs further evaluation.  - Orthopedic  Referral; Future        Dolly Daniel MD  [facility] REJI Faulkner is a 72 year old, presenting for the following health issues:  Recheck Medication  No flowsheet data found.    Patient would like to go over sleeping habits.     Toe pain: history fracture   Shoulder pain  Saw orthopedic for  "both of these issues previously      Family history t2dm    History of Present Illness       Reason for visit:  General check in    She eats 2-3 servings of fruits and vegetables daily.She consumes 0 sweetened beverage(s) daily.She exercises with enough effort to increase her heart rate 10 to 19 minutes per day.  She exercises with enough effort to increase her heart rate 3 or less days per week.   She is taking medications regularly.    Today's PHQ-9         PHQ-9 Total Score: 13    PHQ-9 Q9 Thoughts of better off dead/self-harm past 2 weeks :   Several days  Thoughts of suicide or self harm: (P) No  Self-harm Plan:     Self-harm Action:       Safety concerns for self or others: (P) No    How difficult have these problems made it for you to do your work, take care of things at home, or get along with other people: Somewhat difficult               Objective    /78 (BP Location: Right arm, Patient Position: Sitting, Cuff Size: Adult Regular)   Pulse 65   Temp 97.8  F (36.6  C) (Tympanic)   Resp 17   Ht 1.664 m (5' 5.5\")   Wt 112.1 kg (247 lb 1.6 oz)   LMP 11/12/2007 (LMP Unknown)   SpO2 98%   BMI 40.49 kg/m    Body mass index is 40.49 kg/m .  Physical Exam   GENERAL: healthy, alert and no distress  EYES: Eyes grossly normal to inspection, and conjunctivae and sclerae normal  HENT: ear canals and TM's normal  NECK: no adenopathy, no asymmetry, masses, or scars and thyroid normal to palpation  RESP: lungs clear to auscultation - no rales, rhonchi or wheezes  CV: regular rate and rhythm, normal S1 S2, no S3 or S4, no murmur, click or rub, no peripheral edema and peripheral pulses strong  ABDOMEN: soft, nontender, no hepatosplenomegaly, no masses and bowel sounds normal  MS: no gross musculoskeletal defects noted, no edema  NEURO: Normal strength and tone, mentation intact and speech normal        "

## 2023-04-17 ENCOUNTER — OFFICE VISIT (OUTPATIENT)
Dept: PODIATRY | Facility: CLINIC | Age: 73
End: 2023-04-17
Attending: STUDENT IN AN ORGANIZED HEALTH CARE EDUCATION/TRAINING PROGRAM
Payer: COMMERCIAL

## 2023-04-17 ENCOUNTER — ANCILLARY PROCEDURE (OUTPATIENT)
Dept: GENERAL RADIOLOGY | Facility: CLINIC | Age: 73
End: 2023-04-17
Attending: PODIATRIST
Payer: COMMERCIAL

## 2023-04-17 VITALS — DIASTOLIC BLOOD PRESSURE: 90 MMHG | SYSTOLIC BLOOD PRESSURE: 138 MMHG | WEIGHT: 251 LBS | BODY MASS INDEX: 41.13 KG/M2

## 2023-04-17 DIAGNOSIS — S92.515S CLOSED NONDISPLACED FRACTURE OF PROXIMAL PHALANX OF LESSER TOE OF LEFT FOOT, SEQUELA: ICD-10-CM

## 2023-04-17 PROCEDURE — 99203 OFFICE O/P NEW LOW 30 MIN: CPT | Performed by: PODIATRIST

## 2023-04-17 PROCEDURE — 73630 X-RAY EXAM OF FOOT: CPT | Mod: TC | Performed by: RADIOLOGY

## 2023-04-17 NOTE — PROGRESS NOTES
"Foot & Ankle Surgery  2023    CC: \"Toe delayed healing\"    I was asked to see Lucie Whitehead regarding the chief complaint by:  Dr. AUDIE Daniel    HPI:  Pt is a 72 year old female who presents with above complaint.  The patient was in [facility] 2020 through visiting her daughter when she sustained an injury to the left second toe.  She states \"it was sideways\" and had x-rays confirming a fracture.  The patient states the area is still swollen and discolored, pain 5-6 out of 10 \"daily\", worse with \"walking\".  She was given a toe separator and some pain medications.    ROS:   Pos for CC.  The patient denies current nausea, vomiting, chills, fevers, belly pain, calf pain, chest pain or SOB.  Complete remainder of ROS is otherwise neg.    VITALS:    Vitals:    23 1103   BP: (!) 138/90   Weight: 113.9 kg (251 lb)       PMH:    Past Medical History:   Diagnosis Date     Arthritis      Complication of anesthesia     extremely dry eyes     Depressive disorder      Diabetes (H)      Gastro-oesophageal reflux disease      Hirsutism     Abstracted 02     History of blood transfusion      Hypertension      Leiomyoma of uterus, unspecified     Abstracted 02     Obesity 2010    Estimated Body mass index is 28.97 kg/(m^2) as calculated from the following:   Height as of 7/10/13: 5' 7\"(1.702 m).   Weight as of 7/10/13: 185 lb(83.915 kg).       Other and unspecified hyperlipidemia     Abstracted 02     Other chronic pain     low back and legs and feet     Sleep apnea     CPAP     Uncomplicated asthma        SXHX:    Past Surgical History:   Procedure Laterality Date     ABDOMEN SURGERY      C sections,thoracic access for spine surgery     ARTHROSCOPY SHOULDER DISTAL CLAVICLE REPAIR Right 2014    Procedure: ARTHROSCOPY SHOULDER DISTAL CLAVICLE RESECTION;  Surgeon: John Paul Rodriguez MD;  Location: [facility]     BIOPSY      Cervical     C/SECTION, LOW TRANSVERSE  x 3    , " Low Transverse x 4     COLONOSCOPY       COLONOSCOPY N/A 6/4/2019    Procedure: COLONOSCOPY;  Surgeon: Hernán Gonzales MD;  Location: [facility]     DECOMPRESSION, FUSION CERVICAL ANTERIOR TWO LEVELS, COMBINED  1/26/2012    Procedure:COMBINED DECOMPRESSION, FUSION CERVICAL ANTERIOR TWO LEVELS; DECOMPRESSION, FUSION CERVICAL ANTERIOR C4-6; Surgeon:SHALONDA ALCALA; Location:[facility] OR     DECOMPRESSION, FUSION LUMBAR POSTERIOR ONE LEVEL, COMBINED  3/7/2013    Procedure: COMBINED DECOMPRESSION, FUSION LUMBAR POSTERIOR ONE LEVEL;  Posterior Fusion L2-3, Hardware Removal L3-5;  Surgeon: Shalonda Alcala MD;  Location: [facility]     DECOMPRESSION, FUSION LUMBAR POSTERIOR TWO LEVELS, COMBINED N/A 7/3/2018    Procedure: COMBINED DECOMPRESSION, FUSION LUMBAR POSTERIOR TWO LEVELS;  Bilateral decompression L1-L2 with left total facetectomy   Transforaminal lumbar interbody fusion L1-L2 from left-sided approach   Insertion of intervertebral biomechanical device L1-L2  Posterolateral fusion L1-L2; Removal of instrumentation L2-3  RE-Instrumentation L1-L3;  Surgeon: Shalonda Alcala MD;  Location: [facility]     EXPLORE SPINE, REMOVE HARDWARE, COMBINED N/A 7/3/2018    Procedure: COMBINED EXPLORE SPINE, REMOVE HARDWARE;;  Surgeon: Shalonda Alcala MD;  Location: [facility]     FUSION LUMBAR ANTERIOR ONE LEVEL  4/11/2013    Procedure: FUSION LUMBAR ANTERIOR ONE LEVEL;  Anterior Fusion L2-3;  Surgeon: Shalonda Alcala MD;  Location: [facility] OR     HEAD & NECK SURGERY       LAMINECTOMY, FUSION LUMBAR TWO LEVELS, COMBINED  3/11    AP fusion L3-5     ZZC NONSPECIFIC PROCEDURE      Bicep tendon repair    Abstracted 06/14/02        MEDS:    Current Outpatient Medications   Medication     buPROPion (WELLBUTRIN XL) 150 MG 24 hr tablet     cetirizine (ZYRTEC) 10 MG tablet     cycloSPORINE (RESTASIS) 0.05 % ophthalmic emulsion     diazepam (VALIUM) 10 MG tablet     DULoxetine (CYMBALTA) 60 MG capsule     famotidine (PEPCID) 40 MG tablet      hydrOXYzine (ATARAX) 25 MG tablet     lamoTRIgine (LAMICTAL) 25 MG tablet     Multiple Vitamins-Minerals (OCUVITE ADULT 50+ PO)     NONFORMULARY     omeprazole (PRILOSEC) 40 MG DR capsule     pramipexole (MIRAPEX) 0.25 MG tablet     prazosin (MINIPRESS) 1 MG capsule     pregabalin (LYRICA) 25 MG capsule     pregabalin (LYRICA) 25 MG capsule     propranolol ER (INDERAL LA) 160 MG 24 hr capsule     simvastatin (ZOCOR) 40 MG tablet     No current facility-administered medications for this visit.       ALL:     Allergies   Allergen Reactions     Gabapentin Unknown     Animal Dander      Cats      Dogs      Seasonal Allergies      Trees and mold       FMH:    Family History   Problem Relation Age of Onset     Cancer - colorectal Mother         born  HTN and DMII     Diabetes Mother         type II     Coronary Artery Disease Mother 90        CHF     Colon Cancer Mother      Dementia Mother      Prostate Cancer Father      Diabetes Maternal Grandmother         type II     Hypertension Maternal Grandmother      Cancer - colorectal Maternal Grandmother      Colon Cancer Maternal Grandmother      Heart Disease Maternal Grandfather      Diabetes Maternal Grandfather      Respiratory Paternal Grandmother         asthma     Diabetes Son         dx age 21type I     Diabetes Maternal Uncle         type II     Hypertension Maternal Uncle        SocHx:    Social History     Socioeconomic History     Marital status:      Spouse name: Not on file     Number of children: Not on file     Years of education: Not on file     Highest education level: Not on file   Occupational History     Not on file   Tobacco Use     Smoking status: Former     Packs/day: 0.50     Years: 5.00     Pack years: 2.50     Types: Cigarettes     Quit date: 1975     Years since quittin.3     Smokeless tobacco: Never   Vaping Use     Vaping status: Never Used   Substance and Sexual Activity     Alcohol use: Yes     Alcohol/week: 0.0 standard  drinks of alcohol     Comment: 2-3 glass wine /week     Drug use: No     Sexual activity: Not Currently     Partners: Male     Birth control/protection: Post-menopausal   Other Topics Concern     Parent/sibling w/ CABG, MI or angioplasty before 65F 55M? No   Social History Narrative     Not on file     Social Determinants of Health     Financial Resource Strain: Not on file   Food Insecurity: Not on file   Transportation Needs: Not on file   Physical Activity: Not on file   Stress: Not on file   Social Connections: Not on file   Intimate Partner Violence: Not on file   Housing Stability: Not on file           EXAMINATION:  Gen:   No apparent distress  Neuro:   A&Ox3, no deficits  Psych:    Answering questions appropriately for age and situation with normal affect  Head:    NCAT  Eye:    Visual scanning without deficit  Ear:    Response to auditory stimuli wnl  Lung:    Non-labored breathing on RA noted  Abd:    NTND per patient report  Lymph:    The left second toe is mildly edematous  Vasc:    Pulses palpable, CFT minimally delayed  Neuro:    Light touch sensation intact to all sensory nerve distributions without paresthesias  Derm:    Neg for nodules, lesions or ulcerations  MSK:    Left foot -the left second toe is swollen, focused at the PIPJ, where she is very tender.  The remainder of the second toe is unremarkable  Calf:    Neg for redness, swelling or tenderness      Imagin views weightbearing left -irregularity at the head of the proximal phalanx of the second toe with early degenerative changes at the PIPJ.  Otherwise unremarkable    Assessment:  72 year old female with previous left second toe fracture with evidence of nonhealing fracture fragment and degenerative changes at the PIPJ      Medical Decision Making/Plan:  Discussed etiologies, anatomy and options  1.  Previous left second toe fracture with evidence of nonhealing fracture fragments and degenerative changes at the PIPJ  -I personally  reviewed and interpreted the patient's lower extremity history pertinent to today's visit, including imaging/labs, in preparation for initiating a treatment program.  -I personally reviewed and interpreted the x-ray results  -I advised comfortable shoes  -RICE/NSAID versus Tylenol as needed based on pain  -More aggressive options discussed: 1.  Image guided steroid injection.  We discussed that this will be of limited long-term benefit but can help with symptom control; 2.  PIPJ arthroplasty versus arthrodesis  -She was referred to Dr. Cuellar's office for an image guided injection.  Should this fail to provide sufficient overall improvement, we will likely proceed with surgical intervention    Follow up: As needed or sooner with acute issues      Patient's medical history was reviewed today      Dain Reeder DPM FACFAS FACFAOM  Podiatric Foot & Ankle Surgeon  [facility]  [phone]    Disclaimer: This note consists of symbols derived from keyboarding, dictation and/or voice recognition software. As a result, there may be errors in the script that have gone undetected. Please consider this when interpreting information found in this chart.

## 2023-04-18 DIAGNOSIS — M19.072 OSTEOARTHRITIS OF TOE JOINT, LEFT: Primary | ICD-10-CM

## 2023-04-18 NOTE — PROGRESS NOTES
Per GEORGE Marie.P.MAugust, ordered an ultrasound-guided left second proximal interphalangeal joint corticosteroid injection for left second proximal interphalangeal joint osteoarthritis for Ms. Lucie Whitehead.    Bud Cuellar MD

## 2023-04-27 ENCOUNTER — OFFICE VISIT (OUTPATIENT)
Dept: ORTHOPEDICS | Facility: CLINIC | Age: 73
End: 2023-04-27
Attending: STUDENT IN AN ORGANIZED HEALTH CARE EDUCATION/TRAINING PROGRAM
Payer: COMMERCIAL

## 2023-04-27 ENCOUNTER — OFFICE VISIT (OUTPATIENT)
Dept: PODIATRY | Facility: CLINIC | Age: 73
End: 2023-04-27
Payer: COMMERCIAL

## 2023-04-27 VITALS
SYSTOLIC BLOOD PRESSURE: 122 MMHG | WEIGHT: 251 LBS | BODY MASS INDEX: 41.82 KG/M2 | HEIGHT: 65 IN | DIASTOLIC BLOOD PRESSURE: 76 MMHG

## 2023-04-27 DIAGNOSIS — M19.079 ARTHRITIS OF JOINT OF TOE: Primary | ICD-10-CM

## 2023-04-27 DIAGNOSIS — G89.29 CHRONIC LEFT SHOULDER PAIN: ICD-10-CM

## 2023-04-27 DIAGNOSIS — M79.675 TOE PAIN, LEFT: ICD-10-CM

## 2023-04-27 DIAGNOSIS — M19.012 OSTEOARTHRITIS, LOCALIZED, SHOULDER, LEFT: Primary | ICD-10-CM

## 2023-04-27 DIAGNOSIS — M25.512 CHRONIC LEFT SHOULDER PAIN: ICD-10-CM

## 2023-04-27 PROCEDURE — 99204 OFFICE O/P NEW MOD 45 MIN: CPT | Mod: 25 | Performed by: PHYSICIAN ASSISTANT

## 2023-04-27 PROCEDURE — 99213 OFFICE O/P EST LOW 20 MIN: CPT | Performed by: PODIATRIST

## 2023-04-27 PROCEDURE — 20610 DRAIN/INJ JOINT/BURSA W/O US: CPT | Mod: LT | Performed by: PHYSICIAN ASSISTANT

## 2023-04-27 RX ORDER — LIDOCAINE HYDROCHLORIDE 10 MG/ML
4 INJECTION, SOLUTION INFILTRATION; PERINEURAL
Status: DISCONTINUED | OUTPATIENT
Start: 2023-04-27 | End: 2024-02-02

## 2023-04-27 RX ORDER — TRIAMCINOLONE ACETONIDE 40 MG/ML
40 INJECTION, SUSPENSION INTRA-ARTICULAR; INTRAMUSCULAR
Status: DISCONTINUED | OUTPATIENT
Start: 2023-04-27 | End: 2024-02-02

## 2023-04-27 RX ADMIN — TRIAMCINOLONE ACETONIDE 40 MG: 40 INJECTION, SUSPENSION INTRA-ARTICULAR; INTRAMUSCULAR at 10:38

## 2023-04-27 RX ADMIN — LIDOCAINE HYDROCHLORIDE 4 ML: 10 INJECTION, SOLUTION INFILTRATION; PERINEURAL at 10:38

## 2023-04-27 NOTE — LETTER
4/27/2023         RE: Lucie Whitehead  [address] [address] [id number]        Dear Colleague,    Thank you for referring your patient, Lucie Whitehead, to the [facility]. Please see a copy of my visit note below.    ASSESSMENT:  Encounter Diagnoses   Name Primary?     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 Holloway.  Reviewed the pathology of the proximal inner phalangeal joint region.    Recommendations:  Stiff soled shoes offload the forefoot during the propulsive phase of gait  Anti-inflammatory measures, including topical Voltaren gel  Buddy splinting is optional    I offered a referral for an image guided steroid injection into the left second toe proximal interphalangeal joint.  This was ordered.    We also discussed the future option of surgical intervention in the form of arthroplasty to reduce this.    As-needed basis    Disclaimer: This note consists of symbols derived from keyboarding, dictation and/or voice recognition software. As a result, there may be errors in the script that have gone undetected. Please consider this when interpreting information found in this chart.    Bud Huertas DPM, FACFAS, MS    [facility] Pod[facility]/Foot & Ankle Surgery      ____________________________________________________________________    HPI:       Lucie presents today with left second toe pain.  She reports an injury last September where she caught her toe on some steps wguke ascending.  She reports that the toe was completely angled laterally over the third toe  She presented to an urgent care and x-rays were done.  She was instructed to buddy tape the toe and was provided a boot.  Per her report, no attempted closed reduction.  She has ongoing swelling and pain.  This limits her physical  "activities.  She enjoys camping.    She previously saw Dr. Reeder for this 2023.    *  Past Medical History:   Diagnosis Date     Arthritis      Complication of anesthesia     extremely dry eyes     Depressive disorder      Diabetes (H)      Gastro-oesophageal reflux disease      Hirsutism     Abstracted 02     History of blood transfusion      Hypertension      Leiomyoma of uterus, unspecified     Abstracted 02     Obesity 2010    Estimated Body mass index is 28.97 kg/(m^2) as calculated from the following:   Height as of 7/10/13: 5' 7\"(1.702 m).   Weight as of 7/10/13: 185 lb(83.915 kg).       Other and unspecified hyperlipidemia     Abstracted 02     Other chronic pain     low back and legs and feet     Sleep apnea     CPAP     Uncomplicated asthma    *  *  Past Surgical History:   Procedure Laterality Date     ABDOMEN SURGERY      C sections,thoracic access for spine surgery     ARTHROSCOPY SHOULDER DISTAL CLAVICLE REPAIR Right 2014    Procedure: ARTHROSCOPY SHOULDER DISTAL CLAVICLE RESECTION;  Surgeon: John Paul Rodriguez MD;  Location: [facility] OR     BIOPSY      Cervical     C/SECTION, LOW TRANSVERSE  x 3    , Low Transverse x 4     COLONOSCOPY       COLONOSCOPY N/A 2019    Procedure: COLONOSCOPY;  Surgeon: Hernán Gonzales MD;  Location:  GI     DECOMPRESSION, FUSION CERVICAL ANTERIOR TWO LEVELS, COMBINED  2012    Procedure:COMBINED DECOMPRESSION, FUSION CERVICAL ANTERIOR TWO LEVELS; DECOMPRESSION, FUSION CERVICAL ANTERIOR C4-6; Surgeon:SHALONDA ALCALA; Location:[facility] OR     DECOMPRESSION, FUSION LUMBAR POSTERIOR ONE LEVEL, COMBINED  3/7/2013    Procedure: COMBINED DECOMPRESSION, FUSION LUMBAR POSTERIOR ONE LEVEL;  Posterior Fusion L2-3, Hardware Removal L3-5;  Surgeon: Shalonda Alcala MD;  Location: [facility] OR     DECOMPRESSION, FUSION LUMBAR POSTERIOR TWO LEVELS, COMBINED N/A 7/3/2018    Procedure: COMBINED DECOMPRESSION, FUSION LUMBAR POSTERIOR TWO " LEVELS;  Bilateral decompression L1-L2 with left total facetectomy   Transforaminal lumbar interbody fusion L1-L2 from left-sided approach   Insertion of intervertebral biomechanical device L1-L2  Posterolateral fusion L1-L2; Removal of instrumentation L2-3  RE-Instrumentation L1-L3;  Surgeon: Davide Tamez MD;  Location: [facility] OR     EXPLORE SPINE, REMOVE HARDWARE, COMBINED N/A 7/3/2018    Procedure: COMBINED EXPLORE SPINE, REMOVE HARDWARE;;  Surgeon: Davide Tamez MD;  Location: [facility]     FUSION LUMBAR ANTERIOR ONE LEVEL  4/11/2013    Procedure: FUSION LUMBAR ANTERIOR ONE LEVEL;  Anterior Fusion L2-3;  Surgeon: Davide Tamez MD;  Location: [facility] OR     HEAD & NECK SURGERY       LAMINECTOMY, FUSION LUMBAR TWO LEVELS, COMBINED  3/11    AP fusion L3-5     Z NONSPECIFIC PROCEDURE      Bicep tendon repair    Abstracted 06/14/02   *  *  Current Outpatient Medications   Medication Sig Dispense Refill     buPROPion (WELLBUTRIN XL) 150 MG 24 hr tablet 1 tablet every morning       cetirizine (ZYRTEC) 10 MG tablet Take 10 mg by mouth daily       cycloSPORINE (RESTASIS) 0.05 % ophthalmic emulsion 1 drop 2 times daily       diazepam (VALIUM) 10 MG tablet Take 1/2 to 1 po at bedtime prn headache or neck spasm. 6 tablet 0     DULoxetine (CYMBALTA) 60 MG capsule Take 60 mg by mouth 2 times daily       famotidine (PEPCID) 40 MG tablet Take 1 tablet (40 mg) by mouth daily as needed for heartburn 90 tablet 3     hydrOXYzine (ATARAX) 25 MG tablet 1-2 tablets 3 times daily as needed for anxiety       lamoTRIgine (LAMICTAL) 25 MG tablet 1 TAB ORAL DAILY X2 WK THEN TAKE 2 TABS DAILY  0     Multiple Vitamins-Minerals (OCUVITE ADULT 50+ PO) Take 1 tablet by mouth daily        NONFORMULARY Sustained night time lubricant eye ointment night before bed       omeprazole (PRILOSEC) 40 MG DR capsule TAKE 1 CAPSULE(40 MG) BY MOUTH DAILY 30 capsule 1     pramipexole (MIRAPEX) 0.25 MG tablet 0.5 mg At Bedtime   3     prazosin  (MINIPRESS) 1 MG capsule 1 capsule At Bedtime       pregabalin (LYRICA) 25 MG capsule Take 1 capsule (25 mg) by mouth At Bedtime for 7 days, THEN 1 capsule (25 mg) 2 times daily for 7 days, THEN 1 capsule (25 mg) 3 times daily for 16 days. 69 capsule 0     pregabalin (LYRICA) 25 MG capsule Take 1 capsule (25 mg) by mouth 2 times daily AND 2 capsules (50 mg) At Bedtime. 120 capsule 1     propranolol ER (INDERAL LA) 160 MG 24 hr capsule Take 1 capsule (160 mg) by mouth daily 90 capsule 3     simvastatin (ZOCOR) 40 MG tablet Take 1 tablet (40 mg) by mouth At Bedtime 90 tablet 4         EXAM:    Vitals: LMP 11/12/2007 (LMP Unknown)   BMI: There is no height or weight on file to calculate BMI.    Constitutional:  Lucie Whitehead is in no apparent distress, appears well-nourished.  Cooperative with history and physical exam.    Vascular:  Pedal pulses are palpable for both the DP and PT arteries.  CFT < 3 sec.      Neuro: Light touch sensation is intact to the L4, L5, S1 distributions  No evidence of weakness, spasticity, or contracture in the lower extremities.     Derm: Normal texture and turgor.  No erythema, ecchymosis, or cyanosis.  No open lesions.     Musculoskeletal:    Lower extremity muscle strength is normal.    XR LEFT FOOT THREE OR MORE VIEWS  4/17/2023 11:33 AM     INDICATION: Previous 2nd toe fracture; Closed nondisplaced fracture of  proximal phalanx of lesser toe of left foot, sequela     COMPARISON: None available.                                                                       IMPRESSION: No acute displaced left foot fracture or dislocation  identified. Moderate to severe left first MTP and metatarsal sesamoid  osteoarthritis. Chronic bone fragment along the lateral corner of the  first proximal phalangeal base. Degenerative osteoarthritis left  midfoot particularly naviculocuneiform. Chronic fracture distal second  toe proximal phalanx at the medial head. Heel spur. Distal  Achilles  insertional enthesophyte.        DANAY DENSON MD           Again, thank you for allowing me to participate in the care of your patient.        Sincerely,        Bud Huertas DPM

## 2023-04-27 NOTE — Clinical Note
4/27/2023         RE: Lucie Whitehead  [id number] [facility] [id number]        Dear Colleague,    Thank you for referring your patient, Lucie Whitehead, to the [facility] [place]. Please see a copy of my visit note below.          HISTORY OF PRESENT ILLNESS:    Lucie Whitehead is a 72 year old female who is seen as self referral for Left shoulder pain.    Present symptoms:  Symptoms began  several years(s) ago, but have worsened significantly over the last 6 month.  Reports insidious onset without acute precipitating event.  She reports aching and nagging and occasionally sharp with movement, left Shoulder pain that is located mainly deep and laterally; radiation Absent.  Symptoms are activity related. Pain is 8/10 in maximal severity, and 2/10 currently sitting at rest. Pain may wake her up at night. Symptoms are generally worse with/at night when trying to roll over in bed or unable to lay on that side, or after longer day of sewing; better with/at rest.   Overall the patient feels the condition is worsening. worsening. Other treatment so far has consisted of cortisone years ago, but nothing recently.  Associated symptoms: crepitus and catching.  She has had history of similar or related problems in the past.  She did have a distal biceps repair on the left.  Overall she has multiple orthopedic issues including but not limited to both shoulders, hips, knees and right foot. Lower back issues with surgery. Multiple toe fractures.  She uses walking canes due to neuropathy.  Orthopedic PMH: extensive.   Right shoulder arthroscopy distal clavicle resection and acromioplasty 9/17/2014, Dr. Rodriguez.  Past Medical History:   Diagnosis Date     Arthritis      Complication of anesthesia     extremely dry eyes     Depressive disorder      Diabetes (H)      Gastro-oesophageal reflux disease      Hirsutism     Abstracted 06/14/02     History of blood transfusion       "Hypertension      Leiomyoma of uterus, unspecified     Abstracted 02     Obesity 2010    Estimated Body mass index is 28.97 kg/(m^2) as calculated from the following:   Height as of 7/10/13: 5' 7\"(1.702 m).   Weight as of 7/10/13: 185 lb(83.915 kg).       Other and unspecified hyperlipidemia     Abstracted 02     Other chronic pain     low back and legs and feet     Sleep apnea     CPAP     Uncomplicated asthma        Past Surgical History:   Procedure Laterality Date     ABDOMEN SURGERY      C sections,thoracic access for spine surgery     ARTHROSCOPY SHOULDER DISTAL CLAVICLE REPAIR Right 2014    Procedure: ARTHROSCOPY SHOULDER DISTAL CLAVICLE RESECTION;  Surgeon: John Paul Rodriguez MD;  Location: [facility] OR     BIOPSY      Cervical     C/SECTION, LOW TRANSVERSE  x 3    , Low Transverse x 4     COLONOSCOPY       COLONOSCOPY N/A 2019    Procedure: COLONOSCOPY;  Surgeon: Hernán Gonzales MD;  Location:  GI     DECOMPRESSION, FUSION CERVICAL ANTERIOR TWO LEVELS, COMBINED  2012    Procedure:COMBINED DECOMPRESSION, FUSION CERVICAL ANTERIOR TWO LEVELS; DECOMPRESSION, FUSION CERVICAL ANTERIOR C4-6; Surgeon:SHALONDA ALCALA; Location: OR     DECOMPRESSION, FUSION LUMBAR POSTERIOR ONE LEVEL, COMBINED  3/7/2013    Procedure: COMBINED DECOMPRESSION, FUSION LUMBAR POSTERIOR ONE LEVEL;  Posterior Fusion L2-3, Hardware Removal L3-5;  Surgeon: Shalonda Alcala MD;  Location:  OR     DECOMPRESSION, FUSION LUMBAR POSTERIOR TWO LEVELS, COMBINED N/A 7/3/2018    Procedure: COMBINED DECOMPRESSION, FUSION LUMBAR POSTERIOR TWO LEVELS;  Bilateral decompression L1-L2 with left total facetectomy   Transforaminal lumbar interbody fusion L1-L2 from left-sided approach   Insertion of intervertebral biomechanical device L1-L2  Posterolateral fusion L1-L2; Removal of instrumentation L2-3  RE-Instrumentation L1-L3;  Surgeon: Shalonda Alcala MD;  Location:  OR     EXPLORE SPINE, REMOVE " Prematurity, birth weight 1,750-1,999 grams, with 34 completed weeks of gestation Prematurity, birth weight 1,750-1,999 grams, with 34 completed weeks of gestation Prematurity, birth weight 1,750-1,999 grams, with 34 completed weeks of gestation HARDWARE, COMBINED N/A 7/3/2018    Procedure: COMBINED EXPLORE SPINE, REMOVE HARDWARE;;  Surgeon: Davide Tamez MD;  Location: [facility] OR     FUSION LUMBAR ANTERIOR ONE LEVEL  2013    Procedure: FUSION LUMBAR ANTERIOR ONE LEVEL;  Anterior Fusion L2-3;  Surgeon: Davide Tamez MD;  Location: [facility] OR     HEAD & NECK SURGERY       LAMINECTOMY, FUSION LUMBAR TWO LEVELS, COMBINED  3/11    AP fusion L3-5     ZZC NONSPECIFIC PROCEDURE      Bicep tendon repair    Abstracted 02       Family History   Problem Relation Age of Onset     Cancer - colorectal Mother         born  HTN and DMII     Diabetes Mother         type II     Coronary Artery Disease Mother 90        CHF     Colon Cancer Mother      Dementia Mother      Prostate Cancer Father      Diabetes Maternal Grandmother         type II     Hypertension Maternal Grandmother      Cancer - colorectal Maternal Grandmother      Colon Cancer Maternal Grandmother      Heart Disease Maternal Grandfather      Diabetes Maternal Grandfather      Respiratory Paternal Grandmother         asthma     Diabetes Son         dx age 21type I     Diabetes Maternal Uncle         type II     Hypertension Maternal Uncle        Social History     Socioeconomic History     Marital status:      Spouse name: Not on file     Number of children: Not on file     Years of education: Not on file     Highest education level: Not on file   Occupational History     Not on file   Tobacco Use     Smoking status: Former     Packs/day: 0.50     Years: 5.00     Pack years: 2.50     Types: Cigarettes     Quit date: 1975     Years since quittin.3     Smokeless tobacco: Never   Vaping Use     Vaping status: Never Used   Substance and Sexual Activity     Alcohol use: Yes     Alcohol/week: 0.0 standard drinks of alcohol     Comment: 2-3 glass wine /week     Drug use: No     Sexual activity: Not Currently     Partners: Male     Birth control/protection: Post-menopausal   Other  Prematurity, birth weight 1,750-1,999 grams, with 34 completed weeks of gestation Topics Concern     Parent/sibling w/ CABG, MI or angioplasty before 65F 55M? No   Social History Narrative     Not on file     Social Determinants of Health     Financial Resource Strain: Not on file   Food Insecurity: Not on file   Transportation Needs: Not on file   Physical Activity: Not on file   Stress: Not on file   Social Connections: Not on file   Intimate Partner Violence: Not on file   Housing Stability: Not on file       Current Outpatient Medications   Medication Sig Dispense Refill     buPROPion (WELLBUTRIN XL) 150 MG 24 hr tablet 1 tablet every morning       cetirizine (ZYRTEC) 10 MG tablet Take 10 mg by mouth daily       cycloSPORINE (RESTASIS) 0.05 % ophthalmic emulsion 1 drop 2 times daily       diazepam (VALIUM) 10 MG tablet Take 1/2 to 1 po at bedtime prn headache or neck spasm. 6 tablet 0     DULoxetine (CYMBALTA) 60 MG capsule Take 60 mg by mouth 2 times daily       famotidine (PEPCID) 40 MG tablet Take 1 tablet (40 mg) by mouth daily as needed for heartburn 90 tablet 3     hydrOXYzine (ATARAX) 25 MG tablet 1-2 tablets 3 times daily as needed for anxiety       lamoTRIgine (LAMICTAL) 25 MG tablet 1 TAB ORAL DAILY X2 WK THEN TAKE 2 TABS DAILY  0     Multiple Vitamins-Minerals (OCUVITE ADULT 50+ PO) Take 1 tablet by mouth daily        NONFORMULARY Sustained night time lubricant eye ointment night before bed       omeprazole (PRILOSEC) 40 MG DR capsule TAKE 1 CAPSULE(40 MG) BY MOUTH DAILY 30 capsule 1     pramipexole (MIRAPEX) 0.25 MG tablet 0.5 mg At Bedtime   3     prazosin (MINIPRESS) 1 MG capsule 1 capsule At Bedtime       pregabalin (LYRICA) 25 MG capsule Take 1 capsule (25 mg) by mouth At Bedtime for 7 days, THEN 1 capsule (25 mg) 2 times daily for 7 days, THEN 1 capsule (25 mg) 3 times daily for 16 days. 69 capsule 0     pregabalin (LYRICA) 25 MG capsule Take 1 capsule (25 mg) by mouth 2 times daily AND 2 capsules (50 mg) At Bedtime. 120 capsule 1     propranolol ER (INDERAL LA) 160 MG 24 hr  "capsule Take 1 capsule (160 mg) by mouth daily 90 capsule 3     simvastatin (ZOCOR) 40 MG tablet Take 1 tablet (40 mg) by mouth At Bedtime 90 tablet 4       Allergies   Allergen Reactions     Gabapentin Unknown     Animal Dander      Cats      Dogs      Seasonal Allergies      Trees and mold       Patient's past medical, surgical, social and family histories are reviewed today.  Past medical history is notable for: Chronic pain, depression, PEÑA, morbid obesity, status post lumbar fusion, prediabetic, hyperlipidemia, restless leg syndrome, GERD, hypertension,    REVIEW OF SYSTEMS: No changes to the following recently.  CONSTITUTIONAL:  NEGATIVE for fever, chills, change in weight  INTEGUMENTARY/SKIN:  NEGATIVE for worrisome rashes, moles or lesions  EYES:  NEGATIVE for vision changes or irritation  ENT/MOUTH:  NEGATIVE for ear, mouth and throat problems  RESP:  NEGATIVE for significant cough or SOB  BREAST:  NEGATIVE for masses, tenderness or discharge  CV:  NEGATIVE for chest pain, palpitations or peripheral edema  GI:  NEGATIVE for nausea, abdominal pain, heartburn, or change in bowel habits  :  Negative   MUSCULOSKELETAL:  See HPI above  NEURO:  NEGATIVE for weakness, dizziness or paresthesias  ENDOCRINE:  NEGATIVE for temperature intolerance, skin/hair changes  HEME/ALLERGY/IMMUNE:  NEGATIVE for bleeding problems  PSYCHIATRIC:  NEGATIVE for changes in mood or affect      PHYSICAL EXAM:  From office visit 3/27/2023,  /78 (BP Location: Right arm, Patient Position: Sitting, Cuff Size: Adult Regular)   Pulse 65   Temp 97.8  F (36.6  C) (Tympanic)   Resp 17   Ht 1.664 m (5' 5.5\")   Wt 112.1 kg (247 lb 1.6 oz)   LMP 11/12/2007 (LMP Unknown)   SpO2 98%   BMI 40.49 kg/m      GENERAL APPEARANCE: healthy, well nourished.  Overweight  NEURO: Mildly somnolent however, she is alert and oriented x 3, mentation intact and speech normal  POSTURE: Stands with normal weight bearing line.  Using walking stick " Prematurity, birth weight 1,750-1,999 grams, with 34 completed weeks of gestation today.  PSYCH:  mentation appears normal and affect normal/bright    MUSCULOSKELETAL: Examination of left shoulder.  GROSS OBSERVATION: No deformities, sulcus sign, AC separation, edema, ecchymosis lesions or scars.  PALPATION: Crepitus at the joint with motion, and severe tenderness at the lateral shoulder, not necessarily supraspinatus insertion.  No pain over scapular spine, AC joint, clavicle, biceps at the groove, no excessive warmth.  ROM:  Right: Flexion 120, internal rotation to back pocket, external rotation 60, adduction to opposite shoulder, abduction 90.  Left: Flexion 160, IR to elbow 5, ER 60, adduction to opposite shoulder, abduction 150  LIGAMENTOUS: Grossly intact with no apprehension sign or signs of instability.  STRENGTH: Grossly intact all planes at neutral.  SPECIAL TESTS: Empty can and drop arm and speeds test negative.    CONTRALATERAL JOINT:  no overlying skin change, observable deformity, or effusion; range of motion as noted above; strength and function are within normal limits; no obvious ligamentous instability.    SKIN: As noted above, otherwise, no lesions, erythema noted bilaterally.  VASCULATURE:  Good capillary refill bilaterally.  SENSATION: Light touch intact bilaterally upper extremities otherwise no gross deficits noted.   COORDINATION:  Able to move joint within above stated ROM with good control.    Imaging Interpretation:   Previous images from 1/14/2022 of bilateral shoulders reviewed today.    Specifically looking at the left shoulder there is moderate osteoarthritis of the glenohumeral joint with mild loss of joint space, subchondral changes, inferior humeral head osteophyte formation.  Alignment of the glenohumeral joint appears normal without arthropathy or dislocation.  Mild AC joint degeneration, no acute bony findings noted.    ASSESSMENT:  1. Chronic left shoulder pain      ***  PLAN:    1.  ***  2.  ***    Follow up in clinic in 4 weeks if not improving or as  needed.    Cody Ram PA-C  [facility] - Surgery    A total of 30 minutes spent with patient on care and care coordinating activities.        Large Joint Injection/Arthocentesis: L glenohumeral joint    Date/Time: 4/27/2023 10:38 AM    Performed by: Cody Ram PA-C  Authorized by: Cody Ram PA-C    Indications:  Osteoarthritis  Needle Size:  22 G  Guidance: landmark guided    Approach:  Posterior  Location:  Shoulder      Site:  L glenohumeral joint  Medications:  40 mg triamcinolone 40 MG/ML; 4 mL lidocaine 1 %  Consent Given by:  Patient  Timeout: timeout called immediately prior to procedure    Prep: patient was prepped and draped in usual sterile fashion              Again, thank you for allowing me to participate in the care of your patient.        Sincerely,        Cody Ram PA-C   Prematurity, birth weight 1,750-1,999 grams, with 34 completed weeks of gestation Prematurity, birth weight 1,750-1,999 grams, with 34 completed weeks of gestation Prematurity, birth weight 1,750-1,999 grams, with 34 completed weeks of gestation

## 2023-04-27 NOTE — PATIENT INSTRUCTIONS
Based on the exam today I feel most of her symptoms are coming from the osteoarthritis of your left shoulder joint.    We performed a cortisone injection of the left glenohumeral joint today.  This injection contains a local anesthetic that may cause a feeling of tension or numbness in the region as well as a reduction in pain.  These initial symptoms are temporary.  Please allow the cortisone 1 to 2 weeks to take effect after this.      Please monitor how much improvement and for how long the improvement lasts.    We may consider another injection in the future, waiting a minimum of 3 months.    Follow-up in clinic as needed.

## 2023-04-27 NOTE — PROGRESS NOTES
"      HISTORY OF PRESENT ILLNESS:    Lucie Whitehead is a 72 year old female who is seen as self referral for Left shoulder pain.    Present symptoms:  Symptoms began  several years(s) ago, but have worsened significantly over the last 6 month.  Reports insidious onset without acute precipitating event.  She reports aching and nagging and occasionally sharp with movement, left Shoulder pain that is located mainly deep and laterally; radiation Absent.  Symptoms are activity related. Pain is 8/10 in maximal severity, and 2/10 currently sitting at rest. Pain may wake her up at night. Symptoms are generally worse with/at night when trying to roll over in bed or unable to lay on that side, or after longer day of sewing; better with/at rest.   Overall the patient feels the condition is worsening. worsening. Other treatment so far has consisted of cortisone years ago, but nothing recently.  Associated symptoms: crepitus and catching.  She has had history of similar or related problems in the past.  She did have a distal biceps repair on the left.  Overall she has multiple orthopedic issues including but not limited to both shoulders, hips, knees and right foot. Lower back issues with surgery. Multiple toe fractures.  She uses walking canes due to neuropathy.  Orthopedic PMH: extensive.   Right shoulder arthroscopy distal clavicle resection and acromioplasty 9/17/2014, Dr. Rodriguez.  Past Medical History:   Diagnosis Date     Arthritis      Complication of anesthesia     extremely dry eyes     Depressive disorder      Diabetes (H)      Gastro-oesophageal reflux disease      Hirsutism     Abstracted 06/14/02     History of blood transfusion      Hypertension      Leiomyoma of uterus, unspecified     Abstracted 06/14/02     Obesity 4/14/2010    Estimated Body mass index is 28.97 kg/(m^2) as calculated from the following:   Height as of 7/10/13: 5' 7\"(1.702 m).   Weight as of 7/10/13: 185 lb(83.915 kg).       Other and " unspecified hyperlipidemia     Abstracted 02     Other chronic pain     low back and legs and feet     Sleep apnea     CPAP     Uncomplicated asthma        Past Surgical History:   Procedure Laterality Date     ABDOMEN SURGERY      C sections,thoracic access for spine surgery     ARTHROSCOPY SHOULDER DISTAL CLAVICLE REPAIR Right 2014    Procedure: ARTHROSCOPY SHOULDER DISTAL CLAVICLE RESECTION;  Surgeon: John Paul Rodriguez MD;  Location: [facility]     BIOPSY      Cervical     C/SECTION, LOW TRANSVERSE  x 3    , Low Transverse x 4     COLONOSCOPY       COLONOSCOPY N/A 2019    Procedure: COLONOSCOPY;  Surgeon: Hernán Gonzales MD;  Location:  GI     DECOMPRESSION, FUSION CERVICAL ANTERIOR TWO LEVELS, COMBINED  2012    Procedure:COMBINED DECOMPRESSION, FUSION CERVICAL ANTERIOR TWO LEVELS; DECOMPRESSION, FUSION CERVICAL ANTERIOR C4-6; Surgeon:SHALONDA ALCALA; Location:[facility] OR     DECOMPRESSION, FUSION LUMBAR POSTERIOR ONE LEVEL, COMBINED  3/7/2013    Procedure: COMBINED DECOMPRESSION, FUSION LUMBAR POSTERIOR ONE LEVEL;  Posterior Fusion L2-3, Hardware Removal L3-5;  Surgeon: Shalonda Alcala MD;  Location: [facility] OR     DECOMPRESSION, FUSION LUMBAR POSTERIOR TWO LEVELS, COMBINED N/A 7/3/2018    Procedure: COMBINED DECOMPRESSION, FUSION LUMBAR POSTERIOR TWO LEVELS;  Bilateral decompression L1-L2 with left total facetectomy   Transforaminal lumbar interbody fusion L1-L2 from left-sided approach   Insertion of intervertebral biomechanical device L1-L2  Posterolateral fusion L1-L2; Removal of instrumentation L2-3  RE-Instrumentation L1-L3;  Surgeon: Shalonda Alcala MD;  Location: [facility] OR     EXPLORE SPINE, REMOVE HARDWARE, COMBINED N/A 7/3/2018    Procedure: COMBINED EXPLORE SPINE, REMOVE HARDWARE;;  Surgeon: Shalonda Alcala MD;  Location: [facility]     FUSION LUMBAR ANTERIOR ONE LEVEL  2013    Procedure: FUSION LUMBAR ANTERIOR ONE LEVEL;  Anterior Fusion L2-3;  Surgeon: Dashawn  Davide FAGAN MD;  Location: [facility] OR     HEAD & NECK SURGERY       LAMINECTOMY, FUSION LUMBAR TWO LEVELS, COMBINED  3/11    AP fusion L3-5     ZZC NONSPECIFIC PROCEDURE      Bicep tendon repair    Abstracted 02       Family History   Problem Relation Age of Onset     Cancer - colorectal Mother         born  HTN and DMII     Diabetes Mother         type II     Coronary Artery Disease Mother 90        CHF     Colon Cancer Mother      Dementia Mother      Prostate Cancer Father      Diabetes Maternal Grandmother         type II     Hypertension Maternal Grandmother      Cancer - colorectal Maternal Grandmother      Colon Cancer Maternal Grandmother      Heart Disease Maternal Grandfather      Diabetes Maternal Grandfather      Respiratory Paternal Grandmother         asthma     Diabetes Son         dx age 21type I     Diabetes Maternal Uncle         type II     Hypertension Maternal Uncle        Social History     Socioeconomic History     Marital status:      Spouse name: Not on file     Number of children: Not on file     Years of education: Not on file     Highest education level: Not on file   Occupational History     Not on file   Tobacco Use     Smoking status: Former     Packs/day: 0.50     Years: 5.00     Pack years: 2.50     Types: Cigarettes     Quit date: 1975     Years since quittin.3     Smokeless tobacco: Never   Vaping Use     Vaping status: Never Used   Substance and Sexual Activity     Alcohol use: Yes     Alcohol/week: 0.0 standard drinks of alcohol     Comment: 2-3 glass wine /week     Drug use: No     Sexual activity: Not Currently     Partners: Male     Birth control/protection: Post-menopausal   Other Topics Concern     Parent/sibling w/ CABG, MI or angioplasty before 65F 55M? No   Social History Narrative     Not on file     Social Determinants of Health     Financial Resource Strain: Not on file   Food Insecurity: Not on file   Transportation Needs: Not on file   Physical  Activity: Not on file   Stress: Not on file   Social Connections: Not on file   Intimate Partner Violence: Not on file   Housing Stability: Not on file       Current Outpatient Medications   Medication Sig Dispense Refill     buPROPion (WELLBUTRIN XL) 150 MG 24 hr tablet 1 tablet every morning       cetirizine (ZYRTEC) 10 MG tablet Take 10 mg by mouth daily       cycloSPORINE (RESTASIS) 0.05 % ophthalmic emulsion 1 drop 2 times daily       diazepam (VALIUM) 10 MG tablet Take 1/2 to 1 po at bedtime prn headache or neck spasm. 6 tablet 0     DULoxetine (CYMBALTA) 60 MG capsule Take 60 mg by mouth 2 times daily       famotidine (PEPCID) 40 MG tablet Take 1 tablet (40 mg) by mouth daily as needed for heartburn 90 tablet 3     hydrOXYzine (ATARAX) 25 MG tablet 1-2 tablets 3 times daily as needed for anxiety       lamoTRIgine (LAMICTAL) 25 MG tablet 1 TAB ORAL DAILY X2 WK THEN TAKE 2 TABS DAILY  0     Multiple Vitamins-Minerals (OCUVITE ADULT 50+ PO) Take 1 tablet by mouth daily        NONFORMULARY Sustained night time lubricant eye ointment night before bed       omeprazole (PRILOSEC) 40 MG DR capsule TAKE 1 CAPSULE(40 MG) BY MOUTH DAILY 30 capsule 1     pramipexole (MIRAPEX) 0.25 MG tablet 0.5 mg At Bedtime   3     prazosin (MINIPRESS) 1 MG capsule 1 capsule At Bedtime       pregabalin (LYRICA) 25 MG capsule Take 1 capsule (25 mg) by mouth At Bedtime for 7 days, THEN 1 capsule (25 mg) 2 times daily for 7 days, THEN 1 capsule (25 mg) 3 times daily for 16 days. 69 capsule 0     pregabalin (LYRICA) 25 MG capsule Take 1 capsule (25 mg) by mouth 2 times daily AND 2 capsules (50 mg) At Bedtime. 120 capsule 1     propranolol ER (INDERAL LA) 160 MG 24 hr capsule Take 1 capsule (160 mg) by mouth daily 90 capsule 3     simvastatin (ZOCOR) 40 MG tablet Take 1 tablet (40 mg) by mouth At Bedtime 90 tablet 4       Allergies   Allergen Reactions     Gabapentin Unknown     Animal Dander      Cats      Dogs      Seasonal Allergies       "Trees and mold       Patient's past medical, surgical, social and family histories are reviewed today.  Past medical history is notable for: Chronic pain, depression, PEÑA, morbid obesity, status post lumbar fusion, prediabetic, hyperlipidemia, restless leg syndrome, GERD, hypertension,    REVIEW OF SYSTEMS: No changes to the following recently.  CONSTITUTIONAL:  NEGATIVE for fever, chills, change in weight  INTEGUMENTARY/SKIN:  NEGATIVE for worrisome rashes, moles or lesions  EYES:  NEGATIVE for vision changes or irritation  ENT/MOUTH:  NEGATIVE for ear, mouth and throat problems  RESP:  NEGATIVE for significant cough or SOB  BREAST:  NEGATIVE for masses, tenderness or discharge  CV:  NEGATIVE for chest pain, palpitations or peripheral edema  GI:  NEGATIVE for nausea, abdominal pain, heartburn, or change in bowel habits  :  Negative   MUSCULOSKELETAL:  See HPI above  NEURO:  NEGATIVE for weakness, dizziness or paresthesias  ENDOCRINE:  NEGATIVE for temperature intolerance, skin/hair changes  HEME/ALLERGY/IMMUNE:  NEGATIVE for bleeding problems  PSYCHIATRIC:  NEGATIVE for changes in mood or affect      PHYSICAL EXAM:  From office visit 3/27/2023,  /78 (BP Location: Right arm, Patient Position: Sitting, Cuff Size: Adult Regular)   Pulse 65   Temp 97.8  F (36.6  C) (Tympanic)   Resp 17   Ht 1.664 m (5' 5.5\")   Wt 112.1 kg (247 lb 1.6 oz)   LMP 11/12/2007 (LMP Unknown)   SpO2 98%   BMI 40.49 kg/m      GENERAL APPEARANCE: healthy, well nourished.  Overweight  NEURO: Mildly somnolent however, she is alert and oriented x 3, mentation intact and speech normal  POSTURE: Stands with normal weight bearing line.  Using walking stick today.  PSYCH:  mentation appears normal and affect normal/bright    MUSCULOSKELETAL: Examination of left shoulder.  GROSS OBSERVATION: No deformities, sulcus sign, AC separation, edema, ecchymosis lesions or scars.  PALPATION: Crepitus at the joint with motion, and severe tenderness " at the lateral shoulder, not necessarily supraspinatus insertion.  No pain over scapular spine, AC joint, clavicle, biceps at the groove, no excessive warmth.  ROM:  Right: Flexion 120, internal rotation to back pocket, external rotation 60, adduction to opposite shoulder, abduction 90.  Left: Flexion 160, IR to elbow 5, ER 60, adduction to opposite shoulder, abduction 150  LIGAMENTOUS: Grossly intact with no apprehension sign or signs of instability.  STRENGTH: Grossly intact all planes at neutral.  SPECIAL TESTS: Empty can and drop arm and speeds test negative.    CONTRALATERAL JOINT:  no overlying skin change, observable deformity, or effusion; range of motion as noted above; strength and function are within normal limits; no obvious ligamentous instability.    SKIN: As noted above, otherwise, no lesions, erythema noted bilaterally.  VASCULATURE:  Good capillary refill bilaterally.  SENSATION: Light touch intact bilaterally upper extremities otherwise no gross deficits noted.   COORDINATION:  Able to move joint within above stated ROM with good control.    Imaging Interpretation:   Previous images from 1/14/2022 of bilateral shoulders reviewed today.    Specifically looking at the left shoulder there is moderate osteoarthritis of the glenohumeral joint with mild loss of joint space, subchondral changes, inferior humeral head osteophyte formation.  Alignment of the glenohumeral joint appears normal without arthropathy or dislocation.  Mild AC joint degeneration, no acute bony findings noted.    ASSESSMENT:  1. Osteoarthritis, localized, shoulder, left    2. Chronic left shoulder pain      While there may be some impingement, most symptoms appear to be coming from her glenohumeral arthritis which is most likely progressed since last images however without recent trauma no new images were taken today.    PLAN:    1.  Cortisone injection left glenohumeral joint performed today.  2.  May try Voltaren gel and  over-the-counter anti-inflammatories as directed by her primary care.    Follow up in clinic in 4 weeks if not improving or as needed.    Cody Ram PA-C  [facility] - Surgery    A total of 30 minutes spent with patient on care and care coordinating activities.        Large Joint Injection/Arthocentesis: L glenohumeral joint    Date/Time: 4/27/2023 10:38 AM    Performed by: Cody Ram PA-C  Authorized by: Cody Ram PA-C    Indications:  Osteoarthritis  Needle Size:  22 G  Guidance: landmark guided    Approach:  Posterior  Location:  Shoulder      Site:  L glenohumeral joint  Medications:  40 mg triamcinolone 40 MG/ML; 4 mL lidocaine 1 %  Consent Given by:  Patient  Timeout: timeout called immediately prior to procedure    Prep: patient was prepped and draped in usual sterile fashion

## 2023-04-27 NOTE — PATIENT INSTRUCTIONS
"Thank you for choosing [facility] Podiatry / Foot & Ankle Surgery!    DR. MEDEL'S CLINIC LOCATIONS:     [facility] TRIAGE LINE: [phone]   [address] APPOINTMENTS: [phone]   [place] [address] RADIOLOGY: [phone]   (Every other Tues - Wed - Fri PM) SET UP SURGERY: [phone]    PHYSICAL THERAPY: [phone]   [place] [facility] BILLING QUESTIONS: [phone] [address] FAX: [phone]   [place] [address]    (Thurs & Fri AM)      Some of your pain might still be from the injury.  However, you likely have developed some posttraumatic arthritis from the injury near the joint.    Recommendations:  Please consider finding a comfortable stiffer soled shoe.  This will take pressure off of your toes when you walk.  You might want to also consider a shoe that has a \" rocker-bottom.\"  Dr. Mdeel has ordered an image guided steroid injection as an option for you.  Please call the radiology number above to schedule.    You could also try Voltaren gel on the toe.  This is marketed for arthritis  Consider ice/cold    If the problem persists into the summer, there are surgical options.  "

## 2023-04-27 NOTE — PROGRESS NOTES
ASSESSMENT:  Encounter Diagnoses   Name Primary?     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 Holloway.  Reviewed the pathology of the proximal inner phalangeal joint region.    Recommendations:  Stiff soled shoes offload the forefoot during the propulsive phase of gait  Anti-inflammatory measures, including topical Voltaren gel  Buddy splinting is optional    I offered a referral for an image guided steroid injection into the left second toe proximal interphalangeal joint.  This was ordered.    We also discussed the future option of surgical intervention in the form of arthroplasty to reduce this.    As-needed basis    Disclaimer: This note consists of symbols derived from keyboarding, dictation and/or voice recognition software. As a result, there may be errors in the script that have gone undetected. Please consider this when interpreting information found in this chart.    Bud Huertas DPM, FACFAS, MS    [place] [facility]le [facility]      ____________________________________________________________________    HPI:       Lucie presents today with left second toe pain.  She reports an injury last September where she caught her toe on some steps wguke ascending.  She reports that the toe was completely angled laterally over the third toe  She presented to an urgent care and x-rays were done.  She was instructed to buddy tape the toe and was provided a boot.  Per her report, no attempted closed reduction.  She has ongoing swelling and pain.  This limits her physical activities.  She enjoys camping.    She previously saw Dr. Reeder for this 4/17/2023.    *  Past Medical History:   Diagnosis Date     Arthritis      Complication of anesthesia     extremely dry eyes     Depressive disorder      Diabetes (H)      Gastro-oesophageal reflux  "disease      Hirsutism     Abstracted 02     History of blood transfusion      Hypertension      Leiomyoma of uterus, unspecified     Abstracted 02     Obesity 2010    Estimated Body mass index is 28.97 kg/(m^2) as calculated from the following:   Height as of 7/10/13: 5' 7\"(1.702 m).   Weight as of 7/10/13: 185 lb(83.915 kg).       Other and unspecified hyperlipidemia     Abstracted 02     Other chronic pain     low back and legs and feet     Sleep apnea     CPAP     Uncomplicated asthma    *  *  Past Surgical History:   Procedure Laterality Date     ABDOMEN SURGERY      C sections,thoracic access for spine surgery     ARTHROSCOPY SHOULDER DISTAL CLAVICLE REPAIR Right 2014    Procedure: ARTHROSCOPY SHOULDER DISTAL CLAVICLE RESECTION;  Surgeon: John Paul Rodriguez MD;  Location: [facility] OR     BIOPSY      Cervical     C/SECTION, LOW TRANSVERSE  x 3    , Low Transverse x 4     COLONOSCOPY       COLONOSCOPY N/A 2019    Procedure: COLONOSCOPY;  Surgeon: Hernán Gonzales MD;  Location:  GI     DECOMPRESSION, FUSION CERVICAL ANTERIOR TWO LEVELS, COMBINED  2012    Procedure:COMBINED DECOMPRESSION, FUSION CERVICAL ANTERIOR TWO LEVELS; DECOMPRESSION, FUSION CERVICAL ANTERIOR C4-6; Surgeon:SHALONDA ALCALA; Location:[facility] OR     DECOMPRESSION, FUSION LUMBAR POSTERIOR ONE LEVEL, COMBINED  3/7/2013    Procedure: COMBINED DECOMPRESSION, FUSION LUMBAR POSTERIOR ONE LEVEL;  Posterior Fusion L2-3, Hardware Removal L3-5;  Surgeon: Shalonda Alcala MD;  Location: [facility] OR     DECOMPRESSION, FUSION LUMBAR POSTERIOR TWO LEVELS, COMBINED N/A 7/3/2018    Procedure: COMBINED DECOMPRESSION, FUSION LUMBAR POSTERIOR TWO LEVELS;  Bilateral decompression L1-L2 with left total facetectomy   Transforaminal lumbar interbody fusion L1-L2 from left-sided approach   Insertion of intervertebral biomechanical device L1-L2  Posterolateral fusion L1-L2; Removal of instrumentation L2-3  RE-Instrumentation " L1-L3;  Surgeon: Davide Tamez MD;  Location: [facility] OR     EXPLORE SPINE, REMOVE HARDWARE, COMBINED N/A 7/3/2018    Procedure: COMBINED EXPLORE SPINE, REMOVE HARDWARE;;  Surgeon: Davide Tamez MD;  Location: [facility] OR     FUSION LUMBAR ANTERIOR ONE LEVEL  4/11/2013    Procedure: FUSION LUMBAR ANTERIOR ONE LEVEL;  Anterior Fusion L2-3;  Surgeon: Davide Tamez MD;  Location: [facility] OR     HEAD & NECK SURGERY       LAMINECTOMY, FUSION LUMBAR TWO LEVELS, COMBINED  3/11    AP fusion L3-5     ZZC NONSPECIFIC PROCEDURE      Bicep tendon repair    Abstracted 06/14/02   *  *  Current Outpatient Medications   Medication Sig Dispense Refill     buPROPion (WELLBUTRIN XL) 150 MG 24 hr tablet 1 tablet every morning       cetirizine (ZYRTEC) 10 MG tablet Take 10 mg by mouth daily       cycloSPORINE (RESTASIS) 0.05 % ophthalmic emulsion 1 drop 2 times daily       diazepam (VALIUM) 10 MG tablet Take 1/2 to 1 po at bedtime prn headache or neck spasm. 6 tablet 0     DULoxetine (CYMBALTA) 60 MG capsule Take 60 mg by mouth 2 times daily       famotidine (PEPCID) 40 MG tablet Take 1 tablet (40 mg) by mouth daily as needed for heartburn 90 tablet 3     hydrOXYzine (ATARAX) 25 MG tablet 1-2 tablets 3 times daily as needed for anxiety       lamoTRIgine (LAMICTAL) 25 MG tablet 1 TAB ORAL DAILY X2 WK THEN TAKE 2 TABS DAILY  0     Multiple Vitamins-Minerals (OCUVITE ADULT 50+ PO) Take 1 tablet by mouth daily        NONFORMULARY Sustained night time lubricant eye ointment night before bed       omeprazole (PRILOSEC) 40 MG DR capsule TAKE 1 CAPSULE(40 MG) BY MOUTH DAILY 30 capsule 1     pramipexole (MIRAPEX) 0.25 MG tablet 0.5 mg At Bedtime   3     prazosin (MINIPRESS) 1 MG capsule 1 capsule At Bedtime       pregabalin (LYRICA) 25 MG capsule Take 1 capsule (25 mg) by mouth At Bedtime for 7 days, THEN 1 capsule (25 mg) 2 times daily for 7 days, THEN 1 capsule (25 mg) 3 times daily for 16 days. 69 capsule 0     pregabalin (LYRICA) 25  MG capsule Take 1 capsule (25 mg) by mouth 2 times daily AND 2 capsules (50 mg) At Bedtime. 120 capsule 1     propranolol ER (INDERAL LA) 160 MG 24 hr capsule Take 1 capsule (160 mg) by mouth daily 90 capsule 3     simvastatin (ZOCOR) 40 MG tablet Take 1 tablet (40 mg) by mouth At Bedtime 90 tablet 4         EXAM:    Vitals: LMP 11/12/2007 (LMP Unknown)   BMI: There is no height or weight on file to calculate BMI.    Constitutional:  Lucie Whitehead is in no apparent distress, appears well-nourished.  Cooperative with history and physical exam.    Vascular:  Pedal pulses are palpable for both the DP and PT arteries.  CFT < 3 sec.      Neuro: Light touch sensation is intact to the L4, L5, S1 distributions  No evidence of weakness, spasticity, or contracture in the lower extremities.     Derm: Normal texture and turgor.  No erythema, ecchymosis, or cyanosis.  No open lesions.     Musculoskeletal:    Lower extremity muscle strength is normal.    XR LEFT FOOT THREE OR MORE VIEWS  4/17/2023 11:33 AM     INDICATION: Previous 2nd toe fracture; Closed nondisplaced fracture of  proximal phalanx of lesser toe of left foot, sequela     COMPARISON: None available.                                                                       IMPRESSION: No acute displaced left foot fracture or dislocation  identified. Moderate to severe left first MTP and metatarsal sesamoid  osteoarthritis. Chronic bone fragment along the lateral corner of the  first proximal phalangeal base. Degenerative osteoarthritis left  midfoot particularly naviculocuneiform. Chronic fracture distal second  toe proximal phalanx at the medial head. Heel spur. Distal Achilles  insertional enthesophyte.        DANAY DENSON MD

## 2023-05-04 ENCOUNTER — HOSPITAL ENCOUNTER (OUTPATIENT)
Dept: GENERAL RADIOLOGY | Facility: CLINIC | Age: 73
Discharge: HOME | End: 2023-05-04
Attending: PODIATRIST | Admitting: PHYSICIAN ASSISTANT
Payer: COMMERCIAL

## 2023-05-04 VITALS — OXYGEN SATURATION: 97 % | HEART RATE: 76 BPM | DIASTOLIC BLOOD PRESSURE: 92 MMHG | SYSTOLIC BLOOD PRESSURE: 144 MMHG

## 2023-05-04 DIAGNOSIS — M19.079 ARTHRITIS OF JOINT OF TOE: ICD-10-CM

## 2023-05-04 DIAGNOSIS — M79.675 TOE PAIN, LEFT: ICD-10-CM

## 2023-05-04 PROCEDURE — 250N000011 HC RX IP 250 OP 636: Performed by: PODIATRIST

## 2023-05-04 PROCEDURE — 20600 DRAIN/INJ JOINT/BURSA W/O US: CPT | Mod: LT

## 2023-05-04 PROCEDURE — 255N000002 HC RX 255 OP 636: Performed by: PODIATRIST

## 2023-05-04 PROCEDURE — 250N000009 HC RX 250: Performed by: PODIATRIST

## 2023-05-04 RX ORDER — IOPAMIDOL 408 MG/ML
10 INJECTION, SOLUTION INTRATHECAL ONCE
Status: COMPLETED | OUTPATIENT
Start: 2023-05-04 | End: 2023-05-04

## 2023-05-04 RX ORDER — ETHYL CHLORIDE 100 %
1 AEROSOL, SPRAY (ML) TOPICAL ONCE
Status: COMPLETED | OUTPATIENT
Start: 2023-05-04 | End: 2023-05-04

## 2023-05-04 RX ORDER — LIDOCAINE HYDROCHLORIDE 10 MG/ML
30 INJECTION, SOLUTION EPIDURAL; INFILTRATION; INTRACAUDAL; PERINEURAL ONCE
Status: COMPLETED | OUTPATIENT
Start: 2023-05-04 | End: 2023-05-04

## 2023-05-04 RX ORDER — TRIAMCINOLONE ACETONIDE 40 MG/ML
40 INJECTION, SUSPENSION INTRA-ARTICULAR; INTRAMUSCULAR ONCE
Status: COMPLETED | OUTPATIENT
Start: 2023-05-04 | End: 2023-05-04

## 2023-05-04 RX ADMIN — LIDOCAINE HYDROCHLORIDE 2 ML: 10 INJECTION, SOLUTION EPIDURAL; INFILTRATION; INTRACAUDAL; PERINEURAL at 13:29

## 2023-05-04 RX ADMIN — TRIAMCINOLONE ACETONIDE 40 MG: 40 INJECTION, SUSPENSION INTRA-ARTICULAR; INTRAMUSCULAR at 13:49

## 2023-05-04 RX ADMIN — IOPAMIDOL 0.5 ML: 408 INJECTION, SOLUTION INTRATHECAL at 13:49

## 2023-05-04 RX ADMIN — Medication 1 APPLICATION.: at 13:28

## 2023-05-04 NOTE — DISCHARGE INSTRUCTIONS
JOINT STEROID INJECTION DISCHARGE INSTRUCTIONS    What to expect  After the procedure, you may feel some temporary relief from the local anesthetic, but that will likely wear off within a few hours. Your symptoms may then return to pre-procedure level, or even be temporarily worse for a day or two.  For many people, the steroid begins to provide some relief within 2-3 days, but it can take up to 2 weeks. If you have no improvement in your symptoms after two weeks, please contact your referring provider to discuss next steps.  What to do  Minimize your activity today. You may resume your normal activity tomorrow as tolerated. Avoid vigorous or strenuous activity until your symptoms improve, or as directed by your referring provider.   You may shower tomorrow, but do not submerge in bathtub, hot tub or pool for 48 hours.    Use ice packs as needed for discomfort.  You may resume all medications, including blood thinners.  You may take over the counter acetaminophen (Tylenol) or ibuprofen (Motrin, Advil) for mild discomfort after the procedure.    What to watch for  Bruising or slight swelling at the puncture site can be normal. If you begin to develop redness or excessive swelling at the site, fever over 1010F, notable increase in pain, weakness, or numbness, please contact your primary care or referring provider.  Side effects from the steroid are often mild and go away in a few days. Common side effects may include facial flushing, restlessness, irritability, difficulty sleeping, elevated blood pressure, and increased blood sugar.   If you have diabetes, monitor your blood sugar closely. Contact the provider who manages your diabetes to help you control your blood sugar if needed.  If you have questions or concerns  You may contact the [facility] Radiology Department at [phone] between 8am-4:30pm Monday through Friday.  If you have urgent questions outside of these normal business hours, please contact  the [facility] on-call physician at [phone].    The provider who performed your procedure was {RADProvider:[id number]}

## 2023-05-04 NOTE — PROCEDURES
[facility]    Procedure: Left 2nd PIP joint steroid injection    Date/Time: 5/4/2023 1:55 PM    Performed by: Casimiro Rushing PA-C  Authorized by: Casimiro Rushing PA-C      UNIVERSAL PROTOCOL   Site Marked: Yes  Prior Images Obtained and Reviewed:  Yes  Required items: Required blood products, implants, devices and special equipment available    Patient identity confirmed:  Verbally with patient  Patient was reevaluated immediately before administering moderate or deep sedation or anesthesia  Confirmation Checklist:  Patient's identity using two indicators, relevant allergies, procedure was appropriate and matched the consent or emergent situation and correct equipment/implants were available  Time out: Immediately prior to the procedure a time out was called    Universal Protocol: the Joint Commission Universal Protocol was followed    Preparation: Patient was prepped and draped in usual sterile fashion       ANESTHESIA    Local Anesthetic: Lidocaine 1% without epinephrine      SEDATION    Patient Sedated: No    See dictated procedure note for full details.    PROCEDURE  Describe Procedure: Likely unable to get into the joint space.  Attempted numerous angles and kept hitting bone on dorsal aspect of the joint.  Injected 20 ml of kenalog in the area of the joint.  Told the patient that this may not be helpful due to severe OA seen. Previous toe injuries.  Patient Tolerance:  Patient tolerated the procedure well with no immediate complications  Length of time physician/provider present for 1:1 monitoring during sedation: 0

## 2023-08-28 ENCOUNTER — TRANSFERRED RECORDS (OUTPATIENT)
Dept: HEALTH INFORMATION MANAGEMENT | Facility: CLINIC | Age: 73
End: 2023-08-28
Payer: COMMERCIAL

## 2023-09-01 ENCOUNTER — ANCILLARY PROCEDURE (OUTPATIENT)
Dept: MAMMOGRAPHY | Facility: CLINIC | Age: 73
End: 2023-09-01
Payer: COMMERCIAL

## 2023-09-01 DIAGNOSIS — Z12.31 VISIT FOR SCREENING MAMMOGRAM: ICD-10-CM

## 2023-09-01 PROCEDURE — 77067 SCR MAMMO BI INCL CAD: CPT | Mod: TC | Performed by: RADIOLOGY

## 2023-09-01 PROCEDURE — 77063 BREAST TOMOSYNTHESIS BI: CPT | Mod: TC | Performed by: RADIOLOGY

## 2023-10-29 ENCOUNTER — MYC MEDICAL ADVICE (OUTPATIENT)
Dept: PEDIATRICS | Facility: CLINIC | Age: 73
End: 2023-10-29
Payer: COMMERCIAL

## 2023-10-30 ENCOUNTER — NURSE TRIAGE (OUTPATIENT)
Dept: PEDIATRICS | Facility: CLINIC | Age: 73
End: 2023-10-30
Payer: COMMERCIAL

## 2023-10-30 NOTE — TELEPHONE ENCOUNTER
Nurse Triage SBAR    Is this a 2nd Level Triage? NO    Situation: Called pt to discuss edema reported in mychart message.    Background: Pt reports edema beginning mid-year this year. Pt states it had not bothered her much as it comes and goes, but she would like to have it discussed. Pt denies swelling interfering with daily activities.     Pt also would like to discuss steroid injections for chronic foot and shoulder pain. Pt states she has received injections in the past from ortho. Recommended pt call ortho office to discuss, and can also discuss at upcoming PCP appt.     Assessment: Pt endorses intermittent bilateral lower extremity edema. Pt reports it worsens when sitting for long periods and flying. Pt reports increased swelling during recent flight, much improved today after elevating legs and wearing compression stockings. Pt reports right side occasionally appears larger than the left, but swelling is bilateral. Pt denies pain with swelling, described it more as discomfort. Pt denies redness, fever, chest pain, SOB and difficulty breathing.     Protocol Recommended Disposition:   See in Office Within 3 Days    Recommendation: Recommended pt be seen in office within 3 days per protocol. Pt verbalized understanding and agrees with plan. Scheduled pt to be seen.     Reason for Disposition   MILD swelling of both ankles (i.e., pedal edema) AND new-onset or worsening    Additional Information   Negative: Sounds like a life-threatening emergency to the triager   Negative: Chest pain   Negative: Followed an insect bite and has localized swelling (e.g., small area of puffy or swollen skin)   Negative: Followed a knee injury   Negative: Ankle or foot injury   Negative: Pregnant with leg swelling or edema   Negative: Difficulty breathing at rest   Negative: Entire foot is cool or blue in comparison to other side   Negative: SEVERE swelling (e.g., swelling extends above knee, entire leg is swollen, weeping fluid)    Negative: Cast on leg or ankle and has increasing pain   Negative: Can't walk or can barely stand (new-onset)   Negative: Fever and red area (or area very tender to touch)   Negative: Patient sounds very sick or weak to the triager   Negative: Swelling of face, arm or hands  (Exception: Slight puffiness of fingers during hot weather.)   Negative: Pregnant 20 or more weeks and sudden weight gain (i.e., > 2 lbs, 1 kg in one week)   Negative: Thigh or calf pain and only 1 side and present > 1 hour   Negative: Thigh, calf, or ankle swelling in only one leg   Negative: Thigh, calf, or ankle swelling in both legs, but one side is definitely more swollen (Exception: Longstanding difference between legs.)   Negative: MODERATE swelling of both ankles (e.g., swelling extends up to the knees) AND new-onset or worsening   Negative: Difficulty breathing with exertion AND worsening or new-onset   Negative: Looks like a boil, infected sore, deep ulcer, or other infected rash (spreading redness, pus)   Negative: Patient wants to be seen    Protocols used: Leg Swelling and Edema-A-SUSAN Pearl RN on 10/30/2023 at 12:36 PM

## 2023-10-30 NOTE — TELEPHONE ENCOUNTER
Called pt to discuss message. See nurse triage encounter.  Paul Pearl RN on 10/30/2023 at 12:37 PM

## 2023-11-01 ENCOUNTER — TRANSFERRED RECORDS (OUTPATIENT)
Dept: HEALTH INFORMATION MANAGEMENT | Facility: CLINIC | Age: 73
End: 2023-11-01

## 2023-11-01 ENCOUNTER — OFFICE VISIT (OUTPATIENT)
Dept: PEDIATRICS | Facility: CLINIC | Age: 73
End: 2023-11-01
Payer: COMMERCIAL

## 2023-11-01 VITALS
TEMPERATURE: 98.6 F | RESPIRATION RATE: 16 BRPM | HEART RATE: 105 BPM | BODY MASS INDEX: 41.61 KG/M2 | DIASTOLIC BLOOD PRESSURE: 76 MMHG | WEIGHT: 258.9 LBS | OXYGEN SATURATION: 96 % | SYSTOLIC BLOOD PRESSURE: 136 MMHG | HEIGHT: 66 IN

## 2023-11-01 DIAGNOSIS — G89.29 CHRONIC LOW BACK PAIN, UNSPECIFIED BACK PAIN LATERALITY, UNSPECIFIED WHETHER SCIATICA PRESENT: ICD-10-CM

## 2023-11-01 DIAGNOSIS — M25.551 HIP PAIN, RIGHT: ICD-10-CM

## 2023-11-01 DIAGNOSIS — M54.50 CHRONIC LOW BACK PAIN, UNSPECIFIED BACK PAIN LATERALITY, UNSPECIFIED WHETHER SCIATICA PRESENT: ICD-10-CM

## 2023-11-01 DIAGNOSIS — R60.0 BILATERAL LOWER EXTREMITY EDEMA: Primary | ICD-10-CM

## 2023-11-01 DIAGNOSIS — R60.9 DEPENDENT EDEMA: ICD-10-CM

## 2023-11-01 DIAGNOSIS — R29.6 FALLS FREQUENTLY: ICD-10-CM

## 2023-11-01 LAB
ALBUMIN SERPL BCG-MCNC: 4.6 G/DL (ref 3.5–5.2)
ALP SERPL-CCNC: 86 U/L (ref 35–104)
ALT SERPL W P-5'-P-CCNC: ABNORMAL U/L
ANION GAP SERPL CALCULATED.3IONS-SCNC: 12 MMOL/L (ref 7–15)
AST SERPL W P-5'-P-CCNC: ABNORMAL U/L
BILIRUB SERPL-MCNC: 0.3 MG/DL
BUN SERPL-MCNC: 19.2 MG/DL (ref 8–23)
CALCIUM SERPL-MCNC: 10 MG/DL (ref 8.8–10.2)
CHLORIDE SERPL-SCNC: 99 MMOL/L (ref 98–107)
CREAT SERPL-MCNC: 1.05 MG/DL (ref 0.51–0.95)
DEPRECATED HCO3 PLAS-SCNC: 25 MMOL/L (ref 22–29)
EGFRCR SERPLBLD CKD-EPI 2021: 57 ML/MIN/1.73M2
ERYTHROCYTE [DISTWIDTH] IN BLOOD BY AUTOMATED COUNT: 13.2 % (ref 10–15)
GLUCOSE SERPL-MCNC: 106 MG/DL (ref 70–99)
HCT VFR BLD AUTO: 40.8 % (ref 35–47)
HGB BLD-MCNC: 13.6 G/DL (ref 11.7–15.7)
MCH RBC QN AUTO: 32.2 PG (ref 26.5–33)
MCHC RBC AUTO-ENTMCNC: 33.3 G/DL (ref 31.5–36.5)
MCV RBC AUTO: 97 FL (ref 78–100)
PLATELET # BLD AUTO: 355 10E3/UL (ref 150–450)
POTASSIUM SERPL-SCNC: 4.4 MMOL/L (ref 3.4–5.3)
PROT SERPL-MCNC: 7.6 G/DL (ref 6.4–8.3)
RBC # BLD AUTO: 4.23 10E6/UL (ref 3.8–5.2)
SODIUM SERPL-SCNC: 136 MMOL/L (ref 135–145)
TSH SERPL DL<=0.005 MIU/L-ACNC: 3.52 UIU/ML (ref 0.3–4.2)
WBC # BLD AUTO: 6.9 10E3/UL (ref 4–11)

## 2023-11-01 PROCEDURE — 85027 COMPLETE CBC AUTOMATED: CPT | Performed by: NURSE PRACTITIONER

## 2023-11-01 PROCEDURE — 84443 ASSAY THYROID STIM HORMONE: CPT | Performed by: NURSE PRACTITIONER

## 2023-11-01 PROCEDURE — 82247 BILIRUBIN TOTAL: CPT | Performed by: NURSE PRACTITIONER

## 2023-11-01 PROCEDURE — 84075 ASSAY ALKALINE PHOSPHATASE: CPT | Performed by: NURSE PRACTITIONER

## 2023-11-01 PROCEDURE — 99214 OFFICE O/P EST MOD 30 MIN: CPT | Performed by: NURSE PRACTITIONER

## 2023-11-01 PROCEDURE — 84155 ASSAY OF PROTEIN SERUM: CPT | Performed by: NURSE PRACTITIONER

## 2023-11-01 PROCEDURE — 80048 BASIC METABOLIC PNL TOTAL CA: CPT | Performed by: NURSE PRACTITIONER

## 2023-11-01 PROCEDURE — 36415 COLL VENOUS BLD VENIPUNCTURE: CPT | Performed by: NURSE PRACTITIONER

## 2023-11-01 PROCEDURE — 82040 ASSAY OF SERUM ALBUMIN: CPT | Performed by: NURSE PRACTITIONER

## 2023-11-01 RX ORDER — RESPIRATORY SYNCYTIAL VIRUS VACCINE 120MCG/0.5
0.5 KIT INTRAMUSCULAR ONCE
Qty: 1 EACH | Refills: 0 | Status: CANCELLED | OUTPATIENT
Start: 2023-11-01 | End: 2023-11-01

## 2023-11-01 ASSESSMENT — PATIENT HEALTH QUESTIONNAIRE - PHQ9
SUM OF ALL RESPONSES TO PHQ QUESTIONS 1-9: 12
SUM OF ALL RESPONSES TO PHQ QUESTIONS 1-9: 12
10. IF YOU CHECKED OFF ANY PROBLEMS, HOW DIFFICULT HAVE THESE PROBLEMS MADE IT FOR YOU TO DO YOUR WORK, TAKE CARE OF THINGS AT HOME, OR GET ALONG WITH OTHER PEOPLE: SOMEWHAT DIFFICULT

## 2023-11-01 ASSESSMENT — PAIN SCALES - GENERAL: PAINLEVEL: MODERATE PAIN (5)

## 2023-11-01 ASSESSMENT — ENCOUNTER SYMPTOMS: HIP PAIN: 1

## 2023-11-01 NOTE — PATIENT INSTRUCTIONS
For falls  -Keep up with PT  -Keep up with neurology recommendations-try to get us the records    For hip/back  -Follow up with Dr. Tamez-ask him if he thinks hip is related to back and if not then who you should see for this    For swelling  -Elevate  -compression stockings  -labs today (we will check blood counts, metabolic panel and thyroid)  -Also echocardiogram (ultrasound of heart)

## 2023-11-01 NOTE — PROGRESS NOTES
Assessment & Plan   Dependent edema  Bilateral lower extremity edema  Mild today. Chronic intermittent problem. Does seem better with elevation. Recommend she add compression. Will check labs and echo as well.  - TSH with free T4 reflex; Future  - CBC with platelets; Future  - Comprehensive metabolic panel (BMP + Alb, Alk Phos, ALT, AST, Total. Bili, TP); Future  - Echocardiogram Complete; Future  - TSH with free T4 reflex  - CBC with platelets  - Comprehensive metabolic panel (BMP + Alb, Alk Phos, ALT, AST, Total. Bili, TP)    Falls frequently  Following with PT at [facility] and had visit with her neurologist about this yesterday-recommend she send us records. Not on blood thinners.     Chronic low back pain, unspecified back pain laterality, unspecified whether sciatica present  Hip pain, right  Follows with neurosurgeon Dr. Tamez, recommend she ask if hip pain related to back or if she should  see different ortho provider for her hip. Has appointment next week.    She plans to get due immunizations at her pharmacy.     Patient Instructions   For falls  -Keep up with PT  -Keep up with neurology recommendations-try to get us the records    For hip/back  -Follow up with Dr. Tamez-ask him if he thinks hip is related to back and if not then who you should see for this    For swelling  -Elevate  -compression stockings  -labs today (we will check blood counts, metabolic panel and thyroid)  -Also echocardiogram (ultrasound of heart)          Eloisa Herrera NP  [facility] REJI Faulkner is a 73 year old, presenting for the following health issues:  Edema (Intermittent bilateral lower extremity edema.) and Hip Pain (Right hip pain she has been wearing a brace. )        11/1/2023     2:53 PM   Additional Questions   Roomed by Barbara   Accompanied by self         11/1/2023     2:53 PM   Patient Reported Additional Medications   Patient reports taking the following new medications  no     Hip Pain    History of Present Illness       Reason for visit:  Pain    She eats 2-3 servings of fruits and vegetables daily.She consumes 0 sweetened beverage(s) daily.She exercises with enough effort to increase her heart rate 10 to 19 minutes per day.  She exercises with enough effort to increase her heart rate 4 days per week.   She is taking medications regularly.     Pain History:  When did you first notice your pain? Last 6 months it has become worse but has been going on for years. She has had cervical spinal MRIs done and she has had previous spinal surgeries.   Have you seen this provider for your pain in the past? Yes   Where in your body do you have pain? Hip and spine, she saw her Neurologist today for some testing  Are you seeing anyone else for your pain? Yes - Neurologist        1/7/2022     9:34 AM 3/27/2023     9:48 AM 11/1/2023     2:45 PM   PHQ-9 SCORE   PHQ-9 Total Score MyChart 10 (Moderate depression) 13 (Moderate depression) 12 (Moderate depression)   PHQ-9 Total Score 10 13 12     #swelling  -b/l lower extremities  -better with legs are elevated  -worst when active/camping and walking a lot , driving    Wt Readings from Last 4 Encounters:   11/01/23 117.4 kg (258 lb 14.4 oz)   04/27/23 113.9 kg (251 lb)   04/17/23 113.9 kg (251 lb)   03/27/23 112.1 kg (247 lb 1.6 oz)       #pain  -R hip  -worsening last spring  -she tells me she has fallen due to pain  -takes ibuprofen regularly 9-10 tabs per day  -tylenol not helpful  -has had spinal fusions. Was seen at [facility]  -Dr. Tamez for back , has appointment next week    #falls  -has discussed with her neurologist about falls -Cecy   -recurrent falls  -also think it's due to pain and weakness and spine issues  -falls at least 1-2x per week  -no blood thinners or aspirin use  -following with PT , weekly. Working on overall strength. Shanell Torres.   -uses walking stick    Review of Systems         Objective    /76 (BP  "Location: Right arm, Patient Position: Sitting, Cuff Size: Adult Large)   Pulse 105   Temp 98.6  F (37  C) (Temporal)   Resp 16   Ht 1.676 m (5' 6\")   Wt 117.4 kg (258 lb 14.4 oz)   LMP 11/12/2007 (LMP Unknown)   SpO2 96%   BMI 41.79 kg/m    Body mass index is 41.79 kg/m .  Physical Exam   GENERAL: healthy, alert and no distress  RESP: lungs clear to auscultation - no rales, rhonchi or wheezes  CV: regular rate and rhythm, normal S1 S2, no S3 or S4, no murmur, click or rub, +1 non-pitting peripheral edema                       "

## 2023-11-02 DIAGNOSIS — R79.89 ELEVATED SERUM CREATININE: Primary | ICD-10-CM

## 2023-11-22 ENCOUNTER — OFFICE VISIT (OUTPATIENT)
Dept: PEDIATRICS | Facility: CLINIC | Age: 73
End: 2023-11-22
Payer: COMMERCIAL

## 2023-11-22 ENCOUNTER — HOSPITAL ENCOUNTER (OUTPATIENT)
Dept: CARDIOLOGY | Facility: CLINIC | Age: 73
Discharge: HOME | End: 2023-11-22
Attending: NURSE PRACTITIONER | Admitting: NURSE PRACTITIONER
Payer: COMMERCIAL

## 2023-11-22 VITALS
TEMPERATURE: 97.5 F | HEART RATE: 73 BPM | DIASTOLIC BLOOD PRESSURE: 80 MMHG | OXYGEN SATURATION: 96 % | WEIGHT: 250 LBS | RESPIRATION RATE: 16 BRPM | BODY MASS INDEX: 40.18 KG/M2 | SYSTOLIC BLOOD PRESSURE: 128 MMHG | HEIGHT: 66 IN

## 2023-11-22 DIAGNOSIS — G47.33 OSA (OBSTRUCTIVE SLEEP APNEA): ICD-10-CM

## 2023-11-22 DIAGNOSIS — R73.01 IMPAIRED FASTING GLUCOSE: ICD-10-CM

## 2023-11-22 DIAGNOSIS — F33.2 SEVERE EPISODE OF RECURRENT MAJOR DEPRESSIVE DISORDER, WITHOUT PSYCHOTIC FEATURES (H): ICD-10-CM

## 2023-11-22 DIAGNOSIS — R60.0 BILATERAL LOWER EXTREMITY EDEMA: ICD-10-CM

## 2023-11-22 DIAGNOSIS — E66.813 CLASS 3 SEVERE OBESITY DUE TO EXCESS CALORIES WITH SERIOUS COMORBIDITY AND BODY MASS INDEX (BMI) OF 40.0 TO 44.9 IN ADULT (H): ICD-10-CM

## 2023-11-22 DIAGNOSIS — R60.9 DEPENDENT EDEMA: ICD-10-CM

## 2023-11-22 DIAGNOSIS — E78.5 HYPERLIPIDEMIA LDL GOAL <160: ICD-10-CM

## 2023-11-22 DIAGNOSIS — M54.2 NECK PAIN: ICD-10-CM

## 2023-11-22 DIAGNOSIS — Z01.818 PREOP GENERAL PHYSICAL EXAM: Primary | ICD-10-CM

## 2023-11-22 DIAGNOSIS — E83.52 HYPERCALCEMIA: ICD-10-CM

## 2023-11-22 DIAGNOSIS — E66.01 CLASS 3 SEVERE OBESITY DUE TO EXCESS CALORIES WITH SERIOUS COMORBIDITY AND BODY MASS INDEX (BMI) OF 40.0 TO 44.9 IN ADULT (H): ICD-10-CM

## 2023-11-22 DIAGNOSIS — M85.80 OSTEOPENIA, UNSPECIFIED LOCATION: ICD-10-CM

## 2023-11-22 PROBLEM — I77.810 ASCENDING AORTA DILATATION (H): Status: ACTIVE | Noted: 2023-11-22

## 2023-11-22 LAB
ALBUMIN SERPL-MCNC: 4 G/DL (ref 3.4–5)
ALP SERPL-CCNC: 81 U/L (ref 40–150)
ALT SERPL W P-5'-P-CCNC: 24 U/L (ref 0–50)
ANION GAP SERPL CALCULATED.3IONS-SCNC: 9 MMOL/L (ref 3–14)
AST SERPL W P-5'-P-CCNC: 25 U/L (ref 0–45)
BILIRUB SERPL-MCNC: 0.9 MG/DL (ref 0.2–1.3)
BUN SERPL-MCNC: 19 MG/DL (ref 7–30)
CALCIUM SERPL-MCNC: 10.4 MG/DL (ref 8.5–10.1)
CHLORIDE BLD-SCNC: 102 MMOL/L (ref 94–109)
CO2 SERPL-SCNC: 29 MMOL/L (ref 20–32)
CREAT SERPL-MCNC: 0.9 MG/DL (ref 0.52–1.04)
EGFRCR SERPLBLD CKD-EPI 2021: 68 ML/MIN/1.73M2
GLUCOSE BLD-MCNC: 114 MG/DL (ref 70–99)
HBA1C MFR BLD: 5.7 % (ref 0–5.6)
LVEF ECHO: NORMAL
POTASSIUM BLD-SCNC: 4.7 MMOL/L (ref 3.4–5.3)
PROT SERPL-MCNC: 7.9 G/DL (ref 6.8–8.8)
SODIUM SERPL-SCNC: 140 MMOL/L (ref 133–144)

## 2023-11-22 PROCEDURE — 93306 TTE W/DOPPLER COMPLETE: CPT | Mod: 26 | Performed by: INTERNAL MEDICINE

## 2023-11-22 PROCEDURE — 80053 COMPREHEN METABOLIC PANEL: CPT | Performed by: NURSE PRACTITIONER

## 2023-11-22 PROCEDURE — 93000 ELECTROCARDIOGRAM COMPLETE: CPT | Performed by: NURSE PRACTITIONER

## 2023-11-22 PROCEDURE — 83036 HEMOGLOBIN GLYCOSYLATED A1C: CPT | Performed by: NURSE PRACTITIONER

## 2023-11-22 PROCEDURE — 99214 OFFICE O/P EST MOD 30 MIN: CPT | Mod: 25 | Performed by: NURSE PRACTITIONER

## 2023-11-22 PROCEDURE — 36415 COLL VENOUS BLD VENIPUNCTURE: CPT | Performed by: NURSE PRACTITIONER

## 2023-11-22 PROCEDURE — 93306 TTE W/DOPPLER COMPLETE: CPT

## 2023-11-22 ASSESSMENT — PATIENT HEALTH QUESTIONNAIRE - PHQ9
10. IF YOU CHECKED OFF ANY PROBLEMS, HOW DIFFICULT HAVE THESE PROBLEMS MADE IT FOR YOU TO DO YOUR WORK, TAKE CARE OF THINGS AT HOME, OR GET ALONG WITH OTHER PEOPLE: SOMEWHAT DIFFICULT
SUM OF ALL RESPONSES TO PHQ QUESTIONS 1-9: 10
SUM OF ALL RESPONSES TO PHQ QUESTIONS 1-9: 10

## 2023-11-22 ASSESSMENT — PAIN SCALES - GENERAL: PAINLEVEL: MILD PAIN (3)

## 2023-11-22 NOTE — PATIENT INSTRUCTIONS
Preparing for Your Surgery  Getting started  A nurse will call you to review your health history and instructions. They will give you an arrival time based on your scheduled surgery time. Please be ready to share:  Your doctor's clinic name and phone number  Your medical, surgical, and anesthesia history  A list of allergies and sensitivities  A list of medicines, including herbal treatments and over-the-counter drugs  Whether the patient has a legal guardian (ask how to send us the papers in advance)  Please tell us if you're pregnant--or if there's any chance you might be pregnant. Some surgeries may injure a fetus (unborn baby), so they require a pregnancy test. Surgeries that are safe for a fetus don't always need a test, and you can choose whether to have one.   If you have a child who's having surgery, please ask for a copy of Preparing for Your Child's Surgery.    Preparing for surgery  Within 10 to 30 days of surgery: Have a pre-op exam (sometimes called an H&P, or History and Physical). This can be done at a clinic or pre-operative center.  If you're having a , you may not need this exam. Talk to your care team.  At your pre-op exam, talk to your care team about all medicines you take. If you need to stop any medicines before surgery, ask when to start taking them again.  We do this for your safety. Many medicines can make you bleed too much during surgery. Some change how well surgery (anesthesia) drugs work.  Call your insurance company to let them know you're having surgery. (If you don't have insurance, call [phone].)  Call your clinic if there's any change in your health. This includes signs of a cold or flu (sore throat, runny nose, cough, rash, fever). It also includes a scrape or scratch near the surgery site.  If you have questions on the day of surgery, call your hospital or surgery center.  Eating and drinking guidelines  For your safety: Unless your surgeon tells you otherwise,  follow the guidelines below.  Eat and drink as usual until 8 hours before you arrive for surgery. After that, no food or milk.  Drink clear liquids until 2 hours before you arrive. These are liquids you can see through, like water, Gatorade, and Propel Water. They also include plain black coffee and tea (no cream or milk), candy, and breath mints. You can spit out gum when you arrive.  If you drink alcohol: Stop drinking it the night before surgery.  If your care team tells you to take medicine on the morning of surgery, it's okay to take it with a sip of water.  Preventing infection  Shower or bathe the night before and morning of your surgery. Follow the instructions your clinic gave you. (If no instructions, use regular soap.)  Don't shave or clip hair near your surgery site. We'll remove the hair if needed.  Don't smoke or vape the morning of surgery. You may chew nicotine gum up to 2 hours before surgery. A nicotine patch is okay.  Note: Some surgeries require you to completely quit smoking and nicotine. Check with your surgeon.  Your care team will make every effort to keep you safe from infection. We will:  Clean our hands often with soap and water (or an alcohol-based hand rub).  Clean the skin at your surgery site with a special soap that kills germs.  Give you a special gown to keep you warm. (Cold raises the risk of infection.)  Wear special hair covers, masks, gowns and gloves during surgery.  Give antibiotic medicine, if prescribed. Not all surgeries need antibiotics.  What to bring on the day of surgery  Photo ID and insurance card  Copy of your health care directive, if you have one  Glasses and hearing aids (bring cases)  You can't wear contacts during surgery  Inhaler and eye drops, if you use them (tell us about these when you arrive)  CPAP machine or breathing device, if you use them  A few personal items, if spending the night  If you have . . .  A pacemaker, ICD (cardiac defibrillator) or other  implant: Bring the ID card.  An implanted stimulator: Bring the remote control.  A legal guardian: Bring a copy of the certified (court-stamped) guardianship papers.  Please remove any jewelry, including body piercings. Leave jewelry and other valuables at home.  If you're going home the day of surgery  You must have a responsible adult drive you home. They should stay with you overnight as well.  If you don't have someone to stay with you, and you aren't safe to go home alone, we may keep you overnight. Insurance often won't pay for this.  After surgery  If it's hard to control your pain or you need more pain medicine, please call your surgeon's office.  Questions?   If you have any questions for your care team, list them here: _________________________________________________________________________________________________________________________________________________________________________ ____________________________________ ____________________________________ ____________________________________  For informational purposes only. Not to replace the advice of your health care provider. Copyright   2003, 2019 [facility]. All rights reserved. Clinically reviewed by Kateryna Springer MD. [employer] 154222 - REV 12/22.    How to Take Your Medication Before Surgery  - Take all of your medications before surgery except as noted below  - HOLD (do not take) Ibuprofen for 24 hours prior to procedure.

## 2023-11-22 NOTE — PROGRESS NOTES
[facility]  [address] [place] [address]  [address]  [place] [address]  Phone: [phone]  Fax: [phone]  Primary Provider: Nallely Moore  Pre-op Performing Provider: GIANLUCA ROBBINS      PREOPERATIVE EVALUATION:  Today's date: 11/22/2023    Lucie is a 73 year old, presenting for the following:  Pre-Op Exam        11/22/2023     9:39 AM   Additional Questions   Roomed by Anusha EVERETT   Accompanied by Self         11/22/2023     9:39 AM   Patient Reported Additional Medications   Patient reports taking the following new medications n/a       Surgical Information:  Surgery/Procedure: Cervical Spinal Fusion   Surgery Location: [facility]   Surgeon: Dr. Galloway   Surgery Date: 11/28/23  Time of Surgery: 3:00 pm  Where patient plans to recover: At home with family  Fax number for surgical facility: [phone]    Assessment & Plan     The proposed surgical procedure is considered INTERMEDIATE risk.    Preop general physical exam  Neck pain  Medically optimized for surgery.   - EKG 12-lead complete w/read - Clinics  - Comprehensive metabolic panel (BMP + Alb, Alk Phos, ALT, AST, Total. Bili, TP)  - Hemoglobin A1c    Class 3 severe obesity due to excess calories with serious comorbidity and body mass index (BMI) of 40.0 to 44.9 in adult (H)  Body mass index is 40.35 kg/m .    Hyperlipidemia LDL goal <160  Managed on simvastatin.   - EKG 12-lead complete w/read - Clinics    Impaired fasting glucose  A1C 5.7%.  - Hemoglobin A1c    PEÑA (obstructive sleep apnea)  Uses CPAP.   - EKG 12-lead complete w/read - Clinics    Severe episode of recurrent major depressive disorder, without psychotic features (H)  Chronic, stable.     Osteopenia, unspecified location    Hypercalcemia  Recommend recheck in 2-4 weeks.   - Calcium; Future            - No identified additional risk factors other than previously addressed    Antiplatelet or Anticoagulation Medication Instructions:   - Patient  is on no antiplatelet or anticoagulation medications.    Additional Medication Instructions:  Patient is to take all scheduled medications on the day of surgery EXCEPT for modifications listed below:   - ibuprofen (Advil, Motrin): HOLD 1 day before surgery.     RECOMMENDATION:  APPROVAL GIVEN to proceed with proposed procedure, without further diagnostic evaluation.      33 minutes spent by me on the date of the encounter doing chart review, review of test results, interpretation of tests, patient visit, and documentation       Subjective       HPI related to upcoming procedure: Cervical spinal fusion        11/22/2023     9:32 AM   Preop Questions   1. Have you ever had a heart attack or stroke? No   2. Have you ever had surgery on your heart or blood vessels, such as a stent placement, a coronary artery bypass, or surgery on an artery in your head, neck, heart, or legs? No   3. Do you have chest pain with activity? No   4. Do you have a history of  heart failure? No   5. Do you currently have a cold, bronchitis or symptoms of other infection? No   6. Do you have a cough, shortness of breath, or wheezing? No   7. Do you or anyone in your family have previous history of blood clots? No   8. Do you or does anyone in your family have a serious bleeding problem such as prolonged bleeding following surgeries or cuts? No   9. Have you ever had problems with anemia or been told to take iron pills? No   10. Have you had any abnormal blood loss such as black, tarry or bloody stools, or abnormal vaginal bleeding? No   11. Have you ever had a blood transfusion? YES - many years ago had some bleeding following a lower back surgery.   11a. Have you ever had a transfusion reaction? No   12. Are you willing to have a blood transfusion if it is medically needed before, during, or after your surgery? Yes   13. Have you or any of your relatives ever had problems with anesthesia? No   14. Do you have sleep apnea, excessive snoring or  "daytime drowsiness? YES - uses CPAP.   14a. Do you have a CPAP machine? Yes   15. Do you have any artifical heart valves or other implanted medical devices like a pacemaker, defibrillator, or continuous glucose monitor? No   16. Do you have artificial joints? No   17. Are you allergic to latex? No       Health Care Directive:  Patient does not have a Health Care Directive or Living Will: Patient states has Advance Directive and will bring in a copy to clinic.    Preoperative Review of :   reviewed - no record of controlled substances prescribed.      Patient Active Problem List   Diagnosis    Hirsutism    Leiomyoma of uterus    Ovarian cyst    Hyperlipidemia LDL goal <160    Lumbar spinal stenosis    PEÑA (obstructive sleep apnea)    Impaired fasting glucose    Bilateral low back pain with bilateral sciatica    Macular degeneration    Tubular adenoma of colon    Major depressive disorder, recurrent episode (H24)    Non morbid obesity due to excess calories    Osteopenia    Advance care planning    Lumbar radiculopathy    S/P lumbar spinal fusion    Class 3 severe obesity due to excess calories with serious comorbidity and body mass index (BMI) of 40.0 to 44.9 in adult (H)    Pain of both shoulder joints    Hip pain, right    Hip pain, left    Trochanteric bursitis of both hips    Impingement syndrome of both shoulders    Chronic pain of both knees     Past Medical History:   Diagnosis Date    Arthritis     Complication of anesthesia     extremely dry eyes    Depressive disorder     Diabetes (H)     Gastro-oesophageal reflux disease     Hirsutism     Abstracted 06/14/02    History of blood transfusion     Hypertension     Leiomyoma of uterus, unspecified     Abstracted 06/14/02    Obesity 4/14/2010    Estimated Body mass index is 28.97 kg/(m^2) as calculated from the following:   Height as of 7/10/13: 5' 7\"(1.702 m).   Weight as of 7/10/13: 185 lb(83.915 kg).      Other and unspecified hyperlipidemia     " Abstracted 02    Other chronic pain     low back and legs and feet    Sleep apnea     CPAP    Uncomplicated asthma      Past Surgical History:   Procedure Laterality Date    ABDOMEN SURGERY      C sections,thoracic access for spine surgery    ARTHROSCOPY SHOULDER DISTAL CLAVICLE REPAIR Right 2014    Procedure: ARTHROSCOPY SHOULDER DISTAL CLAVICLE RESECTION;  Surgeon: John Paul Rodriguez MD;  Location: [facility] OR    BIOPSY      Cervical    C/SECTION, LOW TRANSVERSE  x 3    , Low Transverse x 4    COLONOSCOPY      COLONOSCOPY N/A 2019    Procedure: COLONOSCOPY;  Surgeon: Hernán Gonzales MD;  Location:  GI    DECOMPRESSION, FUSION CERVICAL ANTERIOR TWO LEVELS, COMBINED  2012    Procedure:COMBINED DECOMPRESSION, FUSION CERVICAL ANTERIOR TWO LEVELS; DECOMPRESSION, FUSION CERVICAL ANTERIOR C4-6; Surgeon:SHALONDA ALCALA; Location: OR    DECOMPRESSION, FUSION LUMBAR POSTERIOR ONE LEVEL, COMBINED  3/7/2013    Procedure: COMBINED DECOMPRESSION, FUSION LUMBAR POSTERIOR ONE LEVEL;  Posterior Fusion L2-3, Hardware Removal L3-5;  Surgeon: Shalonda Alcala MD;  Location:  OR    DECOMPRESSION, FUSION LUMBAR POSTERIOR TWO LEVELS, COMBINED N/A 7/3/2018    Procedure: COMBINED DECOMPRESSION, FUSION LUMBAR POSTERIOR TWO LEVELS;  Bilateral decompression L1-L2 with left total facetectomy   Transforaminal lumbar interbody fusion L1-L2 from left-sided approach   Insertion of intervertebral biomechanical device L1-L2  Posterolateral fusion L1-L2; Removal of instrumentation L2-3  RE-Instrumentation L1-L3;  Surgeon: Shalonda Alcala MD;  Location:  OR    EXPLORE SPINE, REMOVE HARDWARE, COMBINED N/A 7/3/2018    Procedure: COMBINED EXPLORE SPINE, REMOVE HARDWARE;;  Surgeon: Shalonda Alcala MD;  Location:  OR    FUSION LUMBAR ANTERIOR ONE LEVEL  2013    Procedure: FUSION LUMBAR ANTERIOR ONE LEVEL;  Anterior Fusion L2-3;  Surgeon: Shalonda Alcala MD;  Location:  OR    HEAD & NECK  SURGERY      LAMINECTOMY, FUSION LUMBAR TWO LEVELS, COMBINED  3/11    AP fusion L3-5    ZZC NONSPECIFIC PROCEDURE      Bicep tendon repair    Abstracted 02     Family History   Problem Relation Age of Onset    Cancer - colorectal Mother         born  HTN and DMII    Diabetes Mother         type II    Coronary Artery Disease Mother 90        CHF    Colon Cancer Mother     Dementia Mother     Prostate Cancer Father     Diabetes Maternal Grandmother         type II    Hypertension Maternal Grandmother     Cancer - colorectal Maternal Grandmother     Colon Cancer Maternal Grandmother     Heart Disease Maternal Grandfather     Diabetes Maternal Grandfather     Respiratory Paternal Grandmother         asthma    Diabetes Son         dx age 21type I    Diabetes Maternal Uncle         type II    Hypertension Maternal Uncle      Social History     Socioeconomic History    Marital status:      Spouse name: Not on file    Number of children: Not on file    Years of education: Not on file    Highest education level: Not on file   Occupational History    Not on file   Tobacco Use    Smoking status: Former     Packs/day: 0.50     Years: 5.00     Additional pack years: 0.00     Total pack years: 2.50     Types: Cigarettes     Quit date: 1975     Years since quittin.9    Smokeless tobacco: Never   Vaping Use    Vaping Use: Never used   Substance and Sexual Activity    Alcohol use: Yes     Alcohol/week: 0.0 standard drinks of alcohol     Comment: 2-3 glass wine /week    Drug use: No    Sexual activity: Not Currently     Partners: Male     Birth control/protection: Post-menopausal   Other Topics Concern    Parent/sibling w/ CABG, MI or angioplasty before 65F 55M? No   Social History Narrative    Not on file     Social Determinants of Health     Financial Resource Strain: Low Risk  (2023)    Financial Resource Strain     Within the past 12 months, have you or your family members you live with been unable  to get utilities (heat, electricity) when it was really needed?: No   Food Insecurity: Low Risk  (11/22/2023)    Food Insecurity     Within the past 12 months, did you worry that your food would run out before you got money to buy more?: No     Within the past 12 months, did the food you bought just not last and you didn t have money to get more?: No   Transportation Needs: Low Risk  (11/22/2023)    Transportation Needs     Within the past 12 months, has lack of transportation kept you from medical appointments, getting your medicines, non-medical meetings or appointments, work, or from getting things that you need?: No   Physical Activity: Not on file   Stress: Not on file   Social Connections: Not on file   Interpersonal Safety: Low Risk  (11/1/2023)    Interpersonal Safety     Do you feel physically and emotionally safe where you currently live?: Yes     Within the past 12 months, have you been hit, slapped, kicked or otherwise physically hurt by someone?: No     Within the past 12 months, have you been humiliated or emotionally abused in other ways by your partner or ex-partner?: No   Housing Stability: High Risk (11/22/2023)    Housing Stability     Do you have housing? : No     Are you worried about losing your housing?: No     Current Outpatient Medications   Medication    buPROPion (WELLBUTRIN XL) 150 MG 24 hr tablet    cetirizine (ZYRTEC) 10 MG tablet    cycloSPORINE (RESTASIS) 0.05 % ophthalmic emulsion    DULoxetine (CYMBALTA) 60 MG capsule    hydrOXYzine (ATARAX) 25 MG tablet    Multiple Vitamins-Minerals (OCUVITE ADULT 50+ PO)    NONFORMULARY    pramipexole (MIRAPEX) 0.25 MG tablet    prazosin (MINIPRESS) 1 MG capsule    pregabalin (LYRICA) 25 MG capsule    propranolol ER (INDERAL LA) 160 MG 24 hr capsule    simvastatin (ZOCOR) 40 MG tablet    diazepam (VALIUM) 10 MG tablet    famotidine (PEPCID) 40 MG tablet    lamoTRIgine (LAMICTAL) 25 MG tablet    omeprazole (PRILOSEC) 40 MG DR capsule     Current  "Facility-Administered Medications   Medication    lidocaine 1 % injection 4 mL    triamcinolone (KENALOG-40) injection 40 mg        Allergies   Allergen Reactions    Gabapentin Unknown    Animal Dander     Cats     Dogs     Seasonal Allergies      Trees and mold         Review of Systems  CONSTITUTIONAL: NEGATIVE for fever, chills, change in weight  INTEGUMENTARY/SKIN: NEGATIVE for worrisome rashes, moles or lesions  EYES: NEGATIVE for vision changes or irritation  ENT/MOUTH: NEGATIVE for ear, mouth and throat problems  RESP: NEGATIVE for significant cough or SOB  CV: NEGATIVE for chest pain, palpitations or peripheral edema  GI: NEGATIVE for nausea, abdominal pain, heartburn, or change in bowel habits  : NEGATIVE for frequency, dysuria, or hematuria  MUSCULOSKELETAL: NEGATIVE for significant arthralgias or myalgia  NEURO: NEGATIVE for weakness, dizziness or paresthesias  ENDOCRINE: NEGATIVE for temperature intolerance, skin/hair changes  HEME: NEGATIVE for bleeding problems  PSYCHIATRIC: NEGATIVE for changes in mood or affect    Objective     /80   Pulse 73   Temp 97.5  F (36.4  C) (Tympanic)   Resp 16   Ht 1.676 m (5' 6\")   Wt 113.4 kg (250 lb)   LMP 11/12/2007 (LMP Unknown)   SpO2 96%   BMI 40.35 kg/m      Physical Exam  Constitutional: appears to be in no acute distress, comfortable, pleasant.   Eyes: anicteric, conjunctiva clear without drainage or erythema. THAI.   Ears, Nose and Throat: tympanic membranes gray with LR,  nose without nasal discharge. OP: no erythema to posterior pharynx, negative post nasal drainage, tonsils +1 no erythema or exudate.  Neck: supple, thyroid palpable,not enlarged, no nodules   Cardiovascular: regular rate and rhythm, normal S1 and S2, no murmurs, rubs or gallops, peripheral pulses full and symmetric; negative peripheral edema   Respiratory: Air entry throughout. Breathing pattern unlabored without the use of accessory muscles. Clear to auscultation A and P, no " wheezes or crackles, normal breath sounds.    Gastrointestinal: rounded, soft. Positive bowel sounds x4, nontender, no masses.   Musculoskeletal: full range of motion, no edema.   Skin: pink, turgor smooth and elastic. Negative for lesions or dryness.  Neurological: normal gait, no tremor.   Psychological: appropriate mood and affect.   Lymphatic: no cervical, axillary, supraclavicular, or infraclavicular lymphadenopathy      Diagnostics:  Recent Results (from the past 24 hour(s))   Comprehensive metabolic panel (BMP + Alb, Alk Phos, ALT, AST, Total. Bili, TP)    Collection Time: 11/22/23 10:27 AM   Result Value Ref Range    Sodium 140 133 - 144 mmol/L    Potassium 4.7 3.4 - 5.3 mmol/L    Chloride 102 94 - 109 mmol/L    Carbon Dioxide (CO2) 29 20 - 32 mmol/L    Anion Gap 9 3 - 14 mmol/L    Urea Nitrogen 19 7 - 30 mg/dL    Creatinine 0.90 0.52 - 1.04 mg/dL    GFR Estimate 68 >60 mL/min/1.73m2    Calcium 10.4 (H) 8.5 - 10.1 mg/dL    Glucose 114 (H) 70 - 99 mg/dL    Alkaline Phosphatase 81 40 - 150 U/L    AST 25 0 - 45 U/L    ALT 24 0 - 50 U/L    Protein Total 7.9 6.8 - 8.8 g/dL    Albumin 4.0 3.4 - 5.0 g/dL    Bilirubin Total 0.9 0.2 - 1.3 mg/dL   Hemoglobin A1c    Collection Time: 11/22/23 10:27 AM   Result Value Ref Range    Hemoglobin A1C 5.7 (H) 0.0 - 5.6 %      EKG required for history of hyperlipidemia and not completed in the last 90 days.     EKG reveals normal sinus rhythm. Poor tracing but does not appear to have significant changes from previous EKG on comparison.     Revised Cardiac Risk Index (RCRI):  The patient has the following serious cardiovascular risks for perioperative complications:   - No serious cardiac risks = 0 points     RCRI Interpretation: 0 points: Class I (very low risk - 0.4% complication rate)         Signed Electronically by: LENNY Torres CNP  Copy of this evaluation report is provided to requesting physician.        Answers submitted by the patient for this visit:  Patient  Health Questionnaire (Submitted on 11/22/2023)  If you checked off any problems, how difficult have these problems made it for you to do your work, take care of things at home, or get along with other people?: Somewhat difficult  PHQ9 TOTAL SCORE: 10

## 2023-11-22 NOTE — RESULT ENCOUNTER NOTE
Reviewed. Poor tracing. Appears to be unchanged from previous.  Myrna Walters, TEE, APRN, CNP

## 2023-11-22 NOTE — COMMUNITY RESOURCES LIST (ENGLISH)
11/22/2023   [facility]  N/A  For questions about this resource list or additional care needs, please contact your primary care clinic or care manager.  Phone: [phone]   Email: N/A   Address: [facility] [place] [address]   Hours: N/A        Hotlines and Helplines       Hotline - Housing crisis  1  [facility] (Main Office) Distance: 7.77 miles      Phone/Virtual   [address] [place] [address]  Language: English  Hours: Mon - Sun Open 24 Hours   Phone: (512) 544-4251 Email: info@[facility] Website: http://[facility].org     2  [facility] Distance: 14.71 miles      Phone/Virtual   [address] [place] [address]  Language: English  Hours: Mon - Sun Open 24 Hours   Phone: (213) 534-7301 Email: communications@[facility].org Website: https://[facility].org/oursaviourshousing/          Housing       Coordinated Entry access point  3  [facility] [place] ([facility] Distance: 14.62 miles      In-Person   [address] [place] [address]  Language: English  Hours: Mon - Fri 9:00 AM - 5:00 PM  Fees: Free   Phone: (140) 733-7637 Email: housing@[facility].org Website: http://www.[facility].org/housing     4  [facility] [address] [facility] Distance: 14.9 miles      In-Person, Phone/Virtual   [address] [place] [address]  Language: English, Lithuanian  Hours: Mon - Fri 8:30 AM - 4:30 PM  Fees: Free   Phone: (457) 956-4148 Email: info@[facility].org Website: https://www.[facility].org/locations/[facility]-cl[facility]/     Drop-in center or day shelter  5  [facility] Distance: 15.04 miles      In-Person   [address] [place] [address]  Language: English  Hours: Mon - Fri 12:00 PM - 3:00 PM  Fees: Free   Phone: (643) 334-4260 Email: iRewardChart@[employer] Website: http://[facility].org/     6  [facility]  Distance: 15.14 miles      In-Person   [address] [place] [address]  Language: English, Dominican, Dutch  Hours: Mon - Sat 7:00 AM - 3:00 PM  Fees: Free, Self Pay   Phone: (616) 671-5419 Email: info@[employer].com Website: https://www.[employer]/locations/opportunity-center/     Housing search assistance  7  [facility] [employer] [facility] - [employer] [employer] for Future Homebuyers Program Distance: 6.55 miles      Phone/Virtual   [address] [place] [address]  Language: English  Hours: Mon - Fri 8:00 AM - 4:30 PM  Fees: Free   Phone: (937) 828-2053 Email: hra@[facility] Website: https://www.[facility]/hra/[place]-housing-and-zffsttieuhvch-rclpezjyo-ayt     8  [facility] and Disability Services Distance: 11.48 miles      In-Person   [address] [place] [address]  Language: English  Hours: Mon - Fri 8:00 AM - 4:00 PM  Fees: Free, Insurance, Sliding Fee   Phone: (126) 125-1511 Email: marielos@[facility]. Website: https://www.[facility]./HealthFamily/Disabilities     Shelter for families  9  [facility] Distance: 7.13 miles      In-Person   [address] [place] [address]  Language: English  Hours: Mon - Sun Open 24 Hours  Fees: Free, Sliding Fee   Phone: (815) 176-3132 Ext.1 Email: info@[facility] Website: http://www.[facility].org     Shelter for individuals  10  [facility] Distance: 5.35 miles      In-Person   [address] [place] [address]  Language: English, Dutch  Hours: Mon - Fri 8:00 AM - 4:30 PM  Fees: Free   Phone: (427) 222-1232 Email: info@[employer].org Website: http://www.capagency.org     11  [facility] Distance: 14.71 miles      In-Person   [address] [place] [address]  Language: English  Hours: Mon - Sun Open 24 Hours  Fees: Free   Phone: (340) 172-7823 Email:  communications@[facility].org Website: https://[facility]-[place].org/oursaviourshousing/          Important Numbers & Websites       Emergency Services   911  [facility]   311  Poison Control   (784) 839-6981  Suicide Prevention Lifeline   (146) 734-4561 (TALK)  Child Abuse Hotline   (510) 761-8817 (4-A-Child)  Sexual Assault Hotline   (139) 368-5196 (HOPE)  National Runaway Safeline   (997) 556-2481 (RUNAWAY)  All-Options Talkline   (406) 922-1270  Substance Abuse Referral   (658) 284-1643 (HELP)

## 2023-11-27 ENCOUNTER — TELEPHONE (OUTPATIENT)
Dept: PEDIATRICS | Facility: CLINIC | Age: 73
End: 2023-11-27
Payer: COMMERCIAL

## 2023-11-27 NOTE — TELEPHONE ENCOUNTER
General Call      Reason for Call:  pre op    What are your questions or concerns:  patient had a pre op on 11-22 and will need provider to sign off on it. Surgery is tomorrow 11-28    Date of last appointment with provider: 11-22-23    [facility] please call to let her know this has been done [phone]

## 2023-11-28 ENCOUNTER — TRANSFERRED RECORDS (OUTPATIENT)
Dept: HEALTH INFORMATION MANAGEMENT | Facility: CLINIC | Age: 73
End: 2023-11-28
Payer: COMMERCIAL

## 2023-11-30 ENCOUNTER — PATIENT OUTREACH (OUTPATIENT)
Dept: CARE COORDINATION | Facility: CLINIC | Age: 73
End: 2023-11-30
Payer: COMMERCIAL

## 2023-11-30 RX ORDER — METHOCARBAMOL 500 MG/1
500 TABLET, FILM COATED ORAL 4 TIMES DAILY PRN
COMMUNITY

## 2023-11-30 RX ORDER — ACETAMINOPHEN 500 MG
1000 TABLET ORAL 3 TIMES DAILY
COMMUNITY

## 2023-11-30 RX ORDER — HYDROXYZINE PAMOATE 25 MG/1
25 CAPSULE ORAL EVERY 6 HOURS PRN
COMMUNITY
End: 2024-02-02

## 2023-11-30 RX ORDER — OXYCODONE HYDROCHLORIDE 5 MG/1
5 TABLET ORAL SEE ADMIN INSTRUCTIONS
COMMUNITY
End: 2024-02-02

## 2023-11-30 NOTE — PROGRESS NOTES
Clinical Product Navigator RN reviewed chart; patient on payer product coverage.  Review results:   CPN Initial Information Gathering  Referral Source: Health Plan    Patient identified via health plan as recent hospital discharge. Patient was hospitalized at [facility] 11/28/23-11/29/23 for planned cervical fusion.    Clinic Care Coordination Contact  [facility] Felicitas: Post-Discharge Note  SITUATION                                                      Admission:    Admission Date: 11/28/23   Reason for Admission: Cervical fusion  Discharge:   Discharge Date: 11/29/23  Discharge Diagnosis: Cervical fusion    BACKGROUND                                                      Per hospital discharge summary and inpatient provider notes:    Unavailable in Care Everywhere.    ASSESSMENT           Discharge Assessment  How are you doing now that you are home?: Patiet states she is doing very well at home, citing this is not her first time having this surgery. Patient states she is still on a clear liquid diet, and that is going well. Patient states she took her dressing off today and the incision looks good, no drainage, redness, swelling or other signs of infection. Patient states she slept well last night and her pain is controlled with the prescriptions she was given. Patient denies any questions regarding her discharge instructions. Patient has a hospital follow up appointment with her PCP on 12/15/23. Patient states she will be traveling out to [facility] soon for a new grandchild being born and that her surgical team is going to help facilitate her getting [place] sta[place] if [place]. Patient denies any further needs or concerns at this time.  How are your symptoms? (Red Flag symptoms escalate to triage hotline per guidelines): Improved  Do you feel your condition is stable enough to be safe at home until your provider visit?: Yes  Does the patient have their discharge  instructions? : Yes  Does the patient have questions regarding their discharge instructions? : No  Were you started on any new medications or were there changes to any of your previous medications? : Yes  Does the patient have all of their medications?: Yes  Do you have questions regarding any of your medications? : No  Do you have all of your needed medical supplies or equipment (DME)?  (i.e. oxygen tank, CPAP, cane, etc.): Yes  Discharge follow-up appointment scheduled within 14 calendar days? : Yes  Discharge Follow Up Appointment Date: 12/15/23  Discharge Follow Up Appointment Scheduled with?: Primary Care Provider         Post-op (Clinicians Only)  Did the patient have surgery or a procedure: Yes  Incision: closed  Drainage: No  Bleeding: none  Fever: No  Chills: No  Redness: No  Warmth: No  Swelling: No  Incision site pain: Yes  Closure: staple  Eating & Drinking: eating and drinking without complaints/concerns  PO Intake: clear liquids  Bowel Function: normal  Urinary Status: voiding without complaint/concerns    Care Management       Care Mgmt General Assessment  Referral  Referral Source: Health Plan                      PLAN                                                      Outpatient Plan:  Patient to attend hospital follow up appointment with PCP on 12/15/23 as scheduled. Patient to take prescriptions as written. Patient to monitor for signs of infection. No further needs identified at this time. No further CPN outreach at this time.    Future Appointments   Date Time Provider Department Center   12/15/2023  9:00 AM Nallely Moore MD [facility]   2/2/2024 10:30 AM Nallely Moore MD [facility]         For any urgent concerns, please contact our 24 hour nurse triage line: [phone] ([phone])         Rochelle Rojas RN CC  Casual Clinical Product Navigator Coverage

## 2024-02-02 ENCOUNTER — OFFICE VISIT (OUTPATIENT)
Dept: PEDIATRICS | Facility: CLINIC | Age: 74
End: 2024-02-02
Payer: COMMERCIAL

## 2024-02-02 VITALS
RESPIRATION RATE: 16 BRPM | SYSTOLIC BLOOD PRESSURE: 120 MMHG | HEART RATE: 66 BPM | TEMPERATURE: 97.5 F | HEIGHT: 66 IN | OXYGEN SATURATION: 98 % | WEIGHT: 250 LBS | DIASTOLIC BLOOD PRESSURE: 80 MMHG | BODY MASS INDEX: 40.18 KG/M2

## 2024-02-02 DIAGNOSIS — I77.810 ASCENDING AORTA DILATATION (H): ICD-10-CM

## 2024-02-02 DIAGNOSIS — Z00.00 ENCOUNTER FOR MEDICARE ANNUAL WELLNESS EXAM: Primary | ICD-10-CM

## 2024-02-02 DIAGNOSIS — E83.52 HYPERCALCEMIA: ICD-10-CM

## 2024-02-02 DIAGNOSIS — E66.813 CLASS 3 SEVERE OBESITY DUE TO EXCESS CALORIES WITH SERIOUS COMORBIDITY AND BODY MASS INDEX (BMI) OF 40.0 TO 44.9 IN ADULT (H): ICD-10-CM

## 2024-02-02 DIAGNOSIS — Z12.11 COLON CANCER SCREENING: ICD-10-CM

## 2024-02-02 DIAGNOSIS — E66.01 CLASS 3 SEVERE OBESITY DUE TO EXCESS CALORIES WITH SERIOUS COMORBIDITY AND BODY MASS INDEX (BMI) OF 40.0 TO 44.9 IN ADULT (H): ICD-10-CM

## 2024-02-02 DIAGNOSIS — R94.4 ABNORMAL RENAL FUNCTION TEST: ICD-10-CM

## 2024-02-02 DIAGNOSIS — M25.512 CHRONIC LEFT SHOULDER PAIN: ICD-10-CM

## 2024-02-02 DIAGNOSIS — E78.5 HYPERLIPIDEMIA LDL GOAL <160: ICD-10-CM

## 2024-02-02 DIAGNOSIS — F33.2 SEVERE EPISODE OF RECURRENT MAJOR DEPRESSIVE DISORDER, WITHOUT PSYCHOTIC FEATURES (H): ICD-10-CM

## 2024-02-02 DIAGNOSIS — G89.29 CHRONIC LEFT SHOULDER PAIN: ICD-10-CM

## 2024-02-02 PROCEDURE — 80061 LIPID PANEL: CPT | Performed by: INTERNAL MEDICINE

## 2024-02-02 PROCEDURE — 36415 COLL VENOUS BLD VENIPUNCTURE: CPT | Performed by: INTERNAL MEDICINE

## 2024-02-02 PROCEDURE — 83721 ASSAY OF BLOOD LIPOPROTEIN: CPT | Mod: 59 | Performed by: INTERNAL MEDICINE

## 2024-02-02 PROCEDURE — G0438 PPPS, INITIAL VISIT: HCPCS | Performed by: INTERNAL MEDICINE

## 2024-02-02 PROCEDURE — 80048 BASIC METABOLIC PNL TOTAL CA: CPT | Performed by: INTERNAL MEDICINE

## 2024-02-02 RX ORDER — RESPIRATORY SYNCYTIAL VIRUS VACCINE 120MCG/0.5
0.5 KIT INTRAMUSCULAR ONCE
Qty: 1 EACH | Refills: 0 | Status: CANCELLED | OUTPATIENT
Start: 2024-02-02 | End: 2024-02-02

## 2024-02-02 SDOH — HEALTH STABILITY: PHYSICAL HEALTH: ON AVERAGE, HOW MANY DAYS PER WEEK DO YOU ENGAGE IN MODERATE TO STRENUOUS EXERCISE (LIKE A BRISK WALK)?: 1 DAY

## 2024-02-02 ASSESSMENT — SOCIAL DETERMINANTS OF HEALTH (SDOH): HOW OFTEN DO YOU GET TOGETHER WITH FRIENDS OR RELATIVES?: ONCE A WEEK

## 2024-02-02 ASSESSMENT — PATIENT HEALTH QUESTIONNAIRE - PHQ9
10. IF YOU CHECKED OFF ANY PROBLEMS, HOW DIFFICULT HAVE THESE PROBLEMS MADE IT FOR YOU TO DO YOUR WORK, TAKE CARE OF THINGS AT HOME, OR GET ALONG WITH OTHER PEOPLE: SOMEWHAT DIFFICULT
SUM OF ALL RESPONSES TO PHQ QUESTIONS 1-9: 8
SUM OF ALL RESPONSES TO PHQ QUESTIONS 1-9: 8

## 2024-02-02 ASSESSMENT — PAIN SCALES - GENERAL: PAINLEVEL: MODERATE PAIN (4)

## 2024-02-02 NOTE — PATIENT INSTRUCTIONS
"Eating Healthy Foods: Care Instructions  With every meal, you can make healthy food choices. Try to eat a variety of fruits, vegetables, whole grains, lean proteins, and low-fat dairy products. This can help you get the right balance of nutrients, including vitamins and minerals. Small changes add up over time. You can start by adding one healthy food to your meals each day.    Try to make half your plate fruits and vegetables, one-fourth whole grains, and one-fourth lean proteins. Try including dairy with your meals.   Eat more fruits and vegetables. Try to have them with most meals and snacks.   Foods for healthy eating    Fruits    These can be fresh, frozen, canned, or dried.  Try to choose whole fruit rather than fruit juice.  Eat a variety of colors.    Vegetables    These can be fresh, frozen, canned, or dried.  Beans, peas, and lentils count too.    Whole grains    Choose whole-grain breads, cereals, and noodles.  Try brown rice.    Lean proteins    These can include lean meat, poultry, fish, and eggs.  You can also have tofu, beans, peas, lentils, nuts, and seeds.    Dairy    Try milk, yogurt, and cheese.  Choose low-fat or fat-free when you can.  If you need to, use lactose-free milk or fortified plant-based milk products, such as soy milk.    Water    Drink water when you're thirsty.  Limit sugar-sweetened drinks, including soda, fruit drinks, and sports drinks.  Where can you learn more?  Go to https://www.TechZel.net/patiented  Enter T756 in the search box to learn more about \"Eating Healthy Foods: Care Instructions.\"  Current as of: February 28, 2023               Content Version: 13.8    4296-7582 [employer].   Care instructions adapted under license by your healthcare professional. If you have questions about a medical condition or this instruction, always ask your healthcare professional. [employer] disclaims any warranty or liability for your use of this " information.      Nutrition for Older Adults: Care Instructions  Overview     Good nutrition is important at any age. But it is especially important for older adults. Eating a healthy diet helps keep your body strong. And it can help lower your risk for disease.  As you get older, your body needs more of certain nutrients. These include vitamin B12, calcium, and vitamin D. But it may be harder for you to get these and other important nutrients. This could be for many reasons. You may not feel as hungry as you used to. Or you could have problems with your teeth or mouth that make it hard to chew. Or you may not enjoy planning and preparing meals, especially if you live alone.  Talk with your doctor if you want help getting the most nutrition from what you eat. The doctor may have you work with a dietitian to help you plan meals.  Follow-up care is a key part of your treatment and safety. Be sure to make and go to all appointments, and call your doctor if you are having problems. It's also a good idea to know your test results and keep a list of the medicines you take.  How can you care for yourself at home?  To stay healthy  Eat a variety of foods. The more you vary the foods you eat, the more vitamins, minerals, and other nutrients you get.  Take a multivitamin every day. Choose one with about 100% of the daily value (DV) for vitamins and minerals. Do not take more than 100% of the daily value for any vitamin or mineral unless your doctor tells you to. Talk with your doctor if you are not sure which multivitamin is right for you.  Eat lots of fruits and vegetables. Fresh, frozen, or no-salt canned vegetables and fruits in their own juice or light syrup are good choices.  Include foods that are high in vitamin B12 in your diet. Good choices are fortified breakfast cereal, nonfat or low-fat milk and other dairy products, meat, poultry, fish, and eggs.  Get enough calcium and vitamin D. Good choices include nonfat or  low-fat milk, cheese, and yogurt. Other good options are tofu, orange juice with added calcium, and some leafy green vegetables, such as carlos alberto greens and kale. If you don't use milk products, talk to your doctor about calcium and vitamin D supplements.  Eat protein foods every day. Good choices include lean meat, fish, poultry, eggs, and cheese. Other good options are cooked beans, peanut butter, and nuts and seeds.  Choose whole grains for half of the grains you eat. Look for 100% whole wheat bread, whole-grain cereals, brown rice, and other whole grains.  If you have constipation  Eat high-fiber foods every day. These include fruits, vegetables, cooked dried beans, and whole grains.  Drink plenty of fluids. If you have kidney, heart, or liver disease and have to limit fluids, talk with your doctor before you increase the amount of fluids you drink.  Ask your doctor if stool softeners may help keep your bowels regular.  If you have mouth problems that make chewing hard  Pick canned or cooked fruits and vegetables. These are often softer.  Chop or shred meat, poultry, and fish. Add sauce or gravy to the meat to help keep it moist.  Pick other protein foods that are soft. These include cheese, peanut butter, cooked beans, cottage cheese, and eggs.  If you have trouble shopping for yourself  Ask a local food store to deliver groceries to your home.  Contact a volunteer center and ask for help.  Ask a family member or neighbor to help you.  If you have trouble preparing meals  If you are able, take a cooking class.  Use a microwave oven to cook TV dinners and other frozen or prepared foods.  Take part in group meal programs. You can find these through senior citizen programs.  Have meals brought to your home. Your community may offer programs that deliver meals, such as Meals on Wheels.  If your appetite is poor  Try eating smaller amounts of food more often. For example, eat 4 or 5 small meals a day instead of 1 or  "2 large meals.  Eat with family and friends. Or take part in group meal programs offered through volunteer programs. Eating with others may help your appetite. And it helps you be more social.  Ask your doctor if your medicines could cause appetite or taste problems. If so, ask about changing medicines.  Add spices and herbs to increase the flavor of food.  If you think you are depressed, ask your doctor for help. Depression can affect your appetite. And it can make it hard to do everyday activities like grocery shopping and making meals. Treatment can help.  When should you call for help?  Watch closely for changes in your health, and be sure to contact your doctor if you have any problems.  Where can you learn more?  Go to https://www.PsychSignal.net/patiented  Enter L643 in the search box to learn more about \"Nutrition for Older Adults: Care Instructions.\"  Current as of: February 26, 2023               Content Version: 13.8    4575-8000 [employer].   Care instructions adapted under license by your healthcare professional. If you have questions about a medical condition or this instruction, always ask your healthcare professional. [employer] disclaims any warranty or liability for your use of this information.      Preventing Falls: Care Instructions  Injuries and health problems such as trouble walking or poor eyesight can increase your risk of falling. So can some medicines. But there are things you can do to help prevent falls. You can exercise to get stronger. You can also arrange your home to make it safer.    Talk to your doctor about the medicines you take. Ask if any of them increase the risk of falls and whether they can be changed or stopped.   Try to exercise regularly. It can help improve your strength and balance. This can help lower your risk of falling.     Practice fall safety and prevention.    Wear low-heeled shoes that fit well and give your feet good support. Talk to " "your doctor if you have foot problems that make this hard.  Carry a cellphone or wear a medical alert device that you can use to call for help.  Use stepladders instead of chairs to reach high objects. Don't climb if you're at risk for falls. Ask for help, if needed.  Wear the correct eyeglasses, if you need them.    Make your home safer.    Remove rugs, cords, clutter, and furniture from walkways.  Keep your house well lit. Use night-lights in hallways and bathrooms.  Install and use sturdy handrails on stairways.  Wear nonskid footwear, even inside. Don't walk barefoot or in socks without shoes.    Be safe outside.    Use handrails, curb cuts, and ramps whenever possible.  Keep your hands free by using a shoulder bag or backpack.  Try to walk in well-lit areas. Watch out for uneven ground, changes in pavement, and debris.  Be careful in the winter. Walk on the grass or gravel when sidewalks are slippery. Use de-icer on steps and walkways. Add non-slip devices to shoes.    Put grab bars and nonskid mats in your shower or tub and near the toilet. Try to use a shower chair or bath bench when bathing.   Get into a tub or shower by putting in your weaker leg first. Get out with your strong side first. Have a phone or medical alert device in the bathroom with you.   Where can you learn more?  Go to https://www.Solarcentury.net/patiented  Enter G117 in the search box to learn more about \"Preventing Falls: Care Instructions.\"  Current as of: July 18, 2023               Content Version: 13.8    3689-9586 [employer].   Care instructions adapted under license by your healthcare professional. If you have questions about a medical condition or this instruction, always ask your healthcare professional. [employer] disclaims any warranty or liability for your use of this information.      Learning About Stress  What is stress?     Stress is your body's response to a hard situation. Your body can have a " physical, emotional, or mental response. Stress is a fact of life for most people, and it affects everyone differently. What causes stress for you may not be stressful for someone else.  A lot of things can cause stress. You may feel stress when you go on a job interview, take a test, or run a race. This kind of short-term stress is normal and even useful. It can help you if you need to work hard or react quickly. For example, stress can help you finish an important job on time.  Long-term stress is caused by ongoing stressful situations or events. Examples of long-term stress include long-term health problems, ongoing problems at work, or conflicts in your family. Long-term stress can harm your health.  How does stress affect your health?  When you are stressed, your body responds as though you are in danger. It makes hormones that speed up your heart, make you breathe faster, and give you a burst of energy. This is called the fight-or-flight stress response. If the stress is over quickly, your body goes back to normal and no harm is done.  But if stress happens too often or lasts too long, it can have bad effects. Long-term stress can make you more likely to get sick, and it can make symptoms of some diseases worse. If you tense up when you are stressed, you may develop neck, shoulder, or low back pain. Stress is linked to high blood pressure and heart disease.  Stress also harms your emotional health. It can make you rosario, tense, or depressed. Your relationships may suffer, and you may not do well at work or school.  What can you do to manage stress?  You can try these things to help manage stress:   Do something active. Exercise or activity can help reduce stress. Walking is a great way to get started. Even everyday activities such as housecleaning or yard work can help.  Try yoga or ortiz chi. These techniques combine exercise and meditation. You may need some training at first to learn them.  Do something you  "enjoy. For example, listen to music or go to a movie. Practice your hobby or do volunteer work.  Meditate. This can help you relax, because you are not worrying about what happened before or what may happen in the future.  Do guided imagery. Imagine yourself in any setting that helps you feel calm. You can use online videos, books, or a teacher to guide you.  Do breathing exercises. For example:  From a standing position, bend forward from the waist with your knees slightly bent. Let your arms dangle close to the floor.  Breathe in slowly and deeply as you return to a standing position. Roll up slowly and lift your head last.  Hold your breath for just a few seconds in the standing position.  Breathe out slowly and bend forward from the waist.  Let your feelings out. Talk, laugh, cry, and express anger when you need to. Talking with supportive friends or family, a counselor, or a dipesh leader about your feelings is a healthy way to relieve stress. Avoid discussing your feelings with people who make you feel worse.  Write. It may help to write about things that are bothering you. This helps you find out how much stress you feel and what is causing it. When you know this, you can find better ways to cope.  What can you do to prevent stress?  You might try some of these things to help prevent stress:  Manage your time. This helps you find time to do the things you want and need to do.  Get enough sleep. Your body recovers from the stresses of the day while you are sleeping.  Get support. Your family, friends, and community can make a difference in how you experience stress.  Limit your news feed. Avoid or limit time on social media or news that may make you feel stressed.  Do something active. Exercise or activity can help reduce stress. Walking is a great way to get started.  Where can you learn more?  Go to https://www.healthwise.net/patiented  Enter N032 in the search box to learn more about \"Learning About " "Stress.\"  Current as of: February 26, 2023               Content Version: 13.8    2687-8724 [employer].   Care instructions adapted under license by your healthcare professional. If you have questions about a medical condition or this instruction, always ask your healthcare professional. [employer] disclaims any warranty or liability for your use of this information.      Learning About Sleeping Well  What does sleeping well mean?     Sleeping well means getting enough sleep to feel good and stay healthy. How much sleep is enough varies among people.  The number of hours you sleep and how you feel when you wake up are both important. If you do not feel refreshed, you probably need more sleep. Another sign of not getting enough sleep is feeling tired during the day.  Experts recommend that adults get at least 7 or more hours of sleep per day. Children and older adults need more sleep.  Why is getting enough sleep important?  Getting enough quality sleep is a basic part of good health. When your sleep suffers, your physical health, mood, and your thoughts can suffer too. You may find yourself feeling more grumpy or stressed. Not getting enough sleep also can lead to serious problems, including injury, accidents, anxiety, and depression.  What might cause poor sleeping?  Many things can cause sleep problems, including:  Changes to your sleep schedule.  Stress. Stress can be caused by fear about a single event, such as giving a speech. Or you may have ongoing stress, such as worry about work or school.  Depression, anxiety, and other mental or emotional conditions.  Changes in your sleep habits or surroundings. This includes changes that happen where you sleep, such as noise, light, or sleeping in a different bed. It also includes changes in your sleep pattern, such as having jet lag or working a late shift.  Health problems, such as pain, breathing problems, and restless legs syndrome.  Lack " "of regular exercise.  Using alcohol, nicotine, or caffeine before bed.  How can you help yourself?  Here are some tips that may help you sleep more soundly and wake up feeling more refreshed.  Your sleeping area   Use your bedroom only for sleeping and sex. A bit of light reading may help you fall asleep. But if it doesn't, do your reading elsewhere in the house. Try not to use your TV, computer, smartphone, or tablet while you are in bed.  Be sure your bed is big enough to stretch out comfortably, especially if you have a sleep partner.  Keep your bedroom quiet, dark, and cool. Use curtains, blinds, or a sleep mask to block out light. To block out noise, use earplugs, soothing music, or a \"white noise\" machine.  Your evening and bedtime routine   Create a relaxing bedtime routine. You might want to take a warm shower or bath, or listen to soothing music.  Go to bed at the same time every night. And get up at the same time every morning, even if you feel tired.  What to avoid   Limit caffeine (coffee, tea, caffeinated sodas) during the day, and don't have any for at least 6 hours before bedtime.  Avoid drinking alcohol before bedtime. Alcohol can cause you to wake up more often during the night.  Try not to smoke or use tobacco, especially in the evening. Nicotine can keep you awake.  Limit naps during the day, especially close to bedtime.  Avoid lying in bed awake for too long. If you can't fall asleep or if you wake up in the middle of the night and can't get back to sleep within about 20 minutes, get out of bed and go to another room until you feel sleepy.  Avoid taking medicine right before bed that may keep you awake or make you feel hyper or energized. Your doctor can tell you if your medicine may do this and if you can take it earlier in the day.  If you can't sleep   Imagine yourself in a peaceful, pleasant scene. Focus on the details and feelings of being in a place that is relaxing.  Get up and do a quiet " "or boring activity until you feel sleepy.  Avoid drinking any liquids before going to bed to help prevent waking up often to use the bathroom.  Where can you learn more?  Go to https://www.Dashride.net/patiented  Enter J942 in the search box to learn more about \"Learning About Sleeping Well.\"  Current as of: July 11, 2023               Content Version: 13.8    0998-8750 [employer].   Care instructions adapted under license by your healthcare professional. If you have questions about a medical condition or this instruction, always ask your healthcare professional. [employer] disclaims any warranty or liability for your use of this information.      Preventive Care Advice   This is general advice given by our system to help you stay healthy. However, your care team may have specific advice just for you. Please talk to your care team about your preventive care needs.  Nutrition  Eat 5 or more servings of fruits and vegetables each day.  Try wheat bread, brown rice and whole grain pasta (instead of white bread, rice, and pasta).  Get enough calcium and vitamin D. Check the label on foods and aim for 100% of the RDA (recommended daily allowance).  Lifestyle  Exercise at least 150 minutes each week  (30 minutes a day, 5 days a week).  Do muscle strengthening activities 2 days a week. These help control your weight and prevent disease.  No smoking.  Wear sunscreen to prevent skin cancer.  Have a dental exam and cleaning every 6 months.  Yearly exams  See your health care team every year to talk about:  Any changes in your health.  Any medicines your care team has prescribed.  Preventive care, family planning, and ways to prevent chronic diseases.  Shots (vaccines)   HPV shots (up to age 26), if you've never had them before.  Hepatitis B shots (up to age 59), if you've never had them before.  COVID-19 shot: Get this shot when it's due.  Flu shot: Get a flu shot every year.  Tetanus shot: Get a " tetanus shot every 10 years.  Pneumococcal, hepatitis A, and RSV shots: Ask your care team if you need these based on your risk.  Shingles shot (for age 50 and up)  General health tests  Diabetes screening:  Starting at age 35, Get screened for diabetes at least every 3 years.  If you are younger than age 35, ask your care team if you should be screened for diabetes.  Cholesterol test: At age 39, start having a cholesterol test every 5 years, or more often if advised.  Bone density scan (DEXA): At age 50, ask your care team if you should have this scan for osteoporosis (brittle bones).  Hepatitis C: Get tested at least once in your life.  STIs (sexually transmitted infections)  Before age 24: Ask your care team if you should be screened for STIs.  After age 24: Get screened for STIs if you're at risk. You are at risk for STIs (including HIV) if:  You are sexually active with more than one person.  You don't use condoms every time.  You or a partner was diagnosed with a sexually transmitted infection.  If you are at risk for HIV, ask about PrEP medicine to prevent HIV.  Get tested for HIV at least once in your life, whether you are at risk for HIV or not.  Cancer screening tests  Cervical cancer screening: If you have a cervix, begin getting regular cervical cancer screening tests starting at age 21.  Breast cancer scan (mammogram): If you've ever had breasts, begin having regular mammograms starting at age 40. This is a scan to check for breast cancer.  Colon cancer screening: It is important to start screening for colon cancer at age 45.  Have a colonoscopy test every 10 years (or more often if you're at risk) Or, ask your provider about stool tests like a FIT test every year or Cologuard test every 3 years.  To learn more about your testing options, visit:   https://www.[employer]/637191.pdf.  For help making a decision, visit:   https://bit.ly/zm04465.  Prostate cancer screening test: If you have a prostate, ask  your care team if a prostate cancer screening test (PSA) at age 55 is right for you.  Lung cancer screening: If you are a current or former smoker ages 50 to 80, ask your care team if ongoing lung cancer screenings are right for you.  For informational purposes only. Not to replace the advice of your health care provider. Copyright   2023 [facility]. All rights reserved. Clinically reviewed by the [facility] Transitions Program. Press 044911 - REV 01/24.

## 2024-02-02 NOTE — PROGRESS NOTES
"Preventive Care Visit  [facility] REJI Moore MD, Internal Medicine  Feb 2, 2024    Assessment & Plan     (Z00.00) Encounter for Medicare annual wellness exam  (primary encounter diagnosis)    (M25.512,  G89.29) Chronic left shoulder pain  Comment: Previous steroid injections were helpful. Hoping to get another injection.  Plan: Orthopedic  Referral    (F33.2) Severe episode of recurrent major depressive disorder, without psychotic features (H)  Comment: Care through mental health provider, including medications.  Has follow-up scheduled.    (I77.810) Ascending aorta dilatation (H24)  Comment: Noted to have mild ascending aorta dilation (4.1cm) in Nov 2023. Annual Echo to monitor.  Plan: Pend future annual Echo    (E66.01,  Z68.41) Class 3 severe obesity due to excess calories with serious comorbidity and body mass index (BMI) of 40.0 to 44.9 in adult (H)  Comment: Discussed walking, increasing physical activity.    (E78.5) Hyperlipidemia LDL goal <160  Comment: She doesn't think she is taking Simvastatin anymore, not sure why. Re-check lipids today, plan to switch to Rosuvastatin if LDL and triglycerides are increased  Plan: Lipid panel reflex to direct LDL Fasting    (Z12.11) Colon cancer screening  Comment: Due for colonoscopy in June. Mother and grandmother both had colon cancer. Referral sent to [facility].  Plan: Colonoscopy Screening  Referral    (E83.52) Hypercalcemia  Comment: Recent history of Calcium at 10.4. Reasonable to re-check.  Plan: Basic metabolic panel             BMI  Estimated body mass index is 40.35 kg/m  as calculated from the following:    Height as of this encounter: 1.676 m (5' 6\").    Weight as of this encounter: 113.4 kg (250 lb).   Weight management plan: Discussed healthy diet and exercise guidelines    Counseling  Appropriate preventive services were discussed with this patient, including applicable screening as appropriate for fall prevention, " nutrition, physical activity, Tobacco-use cessation, weight loss and cognition.  Checklist reviewing preventive services available has been given to the patient.  Reviewed patient's diet, addressing concerns and/or questions.   She is at risk for lack of exercise and has been provided with information to increase physical activity for the benefit of her well-being.   Discussed possible causes of fatigue. The patient's PHQ-9 score is consistent with mild depression. She was provided with information regarding depression.     Patient has been advised of split billing requirements and indicates understanding: Yes      Subjective   Lucie is a 73 year old, presenting for the following:  Medicare Visit            2/2/2024     9:57 AM   Additional Questions   Roomed by reginald griffiths cma   Accompanied by self         2/2/2024     9:57 AM   Patient Reported Additional Medications   Patient reports taking the following new medications na        Health Care Directive  Patient does not have a Health Care Directive or Living Will: Discussed advance care planning with patient; however, patient declined at this time.    HPI    Recent cervical surgery in Nov 2023. Getting used to movement after that. Never really feels comfortable, but not in pain. She has some lower back spasms, relieved by Robaxin PRN. Able to do stairs, able to walk more than pre-surgery. Using hiking poles, which help with balance and posture. Knows she needs to walk more and get more activity in, trying to work on that.    Memory: Seemed to be worse right after surgery, improving now a few months later after stopping post-op narcotics.     PEÑA: Using CPAP without issues. Some trouble with falling/staying asleep, more related to anxiety. Looking to refill Cymbalta with mental health provider.    COVID, RSV, and Flu given at pharmacy in Dec 2023.    Shoulder pain: Left shoulder pain, history of cortisone injection, would like another referral.        2/2/2024    General Health   How would you rate your overall physical health? (!) FAIR   Feel stress (tense, anxious, or unable to sleep) To some extent   (!) STRESS CONCERN      2/2/2024   Nutrition   Diet: Regular (no restrictions)         2/2/2024   Exercise   Days per week of moderate/strenous exercise 1 day   (!) EXERCISE CONCERN      2/2/2024   Social Factors   Frequency of gathering with friends or relatives Once a week   Worry food won't last until get money to buy more No   Food not last or not have enough money for food? No   Do you have housing?  Yes   Are you worried about losing your housing? No   Lack of transportation? No   Unable to get utilities (heat,electricity)? No         2/2/2024   Fall Risk   Fallen 2 or more times in the past year? No   Trouble with walking or balance? Yes   Reason Gait Speed Test Not Completed Recent surgery          2/2/2024   Activities of Daily Living- Home Safety   Needs help with the following daily activites None of the above   Safety concerns in the home None of the above         2/2/2024   Dental   Dentist two times every year? Yes         2/2/2024   Hearing Screening   Hearing concerns? None of the above         2/2/2024   Driving Risk Screening   Patient/family members have concerns about driving No         2/2/2024   General Alertness/Fatigue Screening   Have you been more tired than usual lately? (!) YES         2/2/2024   Urinary Incontinence Screening   Bothered by leaking urine in past 6 months No          No data to display                Today's PHQ-9 Score:       2/2/2024     9:55 AM   PHQ-9 SCORE   PHQ-9 Total Score MyChart 8 (Mild depression)   PHQ-9 Total Score 8         2/2/2024   Substance Use   Alcohol more than 3/day or more than 7/wk No   Do you have a current opioid prescription? No   How severe/bad is pain from 1 to 10? 6/10   Do you use any other substances recreationally? No     Social History     Tobacco Use    Smoking status: Former     Packs/day: 0.50      Years: 5.00     Additional pack years: 0.00     Total pack years: 2.50     Types: Cigarettes     Quit date: 1975     Years since quittin.1    Smokeless tobacco: Never   Vaping Use    Vaping Use: Never used   Substance Use Topics    Alcohol use: Yes     Alcohol/week: 0.0 standard drinks of alcohol     Comment: 2-3 glass wine /week    Drug use: No            No data to display                 Annual screen by mutual decision with patient      History of abnormal Pap smear: NO - age 67 - see link Cervical Cytology Screening Guidelines        Latest Ref Rng & Units 2016    10:39 AM 2016    12:00 AM 2013    12:00 AM   PAP / HPV   PAP (Historical)   NIL  NIL    HPV 16 DNA NEG Negative      HPV 18 DNA NEG Negative      Other HR HPV NEG Negative        The 10-year ASCVD risk score (James OSUNA, et al., 2019) is: 13.8%    Values used to calculate the score:      Age: 73 years      Sex: Female      Is Non- : No      Diabetic: No      Tobacco smoker: No      Systolic Blood Pressure: 120 mmHg      Is BP treated: Yes      HDL Cholesterol: 40 mg/dL      Total Cholesterol: 261 mg/dL          Reviewed and updated as needed this visit by Provider   Tobacco  Allergies  Meds  Problems  Med Hx  Surg Hx  Fam Hx            Current providers sharing in care for this patient include:  Patient Care Team:  Nallely Moore MD as PCP - General (Internal Medicine - Pediatrics)  Cody Ram PA-C as Assigned Musculoskeletal Provider  Bud Huertas DPM as Assigned Surgical Provider  Myrna Walters APRN CNP as Assigned PCP    The following health maintenance items are reviewed in Epic and correct as of today:  Health Maintenance   Topic Date Due    DEPRESSION 12 MO INDEX REPEAT PHQ-9  2024    LIPID  2024    COLORECTAL CANCER SCREENING  2024    PHQ-9  2024    MAMMO SCREENING  2024    BMP  2024    ECHO COMPLETE  2024    MEDICARE  "ANNUAL WELLNESS VISIT  02/02/2025    ANNUAL REVIEW OF HM ORDERS  02/02/2025    FALL RISK ASSESSMENT  02/02/2025    DTAP/TDAP/TD IMMUNIZATION (3 - Td or Tdap) 05/13/2026    GLUCOSE  11/22/2026    ADVANCE CARE PLANNING  01/07/2027    DEXA  01/21/2027    HEPATITIS C SCREENING  Completed    DEPRESSION ACTION PLAN  Completed    INFLUENZA VACCINE  Completed    Pneumococcal Vaccine: 65+ Years  Completed    ZOSTER IMMUNIZATION  Completed    RSV VACCINE (Pregnancy & 60+)  Completed    COVID-19 Vaccine  Completed    IPV IMMUNIZATION  Aged Out    HPV IMMUNIZATION  Aged Out    MENINGITIS IMMUNIZATION  Aged Out    RSV MONOCLONAL ANTIBODY  Aged Out    URINE DRUG SCREEN  Discontinued     Review of Systems    Review of Systems  Constitutional, HEENT, cardiovascular, pulmonary, gi and gu systems are negative, except as otherwise noted.     Objective    Exam  /80 (BP Location: Right arm, Patient Position: Sitting, Cuff Size: Adult Large)   Pulse 66   Temp 97.5  F (36.4  C) (Oral)   Resp 16   Ht 1.676 m (5' 6\")   Wt 113.4 kg (250 lb)   LMP 11/12/2007 (LMP Unknown)   SpO2 98%   BMI 40.35 kg/m     Estimated body mass index is 40.35 kg/m  as calculated from the following:    Height as of this encounter: 1.676 m (5' 6\").    Weight as of this encounter: 113.4 kg (250 lb).    Physical Exam  GENERAL: alert and no distress  EYES: Eyes grossly normal to inspection, PERRL and conjunctivae and sclerae normal  HENT: ear canals and TM's normal, nose and mouth without ulcers or lesions  NECK: no adenopathy, no asymmetry, masses, or scars  RESP: lungs clear to auscultation - no rales, rhonchi or wheezes  CV: regular rate and rhythm, normal S1 S2, no S3 or S4, no murmur, click or rub, no peripheral edema  ABDOMEN: soft, nontender, no hepatosplenomegaly, no masses and bowel sounds normal  PSYCH: mentation appears normal, affect normal/bright        2/2/2024   Mini Cog   Clock Draw Score 2 Normal   3 Item Recall 3 objects recalled   Mini " Cog Total Score 5         Vision Screen  Reason Vision Screen Not Completed: Patient had exam in last 12 months    Signed Electronically by: Nallely Moore MD    Answers submitted by the patient for this visit:  Patient Health Questionnaire (Submitted on 2/2/2024)  If you checked off any problems, how difficult have these problems made it for you to do your work, take care of things at home, or get along with other people?: Somewhat difficult  PHQ9 TOTAL SCORE: 8

## 2024-02-03 LAB
ANION GAP SERPL CALCULATED.3IONS-SCNC: 12 MMOL/L (ref 7–15)
BUN SERPL-MCNC: 23.2 MG/DL (ref 8–23)
CALCIUM SERPL-MCNC: 10.1 MG/DL (ref 8.8–10.2)
CHLORIDE SERPL-SCNC: 99 MMOL/L (ref 98–107)
CHOLEST SERPL-MCNC: 327 MG/DL
CREAT SERPL-MCNC: 1.09 MG/DL (ref 0.51–0.95)
DEPRECATED HCO3 PLAS-SCNC: 28 MMOL/L (ref 22–29)
EGFRCR SERPLBLD CKD-EPI 2021: 54 ML/MIN/1.73M2
FASTING STATUS PATIENT QL REPORTED: NO
GLUCOSE SERPL-MCNC: 93 MG/DL (ref 70–99)
HDLC SERPL-MCNC: 43 MG/DL
LDLC SERPL CALC-MCNC: ABNORMAL MG/DL
LDLC SERPL DIRECT ASSAY-MCNC: 217 MG/DL
NONHDLC SERPL-MCNC: 284 MG/DL
POTASSIUM SERPL-SCNC: 5.3 MMOL/L (ref 3.4–5.3)
SODIUM SERPL-SCNC: 139 MMOL/L (ref 135–145)
TRIGL SERPL-MCNC: 439 MG/DL

## 2024-02-05 RX ORDER — ROSUVASTATIN CALCIUM 20 MG/1
20 TABLET, COATED ORAL DAILY
Qty: 90 TABLET | Refills: 4 | Status: SHIPPED | OUTPATIENT
Start: 2024-02-05 | End: 2024-06-21

## 2024-02-15 ENCOUNTER — PATIENT OUTREACH (OUTPATIENT)
Dept: GASTROENTEROLOGY | Facility: CLINIC | Age: 74
End: 2024-02-15
Payer: COMMERCIAL

## 2024-02-20 ENCOUNTER — TELEPHONE (OUTPATIENT)
Dept: GASTROENTEROLOGY | Facility: CLINIC | Age: 74
End: 2024-02-20
Payer: COMMERCIAL

## 2024-02-20 DIAGNOSIS — Z12.11 SPECIAL SCREENING FOR MALIGNANT NEOPLASMS, COLON: Primary | ICD-10-CM

## 2024-02-20 NOTE — TELEPHONE ENCOUNTER
"Endoscopy Scheduling Screen    Have you had a positive Covid test in the last 14 days?  No    Are you active on MyChart?   Yes    What insurance is in the chart?  Other:   /MEDICARE    Ordering/Referring Provider: GUSTAVO GUILLORY    (If ordering provider performs procedure, schedule with ordering provider unless otherwise instructed. )    BMI: Estimated body mass index is 40.35 kg/m  as calculated from the following:    Height as of 2/2/24: 1.676 m (5' 6\").    Weight as of 2/2/24: 113.4 kg (250 lb).     Sedation Ordered  moderate sedation.   If patient BMI > 50 do not schedule in [facility].    If patient BMI > 45 do not schedule at [facility].    Are you taking methadone or Suboxone?  No    Have you had difficulties, pain, or discomfort during past endoscopy procedures?  No    Are you taking any prescription medications for pain 3 or more times per week?   NO - No RN review required.    Do you have a history of malignant hyperthermia or adverse reaction to anesthesia?  No    (Females) Are you currently pregnant?   No     Have you been diagnosed or told you have pulmonary hypertension?   No    Do you have an LVAD?  No    Have you been told you have moderate to severe sleep apnea?  Yes (RN Review required for scheduling unless scheduling in Hospital.)    Have you been told you have COPD, asthma, or any other lung disease?  No    Do you have any heart conditions?  Yes     In the past year, have you had any hospitalizations for heart related issues including cardiomyopathy, heart attack, or stent placement?  No    Do you have any implantable devices in your body (pacemaker, ICD)?  No    Do you take nitroglycerine?  No    Have you ever had an organ transplant?   No    Have you ever had or are you awaiting a heart or lung transplant?   No    Have you had a stroke or transient ischemic attack (TIA aka \"mini stroke\" in the last 6 months?   No    Have you been diagnosed with or been told you have cirrhosis of the liver?   No    Are you " "currently on dialysis?   No    Do you need assistance transferring?   No    BMI: Estimated body mass index is 40.35 kg/m  as calculated from the following:    Height as of 2/2/24: 1.676 m (5' 6\").    Weight as of 2/2/24: 113.4 kg (250 lb).     Is patients BMI > 40 and scheduling location [facility]?  No    Do you take an injectable medication for weight loss or diabetes (excluding insulin)?  No    Do you take the medication Naltrexone?  No    Do you take blood thinners?  No       Prep   Are you currently on dialysis or do you have chronic kidney disease?  No    Do you have a diagnosis of diabetes?  No    Do you have a diagnosis of cystic fibrosis (CF)?  No    On a regular basis do you go 3 -5 days between bowel movements?  No    BMI > 40?  Yes (Extended Prep)    Preferred Pharmacy:    [id number] [id number] [place] [address] AT [address] [address]  Phone: [phone] Fax: [phone]      Final Scheduling Details   Colonoscopy prep sent?  N/A    Procedure scheduled  Colonoscopy    Surgeon:  SHUN     Date of procedure:  05/02/2024     Pre-OP / PAC:   No - Not required for this site.    Location  [facility]  OPENING IN EARLY MAY    Sedation   Moderate Sedation - Patient preference.      Patient Reminders:   You will receive a call from a Nurse to review instructions and health history.  This assessment must be completed prior to your procedure.  Failure to complete the Nurse assessment may result in the procedure being cancelled.      On the day of your procedure, please designate an adult(s) who can drive you home stay with you for the next 24 hours. The medicines used in the exam will make you sleepy. You will not be able to drive.      You cannot take public transportation, ride share services, or non-medical taxi service without a responsible caregiver.  Medical transport services are allowed with the requirement that a responsible caregiver will " receive you at your destination.  We require that drivers and caregivers are confirmed prior to your procedure.

## 2024-02-22 ENCOUNTER — OFFICE VISIT (OUTPATIENT)
Dept: ORTHOPEDICS | Facility: CLINIC | Age: 74
End: 2024-02-22
Payer: COMMERCIAL

## 2024-02-22 VITALS — DIASTOLIC BLOOD PRESSURE: 82 MMHG | WEIGHT: 250 LBS | SYSTOLIC BLOOD PRESSURE: 118 MMHG | BODY MASS INDEX: 40.35 KG/M2

## 2024-02-22 DIAGNOSIS — G89.29 CHRONIC LEFT SHOULDER PAIN: ICD-10-CM

## 2024-02-22 DIAGNOSIS — M19.012 OSTEOARTHRITIS, LOCALIZED, SHOULDER, LEFT: Primary | ICD-10-CM

## 2024-02-22 DIAGNOSIS — M25.512 CHRONIC LEFT SHOULDER PAIN: ICD-10-CM

## 2024-02-22 PROCEDURE — 99213 OFFICE O/P EST LOW 20 MIN: CPT | Mod: 25 | Performed by: PHYSICIAN ASSISTANT

## 2024-02-22 PROCEDURE — 20610 DRAIN/INJ JOINT/BURSA W/O US: CPT | Mod: LT | Performed by: PHYSICIAN ASSISTANT

## 2024-02-22 RX ORDER — LIDOCAINE HYDROCHLORIDE 10 MG/ML
4 INJECTION, SOLUTION INFILTRATION; PERINEURAL
Status: DISCONTINUED | OUTPATIENT
Start: 2024-02-22 | End: 2024-05-02

## 2024-02-22 RX ORDER — TRIAMCINOLONE ACETONIDE 40 MG/ML
40 INJECTION, SUSPENSION INTRA-ARTICULAR; INTRAMUSCULAR
Status: SHIPPED | OUTPATIENT
Start: 2024-02-22

## 2024-02-22 RX ADMIN — TRIAMCINOLONE ACETONIDE 40 MG: 40 INJECTION, SUSPENSION INTRA-ARTICULAR; INTRAMUSCULAR at 15:28

## 2024-02-22 RX ADMIN — LIDOCAINE HYDROCHLORIDE 4 ML: 10 INJECTION, SOLUTION INFILTRATION; PERINEURAL at 15:28

## 2024-02-22 NOTE — Clinical Note
"    2024         RE: Lucie Whitehead  [address] [address] [facility] [address]        Dear Colleague,    Thank you for referring your patient, Lucie Whitehead, to the [facility]. Please see a copy of my visit note below.    ASSESSMENT & PLAN    Lucie was seen today for musculoskeletal problem.    Diagnoses and all orders for this visit:    Chronic left shoulder pain  -     Orthopedic  Referral      This issue is {ACUTE/CHRONIC:730541} and {IMPROVING WORSENIN}.    {FOLLOW UP PLANS (Optional):653827}    Cody Ram PA-C  [facility]    SUBJECTIVE- Interim History 2024    Chief Complaint   Patient presents with    Musculoskeletal Problem     Chronic left shoulder pain- injection wanted       Lucie Whitehead is a 73 year old female who is seen in f/u up for Chronic left shoulder pain. Since last visit on *** patient has ***.  - Now ~ *** from initial onset    Worsened by: ***  Better with: ***  Treatments tried: {sjatreatmentoptions:925341}  Associated symptoms:  {thassociatedsx:412009}    The patient is seen {sjaparent:346787}.  The patient is {HANDDOMINANCE:380075} handed    Orthopedic/Surgical history: {YES - DATE/NO:002184::\"NO\"}  Social History/Occupation: ***      REVIEW OF SYSTEMS:  Review of Systems    OBJECTIVE:  /82   Wt 113.4 kg (250 lb)   LMP 2007 (LMP Unknown)   BMI 40.35 kg/m     General: healthy, alert and in no distress  Skin: no suspicious lesions or rash.  CV: distal perfusion intact ***  Resp: normal respiratory effort without conversational dyspnea   Psych: normal mood and affect  Gait: {FSOC GAIT:527254::\"NORMAL\"}  Neuro: Normal light sensory exam of *** extremity ***    RADIOLOGY:  Final results and radiologist's interpretation, available in the [facility] health record.  Images were reviewed with the patient in the office today.  My personal " interpretation of the performed imaging: ***      {[facility] 2021 Documentation (Optional):100134}  {2021 E&M time (Optional):866126}  {Provider  Link to [facility] Help Grid :516576}        ASSESSMENT & PLAN    Lucie was seen today for musculoskeletal problem.    Diagnoses and all orders for this visit:    Chronic left shoulder pain  -     Orthopedic  Referral      Left shoulder glenohumeral osteoarthritis, received good improvement from last injection.  Left shoulder GH cortisone injection today.    Pt will monitor improvement from shoulder injection and return as needed.      Cody Ram PA-C  [facility] [place]    SUBJECTIVE- Interim History February 22, 2024    Chief Complaint   Patient presents with     Musculoskeletal Problem     Chronic left shoulder pain- injection wanted       Lucie Whitehead is a 73 year old female who is seen in f/u up for Chronic left shoulder pain/glenohumeral arthritis.      Since last visit on 4/27/2023 patient reports having up to 9 months of improvement following the left shoulder intra-articular cortisone injection of the left shoulder at that visit.  States in the last few months pain has been returning.  She notes deep nagging and aching pain that may be up to 8 out of 10 in maximal severity.  She also experiences sharp pains in the shoulder mainly at the lateral side with certain motions and in all symptoms are limiting the use of her right upper extremity for ADLs.  Pain may disturb sleep.  She is here today to consider cortisone injection repeat given significant relief from the last injection.    See note from 4/27/2023 for full history    Worsened by: Use  Better with: Rest, previous cortisone injection.    Of note patient did have a cervical spine fusion 11/28/2023 due to myelopathy, this changed other symptoms to distal upper extremities and balance issues but did not change her left shoulder pain.    Patient denies new injury to  the shoulder.  Patient denies other changes in health history or medications.      REVIEW OF SYSTEMS:  Review of Systems    OBJECTIVE:  /82   Wt 113.4 kg (250 lb)   LMP 11/12/2007 (LMP Unknown)   BMI 40.35 kg/m     General: healthy, alert and in no distress  Skin: no suspicious lesions or rash.  CV: distal perfusion intact grossly to bilateral upper extremities  Resp: normal respiratory effort without conversational dyspnea   Psych: normal mood and affect  Gait: NORMAL  Neuro: Normal light sensory exam of bilateral upper extremity grossly intact.    MSK exam left shoulder.  GROSS OBSERVATION: No deformities, sulcus sign, AC separation, edema, ecchymosis lesions or scars.  PALPATION: Crepitus at the joint with motion, and severe tenderness at the lateral shoulder, not necessarily supraspinatus insertion.  No pain over scapular spine, AC joint, clavicle, biceps at the groove, no excessive warmth.  ROM:  Right: Flexion 110, internal rotation to back pocket, external rotation 60, adduction to opposite shoulder, abduction 80.  Left: Flexion 160, IR to elbow 5, ER 60, adduction to opposite shoulder, abduction 150  LIGAMENTOUS: Grossly intact with no apprehension sign or signs of instability.  STRENGTH: Grossly intact all planes at neutral.  SPECIAL TESTS: Empty can and drop arm and speeds test negative.    RADIOLOGY:    Previous images from 1/14/2022 of bilateral shoulders reviewed today.     Specifically looking at the left shoulder there is moderate osteoarthritis of the glenohumeral joint with mild loss of joint space, subchondral changes, inferior humeral head osteophyte formation.  Alignment of the glenohumeral joint appears normal without arthropathy or dislocation.  Mild AC joint degeneration, no acute bony findings noted.    A total of 20 minutes spent with patient on care and care coordinating activities.    Large Joint Injection/Arthocentesis: L glenohumeral joint    Date/Time: 2/22/2024 3:28  PM    Performed by: Cody Ram PA-C  Authorized by: Cody Ram PA-C    Indications:  Pain and osteoarthritis  Needle Size:  25 G  Guidance: landmark guided    Approach:  Posterior  Location:  Shoulder      Site:  L glenohumeral joint  Medications:  4 mL lidocaine 1 %; 40 mg triamcinolone 40 MG/ML  Medications comment:  8mL of 1% Lidocaine was injected.  Procedure discussed: discussed risks, benefits, and alternatives    Consent Given by:  Patient  Timeout: timeout called immediately prior to procedure    Prep: patient was prepped and draped in usual sterile fashion                Again, thank you for allowing me to participate in the care of your patient.        Sincerely,        Cody Ram PA-C

## 2024-02-22 NOTE — PATIENT INSTRUCTIONS
He received a cortisone injection to the left shoulder joint today for osteoarthritis.  This injection contains 2 medications, a local anesthetic that may provide temporary pain relief and/or cause numbness temporarily about the shoulder.  The second component is a steroid or cortisone that is used to help reduce inflammation caused by the arthritis, please allow this 1 to 2 weeks to take full effect.    After the injection please monitor how much improvement and for how long this improvement lasts in order to better determine future treatment.    Please notify our office for any increased pain or redness redness about the shoulder that happens after for 5 days.    You may follow-up as needed.    Cody Ram PA-C  [facility]

## 2024-02-22 NOTE — PROGRESS NOTES
ASSESSMENT & PLAN    Lucie was seen today for musculoskeletal problem.    Diagnoses and all orders for this visit:    Chronic left shoulder pain  -     Orthopedic  Referral      Left shoulder glenohumeral osteoarthritis, received good improvement from last injection.  Left shoulder GH cortisone injection today.    Pt will monitor improvement from shoulder injection and return as needed.      ALENA Reyes [facility] [place]    SUBJECTIVE- Interim History February 22, 2024    Chief Complaint   Patient presents with    Musculoskeletal Problem     Chronic left shoulder pain- injection wanted       Lucie Whitehead is a 73 year old female who is seen in f/u up for Chronic left shoulder pain/glenohumeral arthritis.      Since last visit on 4/27/2023 patient reports having up to 9 months of improvement following the left shoulder intra-articular cortisone injection of the left shoulder at that visit.  States in the last few months pain has been returning.  She notes deep nagging and aching pain that may be up to 8 out of 10 in maximal severity.  She also experiences sharp pains in the shoulder mainly at the lateral side with certain motions and in all symptoms are limiting the use of her right upper extremity for ADLs.  Pain may disturb sleep.  She is here today to consider cortisone injection repeat given significant relief from the last injection.    See note from 4/27/2023 for full history    Worsened by: Use  Better with: Rest, previous cortisone injection.    Of note patient did have a cervical spine fusion 11/28/2023 due to myelopathy, this changed other symptoms to distal upper extremities and balance issues but did not change her left shoulder pain.    Patient denies new injury to the shoulder.  Patient denies other changes in health history or medications.      REVIEW OF SYSTEMS:  Review of Systems    OBJECTIVE:  /82   Wt 113.4 kg (250 lb)   LMP  11/12/2007 (LMP Unknown)   BMI 40.35 kg/m     General: healthy, alert and in no distress  Skin: no suspicious lesions or rash.  CV: distal perfusion intact grossly to bilateral upper extremities  Resp: normal respiratory effort without conversational dyspnea   Psych: normal mood and affect  Gait: NORMAL  Neuro: Normal light sensory exam of bilateral upper extremity grossly intact.    MSK exam left shoulder.  GROSS OBSERVATION: No deformities, sulcus sign, AC separation, edema, ecchymosis lesions or scars.  PALPATION: Crepitus at the joint with motion, and severe tenderness at the lateral shoulder, not necessarily supraspinatus insertion.  No pain over scapular spine, AC joint, clavicle, biceps at the groove, no excessive warmth.  ROM:  Right: Flexion 110, internal rotation to back pocket, external rotation 60, adduction to opposite shoulder, abduction 80.  Left: Flexion 160, IR to elbow 5, ER 60, adduction to opposite shoulder, abduction 150  LIGAMENTOUS: Grossly intact with no apprehension sign or signs of instability.  STRENGTH: Grossly intact all planes at neutral.  SPECIAL TESTS: Empty can and drop arm and speeds test negative.    RADIOLOGY:    Previous images from 1/14/2022 of bilateral shoulders reviewed today.     Specifically looking at the left shoulder there is moderate osteoarthritis of the glenohumeral joint with mild loss of joint space, subchondral changes, inferior humeral head osteophyte formation.  Alignment of the glenohumeral joint appears normal without arthropathy or dislocation.  Mild AC joint degeneration, no acute bony findings noted.    A total of 20 minutes spent with patient on care and care coordinating activities.    Large Joint Injection/Arthocentesis: L glenohumeral joint    Date/Time: 2/22/2024 3:28 PM    Performed by: Cody Ram PA-C  Authorized by: Cody Ram PA-C    Indications:  Pain and osteoarthritis  Needle Size:  25 G  Guidance: landmark guided     Approach:  Posterior  Location:  Shoulder      Site:  L glenohumeral joint  Medications:  4 mL lidocaine 1 %; 40 mg triamcinolone 40 MG/ML  Medications comment:  8mL of 1% Lidocaine was injected.  Procedure discussed: discussed risks, benefits, and alternatives    Consent Given by:  Patient  Timeout: timeout called immediately prior to procedure    Prep: patient was prepped and draped in usual sterile fashion

## 2024-04-05 RX ORDER — BISACODYL 5 MG/1
TABLET, DELAYED RELEASE ORAL
Qty: 4 TABLET | Refills: 0 | Status: ON HOLD | OUTPATIENT
Start: 2024-04-05 | End: 2024-05-02

## 2024-04-05 NOTE — TELEPHONE ENCOUNTER
Extended Golytely Bowel Prep . Instructions were sent via [employer]. Bowel prep was sent 4/5/2024 to    [facility] #[id number] - [facility] [address] AT [place] [address]    Court Adams RN Colorectal Cancer    Division of Gastroenterology at [facility]

## 2024-04-18 ENCOUNTER — TELEPHONE (OUTPATIENT)
Dept: GASTROENTEROLOGY | Facility: CLINIC | Age: 74
End: 2024-04-18
Payer: COMMERCIAL

## 2024-04-18 NOTE — TELEPHONE ENCOUNTER
Pre visit planning completed.      Procedure details:    Patient scheduled for Colonoscopy  on 5/2/24.     Arrival time: 0745. Procedure time 0830    Facility location: [facility]; [address] [place] [address] [place] [address]. Check in location: 1st Floor [facility], [place], operated by [facility].     Sedation type: Conscious sedation     Pre op exam needed? N/A    Indication for procedure: Hx colon polyps      Chart review:     Electronic implanted devices? No    Recent diagnosis of diverticulitis within the last 6 weeks? No    Diabetic? No      Medication review:    Anticoagulants? No    NSAIDS? No NSAID medications per patient's medication list.  RN will verify with pre-assessment call.    Other medication HOLDING recommendations:  N/A      Prep for procedure:     Bowel prep recommendation: Extended Golytely. Bowel prep prescription sent to [id number] [address] AT [place] [address] by [facility] nursing team on 4/5/24  Due to: BMI > 40.  and CKD noted.     Prep instructions sent via sachin Rangel RN  Endoscopy Procedure Pre Assessment RN  [phone] option 4

## 2024-04-19 NOTE — TELEPHONE ENCOUNTER
Pre assessment completed for upcoming procedure.   (Please see previous telephone encounter notes for complete details)      Procedure details:    Arrival time and facility location reviewed.    Pre op exam needed? N/A    Designated  policy reviewed. Instructed to have someone stay 6  hours post procedure.       Medication review:    Medications reviewed. Please see supporting documentation below. Holding recommendations discussed (if applicable).   Iron supplement (Multivitamin per patient): HOLD 7 days before procedure.   NSAID medication(s): Ibuprofen (Advil, Motrin): HOLD 1 day before procedure.      Prep for procedure:     Procedure prep instructions reviewed.        Any additional information needed:  N/A      Patient verbalized understanding and had no questions or concerns at this time.      Yeni Rangel RN  Endoscopy Procedure Pre Assessment RN  [phone] option 4

## 2024-05-02 ENCOUNTER — HOSPITAL ENCOUNTER (OUTPATIENT)
Facility: CLINIC | Age: 74
Discharge: HOME | End: 2024-05-02
Attending: COLON & RECTAL SURGERY | Admitting: COLON & RECTAL SURGERY
Payer: COMMERCIAL

## 2024-05-02 VITALS
RESPIRATION RATE: 19 BRPM | OXYGEN SATURATION: 99 % | DIASTOLIC BLOOD PRESSURE: 102 MMHG | HEIGHT: 66 IN | WEIGHT: 230 LBS | HEART RATE: 87 BPM | BODY MASS INDEX: 36.96 KG/M2 | SYSTOLIC BLOOD PRESSURE: 168 MMHG

## 2024-05-02 LAB — COLONOSCOPY: NORMAL

## 2024-05-02 PROCEDURE — G0500 MOD SEDAT ENDO SERVICE >5YRS: HCPCS | Performed by: COLON & RECTAL SURGERY

## 2024-05-02 PROCEDURE — 258N000003 HC RX IP 258 OP 636: Performed by: COLON & RECTAL SURGERY

## 2024-05-02 PROCEDURE — 88305 TISSUE EXAM BY PATHOLOGIST: CPT | Mod: 26 | Performed by: PATHOLOGY

## 2024-05-02 PROCEDURE — 250N000011 HC RX IP 250 OP 636: Performed by: COLON & RECTAL SURGERY

## 2024-05-02 PROCEDURE — 45385 COLONOSCOPY W/LESION REMOVAL: CPT | Mod: PT | Performed by: COLON & RECTAL SURGERY

## 2024-05-02 PROCEDURE — 88305 TISSUE EXAM BY PATHOLOGIST: CPT | Mod: TC | Performed by: COLON & RECTAL SURGERY

## 2024-05-02 RX ORDER — LIDOCAINE 40 MG/G
CREAM TOPICAL
Status: DISCONTINUED | OUTPATIENT
Start: 2024-05-02 | End: 2024-05-02 | Stop reason: HOSPADM

## 2024-05-02 RX ORDER — SODIUM CHLORIDE 9 MG/ML
INJECTION, SOLUTION INTRAVENOUS CONTINUOUS PRN
Status: DISCONTINUED | OUTPATIENT
Start: 2024-05-02 | End: 2024-05-02 | Stop reason: HOSPADM

## 2024-05-02 RX ORDER — FENTANYL CITRATE 50 UG/ML
INJECTION, SOLUTION INTRAMUSCULAR; INTRAVENOUS PRN
Status: DISCONTINUED | OUTPATIENT
Start: 2024-05-02 | End: 2024-05-02 | Stop reason: HOSPADM

## 2024-05-02 RX ORDER — ONDANSETRON 2 MG/ML
4 INJECTION INTRAMUSCULAR; INTRAVENOUS
Status: DISCONTINUED | OUTPATIENT
Start: 2024-05-02 | End: 2024-05-02 | Stop reason: HOSPADM

## 2024-05-02 ASSESSMENT — ACTIVITIES OF DAILY LIVING (ADL)
ADLS_ACUITY_SCORE: 38

## 2024-05-02 NOTE — H&P
"Colon & Rectal Surgery History and Physical  Pre-Endoscopy Procedure Note    History of Present Illness   I have been asked by Dr. Moore to evaluate this 73 year old female for colorectal polyp surveillance. She currently denies any abdominal pain, weight loss, bleeding per rectum, or recent change in bowel habits.    Past Medical History  Diagnosis Date    Arthritis     Complication of anesthesia     extremely dry eyes    Depressive disorder     Diabetes     Gastro-oesophageal reflux disease     High cholesterol     Hirsutism     History of blood transfusion     Hypertension     Leiomyoma of uterus, unspecified     Obesity 2010    Estimated Body mass index is 28.97 kg/(m^2) as calculated from the following:   Height as of 7/10/13: 5' 7\"(1.702 m).   Weight as of 7/10/13: 185 lb(83.915 kg).      Hyperlipidemia     Other chronic pain     low back and legs and feet    Sleep apnea     CPAP    Uncomplicated asthma        Past Surgical History  Procedure Laterality Date    ARTHROSCOPY SHOULDER DISTAL CLAVICLE REPAIR Right 2014    Procedure: ARTHROSCOPY SHOULDER DISTAL CLAVICLE RESECTION;  Surgeon: John Paul Rodriguez MD;  Location: [facility] OR    BIOPSY      Cervical    C/SECTION, LOW TRANSVERSE  x 3    , Low Transverse x 4    DECOMPRESSION, FUSION CERVICAL ANTERIOR TWO LEVELS, COMBINED  2012    Procedure:COMBINED DECOMPRESSION, FUSION CERVICAL ANTERIOR TWO LEVELS; DECOMPRESSION, FUSION CERVICAL ANTERIOR C4-6; Surgeon:DAVIDE ALCALA; Location:[facility] OR    DECOMPRESSION, FUSION LUMBAR POSTERIOR ONE LEVEL, COMBINED  3/7/2013    Procedure: COMBINED DECOMPRESSION, FUSION LUMBAR POSTERIOR ONE LEVEL;  Posterior Fusion L2-3, Hardware Removal L3-5;  Surgeon: Davide Alcala MD;  Location: [facility] OR    DECOMPRESSION, FUSION LUMBAR POSTERIOR TWO LEVELS, COMBINED N/A 7/3/2018    Procedure: COMBINED DECOMPRESSION, FUSION LUMBAR POSTERIOR TWO LEVELS;  Bilateral decompression L1-L2 with left total facetectomy "   Transforaminal lumbar interbody fusion L1-L2 from left-sided approach   Insertion of intervertebral biomechanical device L1-L2  Posterolateral fusion L1-L2; Removal of instrumentation L2-3  RE-Instrumentation L1-L3;  Surgeon: Davide Tamez MD;  Location: [facility] OR    EXPLORE SPINE, REMOVE HARDWARE, COMBINED N/A 7/3/2018    Procedure: COMBINED EXPLORE SPINE, REMOVE HARDWARE;;  Surgeon: Davide Tamez MD;  Location: [facility] OR    FUSION LUMBAR ANTERIOR ONE LEVEL  4/11/2013    Procedure: FUSION LUMBAR ANTERIOR ONE LEVEL;  Anterior Fusion L2-3;  Surgeon: Davide Tamez MD;  Location: [facility] OR    HEAD & NECK SURGERY      LAMINECTOMY, FUSION LUMBAR TWO LEVELS, COMBINED  3/11    AP fusion L3-5    NONSPECIFIC PROCEDURE      Bicep tendon repair    Abstracted 06/14/02        Medications  Medications Prior to Admission   Medication Sig    acetaminophen (TYLENOL) 500 MG tablet Take 1,000 mg by mouth 3 times daily    buPROPion (WELLBUTRIN XL) 150 MG 24 hr tablet 1 tablet every morning    cetirizine (ZYRTEC) 10 MG tablet Take 10 mg by mouth daily    cycloSPORINE (RESTASIS) 0.05 % ophthalmic emulsion 1 drop 2 times daily    DULoxetine (CYMBALTA) 60 MG capsule Take 60 mg by mouth 2 times daily    hydrOXYzine (ATARAX) 25 MG tablet 1-2 tablets 3 times daily as needed for anxiety    methocarbamol (ROBAXIN) 500 MG tablet Take 500 mg by mouth 4 times daily as needed for muscle spasms    Multiple Vitamins-Minerals (OCUVITE ADULT 50+ PO) Take 1 tablet by mouth daily     pramipexole (MIRAPEX) 0.25 MG tablet 0.5 mg At Bedtime     prazosin (MINIPRESS) 1 MG capsule 1 capsule at bedtime    propranolol ER (INDERAL LA) 160 MG 24 hr capsule Take 1 capsule (160 mg) by mouth daily    rosuvastatin (CRESTOR) 20 MG tablet Take 1 tablet (20 mg) by mouth daily       Allergies  Allergen Reactions    Gabapentin Unknown    Animal Dander     Cats     Dogs     Seasonal Allergies      Trees and mold        Family History   Family history includes  "Colon Cancer in her maternal grandmother and mother; Coronary Artery Disease (age of onset: 90) in her mother; Dementia in her mother; Diabetes in her maternal grandfather, maternal grandmother, maternal uncle, mother, and son; Heart Disease in her maternal grandfather; Hypertension in her maternal grandmother and maternal uncle; Prostate Cancer in her father; Respiratory Disorder in her paternal grandmother.     Social History   She reports that she quit smoking about 49 years ago. Her smoking use included cigarettes. She started smoking about 54 years ago. She has a 2.5 pack-year smoking history. She has never used smokeless tobacco. She reports current alcohol use. She reports that she does not use drugs.    Review of Systems   Constitutional:  No fever, weight change or fatigue.    Eyes:     No dry eyes or vision changes.   Ears/Nose/Throat/Neck:  No oral ulcers, sore throat or voice change.    Cardiovascular:   No palpitations, syncope, angina or edema.   Respiratory:    No chest pain, excessive sleepiness, shortness of breath or hemoptysis.    Gastrointestinal:   No abdominal pain, nausea, vomiting, diarrhea or heartburn.    Genitourinary:   No dysuria, hematuria, urinary retention or urinary frequency.   Musculoskeletal:  No joint swelling or arthralgias.    Dermatologic:  No skin rash or other skin changes.   Neurologic:    No focal weakness or numbness. No neuropathy.   Psychiatric:    No depression, anxiety, suicidal ideation, or paranoid ideation.   Endocrine:   No cold or heat intolerance, polydipsia, hirsutism, change in libido, or flushing.   Hematology/Lymphatic:  No bleeding or lymphadenopathy.    Allergy/Immunology:  No rhinitis or hives.     Physical Exam   Vitals:  Blood pressure 140/104, pulse 96, resp. rate 14, height 1.676 m (5' 6\"), weight 104.3 kg (230 lb), last menstrual period 11/12/2007, SpO2 96%.    General:  Alert and oriented to person, place and time   Airway: Normal oropharyngeal " airway and neck mobility   Lungs:  Clear bilaterally   Heart:  Regular rate and rhythm   Abdomen: Soft, NT, ND, no masses   Extremities: Warm, good capillary refill    ASA Grade: II (mild systemic disease)    Impression: Cleared for use of conscious sedation for colorectal cancer screening    Plan: Proceed with colonoscopy     Therese Davidson MD  [facility]  [phone]

## 2024-05-03 LAB
PATH REPORT.COMMENTS IMP SPEC: NORMAL
PATH REPORT.FINAL DX SPEC: NORMAL
PATH REPORT.GROSS SPEC: NORMAL
PATH REPORT.MICROSCOPIC SPEC OTHER STN: NORMAL
PATH REPORT.RELEVANT HX SPEC: NORMAL
PHOTO IMAGE: NORMAL

## 2024-05-13 DIAGNOSIS — I10 ESSENTIAL HYPERTENSION: ICD-10-CM

## 2024-05-15 NOTE — TELEPHONE ENCOUNTER
Didn't respond to [employer] message.  Please call about home BP and if running high.  If doesn't know, please schedule for MA-only BP check

## 2024-05-16 ENCOUNTER — MYC MEDICAL ADVICE (OUTPATIENT)
Dept: PEDIATRICS | Facility: CLINIC | Age: 74
End: 2024-05-16
Payer: COMMERCIAL

## 2024-05-16 RX ORDER — PROPRANOLOL HYDROCHLORIDE 160 MG/1
160 CAPSULE, EXTENDED RELEASE ORAL DAILY
Qty: 30 CAPSULE | Refills: 0 | Status: SHIPPED | OUTPATIENT
Start: 2024-05-16 | End: 2024-08-30

## 2024-05-16 NOTE — LETTER
May 17, 2024      Lucie Whitehead  [address] [address] [facility] [address]        Dear Lucie,     We have been attempting to reach you.     We recently received a medication refill request for propranolol ER (INDERAL LA) 160 MG 24 hr capsule which is currently pending. Dr. Moore is wanting to know your most recent blood pressures so we can refill that medication for you. If they have been high, Dr. Moore would like you to schedule a nurse visit. You can do this by calling [phone] or by replying to this message.         Sincerely,        [facility]

## 2024-05-17 DIAGNOSIS — I10 ESSENTIAL HYPERTENSION: ICD-10-CM

## 2024-05-17 RX ORDER — PROPRANOLOL HYDROCHLORIDE 160 MG/1
160 CAPSULE, EXTENDED RELEASE ORAL DAILY
Qty: 30 CAPSULE | Refills: 0 | OUTPATIENT
Start: 2024-05-17

## 2024-05-30 ENCOUNTER — TELEPHONE (OUTPATIENT)
Dept: PEDIATRICS | Facility: CLINIC | Age: 74
End: 2024-05-30
Payer: COMMERCIAL

## 2024-05-30 NOTE — LETTER
May 30, 2024      Lucie Whitehead  [address] [address] [facility] [id number]        Dear Lucie,       We care about your health and have reviewed your health plan including your medical conditions, medications, and lab results.  Based on this review, it is recommended that you follow up regarding the following health topic(s):  You are due for labs to recheck your cholesterol and kidney function.    We recommend you take the following action(s):  -schedule a LAB ONLY APPOINTMENT to recheck your: Lipids (fasting cholesterol - nothing to eat except water and/or meds for 8-10 hours) and BMP (basic metabolic panel) within the next 1-4 weeks.  If' you have had labs completed eslewhere, please let us know so we can update your records.       Please call us at the [facility] - [phone] (or use [email or web address]) to address the above recommendations.     Thank you for trusting [facility] and we appreciate the opportunity to serve you.  We look forward to supporting your healthcare needs in the future.    Healthy Regards,    Your Health Care Team  [facility]

## 2024-06-11 ENCOUNTER — MYC MEDICAL ADVICE (OUTPATIENT)
Dept: PEDIATRICS | Facility: CLINIC | Age: 74
End: 2024-06-11
Payer: COMMERCIAL

## 2024-06-17 NOTE — TELEPHONE ENCOUNTER
[email or web address] message marked as unread at this time. Attempted to reach patient, [facility].     Srinath HARPER RN 6/17/2024 at 2:48 PM

## 2024-06-18 ENCOUNTER — MYC MEDICAL ADVICE (OUTPATIENT)
Dept: PEDIATRICS | Facility: CLINIC | Age: 74
End: 2024-06-18
Payer: COMMERCIAL

## 2024-06-18 DIAGNOSIS — E78.5 HYPERLIPIDEMIA LDL GOAL <160: ICD-10-CM

## 2024-06-18 NOTE — TELEPHONE ENCOUNTER
Patient sending update on BP readings. Patient is scheduled for fasting lab only appointment.     Srinath HARPER RN 6/18/2024 at 11:50 AM

## 2024-06-18 NOTE — TELEPHONE ENCOUNTER
RN called and spoke with patient. Relayed provider message.     Transferred to TC line to schedule a lab appointment.   Patient does not have BP log- will start taking blood pressures and will send us the log!    Kathleen NASCIMENTO RN on 6/18/2024 at 8:29 AM

## 2024-06-20 ENCOUNTER — LAB (OUTPATIENT)
Dept: LAB | Facility: CLINIC | Age: 74
End: 2024-06-20
Payer: COMMERCIAL

## 2024-06-20 DIAGNOSIS — E78.5 HYPERLIPIDEMIA LDL GOAL <160: ICD-10-CM

## 2024-06-20 LAB
CHOLEST SERPL-MCNC: 238 MG/DL
FASTING STATUS PATIENT QL REPORTED: YES
HDLC SERPL-MCNC: 42 MG/DL
LDLC SERPL CALC-MCNC: 124 MG/DL
NONHDLC SERPL-MCNC: 196 MG/DL
TRIGL SERPL-MCNC: 361 MG/DL

## 2024-06-20 PROCEDURE — 80061 LIPID PANEL: CPT

## 2024-06-20 PROCEDURE — 36415 COLL VENOUS BLD VENIPUNCTURE: CPT

## 2024-06-21 RX ORDER — ROSUVASTATIN CALCIUM 40 MG/1
40 TABLET, COATED ORAL DAILY
Qty: 90 TABLET | Refills: 3 | Status: SHIPPED | OUTPATIENT
Start: 2024-06-21

## 2024-06-21 NOTE — ADDENDUM NOTE
Addended by: GUSTAVO GUILLORY on: 6/21/2024 12:29 PM     Modules accepted: Orders     Patient came down moments ago for drainage of her right paracolic abscess  Apparently, when the team picked her up, in her room, she was asking about anesthesia  I offered to do the procedure with conscious sedation, using Versed and fentanyl  She was worried about her "allergies to narcotics, like Valium "  I reviewed her intolerances in the chart including Dilaudid, doxycycline, sulfa, and hydrochlorothiazide  She was still concerned about nausea from the drugs  "Can you just please put me to sleep?"  We discussed that 99 9% of CT-guided procedures can be accomplished with conscious sedation  She would prefer anesthesia  I wrote orders for tomorrow, and placed an anesthesia consult

## 2024-07-24 ENCOUNTER — LAB (OUTPATIENT)
Dept: LAB | Facility: CLINIC | Age: 74
End: 2024-07-24
Payer: COMMERCIAL

## 2024-07-24 DIAGNOSIS — R94.4 ABNORMAL RENAL FUNCTION TEST: ICD-10-CM

## 2024-07-24 LAB
ANION GAP SERPL CALCULATED.3IONS-SCNC: 10 MMOL/L (ref 7–15)
BUN SERPL-MCNC: 16.8 MG/DL (ref 8–23)
CALCIUM SERPL-MCNC: 9.7 MG/DL (ref 8.8–10.4)
CHLORIDE SERPL-SCNC: 98 MMOL/L (ref 98–107)
CREAT SERPL-MCNC: 0.99 MG/DL (ref 0.51–0.95)
CREAT UR-MCNC: 94.3 MG/DL
EGFRCR SERPLBLD CKD-EPI 2021: 61 ML/MIN/1.73M2
GLUCOSE SERPL-MCNC: 111 MG/DL (ref 70–99)
HCO3 SERPL-SCNC: 28 MMOL/L (ref 22–29)
MICROALBUMIN UR-MCNC: 24.5 MG/L
MICROALBUMIN/CREAT UR: 25.98 MG/G CR (ref 0–25)
POTASSIUM SERPL-SCNC: 5.1 MMOL/L (ref 3.4–5.3)
SODIUM SERPL-SCNC: 136 MMOL/L (ref 135–145)

## 2024-07-24 PROCEDURE — 82043 UR ALBUMIN QUANTITATIVE: CPT

## 2024-07-24 PROCEDURE — 36415 COLL VENOUS BLD VENIPUNCTURE: CPT

## 2024-07-24 PROCEDURE — 80048 BASIC METABOLIC PNL TOTAL CA: CPT

## 2024-07-24 PROCEDURE — 82570 ASSAY OF URINE CREATININE: CPT

## 2024-07-25 PROBLEM — N18.31 STAGE 3A CHRONIC KIDNEY DISEASE (H): Status: ACTIVE | Noted: 2020-03-17

## 2024-07-30 ENCOUNTER — MYC MEDICAL ADVICE (OUTPATIENT)
Dept: PEDIATRICS | Facility: CLINIC | Age: 74
End: 2024-07-30

## 2024-07-30 DIAGNOSIS — M25.551 HIP PAIN, RIGHT: Primary | ICD-10-CM

## 2024-08-02 ENCOUNTER — PATIENT OUTREACH (OUTPATIENT)
Dept: CARE COORDINATION | Facility: CLINIC | Age: 74
End: 2024-08-02
Payer: COMMERCIAL

## 2024-08-02 NOTE — TELEPHONE ENCOUNTER
-Placed at  first floor for . Notified patient via [employer] message.    Thank you kindly,  Braeden VAZQUEZ

## 2024-08-07 ENCOUNTER — TELEPHONE (OUTPATIENT)
Dept: ORTHOPEDICS | Facility: CLINIC | Age: 74
End: 2024-08-07
Payer: COMMERCIAL

## 2024-08-15 ENCOUNTER — PATIENT OUTREACH (OUTPATIENT)
Dept: CARE COORDINATION | Facility: CLINIC | Age: 74
End: 2024-08-15
Payer: COMMERCIAL

## 2024-08-23 NOTE — PROGRESS NOTES
ASSESSMENT & PLAN    Lucie was seen today for pain.    Diagnoses and all orders for this visit:    Greater trochanteric pain syndrome of right lower extremity  -     XR Pelvis and Hip Right 1 View; Future  -     Large Joint Injection/Arthocentesis: R greater trochanteric bursa  -     Physical Therapy  Referral; Future    Tendinopathy of right gluteal region  -     Orthopedic  Referral  -     Large Joint Injection/Arthocentesis: R greater trochanteric bursa  -     Physical Therapy  Referral; Future      This issue is chronic and Worsening. Lucie presents our clinic today to discuss her chronic left hip pain.  Radiographs taken in clinic today were reviewed with the patient and show mild to moderate osteoarthritis of the hip.  Her examination and history are consistent with gluteal tendinosis and greater trochanteric pain syndrome as the main  of her symptoms.  She has been participating in physical therapy for her hip and low back for several months with mild relief in her symptoms.  We discussed that the next step in treatment would be a corticosteroid injection to the greater trochanteric bursa to provide pain relief and to allow her to participate in PT, and after this discussion the patient was amenable to this today.  We determined the following plan:  - CSI to the greater trochanteric bursa performed today under ultrasound guidance, see procedure note for details  - Physical therapy referral for the hip placed today  - She otherwise use over-the-counter pain medicines, ice, heat as needed  - She can follow-up in our clinic in 6 to 8 weeks if not improving.  At that time could trial a CSI to the hip joint itself      DO RYLAN Zamarripa [facility] [place]    -----  Chief Complaint   Patient presents with    Right Hip - Pain       SUBJECTIVE  Lucie FAGAN Ventura is a/an 73 year old female who is seen in consultation at the request of  Nallely KENNEDY  "Short  M.D. for evaluation of right hip pain.     The patient is seen by themselves.  The patient is Right handed    Onset: 10 month(s) ago. Notices after cervical surgery pain worsen  Location of Pain: right Hip  Worsened by: walking more than a little  Better with: medication helps minimal  Treatments tried: rest/activity avoidance and physical therapy (12 visits)  Associated symptoms: numbness and locking or catching    Orthopedic/Surgical history: h/o multiple lumbar fusions  Social History/Occupation: retired      REVIEW OF SYSTEMS:  Review of systems negative unless mentioned in HPI     OBJECTIVE:  /72   Ht 1.702 m (5' 7\")   Wt 104.3 kg (230 lb)   LMP 11/12/2007 (LMP Unknown)   BMI 36.02 kg/m     General: healthy, alert and in no distress  Skin: no suspicious lesions or rash.  CV: distal perfusion intact   Resp: normal respiratory effort without conversational dyspnea   Psych: normal mood and affect  Gait: walker  Neuro: Normal light sensory exam of LLE    Hip Exam:    Musculoskeletal Exam   Gait Normal     Left Right   Inspection Grossly Normal  Grossly Normal    Palpation     Tenderness over  Greater troch None   Range of Motion     Flexion - Supine  Full to about 90  Full to about 90   ER at 90 of flexion 55 55   IR at 90 of flexion 40 40   Strength 5/5 Flexion  5/5 Abduction in Neutral  5/5 Abduction in Extension    Grossly Nml otherwise  5/5 Flexion  5/5 Abduction in Neutral  5/5 Abduction in Extension    Grossly Nml otherwise    Pain Provocation Tests     FADIR NEG NEG   ANNA NEG  NEG    Instability/log roll NEG  NEG    Trochanteric Pain Sign POS NEG    Straight leg raise (passive) NEG  NEG    Posterior/Ischiofemoral Impingement Provocation NEG  NEG    Neurologic Intact sensation          RADIOLOGY:  Final results and radiologist's interpretation, available in the [facility] health record.  Images were reviewed with the patient in the office today.  My personal interpretation of the performed " imaging: Mild to moderate joint space narrowing of the hip.  No acute fractures or bony abnormality.  Hardware from previous lumbar spinal fusion present.    Large Joint Injection/Arthocentesis: R greater trochanteric bursa    Date/Time: 8/26/2024 2:30 PM    Performed by: Mauricio Merino DO  Authorized by: Mauricio Merino DO    Indications:  Pain  Needle Size:  22 G  Guidance: ultrasound    Approach:  Lateral  Location:  Hip      Site:  R greater trochanteric bursa  Medications:  6 mg betamethasone acet & sod phos 6 (3-3) MG/ML; 5 mL lidocaine 1 %  Medications comment:  1ml of 8.4% Sodium Bicarbonate solution was used to buffer the local numbing agent for today's injection  2ml of 0.5% Ropivacaine  NDC: 73122-409-66  Lot: [id number]  Exp: 2/28/25  Outcome:  Tolerated well, no immediate complications  Procedure discussed: discussed risks, benefits, and alternatives    Consent Given by:  Patient  Timeout: timeout called immediately prior to procedure    Prep: patient was prepped and draped in usual sterile fashion     Ultrasound was used to ensure safe and accurate needle placement and injection. Ultrasound images of the procedure were permanently stored.

## 2024-08-26 ENCOUNTER — OFFICE VISIT (OUTPATIENT)
Dept: ORTHOPEDICS | Facility: CLINIC | Age: 74
End: 2024-08-26
Attending: INTERNAL MEDICINE
Payer: COMMERCIAL

## 2024-08-26 ENCOUNTER — ANCILLARY PROCEDURE (OUTPATIENT)
Dept: GENERAL RADIOLOGY | Facility: CLINIC | Age: 74
End: 2024-08-26
Attending: STUDENT IN AN ORGANIZED HEALTH CARE EDUCATION/TRAINING PROGRAM
Payer: COMMERCIAL

## 2024-08-26 VITALS
WEIGHT: 230 LBS | SYSTOLIC BLOOD PRESSURE: 130 MMHG | HEIGHT: 67 IN | BODY MASS INDEX: 36.1 KG/M2 | DIASTOLIC BLOOD PRESSURE: 72 MMHG

## 2024-08-26 DIAGNOSIS — M67.951 TENDINOPATHY OF RIGHT GLUTEAL REGION: ICD-10-CM

## 2024-08-26 DIAGNOSIS — M25.551 GREATER TROCHANTERIC PAIN SYNDROME OF RIGHT LOWER EXTREMITY: Primary | ICD-10-CM

## 2024-08-26 DIAGNOSIS — G89.29 CHRONIC RIGHT HIP PAIN: ICD-10-CM

## 2024-08-26 DIAGNOSIS — M25.551 CHRONIC RIGHT HIP PAIN: ICD-10-CM

## 2024-08-26 PROCEDURE — 99213 OFFICE O/P EST LOW 20 MIN: CPT | Mod: 25 | Performed by: STUDENT IN AN ORGANIZED HEALTH CARE EDUCATION/TRAINING PROGRAM

## 2024-08-26 PROCEDURE — 73501 X-RAY EXAM HIP UNI 1 VIEW: CPT | Mod: TC | Performed by: RADIOLOGY

## 2024-08-26 PROCEDURE — 20611 DRAIN/INJ JOINT/BURSA W/US: CPT | Mod: RT | Performed by: STUDENT IN AN ORGANIZED HEALTH CARE EDUCATION/TRAINING PROGRAM

## 2024-08-26 RX ORDER — BETAMETHASONE SODIUM PHOSPHATE AND BETAMETHASONE ACETATE 3; 3 MG/ML; MG/ML
6 INJECTION, SUSPENSION INTRA-ARTICULAR; INTRALESIONAL; INTRAMUSCULAR; SOFT TISSUE
Status: SHIPPED | OUTPATIENT
Start: 2024-08-26

## 2024-08-26 RX ORDER — LIDOCAINE HYDROCHLORIDE 10 MG/ML
5 INJECTION, SOLUTION INFILTRATION; PERINEURAL
Status: SHIPPED | OUTPATIENT
Start: 2024-08-26

## 2024-08-26 RX ADMIN — LIDOCAINE HYDROCHLORIDE 5 ML: 10 INJECTION, SOLUTION INFILTRATION; PERINEURAL at 14:30

## 2024-08-26 RX ADMIN — BETAMETHASONE SODIUM PHOSPHATE AND BETAMETHASONE ACETATE 6 MG: 3; 3 INJECTION, SUSPENSION INTRA-ARTICULAR; INTRALESIONAL; INTRAMUSCULAR; SOFT TISSUE at 14:30

## 2024-08-26 NOTE — LETTER
8/26/2024      Lucie Whitehead  [address] [facility] [id number]      Dear Colleague,    Thank you for referring your patient, Lucie Whitehead, to the [facility] [place]. Please see a copy of my visit note below.    ASSESSMENT & PLAN    Lucie was seen today for pain.    Diagnoses and all orders for this visit:    Greater trochanteric pain syndrome of right lower extremity  -     XR Pelvis and Hip Right 1 View; Future  -     Large Joint Injection/Arthocentesis: R greater trochanteric bursa  -     Physical Therapy  Referral; Future    Tendinopathy of right gluteal region  -     Orthopedic  Referral  -     Large Joint Injection/Arthocentesis: R greater trochanteric bursa  -     Physical Therapy  Referral; Future      This issue is chronic and Worsening. Lucie presents our clinic today to discuss her chronic left hip pain.  Radiographs taken in clinic today were reviewed with the patient and show mild to moderate osteoarthritis of the hip.  Her examination and history are consistent with gluteal tendinosis and greater trochanteric pain syndrome as the main  of her symptoms.  She has been participating in physical therapy for her hip and low back for several months with mild relief in her symptoms.  We discussed that the next step in treatment would be a corticosteroid injection to the greater trochanteric bursa to provide pain relief and to allow her to participate in PT, and after this discussion the patient was amenable to this today.  We determined the following plan:  - CSI to the greater trochanteric bursa performed today under ultrasound guidance, see procedure note for details  - Physical therapy referral for the hip placed today  - She otherwise use over-the-counter pain medicines, ice, heat as needed  - She can follow-up in our clinic in 6 to 8 weeks if not improving.  At that time could trial a CSI to the hip joint  "itself      DO RYLAN Zamarripa [facility] [place]    -----  Chief Complaint   Patient presents with     Right Hip - Pain       SUBJECTIVE  Lucie Whitehead is a/an 73 year old female who is seen in consultation at the request of  Nallely Moore M.D. for evaluation of right hip pain.     The patient is seen by themselves.  The patient is Right handed    Onset: 10 month(s) ago. Notices after cervical surgery pain worsen  Location of Pain: right Hip  Worsened by: walking more than a little  Better with: medication helps minimal  Treatments tried: rest/activity avoidance and physical therapy (12 visits)  Associated symptoms: numbness and locking or catching    Orthopedic/Surgical history: h/o multiple lumbar fusions  Social History/Occupation: retired      REVIEW OF SYSTEMS:  Review of systems negative unless mentioned in HPI     OBJECTIVE:  /72   Ht 1.702 m (5' 7\")   Wt 104.3 kg (230 lb)   LMP 11/12/2007 (LMP Unknown)   BMI 36.02 kg/m     General: healthy, alert and in no distress  Skin: no suspicious lesions or rash.  CV: distal perfusion intact   Resp: normal respiratory effort without conversational dyspnea   Psych: normal mood and affect  Gait: walker  Neuro: Normal light sensory exam of LLE    Hip Exam:    Musculoskeletal Exam   Gait Normal     Left Right   Inspection Grossly Normal  Grossly Normal    Palpation     Tenderness over  Greater troch None   Range of Motion     Flexion - Supine  Full to about 90  Full to about 90   ER at 90 of flexion 55 55   IR at 90 of flexion 40 40   Strength 5/5 Flexion  5/5 Abduction in Neutral  5/5 Abduction in Extension    Grossly Nml otherwise  5/5 Flexion  5/5 Abduction in Neutral  5/5 Abduction in Extension    Grossly Nml otherwise    Pain Provocation Tests     FADIR NEG NEG   ANNA NEG  NEG    Instability/log roll NEG  NEG    Trochanteric Pain Sign POS NEG    Straight leg raise (passive) NEG  NEG    Posterior/Ischiofemoral " Impingement Provocation NEG  NEG    Neurologic Intact sensation          RADIOLOGY:  Final results and radiologist's interpretation, available in the [facility] health record.  Images were reviewed with the patient in the office today.  My personal interpretation of the performed imaging: Mild to moderate joint space narrowing of the hip.  No acute fractures or bony abnormality.  Hardware from previous lumbar spinal fusion present.    Large Joint Injection/Arthocentesis: R greater trochanteric bursa    Date/Time: 8/26/2024 2:30 PM    Performed by: Mauricio Merino DO  Authorized by: Mauricio Merino DO    Indications:  Pain  Needle Size:  22 G  Guidance: ultrasound    Approach:  Lateral  Location:  Hip      Site:  R greater trochanteric bursa  Medications:  6 mg betamethasone acet & sod phos 6 (3-3) MG/ML; 5 mL lidocaine 1 %  Medications comment:  1ml of 8.4% Sodium Bicarbonate solution was used to buffer the local numbing agent for today's injection  2ml of 0.5% Ropivacaine  NDC: 42402-083-95  Lot: [id number]  Exp: 2/28/25  Outcome:  Tolerated well, no immediate complications  Procedure discussed: discussed risks, benefits, and alternatives    Consent Given by:  Patient  Timeout: timeout called immediately prior to procedure    Prep: patient was prepped and draped in usual sterile fashion     Ultrasound was used to ensure safe and accurate needle placement and injection. Ultrasound images of the procedure were permanently stored.               Again, thank you for allowing me to participate in the care of your patient.        Sincerely,        Mauricio Merino DO

## 2024-08-28 ENCOUNTER — MYC MEDICAL ADVICE (OUTPATIENT)
Dept: PEDIATRICS | Facility: CLINIC | Age: 74
End: 2024-08-28
Payer: COMMERCIAL

## 2024-08-28 DIAGNOSIS — I10 ESSENTIAL HYPERTENSION: ICD-10-CM

## 2024-08-30 RX ORDER — PROPRANOLOL HYDROCHLORIDE 160 MG/1
160 CAPSULE, EXTENDED RELEASE ORAL DAILY
Qty: 90 CAPSULE | Refills: 0 | Status: SHIPPED | OUTPATIENT
Start: 2024-08-30

## 2024-09-05 ENCOUNTER — ANCILLARY PROCEDURE (OUTPATIENT)
Dept: MAMMOGRAPHY | Facility: CLINIC | Age: 74
End: 2024-09-05
Attending: STUDENT IN AN ORGANIZED HEALTH CARE EDUCATION/TRAINING PROGRAM
Payer: COMMERCIAL

## 2024-09-05 DIAGNOSIS — Z12.31 VISIT FOR SCREENING MAMMOGRAM: ICD-10-CM

## 2024-09-05 PROCEDURE — 77067 SCR MAMMO BI INCL CAD: CPT | Mod: TC | Performed by: RADIOLOGY

## 2024-09-05 PROCEDURE — 77063 BREAST TOMOSYNTHESIS BI: CPT | Mod: TC | Performed by: RADIOLOGY

## 2024-09-07 ENCOUNTER — MYC MEDICAL ADVICE (OUTPATIENT)
Dept: PEDIATRICS | Facility: CLINIC | Age: 74
End: 2024-09-07
Payer: COMMERCIAL

## 2024-09-07 ENCOUNTER — TRANSFERRED RECORDS (OUTPATIENT)
Dept: HEALTH INFORMATION MANAGEMENT | Facility: CLINIC | Age: 74
End: 2024-09-07
Payer: COMMERCIAL

## 2024-09-09 NOTE — TELEPHONE ENCOUNTER
RN called and spoke with patient. Patient states they only gave her 10 capsules, she thinks because they had to order more.     RN recommended patient reach out to pharmacy to inquire if they are able to fill the rest of the prescription (80 tablets). She will also inquire about covid vaccine with them.     Patient will call us back if new script is needed.        Disp Refills Start End VALENCIA    propranolol ER (INDERAL LA) 160 MG 24 hr capsule 90 capsule 0 8/30/2024 -- No   Sig - Route: Take 1 capsule (160 mg) by mouth daily. - Oral   Sent to pharmacy as: Propranolol HCl  MG Oral Capsule Extended Release 24 Hour (INDERAL LA)   Class: E-Prescribe   Order: [id number]   E-Prescribing Status: Receipt confirmed by pharmacy (8/30/2024  1:29 PM CDT)       Kathleen NASCIMENTO RN on 9/9/2024 at 12:37 PM

## 2024-09-10 ENCOUNTER — THERAPY VISIT (OUTPATIENT)
Dept: PHYSICAL THERAPY | Facility: CLINIC | Age: 74
End: 2024-09-10
Attending: STUDENT IN AN ORGANIZED HEALTH CARE EDUCATION/TRAINING PROGRAM
Payer: COMMERCIAL

## 2024-09-10 DIAGNOSIS — M25.551 GREATER TROCHANTERIC PAIN SYNDROME OF RIGHT LOWER EXTREMITY: ICD-10-CM

## 2024-09-10 DIAGNOSIS — M67.951 TENDINOPATHY OF RIGHT GLUTEAL REGION: ICD-10-CM

## 2024-09-10 PROCEDURE — 97161 PT EVAL LOW COMPLEX 20 MIN: CPT | Mod: GP | Performed by: PHYSICAL THERAPIST

## 2024-09-10 PROCEDURE — 97140 MANUAL THERAPY 1/> REGIONS: CPT | Mod: GP | Performed by: PHYSICAL THERAPIST

## 2024-09-10 ASSESSMENT — ACTIVITIES OF DAILY LIVING (ADL)
WALKING_FOR_APPROXIMATELY_10_MINUTES: EXTREME DIFFICULTY
WALKING_DOWN_STEEP_HILLS: EXTREME DIFFICULTY
ABILITY_TO_PERFORM_ACTIVITY_WITH_YOUR_NORMAL_TECHNIQUE: UNABLE TO DO
GETTING_INTO_AND_OUT_OF_A_BATHTUB: MODERATE DIFFICULTY
GOING_UP_1_FLIGHT_OF_STAIRS: MODERATE DIFFICULTY
ROLLING_OVER_IN_BED: MODERATE DIFFICULTY
STEPPING_UP_AND_DOWN_CURBS: SLIGHT DIFFICULTY
PUTTING_ON_SOCKS_AND_SHOES: EXTREME DIFFICULTY
HEAVY_WORK: EXTREME DIFFICULTY
DEEP_SQUATTING: EXTREME DIFFICULTY
STANDING_FOR_15_MINUTES: EXTREME DIFFICULTY
RUNNING_ONE_MILE: UNABLE TO DO
WALKING_INITIALLY: MODERATE DIFFICULTY
SITTING_FOR_15_MINUTES: SLIGHT DIFFICULTY
WALKING_UP_STEEP_HILLS: EXTREME DIFFICULTY
RECREATIONAL_ACTIVITIES: MODERATE DIFFICULTY
WALKING_15_MINUTES_OR_GREATER: EXTREME DIFFICULTY
PLEASE_INDICATE_YOR_PRIMARY_REASON_FOR_REFERRAL_TO_THERAPY:: HIP
LOW_IMPACT_ACTIVITIES_LIKE_FAST_WALKING: EXTREME DIFFICULTY
HOW_WOULD_YOU_RATE_YOUR_CURRENT_LEVEL_OF_FUNCTION_DURING_YOUR_USUAL_ACTIVITIES_OF_DAILY_LIVING_FROM_0_TO_100_WITH_100_BEING_YOUR_LEVEL_OF_FUNCTION_PRIOR_TO_YOUR_HIP_PROBLEM_AND_0_BEING_THE_INABILITY_TO_PERFORM_ANY_OF_YOUR_USUAL_DAILY_ACTIVITIES?: 40
JUMPING: UNABLE TO DO
TWISTING/PIVOTING_ON_INVOLVED_LEG: EXTREME DIFFICULTY
HOW_WOULD_YOU_RATE_YOUR_CURRENT_LEVEL_OF_FUNCTION_DURING_YOUR_SPORTS_RELATED_ACTIVITIES_FROM_0_TO_100_WITH_100_BEING_YOUR_LEVEL_OF_FUNCTION_PRIOR_TO_YOUR_HIP_PROBLEM_AND_0_BEING_THE_INABILITY_TO_PERFORM_ANY_OF_YOUR_USUAL_DAILY_ACTIVITIES?: 0
LIGHT_TO_MODERATE_WORK: MODERATE DIFFICULTY
STARTING_AND_STOPPING_QUICKLY: MODERATE DIFFICULTY
GOING_DOWN_1_FLIGHT_OF_STAIRS: MODERATE DIFFICULTY
CUTTING/LATERAL_MOVEMENTS: MODERATE DIFFICULTY
GETTING_INTO_AND_OUT_OF_AN_AVERAGE_CAR: MODERATE DIFFICULTY
ABILITY_TO_PARTICIPATE_IN_YOUR_DESIRED_SPORT_AS_LONG_AS_YOU_WOULD_LIKE: UNABLE TO DO

## 2024-09-10 NOTE — PROGRESS NOTES
PHYSICAL THERAPY EVALUATION  Type of Visit: Evaluation              Subjective       Presenting condition or subjective complaint: Main concern is right hip that started 6+ months ago  She recently did PT at [facility] with no change in symptoms. She has a cortisone injection at  [facility]  ortho on 8/26 and had a signficant improvement.  The past few days she is started to feel the pain more.  Right hip hurts on the outside, worse with sitting, lying on her hip/sleeping and prolonged standing.  Used a walking stick more if her hip hurts.  Date of onset: 03/10/24    Relevant medical history:     Dates & types of surgery:      Prior diagnostic imaging/testing results: X-ray     Prior therapy history for the same diagnosis, illness or injury: Yes            Employment:      Hobbies/Interests:      Patient goals for therapy:           Objective   HIP EVALUATION  PAIN: Pain Level at Rest: 2/10  Pain Level with Use: 5/10    GAIT:   Weightbearing Status: WBAT  Assistive Device(s): None, Cane (single end)  Gait Deviations: Base of support increased  Stride length decreased  Lizett decreased  BALANCE/PROPRIOCEPTION:  unable to do SLS, EO.  Trendelenburg, left > right with attempted SLS.    WEIGHTBEARING ALIGNMENT: WFL     ROM:  hip prom.  ER left 35, right 20.  B IR 5. Lx arom ext 20-.  Flexion not assessed.        STRENGTH:  MMT knee ext 5-/5 B.  Hip ER 90/90, right 4/5, left 5-/5.  Pain with attempted bridging.    LE FLEXIBILITY:  B HS wfls    FUNCTIONAL TESTS:  mod + difficulty with squat, flexing lumbar spine  PALPATION:  TTP right iliac crest, post hip, piriformis, lateral quad, GT    Assessment & Plan   CLINICAL IMPRESSIONS  Medical Diagnosis: Tendinopathy of right gluteal region  Greater trochanteric pain syndrome of right lower extremity    Treatment Diagnosis: Left hip bursitis, myofascial pain, weakness   Impression/Assessment: Patient is a 74 year old female with Right hip complaints.  The following  significant findings have been identified: Pain, Decreased ROM/flexibility, Decreased joint mobility, Decreased strength, Impaired balance, Decreased proprioception, Impaired gait, and Decreased activity tolerance. These impairments interfere with their ability to perform self care tasks, recreational activities, household chores, household mobility, and community mobility as compared to previous level of function.     Clinical Decision Making (Complexity):  Clinical Presentation: Stable/Uncomplicated  Clinical Presentation Rationale: based on medical and personal factors listed in PT evaluation  Clinical Decision Making (Complexity): Low complexity    PLAN OF CARE  Treatment Interventions:  Interventions: Gait Training, Manual Therapy, Neuromuscular Re-education, Therapeutic Activity, Therapeutic Exercise, Self-Care/Home Management    Long Term Goals     PT Goal 1  Goal Identifier: Ambulation  Goal Description: Patient will be able to walk 1-2 miles with a normal gait pattern.  Rationale: to maximize safety and independence with performance of ADLs and functional tasks;to maximize safety and independence within the home;to maximize safety and independence within the community  Target Date: 12/03/24  PT Goal 2  Goal Identifier: Sitting, sleeping  Goal Description: Will be able to sit, lie down without limitation  Rationale: to maximize safety and independence with performance of ADLs and functional tasks;to maximize safety and independence within the community;to maximize safety and independence with transportation;to maximize safety and independence with self cares  Target Date: 12/03/24      Frequency of Treatment: 1x eo week for 12  Duration of Treatment: 12      Education Assessment:   Learner/Method: Patient;Listening;Demonstration;Pictures/Video  Education Comments: Patient participated in their education    Risks and benefits of evaluation/treatment have been explained.   Patient/Family/caregiver agrees with  Plan of Care.     Evaluation Time:     PT Eval, Low Complexity Minutes (89259): 20       Signing Clinician: NEL Moura [facility]                                                                                   OUTPATIENT PHYSICAL THERAPY      PLAN OF TREATMENT FOR OUTPATIENT REHABILITATION   Patient's Last Name, First Name, Lucie Allison YOB: 1950   Provider's Name   [facility]   Medical Record No.  [id number]     Onset Date: 03/10/24  Start of Care Date:  9/10/2024     Medical Diagnosis:  Tendinopathy of right gluteal region  Greater trochanteric pain syndrome of right lower extremity      PT Treatment Diagnosis:  Left hip bursitis, myofascial pain, weakness Plan of Treatment  Frequency/Duration: 1x eo week for 12/ 12    Certification date from 09/10/24 to 12/03/24         See note for plan of treatment details and functional goals     John Cooper, PT                         I CERTIFY THE NEED FOR THESE SERVICES FURNISHED UNDER        THIS PLAN OF TREATMENT AND WHILE UNDER MY CARE     (Physician attestation of this document indicates review and certification of the therapy plan).              Referring Provider:  Mauricio Merino    Initial Assessment  See Epic Evaluation-

## 2024-09-16 ENCOUNTER — HOSPITAL ENCOUNTER (OUTPATIENT)
Dept: MAMMOGRAPHY | Facility: CLINIC | Age: 74
Discharge: HOME | End: 2024-09-16
Attending: STUDENT IN AN ORGANIZED HEALTH CARE EDUCATION/TRAINING PROGRAM
Payer: COMMERCIAL

## 2024-09-16 DIAGNOSIS — R92.8 ABNORMAL MAMMOGRAM: ICD-10-CM

## 2024-09-16 PROCEDURE — 77065 DX MAMMO INCL CAD UNI: CPT | Mod: RT

## 2024-10-22 ENCOUNTER — TRANSFERRED RECORDS (OUTPATIENT)
Dept: HEALTH INFORMATION MANAGEMENT | Facility: CLINIC | Age: 74
End: 2024-10-22
Payer: COMMERCIAL

## 2024-10-31 ENCOUNTER — THERAPY VISIT (OUTPATIENT)
Dept: PHYSICAL THERAPY | Facility: CLINIC | Age: 74
End: 2024-10-31
Payer: COMMERCIAL

## 2024-10-31 DIAGNOSIS — M25.551 GREATER TROCHANTERIC PAIN SYNDROME OF RIGHT LOWER EXTREMITY: ICD-10-CM

## 2024-10-31 DIAGNOSIS — M67.951 TENDINOPATHY OF RIGHT GLUTEAL REGION: Primary | ICD-10-CM

## 2024-10-31 PROCEDURE — 97140 MANUAL THERAPY 1/> REGIONS: CPT | Mod: GP

## 2024-10-31 PROCEDURE — 97110 THERAPEUTIC EXERCISES: CPT | Mod: GP

## 2024-11-07 ENCOUNTER — THERAPY VISIT (OUTPATIENT)
Dept: PHYSICAL THERAPY | Facility: CLINIC | Age: 74
End: 2024-11-07
Payer: COMMERCIAL

## 2024-11-07 DIAGNOSIS — M25.551 GREATER TROCHANTERIC PAIN SYNDROME OF RIGHT LOWER EXTREMITY: ICD-10-CM

## 2024-11-07 DIAGNOSIS — M67.951 TENDINOPATHY OF RIGHT GLUTEAL REGION: Primary | ICD-10-CM

## 2024-11-07 PROCEDURE — 97110 THERAPEUTIC EXERCISES: CPT | Mod: GP

## 2024-11-07 PROCEDURE — 97140 MANUAL THERAPY 1/> REGIONS: CPT | Mod: GP

## 2024-11-07 NOTE — PROGRESS NOTES
PLAN  Continue therapy per current plan of care.    Beginning/End Dates of Progress Note Reporting Period:  (P) 11/07/24 to 11/07/2024    Referring Provider:  Mauricio Merino

## 2024-11-25 DIAGNOSIS — I10 ESSENTIAL HYPERTENSION: ICD-10-CM

## 2024-11-25 RX ORDER — PROPRANOLOL HYDROCHLORIDE 160 MG/1
CAPSULE, EXTENDED RELEASE ORAL
Qty: 90 CAPSULE | Refills: 0 | Status: SHIPPED | OUTPATIENT
Start: 2024-11-25

## 2024-11-26 ENCOUNTER — OFFICE VISIT (OUTPATIENT)
Dept: PEDIATRICS | Facility: CLINIC | Age: 74
End: 2024-11-26
Payer: COMMERCIAL

## 2024-11-26 VITALS
HEIGHT: 66 IN | SYSTOLIC BLOOD PRESSURE: 134 MMHG | HEART RATE: 71 BPM | TEMPERATURE: 98.3 F | RESPIRATION RATE: 16 BRPM | OXYGEN SATURATION: 96 % | WEIGHT: 259 LBS | BODY MASS INDEX: 41.62 KG/M2 | DIASTOLIC BLOOD PRESSURE: 82 MMHG

## 2024-11-26 DIAGNOSIS — I10 ESSENTIAL HYPERTENSION: Primary | ICD-10-CM

## 2024-11-26 DIAGNOSIS — N18.31 STAGE 3A CHRONIC KIDNEY DISEASE (H): ICD-10-CM

## 2024-11-26 DIAGNOSIS — M67.951 TENDINOPATHY OF RIGHT GLUTEAL REGION: ICD-10-CM

## 2024-11-26 DIAGNOSIS — I77.810 ASCENDING AORTA DILATATION (H): ICD-10-CM

## 2024-11-26 PROBLEM — F90.0 ATTENTION DEFICIT HYPERACTIVITY DISORDER (ADHD), PREDOMINANTLY INATTENTIVE TYPE: Status: ACTIVE | Noted: 2021-12-17

## 2024-11-26 PROBLEM — F43.10 POSTTRAUMATIC STRESS DISORDER: Status: ACTIVE | Noted: 2021-12-17

## 2024-11-26 PROBLEM — G62.9 NEUROPATHY, PERIPHERAL: Status: ACTIVE | Noted: 2024-10-22

## 2024-11-26 PROCEDURE — G2211 COMPLEX E/M VISIT ADD ON: HCPCS | Performed by: INTERNAL MEDICINE

## 2024-11-26 PROCEDURE — 99214 OFFICE O/P EST MOD 30 MIN: CPT | Performed by: INTERNAL MEDICINE

## 2024-11-26 RX ORDER — PROPRANOLOL HYDROCHLORIDE 160 MG/1
CAPSULE, EXTENDED RELEASE ORAL
Qty: 90 CAPSULE | Refills: 0 | Status: CANCELLED | OUTPATIENT
Start: 2024-11-26

## 2024-11-26 ASSESSMENT — PATIENT HEALTH QUESTIONNAIRE - PHQ9
SUM OF ALL RESPONSES TO PHQ QUESTIONS 1-9: 13
10. IF YOU CHECKED OFF ANY PROBLEMS, HOW DIFFICULT HAVE THESE PROBLEMS MADE IT FOR YOU TO DO YOUR WORK, TAKE CARE OF THINGS AT HOME, OR GET ALONG WITH OTHER PEOPLE: SOMEWHAT DIFFICULT
SUM OF ALL RESPONSES TO PHQ QUESTIONS 1-9: 13

## 2024-11-26 ASSESSMENT — PAIN SCALES - GENERAL: PAINLEVEL_OUTOF10: MILD PAIN (2)

## 2024-11-26 NOTE — PATIENT INSTRUCTIONS
please call [phone] to reschedule PT.  I am sorry, I don't know what happened with the appointments.    They will call you to schedule the heart ultrasound.    We will repeat labs at your appointment 2/4/25.  You don't need to fast.

## 2024-11-26 NOTE — PROGRESS NOTES
"  Assessment & Plan     Essential hypertension  Controlled on only propranolol.  Increase activity as able    Stage 3a chronic kidney disease (H)  Discussed very mild proteinuria.  Recheck in February.  Consider ACE if persists    Ascending aorta dilatation (H)  Reviewed last ECHO and uptodate recommendations.  Recheck yearly  - Echocardiogram Complete; Future    Tendinopathy of right gluteal region  Resume PT, continue exercises          BMI  Estimated body mass index is 41.38 kg/m  as calculated from the following:    Height as of this encounter: 1.685 m (5' 6.34\").    Weight as of this encounter: 117.5 kg (259 lb).   Weight management plan: Discussed healthy diet and exercise guidelines      See Patient Instructions    Adriano Faulkner is a 74 year old, presenting for the following health issues:  Tendon Injury (Patient is here for follow up to discuss lab results and her recent ortho appointment. She had an injection for her pain.)        11/26/2024    10:53 AM   Additional Questions   Roomed by Barbara   Accompanied by self         11/26/2024    10:53 AM   Patient Reported Additional Medications   Patient reports taking the following new medications no     History of Present Illness       Reason for visit:  Hip pain    She eats 2-3 servings of fruits and vegetables daily.She consumes 0 sweetened beverage(s) daily.She exercises with enough effort to increase her heart rate 20 to 29 minutes per day.  She exercises with enough effort to increase her heart rate 4 days per week.   She is taking medications regularly.     Saw ortho and got injection and feeling better and up and around more.  Did PT and want to do more but appointments cancelled.  Using walking stick for longer distances.  Has handicapped parking permit.              Objective    /82 (BP Location: Right arm, Patient Position: Sitting, Cuff Size: Adult Regular)   Pulse 71   Temp 98.3  F (36.8  C) (Tympanic)   Resp 16   Ht 1.685 m (5' " "6.34\")   Wt 117.5 kg (259 lb)   LMP 11/12/2007 (LMP Unknown)   SpO2 96%   BMI 41.38 kg/m    Body mass index is 41.38 kg/m .  Physical Exam             The longitudinal plan of care for the diagnosis(es)/condition(s) as documented were addressed during this visit. Due to the added complexity in care, I will continue to support Lucie in the subsequent management and with ongoing continuity of care.    I spent a total of 28 minutes on the day of the visit.   Time spent by me today doing chart review, history and exam, documentation and further activities per the note    Signed Electronically by: Nallely Moore MD    "

## 2025-01-08 ENCOUNTER — NURSE TRIAGE (OUTPATIENT)
Dept: PEDIATRICS | Facility: CLINIC | Age: 75
End: 2025-01-08
Payer: COMMERCIAL

## 2025-01-08 NOTE — TELEPHONE ENCOUNTER
S-(situation): RN called to triage symptoms from [employer] message r/t ongoing cough.     B-(background): Patient has had cough since 12/13/24. Patient did have exposure to granddaughter with RSV.     A-(assessment): Patient experiencing productive cough with greyish/yellow phlegm. Patient reports her chest feels tight, though feels less tight than around tenizn. Patient denies wheezing. Patient notes some SOB w/ exertion. No fever.     R-(recommendations): Per protocol, RN advised visit today. No available appointments, RN advised UC. Patient would prefer scheduled visit tomorrow instead. Scheduled for Office visit 1/9/25. RN advised if any new/worsening symptoms, to be seen in UC or call back to speak with any triage nurse. Patient verbalized understanding and agreed.     Reason for Disposition   MILD difficulty breathing (e.g., minimal/no SOB at rest, SOB with walking, pulse <100) and still present when not coughing    Additional Information   Negative: MODERATE difficulty breathing (e.g., speaks in phrases, SOB even at rest, pulse 100-120) and still present when not coughing   Negative: Chest pain present when not coughing   Negative: Passed out (e.g., fainted, lost consciousness, blacked out and was not responding)   Negative: Patient sounds very sick or weak to the triager   Negative: Previous asthma attacks and this feels like asthma attack   Negative: Dry cough (non-productive; no sputum or minimal clear sputum) and within 14 days of COVID-19 Exposure   Negative: Bluish (or gray) lips or face   Negative: SEVERE difficulty breathing (e.g., struggling for each breath, speaks in single words)   Negative: Rapid onset of cough and has hives   Negative: Coughing started suddenly after medicine, an allergic food or bee sting   Negative: Difficulty breathing after exposure to flames, smoke, or fumes   Negative: Sounds like a life-threatening emergency to the triager    Answer Assessment - Initial Assessment  "Questions  1. ONSET: \"When did the cough begin?\"       Mid December   2. SEVERITY: \"How bad is the cough today?\"       Staying the same   3. SPUTUM: \"Describe the color of your sputum\" (e.g., none, dry cough; clear, white, yellow, green)      Grey/yellow   4. HEMOPTYSIS: \"Are you coughing up any blood?\" If Yes, ask: \"How much?\" (e.g., flecks, streaks, tablespoons, etc.)      no  5. DIFFICULTY BREATHING: \"Are you having difficulty breathing?\" If Yes, ask: \"How bad is it?\" (e.g., mild, moderate, severe)       SOB with exertion  6. FEVER: \"Do you have a fever?\" If Yes, ask: \"What is your temperature, how was it measured, and when did it start?\"      No fever  7. CARDIAC HISTORY: \"Do you have any history of heart disease?\" (e.g., heart attack, congestive heart failure)       See chart  8. LUNG HISTORY: \"Do you have any history of lung disease?\"  (e.g., pulmonary embolus, asthma, emphysema)      See chart  9. PE RISK FACTORS: \"Do you have a history of blood clots?\" (or: recent major surgery, recent prolonged travel, bedridden)      See chart  10. OTHER SYMPTOMS: \"Do you have any other symptoms?\" (e.g., runny nose, wheezing, chest pain)        No   11. PREGNANCY: \"Is there any chance you are pregnant?\" \"When was your last menstrual period?\"        no  12. TRAVEL: \"Have you traveled out of the country in the last month?\" (e.g., travel history, exposures)        Yes, [place]    Protocols used: Cough-A-OH    "

## 2025-01-09 ENCOUNTER — ANCILLARY PROCEDURE (OUTPATIENT)
Dept: GENERAL RADIOLOGY | Facility: CLINIC | Age: 75
End: 2025-01-09
Attending: PHYSICIAN ASSISTANT
Payer: COMMERCIAL

## 2025-01-09 ENCOUNTER — OFFICE VISIT (OUTPATIENT)
Dept: PEDIATRICS | Facility: CLINIC | Age: 75
End: 2025-01-09
Payer: COMMERCIAL

## 2025-01-09 VITALS
OXYGEN SATURATION: 95 % | RESPIRATION RATE: 20 BRPM | HEIGHT: 66 IN | WEIGHT: 255.8 LBS | HEART RATE: 58 BPM | TEMPERATURE: 96.2 F | SYSTOLIC BLOOD PRESSURE: 126 MMHG | DIASTOLIC BLOOD PRESSURE: 82 MMHG | BODY MASS INDEX: 41.11 KG/M2

## 2025-01-09 DIAGNOSIS — R05.1 ACUTE COUGH: ICD-10-CM

## 2025-01-09 DIAGNOSIS — R05.1 ACUTE COUGH: Primary | ICD-10-CM

## 2025-01-09 DIAGNOSIS — J01.10 ACUTE NON-RECURRENT FRONTAL SINUSITIS: ICD-10-CM

## 2025-01-09 ASSESSMENT — PATIENT HEALTH QUESTIONNAIRE - PHQ9
SUM OF ALL RESPONSES TO PHQ QUESTIONS 1-9: 11
10. IF YOU CHECKED OFF ANY PROBLEMS, HOW DIFFICULT HAVE THESE PROBLEMS MADE IT FOR YOU TO DO YOUR WORK, TAKE CARE OF THINGS AT HOME, OR GET ALONG WITH OTHER PEOPLE: SOMEWHAT DIFFICULT
SUM OF ALL RESPONSES TO PHQ QUESTIONS 1-9: 11

## 2025-01-09 ASSESSMENT — ENCOUNTER SYMPTOMS: SORE THROAT: 1

## 2025-01-09 ASSESSMENT — PAIN SCALES - GENERAL: PAINLEVEL_OUTOF10: NO PAIN (0)

## 2025-01-09 NOTE — RESULT ENCOUNTER NOTE
Derrick Faulkner ,    The results from your recent chest XR is within normal limits.  This is good news.      Thank you for choosing [facility] for your health care needs,      Lo Morrow PA-C

## 2025-01-09 NOTE — PROGRESS NOTES
"  Assessment & Plan     Acute cough    - XR Chest 2 Views; Future    Acute non-recurrent frontal sinusitis    - amoxicillin-clavulanate (AUGMENTIN) 875-125 MG tablet; Take 1 tablet by mouth 2 times daily.      On exam, patient appears to have more superficial or upper airway congestion.  More consistent with sinusitis rather than chest symptoms or pneumonia.  Lungs were clear on exam and chest XR is normal as well.  Will treat patient for sinusitis and she is to followup as needed.  Patient understands and agrees with the plan today.        Adriano Faulkner is a 74 year old, presenting for the following health issues:  Pharyngitis (Cold symptoms, exposed to RSV, symptoms have been going on for about 3 weeks, yellow sputum with cough. )      1/9/2025    11:15 AM   Additional Questions   Roomed by Sammi Holden   Accompanied by n/a         1/9/2025    11:15 AM   Patient Reported Additional Medications   Patient reports taking the following new medications No     Pharyngitis     History of Present Illness       Reason for visit:  Possible rsv or severe cold long lasting  Symptom onset:  3-4 weeks ago   She is taking medications regularly.     Patient was exposed to RSV in December and she has been coughing since then.  She feels that the congestion has thickened and changed in color.  She has some sinus pressure and occasionally feels like it is settling into her lungs.  She is not having shortness of breath or difficulty breathing.        Review of Systems  Constitutional, neuro, ENT, endocrine, pulmonary, cardiac, gastrointestinal, genitourinary, musculoskeletal, integument and psychiatric systems are negative, except as otherwise noted.      Objective    /82 (BP Location: Right arm, Patient Position: Sitting, Cuff Size: Adult Large)   Pulse 58   Temp 96.2  F (35.7  C) (Tympanic)   Resp 20   Ht 1.664 m (5' 5.5\")   Wt 116 kg (255 lb 12.8 oz)   LMP 11/12/2007 (LMP Unknown)   SpO2 95%   BMI 41.92 kg/m  "   Body mass index is 41.92 kg/m .  Physical Exam   GENERAL: alert and no distress  EYES: Eyes grossly normal to inspection, PERRL and conjunctivae and sclerae normal  HENT: ear canals and TM's normal, nose and mouth without ulcers or lesions  NECK: no adenopathy, no asymmetry, masses, or scars  RESP: lungs clear to auscultation - no rales, rhonchi or wheezes  CV: regular rate and rhythm, normal S1 S2, no S3 or S4, no murmur, click or rub, no peripheral edema  MS: no gross musculoskeletal defects noted, no edema    CXR - Reviewed and interpreted by me Normal- no infiltrates, effusions, pneumothoraces, cardiomegaly or masses        Signed Electronically by: Lo Morrow PA-C

## 2025-02-04 ENCOUNTER — HOSPITAL ENCOUNTER (OUTPATIENT)
Dept: CARDIOLOGY | Facility: CLINIC | Age: 75
Discharge: HOME | End: 2025-02-04
Attending: INTERNAL MEDICINE
Payer: COMMERCIAL

## 2025-02-04 ENCOUNTER — OFFICE VISIT (OUTPATIENT)
Dept: PEDIATRICS | Facility: CLINIC | Age: 75
End: 2025-02-04
Payer: COMMERCIAL

## 2025-02-04 VITALS
WEIGHT: 251.6 LBS | TEMPERATURE: 97.7 F | BODY MASS INDEX: 40.43 KG/M2 | DIASTOLIC BLOOD PRESSURE: 68 MMHG | SYSTOLIC BLOOD PRESSURE: 118 MMHG | OXYGEN SATURATION: 95 % | HEART RATE: 78 BPM | HEIGHT: 66 IN | RESPIRATION RATE: 16 BRPM

## 2025-02-04 DIAGNOSIS — E78.5 HYPERLIPIDEMIA LDL GOAL <160: ICD-10-CM

## 2025-02-04 DIAGNOSIS — G89.29 CHRONIC BILATERAL LOW BACK PAIN WITH BILATERAL SCIATICA: ICD-10-CM

## 2025-02-04 DIAGNOSIS — F33.41 RECURRENT MAJOR DEPRESSIVE DISORDER, IN PARTIAL REMISSION: ICD-10-CM

## 2025-02-04 DIAGNOSIS — R73.03 PREDIABETES: ICD-10-CM

## 2025-02-04 DIAGNOSIS — I10 ESSENTIAL HYPERTENSION: ICD-10-CM

## 2025-02-04 DIAGNOSIS — Z00.00 ENCOUNTER FOR MEDICARE ANNUAL WELLNESS EXAM: Primary | ICD-10-CM

## 2025-02-04 DIAGNOSIS — M54.41 CHRONIC BILATERAL LOW BACK PAIN WITH BILATERAL SCIATICA: ICD-10-CM

## 2025-02-04 DIAGNOSIS — I77.810 ASCENDING AORTA DILATATION: ICD-10-CM

## 2025-02-04 DIAGNOSIS — N18.31 STAGE 3A CHRONIC KIDNEY DISEASE (H): ICD-10-CM

## 2025-02-04 DIAGNOSIS — M54.42 CHRONIC BILATERAL LOW BACK PAIN WITH BILATERAL SCIATICA: ICD-10-CM

## 2025-02-04 LAB
ANION GAP SERPL CALCULATED.3IONS-SCNC: 13 MMOL/L (ref 7–15)
BUN SERPL-MCNC: 12.9 MG/DL (ref 8–23)
CALCIUM SERPL-MCNC: 9.2 MG/DL (ref 8.8–10.4)
CHLORIDE SERPL-SCNC: 105 MMOL/L (ref 98–107)
CHOLEST SERPL-MCNC: 104 MG/DL
CREAT SERPL-MCNC: 0.94 MG/DL (ref 0.51–0.95)
CREAT UR-MCNC: 311 MG/DL
EGFRCR SERPLBLD CKD-EPI 2021: 64 ML/MIN/1.73M2
FASTING STATUS PATIENT QL REPORTED: YES
FASTING STATUS PATIENT QL REPORTED: YES
GLUCOSE SERPL-MCNC: 116 MG/DL (ref 70–99)
HCO3 SERPL-SCNC: 23 MMOL/L (ref 22–29)
HDLC SERPL-MCNC: 35 MG/DL
HGB BLD-MCNC: 13.2 G/DL (ref 11.7–15.7)
LDLC SERPL CALC-MCNC: 43 MG/DL
LVEF ECHO: NORMAL
MICROALBUMIN UR-MCNC: 59.3 MG/L
MICROALBUMIN/CREAT UR: 19.07 MG/G CR (ref 0–25)
NONHDLC SERPL-MCNC: 69 MG/DL
POTASSIUM SERPL-SCNC: 4.5 MMOL/L (ref 3.4–5.3)
SODIUM SERPL-SCNC: 141 MMOL/L (ref 135–145)
TRIGL SERPL-MCNC: 132 MG/DL

## 2025-02-04 PROCEDURE — 82043 UR ALBUMIN QUANTITATIVE: CPT | Performed by: INTERNAL MEDICINE

## 2025-02-04 PROCEDURE — G2211 COMPLEX E/M VISIT ADD ON: HCPCS | Performed by: INTERNAL MEDICINE

## 2025-02-04 PROCEDURE — 90480 ADMN SARSCOV2 VAC 1/ONLY CMP: CPT | Performed by: INTERNAL MEDICINE

## 2025-02-04 PROCEDURE — 90662 IIV NO PRSV INCREASED AG IM: CPT | Performed by: INTERNAL MEDICINE

## 2025-02-04 PROCEDURE — 93306 TTE W/DOPPLER COMPLETE: CPT

## 2025-02-04 PROCEDURE — 80061 LIPID PANEL: CPT | Performed by: INTERNAL MEDICINE

## 2025-02-04 PROCEDURE — 83036 HEMOGLOBIN GLYCOSYLATED A1C: CPT | Performed by: INTERNAL MEDICINE

## 2025-02-04 PROCEDURE — G0008 ADMIN INFLUENZA VIRUS VAC: HCPCS | Performed by: INTERNAL MEDICINE

## 2025-02-04 PROCEDURE — 36415 COLL VENOUS BLD VENIPUNCTURE: CPT | Performed by: INTERNAL MEDICINE

## 2025-02-04 PROCEDURE — G0439 PPPS, SUBSEQ VISIT: HCPCS | Performed by: INTERNAL MEDICINE

## 2025-02-04 PROCEDURE — 85018 HEMOGLOBIN: CPT | Performed by: INTERNAL MEDICINE

## 2025-02-04 PROCEDURE — 91320 SARSCV2 VAC 30MCG TRS-SUC IM: CPT | Performed by: INTERNAL MEDICINE

## 2025-02-04 PROCEDURE — 82570 ASSAY OF URINE CREATININE: CPT | Performed by: INTERNAL MEDICINE

## 2025-02-04 PROCEDURE — 80048 BASIC METABOLIC PNL TOTAL CA: CPT | Performed by: INTERNAL MEDICINE

## 2025-02-04 PROCEDURE — 99214 OFFICE O/P EST MOD 30 MIN: CPT | Mod: 25 | Performed by: INTERNAL MEDICINE

## 2025-02-04 PROCEDURE — 93306 TTE W/DOPPLER COMPLETE: CPT | Mod: 26 | Performed by: INTERNAL MEDICINE

## 2025-02-04 RX ORDER — PREGABALIN 25 MG/1
CAPSULE ORAL
Qty: 69 CAPSULE | Refills: 0 | Status: SHIPPED | OUTPATIENT
Start: 2025-02-04 | End: 2025-03-06

## 2025-02-04 RX ORDER — PROPRANOLOL HYDROCHLORIDE 160 MG/1
CAPSULE, EXTENDED RELEASE ORAL
Qty: 90 CAPSULE | Refills: 0 | Status: CANCELLED | OUTPATIENT
Start: 2025-02-04

## 2025-02-04 RX ORDER — PROPRANOLOL HYDROCHLORIDE 160 MG/1
160 CAPSULE, EXTENDED RELEASE ORAL DAILY
Qty: 90 CAPSULE | Refills: 3 | Status: SHIPPED | OUTPATIENT
Start: 2025-02-04

## 2025-02-04 SDOH — HEALTH STABILITY: PHYSICAL HEALTH: ON AVERAGE, HOW MANY DAYS PER WEEK DO YOU ENGAGE IN MODERATE TO STRENUOUS EXERCISE (LIKE A BRISK WALK)?: 1 DAY

## 2025-02-04 ASSESSMENT — PAIN SCALES - GENERAL: PAINLEVEL_OUTOF10: MILD PAIN (2)

## 2025-02-04 ASSESSMENT — PATIENT HEALTH QUESTIONNAIRE - PHQ9
10. IF YOU CHECKED OFF ANY PROBLEMS, HOW DIFFICULT HAVE THESE PROBLEMS MADE IT FOR YOU TO DO YOUR WORK, TAKE CARE OF THINGS AT HOME, OR GET ALONG WITH OTHER PEOPLE: NOT DIFFICULT AT ALL
SUM OF ALL RESPONSES TO PHQ QUESTIONS 1-9: 9
SUM OF ALL RESPONSES TO PHQ QUESTIONS 1-9: 9

## 2025-02-04 ASSESSMENT — SOCIAL DETERMINANTS OF HEALTH (SDOH): HOW OFTEN DO YOU GET TOGETHER WITH FRIENDS OR RELATIVES?: ONCE A WEEK

## 2025-02-04 NOTE — PATIENT INSTRUCTIONS
Patient Education   Look for a Nu-Step (seated ellipitical) or water aerobics to exercise    Start the lyrica abdomen gradually increase per the prescription.  We will follow-up on mychart about adjusting the dose.  Preventive Care Advice   This is general advice given by our system to help you stay healthy. However, your care team may have specific advice just for you. Please talk to your care team about your preventive care needs.  Nutrition  Eat 5 or more servings of fruits and vegetables each day.  Try wheat bread, brown rice and whole grain pasta (instead of white bread, rice, and pasta).  Get enough calcium and vitamin D. Check the label on foods and aim for 100% of the RDA (recommended daily allowance).  Lifestyle  Exercise at least 150 minutes each week  (30 minutes a day, 5 days a week).  Do muscle strengthening activities 2 days a week. These help control your weight and prevent disease.  No smoking.  Wear sunscreen to prevent skin cancer.  Have a dental exam and cleaning every 6 months.  Yearly exams  See your health care team every year to talk about:  Any changes in your health.  Any medicines your care team has prescribed.  Preventive care, family planning, and ways to prevent chronic diseases.  Shots (vaccines)   COVID-19 shot: Get this shot when it's due.  Flu shot: Get a flu shot every year.  Tetanus shot: Get a tetanus shot every 10 years.    General health tests  Diabetes screening:  Starting at age 35, Get screened for diabetes at least every 3 years.  If you are younger than age 35, ask your care team if you should be screened for diabetes.  Cholesterol test: At age 39, start having a cholesterol test every 5 years, or more often if advised.  Bone density scan (DEXA): At age 50, ask your care team if you should have this scan for osteoporosis (brittle bones).  Hepatitis C: Get tested at least once in your life.  STIs (sexually transmitted infections)  Before age 24: Ask your care team if you  should be screened for STIs.  After age 24: Get screened for STIs if you're at risk. You are at risk for STIs (including HIV) if:  You are sexually active with more than one person.  You don't use condoms every time.  You or a partner was diagnosed with a sexually transmitted infection.  If you are at risk for HIV, ask about PrEP medicine to prevent HIV.  Get tested for HIV at least once in your life, whether you are at risk for HIV or not.  Cancer screening tests  Breast cancer scan (mammogram): If you've ever had breasts, begin having regular mammograms starting at age 40. This is a scan to check for breast cancer.  Colon cancer screening: It is important to start screening for colon cancer at age 45.  Have a colonoscopy test every 10 years (or more often if you're at risk) Or, ask your provider about stool tests like a FIT test every year or Cologuard test every 3 years.  To learn more about your testing options, visit:   .  For help making a decision, visit:   https://bit.ly/pr17119.  Lung cancer screening: If you are a current or former smoker ages 50 to 80, ask your care team if ongoing lung cancer screenings are right for you.  For informational purposes only. Not to replace the advice of your health care provider. Copyright   2023 [facility]. All rights reserved. Clinically reviewed by the [facility]. [employer] 157154 - REV 01/24.  Preventing Falls: Care Instructions  Injuries and health problems such as trouble walking or poor eyesight can increase your risk of falling. So can some medicines. But there are things you can do to help prevent falls. You can exercise to get stronger. You can also arrange your home to make it safer.    Talk to your doctor about the medicines you take. Ask if any of them increase the risk of falls and whether they can be changed or stopped.   Try to exercise regularly. It can help improve your strength and balance. This can help lower  "your risk of falling.         Practice fall safety and prevention.   Wear low-heeled shoes that fit well and give your feet good support. Talk to your doctor if you have foot problems that make this hard.  Carry a cellphone or wear a medical alert device that you can use to call for help.  Use stepladders instead of chairs to reach high objects. Don't climb if you're at risk for falls. Ask for help, if needed.  Wear the correct eyeglasses, if you need them.        Make your home safer.   Remove rugs, cords, clutter, and furniture from walkways.  Keep your house well lit. Use night-lights in hallways and bathrooms.  Install and use sturdy handrails on stairways.  Wear nonskid footwear, even inside. Don't walk barefoot or in socks without shoes.        Be safe outside.   Use handrails, curb cuts, and ramps whenever possible.  Keep your hands free by using a shoulder bag or backpack.  Try to walk in well-lit areas. Watch out for uneven ground, changes in pavement, and debris.  Be careful in the winter. Walk on the grass or gravel when sidewalks are slippery. Use de-icer on steps and walkways. Add non-slip devices to shoes.    Put grab bars and nonskid mats in your shower or tub and near the toilet. Try to use a shower chair or bath bench when bathing.   Get into a tub or shower by putting in your weaker leg first. Get out with your strong side first. Have a phone or medical alert device in the bathroom with you.   Where can you learn more?  Go to https://www.[employer].net/patiented  Enter G117 in the search box to learn more about \"Preventing Falls: Care Instructions.\"  Current as of: July 31, 2024  Content Version: 14.3    2024 [employer].   Care instructions adapted under license by your healthcare professional. If you have questions about a medical condition or this instruction, always ask your healthcare professional. [employer] disclaims any warranty or liability for your use of this " information.    Learning About Stress  What is stress?     Stress is your body's response to a hard situation. Your body can have a physical, emotional, or mental response. Stress is a fact of life for most people, and it affects everyone differently. What causes stress for you may not be stressful for someone else.  A lot of things can cause stress. You may feel stress when you go on a job interview, take a test, or run a race. This kind of short-term stress is normal and even useful. It can help you if you need to work hard or react quickly. For example, stress can help you finish an important job on time.  Long-term stress is caused by ongoing stressful situations or events. Examples of long-term stress include long-term health problems, ongoing problems at work, or conflicts in your family. Long-term stress can harm your health.  How does stress affect your health?  When you are stressed, your body responds as though you are in danger. It makes hormones that speed up your heart, make you breathe faster, and give you a burst of energy. This is called the fight-or-flight stress response. If the stress is over quickly, your body goes back to normal and no harm is done.  But if stress happens too often or lasts too long, it can have bad effects. Long-term stress can make you more likely to get sick, and it can make symptoms of some diseases worse. If you tense up when you are stressed, you may develop neck, shoulder, or low back pain. Stress is linked to high blood pressure and heart disease.  Stress also harms your emotional health. It can make you rosario, tense, or depressed. Your relationships may suffer, and you may not do well at work or school.  What can you do to manage stress?  You can try these things to help manage stress:   Do something active. Exercise or activity can help reduce stress. Walking is a great way to get started. Even everyday activities such as housecleaning or yard work can help.  Try yoga  or ortiz chi. These techniques combine exercise and meditation. You may need some training at first to learn them.  Do something you enjoy. For example, listen to music or go to a movie. Practice your hobby or do volunteer work.  Meditate. This can help you relax, because you are not worrying about what happened before or what may happen in the future.  Do guided imagery. Imagine yourself in any setting that helps you feel calm. You can use online videos, books, or a teacher to guide you.  Do breathing exercises. For example:  From a standing position, bend forward from the waist with your knees slightly bent. Let your arms dangle close to the floor.  Breathe in slowly and deeply as you return to a standing position. Roll up slowly and lift your head last.  Hold your breath for just a few seconds in the standing position.  Breathe out slowly and bend forward from the waist.  Let your feelings out. Talk, laugh, cry, and express anger when you need to. Talking with supportive friends or family, a counselor, or a dipesh leader about your feelings is a healthy way to relieve stress. Avoid discussing your feelings with people who make you feel worse.  Write. It may help to write about things that are bothering you. This helps you find out how much stress you feel and what is causing it. When you know this, you can find better ways to cope.  What can you do to prevent stress?  You might try some of these things to help prevent stress:  Manage your time. This helps you find time to do the things you want and need to do.  Get enough sleep. Your body recovers from the stresses of the day while you are sleeping.  Get support. Your family, friends, and community can make a difference in how you experience stress.  Limit your news feed. Avoid or limit time on social media or news that may make you feel stressed.  Do something active. Exercise or activity can help reduce stress. Walking is a great way to get started.  Where can you  "learn more?  Go to https://www.[employer].net/patiented  Enter N032 in the search box to learn more about \"Learning About Stress.\"  Current as of: October 24, 2023  Content Version: 14.3    2024 [employer].   Care instructions adapted under license by your healthcare professional. If you have questions about a medical condition or this instruction, always ask your healthcare professional. [employer] disclaims any warranty or liability for your use of this information.    Learning About Sleeping Well  What does sleeping well mean?     Sleeping well means getting enough sleep to feel good and stay healthy. How much sleep is enough varies among people.  The number of hours you sleep and how you feel when you wake up are both important. If you do not feel refreshed, you probably need more sleep. Another sign of not getting enough sleep is feeling tired during the day.  Experts recommend that adults get at least 7 or more hours of sleep per day. Children and older adults need more sleep.  Why is getting enough sleep important?  Getting enough quality sleep is a basic part of good health. When your sleep suffers, your physical health, mood, and your thoughts can suffer too. You may find yourself feeling more grumpy or stressed. Not getting enough sleep also can lead to serious problems, including injury, accidents, anxiety, and depression.  What might cause poor sleeping?  Many things can cause sleep problems, including:  Changes to your sleep schedule.  Stress. Stress can be caused by fear about a single event, such as giving a speech. Or you may have ongoing stress, such as worry about work or school.  Depression, anxiety, and other mental or emotional conditions.  Changes in your sleep habits or surroundings. This includes changes that happen where you sleep, such as noise, light, or sleeping in a different bed. It also includes changes in your sleep pattern, such as having jet lag or working a " "late shift.  Health problems, such as pain, breathing problems, and restless legs syndrome.  Lack of regular exercise.  Using alcohol, nicotine, or caffeine before bed.  How can you help yourself?  Here are some tips that may help you sleep more soundly and wake up feeling more refreshed.  Your sleeping area   Use your bedroom only for sleeping and sex. A bit of light reading may help you fall asleep. But if it doesn't, do your reading elsewhere in the house. Try not to use your TV, computer, smartphone, or tablet while you are in bed.  Be sure your bed is big enough to stretch out comfortably, especially if you have a sleep partner.  Keep your bedroom quiet, dark, and cool. Use curtains, blinds, or a sleep mask to block out light. To block out noise, use earplugs, soothing music, or a \"white noise\" machine.  Your evening and bedtime routine   Create a relaxing bedtime routine. You might want to take a warm shower or bath, or listen to soothing music.  Go to bed at the same time every night. And get up at the same time every morning, even if you feel tired.  What to avoid   Limit caffeine (coffee, tea, caffeinated sodas) during the day, and don't have any for at least 6 hours before bedtime.  Avoid drinking alcohol before bedtime. Alcohol can cause you to wake up more often during the night.  Try not to smoke or use tobacco, especially in the evening. Nicotine can keep you awake.  Limit naps during the day, especially close to bedtime.  Avoid lying in bed awake for too long. If you can't fall asleep or if you wake up in the middle of the night and can't get back to sleep within about 20 minutes, get out of bed and go to another room until you feel sleepy.  Avoid taking medicine right before bed that may keep you awake or make you feel hyper or energized. Your doctor can tell you if your medicine may do this and if you can take it earlier in the day.  If you can't sleep   Imagine yourself in a peaceful, pleasant " "scene. Focus on the details and feelings of being in a place that is relaxing.  Get up and do a quiet or boring activity until you feel sleepy.  Avoid drinking any liquids before going to bed to help prevent waking up often to use the bathroom.  Where can you learn more?  Go to https://www.[employer].net/patiented  Enter J942 in the search box to learn more about \"Learning About Sleeping Well.\"  Current as of: July 31, 2024  Content Version: 14.3    2024 [employer].   Care instructions adapted under license by your healthcare professional. If you have questions about a medical condition or this instruction, always ask your healthcare professional. [employer] disclaims any warranty or liability for your use of this information.       "

## 2025-02-04 NOTE — PROGRESS NOTES
Preventive Care Visit  [facility] REJI Moore MD, Internal Medicine  Feb 4, 2025      Assessment & Plan     Encounter for Medicare annual wellness exam  Routine health education discussed: calcium, diet, exercise, weight, safety.     Stage 3a chronic kidney disease (H)  Recheck labs, consider ace based on results.  BP is controlled  - Hemoglobin; Future  - Albumin Random Urine Quantitative with Creat Ratio; Future  - BASIC METABOLIC PANEL; Future  - Hemoglobin  - Albumin Random Urine Quantitative with Creat Ratio  - BASIC METABOLIC PANEL    Essential hypertension  Controlled.  Continue medication   - propranolol ER (INDERAL LA) 160 MG 24 hr capsule; Take 1 capsule (160 mg) by mouth daily.    Recurrent major depressive disorder, in partial remission  Stable.  Continue follow-up with psychiatry    Body mass index (BMI) 40.0-44.9, adult (H)  Discussed healthy diet and exercise    Hyperlipidemia LDL goal <160  Recheck and continue statin  - Lipid panel reflex to direct LDL Non-fasting; Future  - Lipid panel reflex to direct LDL Non-fasting    Chronic bilateral low back pain with bilateral sciatica  Uncontrolled.  Add lyrica - no records of why it was stopped previously.  Follow-up on mychart to adjust dose   - pregabalin (LYRICA) 25 MG capsule; Take 1 capsule (25 mg) by mouth at bedtime for 7 days, THEN 1 capsule (25 mg) 2 times daily for 7 days, THEN 1 capsule (25 mg) 3 times daily for 16 days.    Prediabetes  Check A1C and encouraged exercise  - Hemoglobin A1c; Future  - Hemoglobin A1c            Counseling  Appropriate preventive services were addressed with this patient via screening, questionnaire, or discussion as appropriate for fall prevention, nutrition, physical activity, Tobacco-use cessation, social engagement, weight loss and cognition.  Checklist reviewing preventive services available has been given to the patient.  Reviewed patient's diet, addressing concerns and/or questions.    She is at risk for lack of exercise and has been provided with information to increase physical activity for the benefit of her well-being.   She is at risk for psychosocial distress and has been provided with information to reduce risk.   Discussed possible causes of fatigue. The patient's PHQ-9 score is consistent with mild depression. She was provided with information regarding depression.       See Patient Instructions    Adriano Faulkner is a 74 year old, presenting for the following:  Annual Visit        2/4/2025     9:58 AM   Additional Questions   Roomed by Shreya Aguirre CMA           HPI  FASTING today  Mood - better, off some medications and still following with psych      11/26/2024    10:43 AM 1/9/2025    11:05 AM 2/4/2025     9:34 AM   PHQ-9 SCORE   PHQ-9 Total Score MyChart 13 (Moderate depression) 11 (Moderate depression) 9 (Mild depression)   PHQ-9 Total Score 13  11  9        Patient-reported      Hip pains - still bothering her.  Got hip injections.  Has been to PT a couple of times and gotten exercises to do at home but not doing them regularly      Health Care Directive  Patient does not have a Health Care Directive: Patient states has Advance Directive and will bring in a copy to clinic.      2/4/2025   General Health   How would you rate your overall physical health? (!) FAIR   Feel stress (tense, anxious, or unable to sleep) To some extent   (!) STRESS CONCERN      2/4/2025   Nutrition   Diet: Carbohydrate counting         2/4/2025   Exercise   Days per week of moderate/strenous exercise 1 day   (!) EXERCISE CONCERN      2/4/2025   Social Factors   Frequency of gathering with friends or relatives Once a week   Worry food won't last until get money to buy more No   Food not last or not have enough money for food? No   Do you have housing? (Housing is defined as stable permanent housing and does not include staying ouside in a car, in a tent, in an abandoned building, in an overnight  shelter, or couch-surfing.) Yes   Are you worried about losing your housing? No   Lack of transportation? No   Unable to get utilities (heat,electricity)? No         2025   Fall Risk   Fallen 2 or more times in the past year? No    No   Trouble with walking or balance? Yes    Yes   Gait Speed Test (Document in seconds) 4.9   Gait Speed Test Interpretation Less than or equal to 5.00 seconds - PASS       Multiple values from one day are sorted in reverse-chronological order          2025   Activities of Daily Living- Home Safety   Needs help with the following daily activites None of the above   Safety concerns in the home None of the above         2025   Dental   Dentist two times every year? Yes         2025   Hearing Screening   Hearing concerns? None of the above         2025   Driving Risk Screening   Patient/family members have concerns about driving No         2025   General Alertness/Fatigue Screening   Have you been more tired than usual lately? (!) YES - partly mental and partly due to pain.         2025   Urinary Incontinence Screening   Bothered by leaking urine in past 6 months No         2025   TB Screening   Were you born outside of the [place]? No       Today's PHQ-9 Score:       2025     9:34 AM   PHQ-9 SCORE   PHQ-9 Total Score MyChart 9 (Mild depression)   PHQ-9 Total Score 9        Patient-reported         2025   Substance Use   Alcohol more than 3/day or more than 7/wk No   Do you have a current opioid prescription? No   How severe/bad is pain from 1 to 10? 6/10   Do you use any other substances recreationally? No     Social History     Tobacco Use    Smoking status: Former     Current packs/day: 0.00     Average packs/day: 0.5 packs/day for 5.0 years (2.5 ttl pk-yrs)     Types: Cigarettes     Start date: 1970     Quit date: 1975     Years since quittin.1     Passive exposure: Past    Smokeless tobacco: Never   Vaping Use    Vaping status: Never Used    Substance Use Topics    Alcohol use: Yes     Alcohol/week: 0.0 standard drinks of alcohol     Comment: 2-3 glass wine /week    Drug use: No           9/5/2024   LAST FHS-7 RESULTS   1st degree relative breast or ovarian cancer No   Any relative bilateral breast cancer No   Any male have breast cancer No   Any ONE woman have BOTH breast AND ovarian cancer No   Any woman with breast cancer before 50yrs No   2 or more relatives with breast AND/OR ovarian cancer No   2 or more relatives with breast AND/OR bowel cancer Yes        Mammogram Screening - Mammogram every 1-2 years updated in Health Maintenance based on mutual decision making    ASCVD Risk   The 10-year ASCVD risk score (James OSUNA, et al., 2019) is: 14.2%    Values used to calculate the score:      Age: 74 years      Sex: Female      Is Non- : No      Diabetic: No      Tobacco smoker: No      Systolic Blood Pressure: 118 mmHg      Is BP treated: Yes      HDL Cholesterol: 42 mg/dL      Total Cholesterol: 238 mg/dL            Reviewed and updated as needed this visit by Provider   Tobacco  Allergies  Meds  Problems  Med Hx  Surg Hx  Fam Hx     Sexual Activity          Labs reviewed in EPIC  Current providers sharing in care for this patient include:  Patient Care Team:  Nallely Moore MD as PCP - General (Internal Medicine - Pediatrics)  Nallely Moore MD as Assigned PCP  Mauricio Merino DO as Assigned Musculoskeletal Provider    The following health maintenance items are reviewed in Epic and correct as of today:  Health Maintenance   Topic Date Due    INFLUENZA VACCINE (1) 09/01/2024    COVID-19 Vaccine (6 - 2024-25 season) 09/01/2024    HEMOGLOBIN  11/01/2024    LIPID  06/20/2025    BMP  07/24/2025    MICROALBUMIN  07/24/2025    PHQ-9  08/04/2025    MAMMO SCREENING  09/16/2025    MEDICARE ANNUAL WELLNESS VISIT  02/04/2026    ANNUAL REVIEW OF HM ORDERS  02/04/2026    FALL RISK ASSESSMENT  02/04/2026    ECHO  "COMPLETE  02/04/2026    DTAP/TDAP/TD IMMUNIZATION (3 - Td or Tdap) 05/13/2026    DEXA  01/21/2027    GLUCOSE  07/24/2027    COLORECTAL CANCER SCREENING  05/02/2029    ADVANCE CARE PLANNING  02/04/2030    HEPATITIS C SCREENING  Completed    DEPRESSION ACTION PLAN  Completed    Pneumococcal Vaccine: 50+ Years  Completed    URINALYSIS  Completed    ZOSTER IMMUNIZATION  Completed    RSV VACCINE  Completed    HPV IMMUNIZATION  Aged Out    MENINGITIS IMMUNIZATION  Aged Out    RSV MONOCLONAL ANTIBODY  Aged Out    URINE DRUG SCREEN  Discontinued            Objective    Exam  /68 (BP Location: Right arm, Cuff Size: Adult Large)   Pulse 78   Temp 97.7  F (36.5  C) (Tympanic)   Resp 16   Ht 1.67 m (5' 5.75\")   Wt 114.1 kg (251 lb 9.6 oz)   LMP 11/12/2007 (LMP Unknown)   SpO2 95%   BMI 40.92 kg/m     Estimated body mass index is 40.92 kg/m  as calculated from the following:    Height as of this encounter: 1.67 m (5' 5.75\").    Weight as of this encounter: 114.1 kg (251 lb 9.6 oz).    Physical Exam  GENERAL: alert and no distress  EYES: Eyes grossly normal to inspection, PERRL and conjunctivae and sclerae normal  HENT: ear canals and TM's normal, nose and mouth without ulcers or lesions  NECK: no adenopathy, no asymmetry, masses, or scars  RESP: lungs clear to auscultation - no rales, rhonchi or wheezes  CV: regular rate and rhythm, normal S1 S2, no S3 or S4, no murmur, click or rub, no peripheral edema  ABDOMEN: soft, nontender, no hepatosplenomegaly, no masses and bowel sounds normal  MS: no gross musculoskeletal defects noted, no edema  SKIN: no suspicious lesions or rashes  NEURO: Normal strength and tone, mentation intact and speech normal  PSYCH: mentation appears normal, affect normal/bright        2/4/2025   Mini Cog   Clock Draw Score 2 Normal   3 Item Recall 2 objects recalled   Mini Cog Total Score 4             2/2/2024   Vision Screen   Reason Vision Screen Not Completed Patient had exam in last 12 " months       Signed Electronically by: Nallely Moore MD    Answers submitted by the patient for this visit:  Patient Health Questionnaire (Submitted on 2/4/2025)  If you checked off any problems, how difficult have these problems made it for you to do your work, take care of things at home, or get along with other people?: Not difficult at all  PHQ9 TOTAL SCORE: 9

## 2025-02-05 PROBLEM — F33.41 RECURRENT MAJOR DEPRESSIVE DISORDER, IN PARTIAL REMISSION: Status: ACTIVE | Noted: 2024-02-02

## 2025-02-05 PROBLEM — R73.03 PREDIABETES: Status: ACTIVE | Noted: 2025-02-05

## 2025-02-05 LAB
EST. AVERAGE GLUCOSE BLD GHB EST-MCNC: 128 MG/DL
HBA1C MFR BLD: 6.1 % (ref 0–5.6)

## 2025-02-28 ENCOUNTER — TRANSFERRED RECORDS (OUTPATIENT)
Dept: HEALTH INFORMATION MANAGEMENT | Facility: CLINIC | Age: 75
End: 2025-02-28
Payer: COMMERCIAL

## 2025-03-11 ENCOUNTER — MYC REFILL (OUTPATIENT)
Dept: PEDIATRICS | Facility: CLINIC | Age: 75
End: 2025-03-11
Payer: COMMERCIAL

## 2025-03-11 DIAGNOSIS — G89.29 CHRONIC BILATERAL LOW BACK PAIN WITH BILATERAL SCIATICA: ICD-10-CM

## 2025-03-11 DIAGNOSIS — M54.42 CHRONIC BILATERAL LOW BACK PAIN WITH BILATERAL SCIATICA: ICD-10-CM

## 2025-03-11 DIAGNOSIS — M54.41 CHRONIC BILATERAL LOW BACK PAIN WITH BILATERAL SCIATICA: ICD-10-CM

## 2025-03-11 RX ORDER — PREGABALIN 25 MG/1
25 CAPSULE ORAL 3 TIMES DAILY
Qty: 270 CAPSULE | Refills: 1 | Status: SHIPPED | OUTPATIENT
Start: 2025-03-11

## 2025-05-07 ENCOUNTER — PATIENT OUTREACH (OUTPATIENT)
Dept: GASTROENTEROLOGY | Facility: CLINIC | Age: 75
End: 2025-05-07
Payer: COMMERCIAL

## 2025-07-02 ENCOUNTER — TRANSFERRED RECORDS (OUTPATIENT)
Dept: HEALTH INFORMATION MANAGEMENT | Facility: CLINIC | Age: 75
End: 2025-07-02
Payer: COMMERCIAL

## 2025-07-03 PROBLEM — G31.84 MILD COGNITIVE IMPAIRMENT: Status: ACTIVE | Noted: 2025-07-03

## 2025-08-12 ENCOUNTER — OFFICE VISIT (OUTPATIENT)
Dept: PEDIATRICS | Facility: CLINIC | Age: 75
End: 2025-08-12
Payer: COMMERCIAL

## 2025-08-12 VITALS
HEART RATE: 97 BPM | BODY MASS INDEX: 41.14 KG/M2 | HEIGHT: 66 IN | WEIGHT: 256 LBS | SYSTOLIC BLOOD PRESSURE: 132 MMHG | OXYGEN SATURATION: 97 % | RESPIRATION RATE: 20 BRPM | TEMPERATURE: 96.8 F | DIASTOLIC BLOOD PRESSURE: 68 MMHG

## 2025-08-12 DIAGNOSIS — M70.61 TROCHANTERIC BURSITIS OF BOTH HIPS: ICD-10-CM

## 2025-08-12 DIAGNOSIS — F33.41 RECURRENT MAJOR DEPRESSIVE DISORDER, IN PARTIAL REMISSION: ICD-10-CM

## 2025-08-12 DIAGNOSIS — M54.42 CHRONIC BILATERAL LOW BACK PAIN WITH BILATERAL SCIATICA: ICD-10-CM

## 2025-08-12 DIAGNOSIS — M70.62 TROCHANTERIC BURSITIS OF BOTH HIPS: ICD-10-CM

## 2025-08-12 DIAGNOSIS — G31.84 MILD COGNITIVE IMPAIRMENT: Primary | ICD-10-CM

## 2025-08-12 DIAGNOSIS — G89.29 CHRONIC BILATERAL LOW BACK PAIN WITH BILATERAL SCIATICA: ICD-10-CM

## 2025-08-12 DIAGNOSIS — E78.5 HYPERLIPIDEMIA LDL GOAL <160: ICD-10-CM

## 2025-08-12 DIAGNOSIS — M54.41 CHRONIC BILATERAL LOW BACK PAIN WITH BILATERAL SCIATICA: ICD-10-CM

## 2025-08-12 PROCEDURE — 3078F DIAST BP <80 MM HG: CPT | Performed by: INTERNAL MEDICINE

## 2025-08-12 PROCEDURE — 3075F SYST BP GE 130 - 139MM HG: CPT | Performed by: INTERNAL MEDICINE

## 2025-08-12 PROCEDURE — G2211 COMPLEX E/M VISIT ADD ON: HCPCS | Performed by: INTERNAL MEDICINE

## 2025-08-12 PROCEDURE — 1125F AMNT PAIN NOTED PAIN PRSNT: CPT | Performed by: INTERNAL MEDICINE

## 2025-08-12 PROCEDURE — 99215 OFFICE O/P EST HI 40 MIN: CPT | Performed by: INTERNAL MEDICINE

## 2025-08-12 RX ORDER — PREGABALIN 50 MG/1
50 CAPSULE ORAL AT BEDTIME
Qty: 90 CAPSULE | Refills: 1 | Status: SHIPPED | OUTPATIENT
Start: 2025-08-12

## 2025-08-12 RX ORDER — ROSUVASTATIN CALCIUM 40 MG/1
40 TABLET, COATED ORAL DAILY
Qty: 90 TABLET | Refills: 2 | Status: SHIPPED | OUTPATIENT
Start: 2025-08-12

## 2025-08-12 RX ORDER — DONEPEZIL HYDROCHLORIDE 10 MG/1
10 TABLET, FILM COATED ORAL AT BEDTIME
COMMUNITY

## 2025-08-12 ASSESSMENT — ANXIETY QUESTIONNAIRES
3. WORRYING TOO MUCH ABOUT DIFFERENT THINGS: SEVERAL DAYS
7. FEELING AFRAID AS IF SOMETHING AWFUL MIGHT HAPPEN: SEVERAL DAYS
GAD7 TOTAL SCORE: 7
4. TROUBLE RELAXING: SEVERAL DAYS
2. NOT BEING ABLE TO STOP OR CONTROL WORRYING: SEVERAL DAYS
7. FEELING AFRAID AS IF SOMETHING AWFUL MIGHT HAPPEN: SEVERAL DAYS
GAD7 TOTAL SCORE: 7
6. BECOMING EASILY ANNOYED OR IRRITABLE: SEVERAL DAYS
IF YOU CHECKED OFF ANY PROBLEMS ON THIS QUESTIONNAIRE, HOW DIFFICULT HAVE THESE PROBLEMS MADE IT FOR YOU TO DO YOUR WORK, TAKE CARE OF THINGS AT HOME, OR GET ALONG WITH OTHER PEOPLE: SOMEWHAT DIFFICULT
8. IF YOU CHECKED OFF ANY PROBLEMS, HOW DIFFICULT HAVE THESE MADE IT FOR YOU TO DO YOUR WORK, TAKE CARE OF THINGS AT HOME, OR GET ALONG WITH OTHER PEOPLE?: SOMEWHAT DIFFICULT
5. BEING SO RESTLESS THAT IT IS HARD TO SIT STILL: NOT AT ALL
1. FEELING NERVOUS, ANXIOUS, OR ON EDGE: MORE THAN HALF THE DAYS
GAD7 TOTAL SCORE: 7

## 2025-08-12 ASSESSMENT — PAIN SCALES - GENERAL: PAINLEVEL_OUTOF10: MILD PAIN (3)

## 2025-08-13 ENCOUNTER — PATIENT OUTREACH (OUTPATIENT)
Dept: CARE COORDINATION | Facility: CLINIC | Age: 75
End: 2025-08-13
Payer: COMMERCIAL

## 2025-08-18 ENCOUNTER — PATIENT OUTREACH (OUTPATIENT)
Dept: CARE COORDINATION | Facility: CLINIC | Age: 75
End: 2025-08-18
Payer: COMMERCIAL

## 2025-08-19 ENCOUNTER — OFFICE VISIT (OUTPATIENT)
Dept: ORTHOPEDICS | Facility: CLINIC | Age: 75
End: 2025-08-19
Attending: INTERNAL MEDICINE
Payer: COMMERCIAL

## 2025-08-19 DIAGNOSIS — M70.62 TROCHANTERIC BURSITIS OF BOTH HIPS: Primary | ICD-10-CM

## 2025-08-19 DIAGNOSIS — M70.61 TROCHANTERIC BURSITIS OF BOTH HIPS: Primary | ICD-10-CM

## 2025-08-19 PROCEDURE — 99213 OFFICE O/P EST LOW 20 MIN: CPT | Mod: 25 | Performed by: STUDENT IN AN ORGANIZED HEALTH CARE EDUCATION/TRAINING PROGRAM

## 2025-08-19 PROCEDURE — 20611 DRAIN/INJ JOINT/BURSA W/US: CPT | Mod: RT | Performed by: STUDENT IN AN ORGANIZED HEALTH CARE EDUCATION/TRAINING PROGRAM

## 2025-08-19 RX ORDER — LIDOCAINE HYDROCHLORIDE 10 MG/ML
3 INJECTION, SOLUTION INFILTRATION; PERINEURAL
Status: COMPLETED | OUTPATIENT
Start: 2025-08-19 | End: 2025-08-19

## 2025-08-19 RX ORDER — BETAMETHASONE SODIUM PHOSPHATE AND BETAMETHASONE ACETATE 3; 3 MG/ML; MG/ML
6 INJECTION, SUSPENSION INTRA-ARTICULAR; INTRALESIONAL; INTRAMUSCULAR; SOFT TISSUE
Status: COMPLETED | OUTPATIENT
Start: 2025-08-19 | End: 2025-08-19

## 2025-08-19 RX ORDER — ROPIVACAINE HYDROCHLORIDE 5 MG/ML
2 INJECTION, SOLUTION EPIDURAL; INFILTRATION; PERINEURAL
Status: COMPLETED | OUTPATIENT
Start: 2025-08-19 | End: 2025-08-19

## 2025-08-19 RX ADMIN — BETAMETHASONE SODIUM PHOSPHATE AND BETAMETHASONE ACETATE 6 MG: 3; 3 INJECTION, SUSPENSION INTRA-ARTICULAR; INTRALESIONAL; INTRAMUSCULAR; SOFT TISSUE at 09:55

## 2025-08-19 RX ADMIN — LIDOCAINE HYDROCHLORIDE 3 ML: 10 INJECTION, SOLUTION INFILTRATION; PERINEURAL at 09:55

## 2025-08-19 RX ADMIN — ROPIVACAINE HYDROCHLORIDE 2 ML: 5 INJECTION, SOLUTION EPIDURAL; INFILTRATION; PERINEURAL at 09:55

## (undated) DEVICE — SYR 05ML LL W/O NDL

## (undated) DEVICE — NDL SPINAL 22GA 3.5" QUINCKE 405181

## (undated) DEVICE — CATH TRAY FOLEY SURESTEP 16FR W/URNE MTR STLK LATEX A303316A

## (undated) DEVICE — IOM 15 MIN UP TO 7 HOURS

## (undated) DEVICE — DRAPE IOBAN INCISE 23X17" 6650EZ

## (undated) DEVICE — PACK SET-UP STD 9102

## (undated) DEVICE — CATH IV ANGIO INTRO 12GA 382277

## (undated) DEVICE — SYR 20ML LL W/O NDL 302830

## (undated) DEVICE — NDL BLUNT 18GA 1" W/O FILTER 305181

## (undated) DEVICE — ESU ELEC BLADE 6" COATED E1450-6

## (undated) DEVICE — ESU CLEANER TIP 31142717

## (undated) DEVICE — SU VICRYL 2-0 CT-1 CR 8X27" UND JJ42G

## (undated) DEVICE — NIM PROBE PRASS STIMULATOR PROTECTED TIP 8225101

## (undated) DEVICE — SU VICRYL 0 CT-1 27" J340H

## (undated) DEVICE — CUSHION INSERT LG PRONE VIEW JACKSON TABLE

## (undated) DEVICE — KIT ENDO TURNOVER/PROCEDURE W/CLEAN A SCOPE LINERS 103888

## (undated) DEVICE — LINEN ORTHO ACL PACK 5447

## (undated) DEVICE — DRAPE STERI TOWEL LG 1010

## (undated) DEVICE — SOL NACL 0.9% 20ML VIAL

## (undated) DEVICE — BLADE CLIPPER SGL USE 9680

## (undated) DEVICE — DRSG ADAPTIC 3X8" 6113

## (undated) DEVICE — ESU GROUND PAD ADULT W/CORD E7507

## (undated) DEVICE — TUBING ACP CQ FOR JACKSON TABLE SPINE 5996-35

## (undated) DEVICE — LABEL MEDICATION SYSTEM 3303-P

## (undated) DEVICE — PREP POVIDONE IODINE SOLUTION 10% 4OZ

## (undated) DEVICE — SPONGE SURGIFOAM 100 1974

## (undated) DEVICE — PACK SMALL SPINE RIDGES

## (undated) DEVICE — DRAIN HEMOVAC RESERVOIR KIT 10FR 1/8" MED 00-2550-002-10

## (undated) DEVICE — MIDAS REX DISSECTING TOOL  14MH30

## (undated) DEVICE — NDL BLUNT 18GA 1.5" FILTER 305211

## (undated) DEVICE — STPL SKIN 35W APPOSE 8886803712

## (undated) DEVICE — LINEN DRAPE 54X72" 5467

## (undated) DEVICE — SYR 10ML SLIP TIP W/O NDL 303134

## (undated) DEVICE — DRAPE C-ARM 60X42" 1013

## (undated) DEVICE — DRSG ABDOMINAL 07 1/2X8" 7197D

## (undated) DEVICE — PREP DURAPREP 26ML APL 8630

## (undated) DEVICE — BAG CLEAR TRASH 1.3M 39X33" P4040C

## (undated) DEVICE — SUCTION TIP POOLE K770

## (undated) DEVICE — GOWN IMPERVIOUS SPECIALTY XLG/XLONG 32474

## (undated) DEVICE — Device

## (undated) DEVICE — LINEN POUCH DBL 5427

## (undated) DEVICE — GLOVE PROTEXIS POWDER FREE 8.5 ORTHOPEDIC 2D73ET85

## (undated) DEVICE — DECANTER BAG 2002S

## (undated) DEVICE — SUCTION MANIFOLD NEPTUNE 2 SYS 4 PORT 0702-020-000

## (undated) DEVICE — SPONGE SURGIFOAM 01GM POWDER 1978

## (undated) DEVICE — SU VICRYL 1 MO-4 18" J702D

## (undated) DEVICE — SOL WATER IRRIG 1000ML BOTTLE 2F7114

## (undated) DEVICE — GLOVE PROTEXIS POWDER FREE 7.5 ORTHOPEDIC 2D73ET75

## (undated) RX ORDER — FENTANYL CITRATE 50 UG/ML
INJECTION, SOLUTION INTRAMUSCULAR; INTRAVENOUS
Status: DISPENSED
Start: 2019-06-04

## (undated) RX ORDER — DEXAMETHASONE SODIUM PHOSPHATE 4 MG/ML
INJECTION, SOLUTION INTRA-ARTICULAR; INTRALESIONAL; INTRAMUSCULAR; INTRAVENOUS; SOFT TISSUE
Status: DISPENSED
Start: 2018-07-03

## (undated) RX ORDER — FENTANYL CITRATE 50 UG/ML
INJECTION, SOLUTION INTRAMUSCULAR; INTRAVENOUS
Status: DISPENSED
Start: 2024-05-02

## (undated) RX ORDER — CEFAZOLIN SODIUM 2 G/100ML
INJECTION, SOLUTION INTRAVENOUS
Status: DISPENSED
Start: 2018-07-03

## (undated) RX ORDER — PHENYLEPHRINE HCL IN 0.9% NACL 1 MG/10 ML
SYRINGE (ML) INTRAVENOUS
Status: DISPENSED
Start: 2018-07-03

## (undated) RX ORDER — HYDROMORPHONE HYDROCHLORIDE 1 MG/ML
INJECTION, SOLUTION INTRAMUSCULAR; INTRAVENOUS; SUBCUTANEOUS
Status: DISPENSED
Start: 2018-07-03

## (undated) RX ORDER — LIDOCAINE HYDROCHLORIDE 10 MG/ML
INJECTION, SOLUTION EPIDURAL; INFILTRATION; INTRACAUDAL; PERINEURAL
Status: DISPENSED
Start: 2023-05-04

## (undated) RX ORDER — GLYCOPYRROLATE 0.2 MG/ML
INJECTION INTRAMUSCULAR; INTRAVENOUS
Status: DISPENSED
Start: 2018-07-03

## (undated) RX ORDER — BUPIVACAINE HYDROCHLORIDE 2.5 MG/ML
INJECTION, SOLUTION EPIDURAL; INFILTRATION; INTRACAUDAL
Status: DISPENSED
Start: 2018-07-03

## (undated) RX ORDER — FENTANYL CITRATE 50 UG/ML
INJECTION, SOLUTION INTRAMUSCULAR; INTRAVENOUS
Status: DISPENSED
Start: 2018-07-03

## (undated) RX ORDER — ONDANSETRON 2 MG/ML
INJECTION INTRAMUSCULAR; INTRAVENOUS
Status: DISPENSED
Start: 2018-07-03

## (undated) RX ORDER — LIDOCAINE HYDROCHLORIDE 10 MG/ML
INJECTION, SOLUTION EPIDURAL; INFILTRATION; INTRACAUDAL; PERINEURAL
Status: DISPENSED
Start: 2018-07-03

## (undated) RX ORDER — METOPROLOL TARTRATE 1 MG/ML
INJECTION, SOLUTION INTRAVENOUS
Status: DISPENSED
Start: 2018-07-03

## (undated) RX ORDER — EPHEDRINE SULFATE 50 MG/ML
INJECTION, SOLUTION INTRAMUSCULAR; INTRAVENOUS; SUBCUTANEOUS
Status: DISPENSED
Start: 2018-07-03

## (undated) RX ORDER — TRIAMCINOLONE ACETONIDE 40 MG/ML
INJECTION, SUSPENSION INTRA-ARTICULAR; INTRAMUSCULAR
Status: DISPENSED
Start: 2023-05-04

## (undated) RX ORDER — TOBRAMYCIN 1.2 G/30ML
INJECTION, POWDER, LYOPHILIZED, FOR SOLUTION INTRAVENOUS
Status: DISPENSED
Start: 2018-07-03

## (undated) RX ORDER — PROPOFOL 10 MG/ML
INJECTION, EMULSION INTRAVENOUS
Status: DISPENSED
Start: 2018-07-03